# Patient Record
Sex: FEMALE | Race: BLACK OR AFRICAN AMERICAN | Employment: OTHER | RURAL
[De-identification: names, ages, dates, MRNs, and addresses within clinical notes are randomized per-mention and may not be internally consistent; named-entity substitution may affect disease eponyms.]

---

## 2017-02-02 RX ORDER — DILTIAZEM HYDROCHLORIDE 180 MG/1
CAPSULE, COATED, EXTENDED RELEASE ORAL
Qty: 30 CAP | Refills: 4 | Status: SHIPPED | OUTPATIENT
Start: 2017-02-02 | End: 2017-07-03 | Stop reason: SDUPTHER

## 2017-02-02 RX ORDER — FUROSEMIDE 20 MG/1
TABLET ORAL
Qty: 30 TAB | Refills: 4 | Status: SHIPPED | OUTPATIENT
Start: 2017-02-02 | End: 2017-07-03 | Stop reason: SDUPTHER

## 2017-03-07 ENCOUNTER — OFFICE VISIT (OUTPATIENT)
Dept: FAMILY MEDICINE CLINIC | Age: 82
End: 2017-03-07

## 2017-03-07 VITALS
BODY MASS INDEX: 35.19 KG/M2 | OXYGEN SATURATION: 96 % | HEART RATE: 60 BPM | RESPIRATION RATE: 18 BRPM | DIASTOLIC BLOOD PRESSURE: 69 MMHG | TEMPERATURE: 97.2 F | WEIGHT: 186.38 LBS | HEIGHT: 61 IN | SYSTOLIC BLOOD PRESSURE: 174 MMHG

## 2017-03-07 DIAGNOSIS — H65.01 RIGHT ACUTE SEROUS OTITIS MEDIA, RECURRENCE NOT SPECIFIED: ICD-10-CM

## 2017-03-07 DIAGNOSIS — E11.9 WELL CONTROLLED TYPE 2 DIABETES MELLITUS (HCC): Primary | ICD-10-CM

## 2017-03-07 DIAGNOSIS — I10 ESSENTIAL HYPERTENSION: ICD-10-CM

## 2017-03-07 RX ORDER — AMOXICILLIN 875 MG/1
875 TABLET, FILM COATED ORAL 2 TIMES DAILY
Qty: 20 TAB | Refills: 0 | Status: SHIPPED | OUTPATIENT
Start: 2017-03-07 | End: 2017-03-17

## 2017-03-07 RX ORDER — GLIMEPIRIDE 1 MG/1
1 TABLET ORAL
Qty: 90 TAB | Refills: 0 | Status: SHIPPED | OUTPATIENT
Start: 2017-03-07 | End: 2017-06-02 | Stop reason: SDUPTHER

## 2017-03-07 NOTE — MR AVS SNAPSHOT
Visit Information Date & Time Provider Department Dept. Phone Encounter #  
 3/7/2017  1:40 PM Jonathan Ordaz MD [place] [facility] [phone] [id number] Your Appointments 6/6/2017  1:40 PM  
Medicare Physical with MD [facility]) Appt Note: 3 mo f/u  
 [address] [phone]  
  
   
 [address] [phone] Upcoming Health Maintenance Date Due  
 FOOT EXAM Q1 10/5/2016 HEMOGLOBIN A1C Q6M 6/6/2017 Pneumococcal 65+ Low/Medium Risk (2 of 2 - PCV13) 6/7/2017 MICROALBUMIN Q1 6/7/2017 MEDICARE YEARLY EXAM 6/8/2017 EYE EXAM RETINAL OR DILATED Q1 7/14/2017 LIPID PANEL Q1 9/7/2017 GLAUCOMA SCREENING Q2Y 7/14/2018 DTaP/Tdap/Td series (2 - Td) 6/7/2026 Allergies as of 3/7/2017  Review Complete On: 3/7/2017 By: Jonathan Ordaz MD  
  
 Severity Noted Reaction Type Reactions Metformin  01/27/2014    Other (comments) Kidneys effected Current Immunizations  Reviewed on 11/7/2014 Name Date Influenza Vaccine 11/7/2014, 10/25/2013, 10/22/2012 Influenza Vaccine Kenneth Nicolasa) 12/6/2016, 10/2/2015 Pneumococcal Polysaccharide (PPSV-23) 6/7/2016 Not reviewed this visit You Were Diagnosed With   
  
 Codes Comments Well controlled type 2 [facility]    -  Primary ICD-10-CM: E11.9 ICD-9-CM: 250.00 Essential hypertension     ICD-10-CM: I10 
ICD-9-CM: 401.9 Right acute serous otitis media, recurrence not specified     ICD-10-CM: H65.01 
ICD-9-CM: 381.01 Vitals BP Pulse Temp Resp Height(growth percentile) Weight(growth percentile) 174/69 60 97.2 °F (36.2 °C) (Oral) 18 5' 1\" (1.549 m) 186 lb 6 oz (84.5 kg) SpO2 BMI OB Status Smoking Status 96% 35.22 kg/m2 Hysterectomy Never Smoker Vitals History BMI and BSA Data Body Mass Index Body Surface Area 35.22 kg/m 2 1.91 m 2 Preferred Pharmacy Pharmacy Name Phone [id number] [place], [place] - [address] Your Updated Medication List  
  
   
This list is accurate as of: 3/7/17  1:59 PM.  Always use your most recent med list.  
  
  
  
  
 acetaminophen 500 mg tablet Commonly known as:  [address] Take 1 Tab by mouth every six (6) hours as needed for Pain.  
  
 amoxicillin 875 mg tablet Commonly known as:  AMOXIL Take 1 Tab by mouth two (2) times a day for 10 days. aspirin delayed-release 81 mg tablet Take 1 Tab by mouth daily. dilTIAZem  mg ER capsule Commonly known as:  CARTIA XT  
ONE ORALLY DAILY  
  
 fluticasone 50 mcg/actuation nasal spray Commonly known as:  Shelvia Birks SPRAY 1 PUFF IN EACH NOSTRIL ONCE DAILY  
  
 furosemide 20 mg tablet Commonly known as:  LASIX TAKE ONE TABLET BY MOUTH ONCE DAILY IN THE MORNING  
  
 glimepiride 1 mg tablet Commonly known as:  AMARYL Take 1 Tab by mouth Daily (before breakfast). lisinopril-hydroCHLOROthiazide 20-25 mg per tablet Commonly known as:  Lopez Kemps Take 1 Tab by mouth daily. lovastatin 40 mg tablet Commonly known as:  MEVACOR  
TAKE 1 TAB BY MOUTH NIGHTLY FOR CHOLESTEROL LOWERING  
  
 MULTIVITAMIN PO Take 3,000 Units by mouth daily. omeprazole 40 mg capsule Commonly known as:  PRILOSEC  
TAKE ONE CAPSULE BY MOUTH EVERY MORNING TO CONTROL STOMACH ACID AND REPLACE PANTOPRAZOLE  
  
 spironolactone 25 mg tablet Commonly known as:  ALDACTONE  
TAKE 1/2 TABLET ONCE DAILY  
  
 triamcinolone acetonide 0.1 % topical cream  
Commonly known as:  KENALOG Apply  to affected area two (2) times a day. use thin layer  
  
 valsartan 320 mg tablet Commonly known as:  DIOVAN  
TAKE ONE TABLET ONCE DAILY FOR BLOOD PRESSURE CONTROL Prescriptions Sent to Pharmacy  Refills  
 glimepiride (AMARYL) 1 mg tablet 0  
 Sig: Take 1 Tab by mouth Daily (before breakfast). Class: Normal  
 Pharmacy: [facility] [address] Ph #: [phone] Route: Oral  
 amoxicillin (AMOXIL) 875 mg tablet 0 Sig: Take 1 Tab by mouth two (2) times a day for 10 days. Class: Normal  
 Pharmacy: [facility] Ph #: [phone] Route: Oral  
  
We Performed the Following COLLECTION VENOUS BLOOD,VENIPUNCTURE A2098649 CPT(R)] HEMOGLOBIN A1C WITH EAG [11424 CPT(R)]  DIABETES FOOT EXAM [HM7 Custom] METABOLIC PANEL, COMPREHENSIVE [10191 CPT(R)] Introducing [facility]! Zheng Covington introduces [employer] patient portal. Now you can access parts of your medical record, email your doctor's office, and request medication refills online. 1. In your internet browser, go to [email or web address] 2. Click on the First Time User? Click Here link in the Sign In box. You will see the New Member Sign Up page. 3. Enter your Song[employer] Access Code exactly as it appears below. You will not need to use this code after youve completed the sign-up process. If you do not sign up before the expiration date, you must request a new code. · Song[employer] Access Code: [email or web address] Expires: 6/5/2017  1:59 PM 
 
4. Enter the last four digits of your Social Security Number (xxxx) and Date of Birth (mm/dd/yyyy) as indicated and click Submit. You will be taken to the next sign-up page. 5. Create a [employer] ID. This will be your Songza login ID and cannot be changed, so think of one that is secure and easy to remember. 6. Create a [employer] password. You can change your password at any time. 7. Enter your Password Reset Question and Answer. This can be used at a later time if you forget your password. 8. Enter your e-mail address. You will receive e-mail notification when new information is available in [address] 9. Click Sign Up. You can now view and download portions of your medical record. 10. Click the Download Summary menu link to download a portable copy of your medical information. If you have questions, please visit the Frequently Asked Questions section of the DriftToIt website. Remember, DriftToIt is NOT to be used for urgent needs. For medical emergencies, dial 911. Now available from your iPhone and Android! Please provide this summary of care documentation to your next provider. Your primary care clinician is listed as Cassi Myrick. If you have any questions after today's visit, please call [phone].

## 2017-03-07 NOTE — PROGRESS NOTES
HISTORY OF PRESENT ILLNESS  Mariluz Blackmon is a 80 y.o. female. Ear Pain   Pertinent negatives include no chest pain, no abdominal pain, no headaches and no shortness of breath. Diabetes  Mariluz Blackmon is a 80 y.o. female who is here for a routine diabetic check. Current blood sugars at home have been 90-120s fasting. Lab Results   Component Value Date/Time    Hemoglobin A1c 8.0 12/06/2016 01:54 PM   There have been no episodes of hypoglycemia. Now she is back on Januvia as her A1c was up last visit. This has been very expensive for her and she would like to have something else. Last eye exam was this past summer. Checking feet daily. Hypertension   Has been following a low sodium diet   Taking meds daily  . Denies chest pain or shortness of breath. Hyperlipidemia  On medication for high cholesterol. No myalgias noted. Taking meds daily    Lab Results   Component Value Date/Time    Cholesterol, total 137 09/07/2016 03:24 PM    HDL Cholesterol 40 09/07/2016 03:24 PM    LDL, calculated 75 09/07/2016 03:24 PM    VLDL, calculated 22 09/07/2016 03:24 PM    Triglyceride 109 09/07/2016 03:24 PM     She is c/o right ear pain. Started a few days ago. Has recurrent OM. Using Flonase. Review of Systems   Constitutional: Negative for chills, fever and malaise/fatigue. HENT: Positive for congestion and ear pain. Negative for sore throat. Respiratory: Negative for cough and shortness of breath. Cardiovascular: Negative for chest pain, palpitations and leg swelling. Gastrointestinal: Negative for abdominal pain. Musculoskeletal: Positive for back pain and joint pain. Negative for falls and neck pain. Skin: Negative for rash. Neurological: Negative for dizziness and headaches. Psychiatric/Behavioral: Negative for depression.      Past Medical History:   Diagnosis Date    Anxiety     Back pain     CAD (coronary artery disease)     CKD (chronic kidney disease) stage 3, GFR 30-59 ml/min     Constipation     Diabetes ([facility]     Diverticulosis 2007    DM (diabetes mellitus) ([facility]     AODM    Fibroid 1972    GERD (gastroesophageal reflux disease)     Goiter, non-toxic     Hernia, hiatal     Hyperlipemia     Hypertension     Hypokalemia     Osteoarthritis 2006    bursitis R shoulder    Osteoarthritis of right knee     PVC (premature ventricular contraction)     H/O PVC's    Raynaud disease     Venous stasis     Wears hearing aid     both ears     Past Surgical History:   Procedure Laterality Date    HX BREAST BIOPSY      x5    HX CATARACT REMOVAL  2004    bilateral    HX COLONOSCOPY  2002, 08/2007    HX DILATION AND CURETTAGE  1960's    HX HYSTERECTOMY  70's    BSO    HX OTHER SURGICAL  2001    sigmoidoscopy     Allergies   Allergen Reactions    Metformin Other (comments)     Kidneys effected     Blood pressure 174/69, pulse 60, temperature 97.2 °F (36.2 °C), temperature source Oral, resp. rate 18, height 5' 1\" (1.549 m), weight 186 lb 6 oz (84.5 kg), SpO2 96 %. Physical Exam   Constitutional: She is oriented to person, place, and time. She appears well-developed and well-nourished. No distress. HENT:   Head: Normocephalic and atraumatic. Left Ear: External ear normal.   Mouth/Throat: Oropharynx is clear and moist.   Right TM red with poor LR   Eyes: Conjunctivae are normal.   Neck: Neck supple. Cardiovascular: Normal rate and regular rhythm. Murmur heard. Usual III/VI murmur   Pulmonary/Chest: Effort normal and breath sounds normal. No respiratory distress. Abdominal: Soft. Lymphadenopathy:     She has no cervical adenopathy. Neurological: She is alert and oriented to person, place, and time. Skin: Skin is warm. Psychiatric: She has a normal mood and affect. Nursing note and vitals reviewed.     Diabetic foot exam:     Left:    Sharp/dull discrimination normal    Filament test normal sensation with micro filament   Pulse DP: 2+ (normal)   Pulse PT: 2+ (normal)   Deformities: None  Right:    Sharp/dull discrimination normal   Filament test normal sensation with micro filament   Pulse DP: 2+ (normal)   Pulse PT: 2+ (normal)   Deformities: None    ASSESSMENT and PLAN  DM   Check A1c and CMP   Regular eye and foot exams   Discussed diet and exercise   Stop Januvia   Start Amaryl 1mg daily   Warned of s/sx hypoglycemia- if this occurs she is to stop Amaryl  HTN   Low sodium diet   Continue present meds  Hyperlipidemia   Low fat diet   Continue meds  OM   Amoxil 875mg BID x 10 days   RTO if no improvement  RTO 3 months

## 2017-03-08 LAB
ALBUMIN SERPL-MCNC: 3.7 G/DL (ref 3.5–4.7)
ALBUMIN/GLOB SERPL: 1.1 {RATIO} (ref 1.1–2.5)
ALP SERPL-CCNC: 57 IU/L (ref 39–117)
ALT SERPL-CCNC: 5 IU/L (ref 0–32)
AST SERPL-CCNC: 15 IU/L (ref 0–40)
BILIRUB SERPL-MCNC: 0.3 MG/DL (ref 0–1.2)
BUN SERPL-MCNC: 35 MG/DL (ref 8–27)
BUN/CREAT SERPL: 21 (ref 11–26)
CALCIUM SERPL-MCNC: 10.6 MG/DL (ref 8.7–10.3)
CHLORIDE SERPL-SCNC: 102 MMOL/L (ref 96–106)
CO2 SERPL-SCNC: 26 MMOL/L (ref 18–29)
CREAT SERPL-MCNC: 1.65 MG/DL (ref 0.57–1)
EST. AVERAGE GLUCOSE BLD GHB EST-MCNC: 148 MG/DL
GLOBULIN SER CALC-MCNC: 3.4 G/DL (ref 1.5–4.5)
GLUCOSE SERPL-MCNC: 124 MG/DL (ref 65–99)
HBA1C MFR BLD: 6.8 % (ref 4.8–5.6)
INTERPRETATION: NORMAL
POTASSIUM SERPL-SCNC: 3.9 MMOL/L (ref 3.5–5.2)
PROT SERPL-MCNC: 7.1 G/DL (ref 6–8.5)
SODIUM SERPL-SCNC: 141 MMOL/L (ref 134–144)

## 2017-03-08 NOTE — PROGRESS NOTES
Labs are stable. Her A1c is stable as well as her kidney function. Take the low dose Amaryl. Monitor sugars at home.  RTO 3 months

## 2017-06-06 ENCOUNTER — OFFICE VISIT (OUTPATIENT)
Dept: FAMILY MEDICINE CLINIC | Age: 82
End: 2017-06-06

## 2017-06-06 VITALS
BODY MASS INDEX: 35.21 KG/M2 | RESPIRATION RATE: 18 BRPM | OXYGEN SATURATION: 99 % | WEIGHT: 186.5 LBS | SYSTOLIC BLOOD PRESSURE: 144 MMHG | DIASTOLIC BLOOD PRESSURE: 80 MMHG | HEIGHT: 61 IN | HEART RATE: 50 BPM

## 2017-06-06 DIAGNOSIS — E78.5 HYPERLIPIDEMIA, UNSPECIFIED HYPERLIPIDEMIA TYPE: ICD-10-CM

## 2017-06-06 DIAGNOSIS — E11.9 WELL CONTROLLED TYPE 2 DIABETES MELLITUS (HCC): Primary | ICD-10-CM

## 2017-06-06 DIAGNOSIS — Z71.89 ADVANCE CARE PLANNING: ICD-10-CM

## 2017-06-06 DIAGNOSIS — Z00.00 ROUTINE GENERAL MEDICAL EXAMINATION AT A HEALTH CARE FACILITY: ICD-10-CM

## 2017-06-06 DIAGNOSIS — I10 ESSENTIAL HYPERTENSION: ICD-10-CM

## 2017-06-06 DIAGNOSIS — Z13.39 SCREENING FOR ALCOHOLISM: ICD-10-CM

## 2017-06-06 NOTE — PROGRESS NOTES
This is a Subsequent Medicare Annual Wellness Visit providing Personalized Prevention Plan Services (PPPS) (Performed 12 months after initial AWV and PPPS )    I have reviewed the patient's medical history in detail and updated the computerized patient record. History     Diabetes  Baldo Naima is a 80 y.o. female who is here for a routine diabetic check. Current blood sugars at home have been  fasting . Lab Results   Component Value Date/Time    Hemoglobin A1c 6.8 03/07/2017 01:55 PM   There have been no episodes of hypoglycemia. Taking meds daily   Last eye exam was last summer and she has an appointment in July. Checking feet daily      Hypertension   Has been following a low sodium diet  Exercises a few times per week. Taking meds daily  . Denies chest pain or shortness of breath. Hyperlipidemia  On medication for high cholesterol. No myalgias noted.   Taking meds daily    Lab Results   Component Value Date/Time    Cholesterol, total 137 09/07/2016 03:24 PM    HDL Cholesterol 40 09/07/2016 03:24 PM    LDL, calculated 75 09/07/2016 03:24 PM    VLDL, calculated 22 09/07/2016 03:24 PM    Triglyceride 109 09/07/2016 03:24 PM     CAD  No CP or SOB    Past Medical History:   Diagnosis Date    Anxiety     Back pain     CAD (coronary artery disease)     CKD (chronic kidney disease) stage 3, GFR 30-59 ml/min     Constipation     Diabetes ([address]     Diverticulosis 2007    DM (diabetes mellitus) ([address]     AODM    Fibroid 1972    GERD (gastroesophageal reflux disease)     Goiter, non-toxic     Hernia, hiatal     Hyperlipemia     Hypertension     Hypokalemia     Osteoarthritis 2006    bursitis R shoulder    Osteoarthritis of right knee     PVC (premature ventricular contraction)     H/O PVC's    Raynaud disease     Venous stasis     Wears hearing aid     both ears      Past Surgical History:   Procedure Laterality Date    HX BREAST BIOPSY      x5    HX CATARACT REMOVAL  2004 bilateral    HX COLONOSCOPY  2002, 08/2007    HX DILATION AND CURETTAGE  1960's    HX HYSTERECTOMY  70's    BSO    HX OTHER SURGICAL  2001    sigmoidoscopy     Current Outpatient Prescriptions   Medication Sig Dispense Refill    valsartan (DIOVAN) 320 mg tablet TAKE ONE TABLET ONCE DAILY FOR BLOOD PRESSURE CONTROL 90 Tab 0    glimepiride (AMARYL) 1 mg tablet TAKE 1 TAB BY MOUTH DAILY (BEFORE BREAKFAST). 90 Tab 0    lovastatin (MEVACOR) 40 mg tablet TAKE 1 TAB BY MOUTH NIGHTLY FOR CHOLESTEROL LOWERING 30 Tab 0    omeprazole (PRILOSEC) 40 mg capsule TAKE ONE CAPSULE BY MOUTH EVERY MORNING TO CONTROL STOMACH ACID 90 Cap 0    dilTIAZem CD (CARTIA XT) 180 mg ER capsule ONE ORALLY DAILY 30 Cap 4    furosemide (LASIX) 20 mg tablet TAKE ONE TABLET BY MOUTH ONCE DAILY IN THE MORNING 30 Tab 4    triamcinolone acetonide (KENALOG) 0.1 % topical cream Apply  to affected area two (2) times a day. use thin layer      fluticasone (FLONASE) 50 mcg/actuation nasal spray SPRAY 1 PUFF IN EACH NOSTRIL ONCE DAILY 16 g 3    spironolactone (ALDACTONE) 25 mg tablet TAKE 1/2 TABLET ONCE DAILY 15 Tab 5    aspirin delayed-release 81 mg tablet Take 1 Tab by mouth daily. 30 Tab 0    acetaminophen (TYLENOL EXTRA STRENGTH) 500 mg tablet Take 1 Tab by mouth every six (6) hours as needed for Pain. 30 Tab 1    MULTIVITAMIN PO Take 3,000 Units by mouth daily.        Allergies   Allergen Reactions    Metformin Other (comments)     Kidneys effected     Family History   Problem Relation Age of Onset    Diabetes Brother     Hypertension Mother     Diabetes Brother      Social History   Substance Use Topics    Smoking status: Never Smoker    Smokeless tobacco: Never Used    Alcohol use No     Patient Active Problem List   Diagnosis Code    Hyperlipemia E78.5    Encounter for long-term (current) use of other medications Z79.899    Well controlled type 2 diabetes mellitus ([facility] E11.9    Essential hypertension I10       Depression Risk Factor Screening:     PHQ over the last two weeks 6/6/2017   PHQ Not Done Patient Decline   Little interest or pleasure in doing things -   Feeling down, depressed or hopeless -   Total Score PHQ 2 -     Alcohol Risk Factor Screening: On any occasion during the past 3 months, have you had more than 3 drinks containing alcohol? No    Do you average more than 7 drinks per week? No      Functional Ability and Level of Safety:     Functional Ability:   Does the patient exhibit a steady gait?  nono   How long did it take the patient to get up and walk from a sitting position? 7 sec   Is the patient self reliant?  (ie can do own laundry, meals, household chores)  yes     Does the patient handle his/her own medications? yes     Does the patient handle his/her own money? yes     Is the patients home safe (ie good lighting, handrails on stairs and bath, etc.)? yes     Did you notice or did patient express any hearing difficulties? yes     Did you notice or did patient express any vision difficulties?   no     Were distance and reading eye charts used? no       Advance Care Planning:   Patient was offered the opportunity to discuss advance care planning:  yes     Does patient have an Advance Directive:  no   If no, did you provide information on Caring Connections?   yes     ADL Assessment 6/6/2017   Feeding yourself No Help Needed   Getting from bed to chair No Help Needed   Getting dressed No Help Needed   Bathing or showering No Help Needed   Walk across the room (includes cane/walker) No Help Needed   Using the telphone No Help Needed   Taking your medications No Help Needed   Preparing meals No Help Needed   Managing money (expenses/bills) No Help Needed   Moderately strenuous housework (laundry) No Help Needed   Shopping for personal items (toiletries/medicines) No Help Needed   Shopping for groceries No Help Needed   Driving No Help Needed   Climbing a flight of stairs No Help Needed   Getting to places beyond walking distances No Help Needed         Hearing Loss   normal-to-mild    Activities of Daily Living   Self-care. Requires assistance with: no ADLs    Fall Risk     Fall Risk Assessment, last 12 mths 6/6/2017   Able to walk? Yes   Fall in past 12 months? No     Abuse Screen   Patient is not abused    Review of Systems   A comprehensive review of systems was negative except for that written in the HPI. Physical Examination     Evaluation of Cognitive Function:  Mood/affect:  neutral  Appearance: age appropriate  Family member/caregiver input: n/a  Clock test done and passed    Physical Exam   Constitutional: She is oriented to person, place, and time. She appears well-developed and well-nourished. No distress. HENT:   Head: Normocephalic and atraumatic. Left Ear: External ear normal.   Mouth/Throat: Oropharynx is clear and moist.   Eyes: Conjunctivae are normal.   Neck: Neck supple. Cardiovascular: Normal rate and regular rhythm. Murmur heard. Usual III/VI murmur   Pulmonary/Chest: Effort normal and breath sounds normal. No respiratory distress. Abdominal: Soft. Lymphadenopathy:   She has no cervical adenopathy. Neurological: She is alert and oriented to person, place, and time. Skin: Skin is warm. Psychiatric: She has a normal mood and affect. Nursing note and vitals reviewed. Patient Care Team:  Orlando Alfonso MD as PCP - [facility])    Advice/Referrals/Counseling   Education and counseling provided:  End-of-Life planning (with patient's consent)  Screening for glaucoma      Assessment/Plan       ICD-10-CM ICD-9-CM    1. Well controlled type 2 diabetes mellitus ([facility] E11.9 250.00 COLLECTION VENOUS BLOOD,VENIPUNCTURE      METABOLIC PANEL, COMPREHENSIVE      MICROALBUMIN, UR, RAND W/ MICROALBUMIN/CREA RATIO      HEMOGLOBIN A1C W/O EAG   2. Routine general medical examination at a health care facility Z00.00 V70.0    3.  Screening for alcoholism Z13.89 V79.1    4. Essential hypertension I10 401.9 COLLECTION VENOUS BLOOD,VENIPUNCTURE      CBC WITH AUTOMATED DIFF      METABOLIC PANEL, COMPREHENSIVE   5. Hyperlipidemia, unspecified hyperlipidemia type E78.5 272.4 COLLECTION VENOUS BLOOD,VENIPUNCTURE      LIPID PANEL      CBC WITH AUTOMATED DIFF      METABOLIC PANEL, COMPREHENSIVE     current treatment plan is effective, no change in therapy  lab results and schedule of future lab studies reviewed with patient  reviewed diet, exercise and weight control  reviewed medications and side effects in detail. Pt has upcoming apt with Dr Joe Ferguson will get them to fax note in July     Advance Care Planning (ACP) Provider Note - Comprehensive     Date of ACP Conversation: 06/06/17  Persons included in Conversation:  patient and family  Length of ACP Conversation in minutes:  <16 minutes (Non-Billable)    Authorized Decision Maker (if patient is incapable of making informed decisions): This person is:   Other Legally Authorized Decision Maker (e.g. Next of Kin)          General ACP for ALL Patients with Decision Making Capacity:   Importance of advance care planning, including choosing a healthcare agent to communicate patient's healthcare decisions if patient lost the ability to make decisions, such as after a sudden illness or accident  Understanding of the healthcare agent role was assessed and information provided    Interventions Provided:  Recommended completion of Advance Directive form after review of ACP materials and conversation with prospective healthcare agent   Recommended communicating the plan and making copies for the healthcare agent, personal physician, and others as appropriate (e.g., health system)

## 2017-06-06 NOTE — MR AVS SNAPSHOT
Visit Information Date & Time Provider Department Dept. Phone Encounter #  
 6/6/2017  1:40 PM Karli Maharaj MD [facility] [phone] [id number] Upcoming Health Maintenance Date Due MICROALBUMIN Q1 6/7/2017 Pneumococcal 65+ Low/Medium Risk (2 of 2 - PCV13) 6/7/2017 EYE EXAM RETINAL OR DILATED Q1 7/14/2017 INFLUENZA AGE 9 TO ADULT 8/1/2017 HEMOGLOBIN A1C Q6M 9/7/2017 LIPID PANEL Q1 9/7/2017 FOOT EXAM Q1 3/7/2018 MEDICARE YEARLY EXAM 6/7/2018 GLAUCOMA SCREENING Q2Y 7/14/2018 DTaP/Tdap/Td series (2 - Td) 6/7/2026 Allergies as of 6/6/2017  Review Complete On: 6/6/2017 By: Karli Maharaj MD  
  
 Severity Noted Reaction Type Reactions Metformin  01/27/2014    Other (comments) Kidneys effected Current Immunizations  Reviewed on 11/7/2014 Name Date Influenza Vaccine 11/7/2014, 10/25/2013, 10/22/2012 Influenza Vaccine Jean-Pierre Lafourche) 12/6/2016, 10/2/2015 Pneumococcal Polysaccharide (PPSV-23) 6/7/2016 Not reviewed this visit You Were Diagnosed With   
  
 Codes Comments Well controlled type 2 diabetes mellitus ([facility]    -  Primary ICD-10-CM: E11.9 ICD-9-CM: 250.00 Routine general medical examination at a health care facility     ICD-10-CM: Z00.00 ICD-9-CM: V70.0 Screening for alcoholism     ICD-10-CM: Z13.89 ICD-9-CM: V79.1 Essential hypertension     ICD-10-CM: I10 
ICD-9-CM: 401.9 Hyperlipidemia, unspecified hyperlipidemia type     ICD-10-CM: E78.5 ICD-9-CM: 272.4 Vitals BP Pulse Resp Height(growth percentile) Weight(growth percentile) SpO2  
 173/52 (!) 50 18 5' 1\" (1.549 m) 186 lb 8 oz (84.6 kg) 99% BMI OB Status Smoking Status 35.24 kg/m2 Hysterectomy Never Smoker Vitals History BMI and BSA Data Body Mass Index Body Surface Area  
 35.24 kg/m 2 1.91 m 2 Preferred Pharmacy Pharmacy Name Phone [address] [place] [address] - [address] Your Updated Medication List  
  
   
This list is accurate as of: 6/6/17  2:05 PM.  Always use your most recent med list.  
  
  
  
  
 acetaminophen 500 mg tablet Commonly known as:  [address] Sus[address] Mcclain Take 1 Tab by mouth every six (6) hours as needed for Pain. aspirin delayed-release 81 mg tablet Take 1 Tab by mouth daily. dilTIAZem  mg ER capsule Commonly known as:  CARTIA XT  
ONE ORALLY DAILY  
  
 fluticasone 50 mcg/actuation nasal spray Commonly known as:  Skipper Or SPRAY 1 PUFF IN EACH NOSTRIL ONCE DAILY  
  
 furosemide 20 mg tablet Commonly known as:  LASIX TAKE ONE TABLET BY MOUTH ONCE DAILY IN THE MORNING  
  
 glimepiride 1 mg tablet Commonly known as:  AMARYL  
TAKE 1 TAB BY MOUTH DAILY (BEFORE BREAKFAST). lovastatin 40 mg tablet Commonly known as:  MEVACOR  
TAKE 1 TAB BY MOUTH NIGHTLY FOR CHOLESTEROL LOWERING  
  
 MULTIVITAMIN PO Take 3,000 Units by mouth daily. omeprazole 40 mg capsule Commonly known as:  PRILOSEC  
TAKE ONE CAPSULE BY MOUTH EVERY MORNING TO CONTROL STOMACH ACID  
  
 spironolactone 25 mg tablet Commonly known as:  ALDACTONE  
TAKE 1/2 TABLET ONCE DAILY  
  
 triamcinolone acetonide 0.1 % topical cream  
Commonly known as:  KENALOG Apply  to affected area two (2) times a day. use thin layer  
  
 valsartan 320 mg tablet Commonly known as:  DIOVAN  
TAKE ONE TABLET ONCE DAILY FOR BLOOD PRESSURE CONTROL We Performed the Following CBC WITH AUTOMATED DIFF [43320 CPT(R)] COLLECTION VENOUS BLOOD,VENIPUNCTURE [id number] CPT(R)] HEMOGLOBIN A1C W/O EAG [31247 CPT(R)] LIPID PANEL [63115 CPT(R)] METABOLIC PANEL, COMPREHENSIVE [20932 CPT(R)] MICROALBUMIN, UR, RAND W/ MICROALBUMIN/CREA RATIO [id number] CPT(R)] Introducing [facility] & HEALTH SERVICES!    
 Olivier Menezes introduces [employer] patient portal. Now you can access parts of your medical record, email your doctor's office, and request medication refills online. 1. In your internet browser, go to [email or web address] 2. Click on the First Time User? Click Here link in the Sign In box. You will see the New Member Sign Up page. 3. Enter your [employer] Access Code exactly as it appears below. You will not need to use this code after youve completed the sign-up process. If you do not sign up before the expiration date, you must request a new code. · [employer] Access Code: [address] [place] [address] Expires: 9/4/2017  2:05 PM 
 
4. Enter the last four digits of your Social Security Number (xxxx) and Date of Birth (mm/dd/yyyy) as indicated and click Submit. You will be taken to the next sign-up page. 5. Create a abcdexperts ID. This will be your abcdexperts login ID and cannot be changed, so think of one that is secure and easy to remember. 6. Create a abcdexperts password. You can change your password at any time. 7. Enter your Password Reset Question and Answer. This can be used at a later time if you forget your password. 8. Enter your e-mail address. You will receive e-mail notification when new information is available in [address] 9. Click Sign Up. You can now view and download portions of your medical record. 10. Click the Download Summary menu link to download a portable copy of your medical information. If you have questions, please visit the Frequently Asked Questions section of the abcdexperts website. Remember, abcdexperts is NOT to be used for urgent needs. For medical emergencies, dial 911. Now available from your iPhone and Android! Please provide this summary of care documentation to your next provider. Your primary care clinician is listed as Pawel Clemens. If you have any questions after today's visit, please call [phone].

## 2017-06-07 LAB
ALBUMIN SERPL-MCNC: 3.8 G/DL (ref 3.5–4.7)
ALBUMIN/CREAT UR: 7.4 MG/G CREAT (ref 0–30)
ALBUMIN/GLOB SERPL: 1.1 {RATIO} (ref 1.2–2.2)
ALP SERPL-CCNC: 62 IU/L (ref 39–117)
ALT SERPL-CCNC: 7 IU/L (ref 0–32)
AST SERPL-CCNC: 17 IU/L (ref 0–40)
BASOPHILS # BLD AUTO: 0 X10E3/UL (ref 0–0.2)
BASOPHILS NFR BLD AUTO: 0 %
BILIRUB SERPL-MCNC: 0.2 MG/DL (ref 0–1.2)
BUN SERPL-MCNC: 32 MG/DL (ref 8–27)
BUN/CREAT SERPL: 17 (ref 12–28)
CALCIUM SERPL-MCNC: 10.4 MG/DL (ref 8.7–10.3)
CHLORIDE SERPL-SCNC: 100 MMOL/L (ref 96–106)
CHOLEST SERPL-MCNC: 126 MG/DL (ref 100–199)
CO2 SERPL-SCNC: 23 MMOL/L (ref 18–29)
CREAT SERPL-MCNC: 1.89 MG/DL (ref 0.57–1)
CREAT UR-MCNC: 49.9 MG/DL
EOSINOPHIL # BLD AUTO: 0.1 X10E3/UL (ref 0–0.4)
EOSINOPHIL NFR BLD AUTO: 1 %
ERYTHROCYTE [DISTWIDTH] IN BLOOD BY AUTOMATED COUNT: 13.2 % (ref 12.3–15.4)
GLOBULIN SER CALC-MCNC: 3.4 G/DL (ref 1.5–4.5)
GLUCOSE SERPL-MCNC: 79 MG/DL (ref 65–99)
HBA1C MFR BLD: 5.8 % (ref 4.8–5.6)
HCT VFR BLD AUTO: 32.2 % (ref 34–46.6)
HDLC SERPL-MCNC: 40 MG/DL
HGB BLD-MCNC: 10.6 G/DL (ref 11.1–15.9)
IMM GRANULOCYTES # BLD: 0 X10E3/UL (ref 0–0.1)
IMM GRANULOCYTES NFR BLD: 0 %
INTERPRETATION, 910389: NORMAL
INTERPRETATION: NORMAL
LDLC SERPL CALC-MCNC: 64 MG/DL (ref 0–99)
LYMPHOCYTES # BLD AUTO: 1.7 X10E3/UL (ref 0.7–3.1)
LYMPHOCYTES NFR BLD AUTO: 35 %
MCH RBC QN AUTO: 31.5 PG (ref 26.6–33)
MCHC RBC AUTO-ENTMCNC: 32.9 G/DL (ref 31.5–35.7)
MCV RBC AUTO: 96 FL (ref 79–97)
MICROALBUMIN UR-MCNC: 3.7 UG/ML
MONOCYTES # BLD AUTO: 0.4 X10E3/UL (ref 0.1–0.9)
MONOCYTES NFR BLD AUTO: 8 %
NEUTROPHILS # BLD AUTO: 2.7 X10E3/UL (ref 1.4–7)
NEUTROPHILS NFR BLD AUTO: 56 %
PDF IMAGE, 910387: NORMAL
PLATELET # BLD AUTO: 177 X10E3/UL (ref 150–379)
POTASSIUM SERPL-SCNC: 3.9 MMOL/L (ref 3.5–5.2)
PROT SERPL-MCNC: 7.2 G/DL (ref 6–8.5)
RBC # BLD AUTO: 3.36 X10E6/UL (ref 3.77–5.28)
SODIUM SERPL-SCNC: 140 MMOL/L (ref 134–144)
TRIGL SERPL-MCNC: 108 MG/DL (ref 0–149)
VLDLC SERPL CALC-MCNC: 22 MG/DL (ref 5–40)
WBC # BLD AUTO: 4.9 X10E3/UL (ref 3.4–10.8)

## 2017-06-12 NOTE — PROGRESS NOTES
Overall labs look ok. Kidney function is a little worse. Blood sugars look to be under very good control with the medications.  RTO 3 months

## 2017-07-03 RX ORDER — DILTIAZEM HYDROCHLORIDE 180 MG/1
CAPSULE, EXTENDED RELEASE ORAL
Qty: 30 CAP | Refills: 1 | Status: SHIPPED | OUTPATIENT
Start: 2017-07-03 | End: 2017-08-30 | Stop reason: SDUPTHER

## 2017-07-03 RX ORDER — LISINOPRIL AND HYDROCHLOROTHIAZIDE 20; 25 MG/1; MG/1
TABLET ORAL
Qty: 30 TAB | Refills: 1 | Status: SHIPPED | OUTPATIENT
Start: 2017-07-03 | End: 2017-08-30 | Stop reason: SDUPTHER

## 2017-07-03 RX ORDER — OMEPRAZOLE 40 MG/1
CAPSULE, DELAYED RELEASE ORAL
Qty: 90 CAP | Refills: 1 | Status: SHIPPED | OUTPATIENT
Start: 2017-07-03 | End: 2017-12-01 | Stop reason: SDUPTHER

## 2017-07-03 RX ORDER — LOVASTATIN 40 MG/1
TABLET ORAL
Qty: 30 TAB | Refills: 1 | Status: SHIPPED | OUTPATIENT
Start: 2017-07-03 | End: 2017-08-30 | Stop reason: SDUPTHER

## 2017-07-03 RX ORDER — FUROSEMIDE 20 MG/1
TABLET ORAL
Qty: 30 TAB | Refills: 1 | Status: SHIPPED | OUTPATIENT
Start: 2017-07-03 | End: 2017-08-30 | Stop reason: SDUPTHER

## 2017-08-30 RX ORDER — GLIMEPIRIDE 1 MG/1
TABLET ORAL
Qty: 90 TAB | Refills: 1 | Status: SHIPPED | OUTPATIENT
Start: 2017-08-30 | End: 2018-01-29 | Stop reason: SDUPTHER

## 2017-08-30 RX ORDER — FUROSEMIDE 20 MG/1
TABLET ORAL
Qty: 30 TAB | Refills: 3 | Status: SHIPPED | OUTPATIENT
Start: 2017-08-30 | End: 2018-01-02 | Stop reason: SDUPTHER

## 2017-08-30 RX ORDER — VALSARTAN 320 MG/1
TABLET ORAL
Qty: 90 TAB | Refills: 1 | Status: SHIPPED | OUTPATIENT
Start: 2017-08-30 | End: 2018-03-01 | Stop reason: SDUPTHER

## 2017-08-30 RX ORDER — LISINOPRIL AND HYDROCHLOROTHIAZIDE 20; 25 MG/1; MG/1
TABLET ORAL
Qty: 30 TAB | Refills: 3 | Status: SHIPPED | OUTPATIENT
Start: 2017-08-30 | End: 2018-01-02 | Stop reason: SDUPTHER

## 2017-08-30 RX ORDER — DILTIAZEM HYDROCHLORIDE 180 MG/1
CAPSULE, EXTENDED RELEASE ORAL
Qty: 30 CAP | Refills: 3 | Status: SHIPPED | OUTPATIENT
Start: 2017-08-30 | End: 2017-09-19 | Stop reason: SDUPTHER

## 2017-08-30 RX ORDER — LOVASTATIN 40 MG/1
TABLET ORAL
Qty: 30 TAB | Refills: 3 | Status: SHIPPED | OUTPATIENT
Start: 2017-08-30 | End: 2018-01-02 | Stop reason: SDUPTHER

## 2017-09-19 ENCOUNTER — OFFICE VISIT (OUTPATIENT)
Dept: FAMILY MEDICINE CLINIC | Age: 82
End: 2017-09-19

## 2017-09-19 VITALS
HEART RATE: 78 BPM | DIASTOLIC BLOOD PRESSURE: 59 MMHG | RESPIRATION RATE: 18 BRPM | SYSTOLIC BLOOD PRESSURE: 178 MMHG | HEIGHT: 61 IN | TEMPERATURE: 98 F | BODY MASS INDEX: 35.34 KG/M2 | OXYGEN SATURATION: 99 % | WEIGHT: 187.2 LBS

## 2017-09-19 DIAGNOSIS — D63.8 ANEMIA OF INFECTION AND CHRONIC DISEASE: ICD-10-CM

## 2017-09-19 DIAGNOSIS — E11.9 WELL CONTROLLED TYPE 2 DIABETES MELLITUS (HCC): Primary | ICD-10-CM

## 2017-09-19 DIAGNOSIS — I10 ESSENTIAL HYPERTENSION: ICD-10-CM

## 2017-09-19 DIAGNOSIS — Z23 ENCOUNTER FOR IMMUNIZATION: ICD-10-CM

## 2017-09-19 DIAGNOSIS — E78.00 PURE HYPERCHOLESTEROLEMIA: ICD-10-CM

## 2017-09-19 DIAGNOSIS — H65.01 RIGHT ACUTE SEROUS OTITIS MEDIA, RECURRENCE NOT SPECIFIED: ICD-10-CM

## 2017-09-19 DIAGNOSIS — B99.9 ANEMIA OF INFECTION AND CHRONIC DISEASE: ICD-10-CM

## 2017-09-19 DIAGNOSIS — H61.21 IMPACTED CERUMEN OF RIGHT EAR: ICD-10-CM

## 2017-09-19 RX ORDER — AMOXICILLIN 875 MG/1
875 TABLET, FILM COATED ORAL 2 TIMES DAILY
Qty: 20 TAB | Refills: 0 | Status: SHIPPED | OUTPATIENT
Start: 2017-09-19 | End: 2017-09-29

## 2017-09-19 RX ORDER — DILTIAZEM HYDROCHLORIDE 240 MG/1
240 CAPSULE, COATED, EXTENDED RELEASE ORAL DAILY
Qty: 90 CAP | Refills: 1 | Status: SHIPPED | OUTPATIENT
Start: 2017-09-19 | End: 2018-01-29 | Stop reason: SDUPTHER

## 2017-09-19 NOTE — MR AVS SNAPSHOT
Visit Information Date & Time Provider Department Dept. Phone Encounter #  
 9/19/2017  1:20 PM Gilbert Reeves MD [facility] Primary Care [phone] [id number] Follow-up Instructions Return in about 6 weeks (around 10/31/2017). Your Appointments 10/31/2017  2:00 PM  
Follow Up with Gilbert Reeves MD  
[facility] Primary Care [address] [facility]) Appt Note: 6 week f/u  
 [address] [facility] [phone]  
  
   
 [address] [place] [address] [phone] Upcoming Health Maintenance Date Due Pneumococcal 65+ Low/Medium Risk (2 of 2 - PCV13) 6/7/2017 INFLUENZA AGE 9 TO ADULT 8/1/2017 HEMOGLOBIN A1C Q6M 12/6/2017 FOOT EXAM Q1 3/7/2018 MICROALBUMIN Q1 6/6/2018 LIPID PANEL Q1 6/6/2018 MEDICARE YEARLY EXAM 6/7/2018 EYE EXAM RETINAL OR DILATED Q1 7/13/2018 GLAUCOMA SCREENING Q2Y 7/13/2019 DTaP/Tdap/Td series (2 - Td) 6/7/2026 Allergies as of 9/19/2017  Review Complete On: 9/19/2017 By: Gilbert Reeves MD  
  
 Severity Noted Reaction Type Reactions Metformin  01/27/2014    Other (comments) Kidneys effected Current Immunizations  Reviewed on 11/7/2014 Name Date Influenza Vaccine 11/7/2014, 10/25/2013, 10/22/2012 Influenza Vaccine Juanita Hane) 12/6/2016, 10/2/2015 Pneumococcal Conjugate (PCV-13)  Incomplete Pneumococcal Polysaccharide (PPSV-23) 6/7/2016 Not reviewed this visit You Were Diagnosed With   
  
 Codes Comments Well controlled type 2 diabetes mellitus ([facility]    -  Primary ICD-10-CM: E11.9 ICD-9-CM: 250.00 Pure hypercholesterolemia     ICD-10-CM: E78.00 ICD-9-CM: 272.0 Essential hypertension     ICD-10-CM: I10 
ICD-9-CM: 401.9 Impacted cerumen of right ear     ICD-10-CM: H61.21 ICD-9-CM: 380.4 Anemia of infection and chronic disease     ICD-10-CM: B99.9, D63.8 ICD-9-CM: 285.29, 285.9 Right acute serous otitis media, recurrence not specified     ICD-10-CM: H65.01 
ICD-9-CM: 381.01 Encounter for immunization     ICD-10-CM: W15 ICD-9-CM: V03.89 Vitals BP Pulse Temp Resp Height(growth percentile) Weight(growth percentile) 175/63 (BP 1 Location: Left arm, BP Patient Position: Sitting) 78 98 °F (36.7 °C) (Oral) 18 5' 1\" (1.549 m) 187 lb 3.2 oz (84.9 kg) SpO2 BMI OB Status Smoking Status 99% 35.37 kg/m2 Hysterectomy Never Smoker Vitals History BMI and BSA Data Body Mass Index Body Surface Area  
 35.37 kg/m 2 1.91 m 2 Preferred Pharmacy Pharmacy Name Phone [phone] [place] [address] - [address] Your Updated Medication List  
  
   
This list is accurate as of: 9/19/17  2:19 PM.  Always use your most recent med list.  
  
  
  
  
 acetaminophen 500 mg tablet Commonly known as:  [address] Take 1 Tab by mouth every six (6) hours as needed for Pain.  
  
 amoxicillin 875 mg tablet Commonly known as:  AMOXIL Take 1 Tab by mouth two (2) times a day for 10 days. aspirin delayed-release 81 mg tablet Take 1 Tab by mouth daily. dilTIAZem  mg ER capsule Commonly known as:  CARTIA XT Take 1 Cap by mouth daily. fluticasone 50 mcg/actuation nasal spray Commonly known as:  Mariela Coffee SPRAY 1 PUFF IN EACH NOSTRIL ONCE DAILY  
  
 furosemide 20 mg tablet Commonly known as:  LASIX TAKE ONE TABLET BY MOUTH ONCE DAILY IN THE MORNING  
  
 glimepiride 1 mg tablet Commonly known as:  AMARYL  
TAKE 1 TAB BY MOUTH DAILY (BEFORE BREAKFAST). lisinopril-hydroCHLOROthiazide 20-25 mg per tablet Commonly known as:  PRINZIDE, ZESTORETIC  
TAKE 1 TAB BY MOUTH DAILY. lovastatin 40 mg tablet Commonly known as:  MEVACOR  
TAKE 1 TAB BY MOUTH NIGHTLY FOR CHOLESTEROL LOWERING  
  
 MULTIVITAMIN PO Take 3,000 Units by mouth daily. omeprazole 40 mg capsule Commonly known as:  PRILOSEC  
TAKE ONE CAPSULE BY MOUTH EVERY MORNING TO CONTROL STOMACH ACID  
  
 spironolactone 25 mg tablet Commonly known as:  ALDACTONE  
TAKE 1/2 TABLET ONCE DAILY  
  
 triamcinolone acetonide 0.1 % topical cream  
Commonly known as:  KENALOG Apply  to affected area two (2) times a day. use thin layer  
  
 valsartan 320 mg tablet Commonly known as:  DIOVAN  
TAKE ONE TABLET ONCE DAILY FOR BLOOD PRESSURE CONTROL Prescriptions Sent to Pharmacy Refills  
 amoxicillin (AMOXIL) 875 mg tablet 0 Sig: Take 1 Tab by mouth two (2) times a day for 10 days. Class: Normal  
 [facility] Ph #: [phone] Route: Oral  
 dilTIAZem CD (CARDIZEM CD) 240 mg ER capsule 1 Sig: Take 1 Cap by mouth daily. Class: Normal  
 Pharmacy: [facility] [address] Ph #: [phone] Route: Oral  
  
We Performed the Following ADMIN PNEUMOCOCCAL VACCINE [ [facility] [place]] CBC WITH AUTOMATED DIFF [41210 CPT(R)] HEMOGLOBIN A1C WITH EAG [29793 CPT(R)] METABOLIC PANEL, COMPREHENSIVE [60281 CPT(R)] PNEUMOCOCCAL CONJ VACCINE 13 VALENT IM [id number] CPT(R)] REMOVE IMPACTED EAR WAX [35123 CPT(R)] Follow-up Instructions Return in about 6 weeks (around 10/31/2017). Introducing [facility] & HEALTH SERVICES! Gretchen Gregg introduces [employer] patient portal. Now you can access parts of your medical record, email your doctor's office, and request medication refills online. 1. In your internet browser, go to [email or web address] 2. Click on the First Time User? Click Here link in the Sign In box. You will see the New Member Sign Up page. 3. Enter your [employer] Access Code exactly as it appears below. You will not need to use this code after youve completed the sign-up process. If you do not sign up before the expiration date, you must request a new code. · [employer] Access Code: [email or web address] Expires: 12/18/2017  2:17 PM 
 
4. Enter the last four digits of your Social Security Number (xxxx) and Date of Birth (mm/dd/yyyy) as indicated and click Submit. You will be taken to the next sign-up page. 5. Create a C[employer]Flip ID. This will be your [employer] login ID and cannot be changed, so think of one that is secure and easy to remember. 6. Create a [employer] password. You can change your password at any time. 7. Enter your Password Reset Question and Answer. This can be used at a later time if you forget your password. 8. Enter your e-mail address. You will receive e-mail notification when new information is available in [address] 9. Click Sign Up. You can now view and download portions of your medical record. 10. Click the Download Summary menu link to download a portable copy of your medical information. If you have questions, please visit the Frequently Asked Questions section of the [employer] website. Remember, [employer] is NOT to be used for urgent needs. For medical emergencies, dial 911. Now available from your iPhone and Android! Please provide this summary of care documentation to your next provider. Your primary care clinician is listed as Natalie Bethea. If you have any questions after today's visit, please call [phone].

## 2017-09-19 NOTE — PROGRESS NOTES
HISTORY OF PRESENT ILLNESS  Korina Ulloa is a 80 y.o. female. HPI  Diabetes  Korina Ulloa is a 80 y.o. female who is here for a routine diabetic check. Current blood sugars at home have been  fasting . Lab Results   Component Value Date/Time    Hemoglobin A1c 5.8 06/06/2017 01:47 PM   . There have been no episodes of hypoglycemia. Taking meds daily   Last eye exam was July. Checking feet daily      Hypertension  . Taking meds daily  . Denies chest pain or shortness of breath. Hyperlipidemia  On medication for high cholesterol. No myalgias noted. Taking meds daily    Lab Results   Component Value Date/Time    Cholesterol, total 126 06/06/2017 01:47 PM    HDL Cholesterol 40 06/06/2017 01:47 PM    LDL, calculated 64 06/06/2017 01:47 PM    VLDL, calculated 22 06/06/2017 01:47 PM    Triglyceride 108 06/06/2017 01:47 PM     She is having pain in her right ear. Some dizziness. Saw the person at [facility] and was told she had wax in the ear. Review of Systems   Constitutional: Positive for malaise/fatigue. Negative for fever. HENT: Positive for ear pain and hearing loss. Negative for congestion and sore throat. Respiratory: Negative for cough. Cardiovascular: Negative for chest pain and palpitations. Gastrointestinal: Negative for abdominal pain. Neurological: Positive for dizziness. Psychiatric/Behavioral: Negative for depression.      Past Medical History:   Diagnosis Date    Anxiety     Back pain     CAD (coronary artery disease)     CKD (chronic kidney disease) stage 3, GFR 30-59 ml/min     Constipation     [facility]     Diverticulosis 2007    DM (diabetes mellitus) ([facility]     AODM    Fibroid 1972    GERD (gastroesophageal reflux disease)     Goiter, non-toxic     Hernia, hiatal     Hyperlipemia     Hypertension     Hypokalemia     Osteoarthritis 2006    bursitis R shoulder    Osteoarthritis of right knee     PVC (premature ventricular contraction)     H/O PVC's  Raynaud disease     Venous stasis     Wears hearing aid     both ears     Past Surgical History:   Procedure Laterality Date    HX BREAST BIOPSY      x5    HX CATARACT REMOVAL  2004    bilateral    HX COLONOSCOPY  2002, 08/2007    HX DILATION AND CURETTAGE  1960's    HX HYSTERECTOMY  70's    BSO    HX OTHER SURGICAL  2001    sigmoidoscopy     Allergies   Allergen Reactions    Metformin Other (comments)     Kidneys effected     Blood pressure 175/63, pulse 78, temperature 98 °F (36.7 °C), temperature source Oral, resp. rate 18, height 5' 1\" (1.549 m), weight 187 lb 3.2 oz (84.9 kg), SpO2 99 %. Physical Exam   Constitutional: She is oriented to person, place, and time. She appears well-developed and well-nourished. No distress. HENT:   Head: Normocephalic and atraumatic. Left Ear: External ear normal.   Mouth/Throat: Oropharynx is clear and moist.   Right TM occluded with cerumen. After lavage with instrumentation TM with poor LR and LM   Eyes: Conjunctivae are normal.   Neck: Neck supple. Cardiovascular: Normal rate and regular rhythm. Murmur heard. Usual III/VI murmur   Pulmonary/Chest: Effort normal and breath sounds normal. No respiratory distress. Abdominal: Soft. Lymphadenopathy:     She has no cervical adenopathy. Neurological: She is alert and oriented to person, place, and time. Skin: Skin is warm. Psychiatric: She has a normal mood and affect. Nursing note and vitals reviewed.       ASSESSMENT and PLAN  DM   Monitor blood sugars at home   Regular eye and foot exams   Continue present meds   Check A1c  HTN   Low sodium diet   Increase Cartia to 240mg daily   RTO 6 weeks for BP check  Hyperlipidemia   Low fat diet  Impacted cerumen/OM   Ear lavage   Amoxil 875mg BID x 10 days  Immun Due   Prevnar today

## 2017-09-20 LAB
ALBUMIN SERPL-MCNC: 4.1 G/DL (ref 3.5–4.7)
ALBUMIN/GLOB SERPL: 1.4 {RATIO} (ref 1.2–2.2)
ALP SERPL-CCNC: 63 IU/L (ref 39–117)
ALT SERPL-CCNC: 7 IU/L (ref 0–32)
AST SERPL-CCNC: 16 IU/L (ref 0–40)
BASOPHILS # BLD AUTO: 0 X10E3/UL (ref 0–0.2)
BASOPHILS NFR BLD AUTO: 0 %
BILIRUB SERPL-MCNC: 0.3 MG/DL (ref 0–1.2)
BUN SERPL-MCNC: 31 MG/DL (ref 8–27)
BUN/CREAT SERPL: 18 (ref 12–28)
CALCIUM SERPL-MCNC: 10.7 MG/DL (ref 8.7–10.3)
CHLORIDE SERPL-SCNC: 100 MMOL/L (ref 96–106)
CO2 SERPL-SCNC: 26 MMOL/L (ref 18–29)
CREAT SERPL-MCNC: 1.73 MG/DL (ref 0.57–1)
EOSINOPHIL # BLD AUTO: 0.1 X10E3/UL (ref 0–0.4)
EOSINOPHIL NFR BLD AUTO: 1 %
ERYTHROCYTE [DISTWIDTH] IN BLOOD BY AUTOMATED COUNT: 13.6 % (ref 12.3–15.4)
EST. AVERAGE GLUCOSE BLD GHB EST-MCNC: 111 MG/DL
GLOBULIN SER CALC-MCNC: 2.9 G/DL (ref 1.5–4.5)
GLUCOSE SERPL-MCNC: 94 MG/DL (ref 65–99)
HBA1C MFR BLD: 5.5 % (ref 4.8–5.6)
HCT VFR BLD AUTO: 32.8 % (ref 34–46.6)
HGB BLD-MCNC: 10.6 G/DL (ref 11.1–15.9)
IMM GRANULOCYTES # BLD: 0 X10E3/UL (ref 0–0.1)
IMM GRANULOCYTES NFR BLD: 0 %
INTERPRETATION: NORMAL
LYMPHOCYTES # BLD AUTO: 1.6 X10E3/UL (ref 0.7–3.1)
LYMPHOCYTES NFR BLD AUTO: 35 %
MCH RBC QN AUTO: 31.9 PG (ref 26.6–33)
MCHC RBC AUTO-ENTMCNC: 32.3 G/DL (ref 31.5–35.7)
MCV RBC AUTO: 99 FL (ref 79–97)
MONOCYTES # BLD AUTO: 0.3 X10E3/UL (ref 0.1–0.9)
MONOCYTES NFR BLD AUTO: 6 %
NEUTROPHILS # BLD AUTO: 2.6 X10E3/UL (ref 1.4–7)
NEUTROPHILS NFR BLD AUTO: 58 %
PLATELET # BLD AUTO: 193 X10E3/UL (ref 150–379)
POTASSIUM SERPL-SCNC: 4 MMOL/L (ref 3.5–5.2)
PROT SERPL-MCNC: 7 G/DL (ref 6–8.5)
RBC # BLD AUTO: 3.32 X10E6/UL (ref 3.77–5.28)
SODIUM SERPL-SCNC: 140 MMOL/L (ref 134–144)
WBC # BLD AUTO: 4.6 X10E3/UL (ref 3.4–10.8)

## 2017-09-22 NOTE — PROGRESS NOTES
Labs are ok except for the calcium which is a little high. Need to recheck in 3 months. Other labs are stable.

## 2017-10-31 ENCOUNTER — OFFICE VISIT (OUTPATIENT)
Dept: FAMILY MEDICINE CLINIC | Age: 82
End: 2017-10-31

## 2017-10-31 VITALS
HEART RATE: 57 BPM | TEMPERATURE: 97.5 F | RESPIRATION RATE: 18 BRPM | OXYGEN SATURATION: 98 % | WEIGHT: 186.44 LBS | HEIGHT: 61 IN | BODY MASS INDEX: 35.2 KG/M2 | DIASTOLIC BLOOD PRESSURE: 84 MMHG | SYSTOLIC BLOOD PRESSURE: 158 MMHG

## 2017-10-31 DIAGNOSIS — Z23 ENCOUNTER FOR IMMUNIZATION: ICD-10-CM

## 2017-10-31 DIAGNOSIS — I10 ESSENTIAL HYPERTENSION: Primary | ICD-10-CM

## 2017-10-31 NOTE — PROGRESS NOTES
HISTORY OF PRESENT ILLNESS  Albaro Tobin is a 80 y.o. female. Hypertension    Associated symptoms include malaise/fatigue. Pertinent negatives include no chest pain, no palpitations and no dizziness. Immunization/Injection   Pertinent negatives include no chest pain and no abdominal pain. Hypertension  Taking meds daily  . Denies chest pain or shortness of breath. Floyde Alar was recently increased to 240mg daily    Review of Systems   Constitutional: Positive for malaise/fatigue. Negative for fever. HENT: Negative for congestion, ear pain and sore throat. Respiratory: Negative for cough. Cardiovascular: Negative for chest pain and palpitations. Gastrointestinal: Negative for abdominal pain. Neurological: Negative for dizziness. Psychiatric/Behavioral: Negative for depression. Past Medical History:   Diagnosis Date    Anxiety     Back pain     CAD (coronary artery disease)     CKD (chronic kidney disease) stage 3, GFR 30-59 ml/min     Constipation     [facility]     Diverticulosis 2007    DM (diabetes mellitus) ([facility]     AODM    Fibroid 1972    GERD (gastroesophageal reflux disease)     Goiter, non-toxic     Hernia, hiatal     Hyperlipemia     Hypertension     Hypokalemia     Osteoarthritis 2006    bursitis R shoulder    Osteoarthritis of right knee     PVC (premature ventricular contraction)     H/O PVC's    Raynaud disease     Venous stasis     Wears hearing aid     both ears     Past Surgical History:   Procedure Laterality Date    HX BREAST BIOPSY      x5    HX CATARACT REMOVAL  2004    bilateral    HX COLONOSCOPY  2002, 08/2007    HX DILATION AND CURETTAGE  1960's    HX HYSTERECTOMY  70's    BSO    HX OTHER SURGICAL  2001    sigmoidoscopy     Allergies   Allergen Reactions    Metformin Other (comments)     Kidneys effected     Blood pressure 182/63, pulse (!) 57, temperature 97.5 °F (36.4 °C), temperature source Oral, resp.  rate 18, height 5' 1\" (1.549 m), weight 186 lb 7 oz (84.6 kg), SpO2 98 %. Physical Exam   Constitutional: She appears well-developed and well-nourished. No distress. HENT:   Head: Normocephalic and atraumatic. Left Ear: External ear normal.   Mouth/Throat: Oropharynx is clear and moist.   Neck: Neck supple. Cardiovascular: Normal rate and regular rhythm. Murmur heard. Usual III/VI murmur   Pulmonary/Chest: Effort normal and breath sounds normal. No respiratory distress. Lymphadenopathy:     She has no cervical adenopathy. Neurological: She is alert. Skin: Skin is warm. Nursing note and vitals reviewed.       ASSESSMENT and PLAN  HTN   Low sodium diet   Continue Cartia  240mg daily   RTO 3 months for BP check  Immun Due   Flu shot today

## 2017-10-31 NOTE — MR AVS SNAPSHOT
Visit Information Date & Time Provider Department Dept. Phone Encounter #  
 10/31/2017  2:00 PM Che Wilks MD [facility] [phone] [id number] Upcoming Health Maintenance Date Due INFLUENZA AGE 9 TO ADULT 8/1/2017 FOOT EXAM Q1 3/7/2018 HEMOGLOBIN A1C Q6M 3/19/2018 MICROALBUMIN Q1 6/6/2018 LIPID PANEL Q1 6/6/2018 MEDICARE YEARLY EXAM 6/7/2018 EYE EXAM RETINAL OR DILATED Q1 7/13/2018 GLAUCOMA SCREENING Q2Y 7/13/2019 DTaP/Tdap/Td series (2 - Td) 6/7/2026 Allergies as of 10/31/2017  Review Complete On: 10/31/2017 By: Che Wilks MD  
  
 Severity Noted Reaction Type Reactions Metformin  01/27/2014    Other (comments) Kidneys effected Current Immunizations  Reviewed on 10/31/2017 Name Date Influenza High Dose Vaccine PF 10/31/2017  2:08 PM  
 Influenza Vaccine 11/7/2014, 10/25/2013, 10/22/2012 Influenza Vaccine Alcario Ravens) 12/6/2016, 10/2/2015 Pneumococcal Conjugate (PCV-13) 9/19/2017  2:22 PM  
 Pneumococcal Polysaccharide (PPSV-23) 6/7/2016 Reviewed by Jaky Jordan RN on 10/31/2017 at  2:09 PM  
You Were Diagnosed With   
  
 Codes Comments Essential hypertension    -  Primary ICD-10-CM: I10 
ICD-9-CM: 401.9 Encounter for immunization     ICD-10-CM: C80 ICD-9-CM: V03.89 Vitals BP Pulse Temp Resp Height(growth percentile) Weight(growth percentile) 158/84 (BP 1 Location: Left arm, BP Patient Position: Sitting) (!) 57 97.5 °F (36.4 °C) (Oral) 18 5' 1\" (1.549 m) 186 lb 7 oz (84.6 kg) SpO2 BMI OB Status Smoking Status 98% 35.23 kg/m2 Hysterectomy Never Smoker Vitals History BMI and BSA Data Body Mass Index Body Surface Area  
 35.23 kg/m 2 1.91 m 2 Preferred Pharmacy Pharmacy Name Phone [phone] [place], [place] - [address] Your Updated Medication List  
  
   
 This list is accurate as of: 10/31/17  2:22 PM.  Always use your most recent med list.  
  
  
  
  
 acetaminophen 500 mg tablet Commonly known as:  [address] Take 1 Tab by mouth every six (6) hours as needed for Pain. aspirin delayed-release 81 mg tablet Take 1 Tab by mouth daily. dilTIAZem  mg ER capsule Commonly known as:  CARTIA XT Take 1 Cap by mouth daily. fluticasone 50 mcg/actuation nasal spray Commonly known as:  Delisa Jumper SPRAY 1 PUFF IN EACH NOSTRIL ONCE DAILY  
  
 furosemide 20 mg tablet Commonly known as:  LASIX TAKE ONE TABLET BY MOUTH ONCE DAILY IN THE MORNING  
  
 glimepiride 1 mg tablet Commonly known as:  AMARYL  
TAKE 1 TAB BY MOUTH DAILY (BEFORE BREAKFAST). lisinopril-hydroCHLOROthiazide 20-25 mg per tablet Commonly known as:  PRINZIDE, ZESTORETIC  
TAKE 1 TAB BY MOUTH DAILY. lovastatin 40 mg tablet Commonly known as:  MEVACOR  
TAKE 1 TAB BY MOUTH NIGHTLY FOR CHOLESTEROL LOWERING  
  
 MULTIVITAMIN PO Take 3,000 Units by mouth daily. omeprazole 40 mg capsule Commonly known as:  PRILOSEC  
TAKE ONE CAPSULE BY MOUTH EVERY MORNING TO CONTROL STOMACH ACID  
  
 spironolactone 25 mg tablet Commonly known as:  ALDACTONE  
TAKE 1/2 TABLET ONCE DAILY  
  
 triamcinolone acetonide 0.1 % topical cream  
Commonly known as:  KENALOG Apply  to affected area two (2) times a day. use thin layer  
  
 valsartan 320 mg tablet Commonly known as:  DIOVAN  
TAKE ONE TABLET ONCE DAILY FOR BLOOD PRESSURE CONTROL We Performed the Following ADMIN INFLUENZA VIRUS VAC [ [facility]] INFLUENZA VIRUS VACCINE, HIGH DOSE SEASONAL, PRESERVATIVE FREE [68918 CPT(R)] Introducing [facility]! [facility] introduces [employer] patient portal. Now you can access parts of your medical record, email your doctor's office, and request medication refills online.    
 
1. In your internet browser, go to [email or web address]Bardolino Grillehart 2. Click on the First Time User? Click Here link in the Sign In box. You will see the New Member Sign Up page. 3. Enter your [employer] Access Code exactly as it appears below. You will not need to use this code after youve completed the sign-up process. If you do not sign up before the expiration date, you must request a new code. · [employer] Access Code: [email or web address] Expires: 12/18/2017  2:17 PM 
 
4. Enter the last four digits of your Social Security Number (xxxx) and Date of Birth (mm/dd/yyyy) as indicated and click Submit. You will be taken to the next sign-up page. 5. Create a [employer] ID. This will be your [employer] login ID and cannot be changed, so think of one that is secure and easy to remember. 6. Create a [employer] password. You can change your password at any time. 7. Enter your Password Reset Question and Answer. This can be used at a later time if you forget your password. 8. Enter your e-mail address. You will receive e-mail notification when new information is available in [address] 9. Click Sign Up. You can now view and download portions of your medical record. 10. Click the Download Summary menu link to download a portable copy of your medical information. If you have questions, please visit the Frequently Asked Questions section of the [employer] website. Remember, [employer] is NOT to be used for urgent needs. For medical emergencies, dial 911. Now available from your iPhone and Android! Please provide this summary of care documentation to your next provider. Your primary care clinician is listed as Darnell Singh. If you have any questions after today's visit, please call [phone].

## 2017-11-14 RX ORDER — FLUTICASONE PROPIONATE 50 MCG
SPRAY, SUSPENSION (ML) NASAL
Qty: 16 G | Refills: 3 | Status: SHIPPED | OUTPATIENT
Start: 2017-11-14 | End: 2019-02-08 | Stop reason: ALTCHOICE

## 2017-12-01 RX ORDER — OMEPRAZOLE 40 MG/1
CAPSULE, DELAYED RELEASE ORAL
Qty: 90 CAP | Refills: 1 | Status: SHIPPED | OUTPATIENT
Start: 2017-12-01 | End: 2018-12-28 | Stop reason: SDUPTHER

## 2017-12-19 ENCOUNTER — OFFICE VISIT (OUTPATIENT)
Dept: FAMILY MEDICINE CLINIC | Age: 82
End: 2017-12-19

## 2017-12-19 VITALS
SYSTOLIC BLOOD PRESSURE: 142 MMHG | DIASTOLIC BLOOD PRESSURE: 76 MMHG | HEART RATE: 54 BPM | WEIGHT: 189 LBS | HEIGHT: 61 IN | RESPIRATION RATE: 18 BRPM | BODY MASS INDEX: 35.68 KG/M2 | OXYGEN SATURATION: 97 %

## 2017-12-19 DIAGNOSIS — E11.21 TYPE 2 DIABETES MELLITUS WITH NEPHROPATHY (HCC): Primary | ICD-10-CM

## 2017-12-19 DIAGNOSIS — E78.00 PURE HYPERCHOLESTEROLEMIA: ICD-10-CM

## 2017-12-19 DIAGNOSIS — I10 ESSENTIAL HYPERTENSION: ICD-10-CM

## 2017-12-19 DIAGNOSIS — E11.9 WELL CONTROLLED TYPE 2 DIABETES MELLITUS (HCC): ICD-10-CM

## 2017-12-19 NOTE — PROGRESS NOTES
HISTORY OF PRESENT ILLNESS  Albaro Tobin is a 80 y.o. female. Hypertension    Pertinent negatives include no chest pain, no palpitations, no malaise/fatigue, no headaches and no dizziness. Diabetes   Pertinent negatives include no chest pain, no abdominal pain and no headaches. Diabetes  Albaro Tobin is a 80 y.o. female who is here for a routine diabetic check. Current blood sugars at home have been 80-90 fasting . Rarely over . Lab Results   Component Value Date/Time    Hemoglobin A1c 5.5 09/19/2017 01:36 PM   There have been no episodes of hypoglycemia. Taking meds daily   Last eye exam was July. Checking feet daily      Hypertension  Taking meds daily  . Denies chest pain or shortness of breath. Her Cartia was increased last visit. Hyperlipidemia  On medication for high cholesterol. No myalgias noted. Taking meds daily    Lab Results   Component Value Date/Time    Cholesterol, total 126 06/06/2017 01:47 PM    HDL Cholesterol 40 06/06/2017 01:47 PM    LDL, calculated 64 06/06/2017 01:47 PM    VLDL, calculated 22 06/06/2017 01:47 PM    Triglyceride 108 06/06/2017 01:47 PM         Review of Systems   Constitutional: Negative for fever and malaise/fatigue. HENT: Negative for congestion, ear pain, hearing loss and sore throat. Respiratory: Negative for cough and sputum production. Cardiovascular: Negative for chest pain and palpitations. Gastrointestinal: Negative for abdominal pain and heartburn. Musculoskeletal: Negative for joint pain. Neurological: Negative for dizziness and headaches. Psychiatric/Behavioral: Negative for depression.      Past Medical History:   Diagnosis Date    Anxiety     Back pain     CAD (coronary artery disease)     CKD (chronic kidney disease) stage 3, GFR 30-59 ml/min     Constipation     Diabetes ([address]     Diverticulosis 2007    DM (diabetes mellitus) [address] [address]     AODM    Fibroid 1972    GERD (gastroesophageal reflux disease)     Goiter, non-toxic     Hernia, hiatal     Hyperlipemia     Hypertension     Hypokalemia     Osteoarthritis 2006    bursitis R shoulder    Osteoarthritis of right knee     PVC (premature ventricular contraction)     H/O PVC's    Raynaud disease     Venous stasis     Wears hearing aid     both ears     Past Surgical History:   Procedure Laterality Date    HX BREAST BIOPSY      x5    HX CATARACT REMOVAL  2004    bilateral    HX COLONOSCOPY  2002, 08/2007    HX DILATION AND CURETTAGE  1960's    HX HYSTERECTOMY  70's    BSO    HX OTHER SURGICAL  2001    sigmoidoscopy     Allergies   Allergen Reactions    Metformin Other (comments)     Kidneys effected     Blood pressure 142/76, pulse (!) 54, resp. rate 18, height 5' 1\" (1.549 m), weight 189 lb (85.7 kg), SpO2 97 %. Physical Exam   Constitutional: She is oriented to person, place, and time. She appears well-developed and well-nourished. No distress. HENT:   Head: Normocephalic and atraumatic. Mouth/Throat: Oropharynx is clear and moist.   Eyes: Conjunctivae are normal.   Neck: Neck supple. Cardiovascular: Normal rate and regular rhythm. Murmur heard. Usual III/VI murmur   Pulmonary/Chest: Effort normal and breath sounds normal. No respiratory distress. Lymphadenopathy:     She has no cervical adenopathy. Neurological: She is alert and oriented to person, place, and time. Skin: Skin is warm. Psychiatric: She has a normal mood and affect. Nursing note and vitals reviewed.       ASSESSMENT and PLAN  DM   Monitor blood sugars at home   Regular eye and foot exams   Continue present meds- if A1c is still less than 6 will stop the Amaryl   Check A1c and TSH  HTN   Low sodium diet   Continue Cartia to 240mg daily and other meds   Check CMP  Hyperlipidemia   Low fat diet   Continue statin  BMI 35   Discussed diet and weight loss   Monitor weight

## 2017-12-19 NOTE — MR AVS SNAPSHOT
Visit Information Date & Time Provider Department Dept. Phone Encounter #  
 12/19/2017  3:20 PM Zita Marrufo MD [facility] Primary [facility] [phone] [id number] Follow-up Instructions Return in about 3 months (around 3/19/2018). Follow-up and Disposition History Your Appointments 12/19/2017  3:20 PM  
Follow Up with Zita Marrufo MD  
[facility] Primary Care [facility] Appt Note: 2 mo f/u  
 [address] [facility] [address] [phone]  
  
   
 [address] [facility] [phone] Upcoming Health Maintenance Date Due  
 FOOT EXAM Q1 3/7/2018 HEMOGLOBIN A1C Q6M 3/19/2018 MICROALBUMIN Q1 6/6/2018 LIPID PANEL Q1 6/6/2018 MEDICARE YEARLY EXAM 6/7/2018 EYE EXAM RETINAL OR DILATED Q1 7/13/2018 GLAUCOMA SCREENING Q2Y 7/13/2019 DTaP/Tdap/Td series (2 - Td) 6/7/2026 Allergies as of 12/19/2017  Review Complete On: 12/19/2017 By: Zita Marrufo MD  
  
 Severity Noted Reaction Type Reactions Metformin  01/27/2014    Other (comments) Kidneys effected Current Immunizations  Reviewed on 10/31/2017 Name Date Influenza High Dose Vaccine PF 10/31/2017  2:08 PM  
 Influenza Vaccine 11/7/2014, 10/25/2013, 10/22/2012 Influenza Vaccine Melford Going) 12/6/2016, 10/2/2015 Pneumococcal Conjugate (PCV-13) 9/19/2017  2:22 PM  
 Pneumococcal Polysaccharide (PPSV-23) 6/7/2016 Not reviewed this visit You Were Diagnosed With   
  
 Codes Comments Type 2 diabetes mellitus with nephropathy ([facility] [address]    -  Primary ICD-10-CM: E11.21 
ICD-9-CM: 250.40, 583.81 Well controlled type 2 diabetes mellitus ([facility] [address]     ICD-10-CM: E11.9 ICD-9-CM: 250.00 Pure hypercholesterolemia     ICD-10-CM: E78.00 ICD-9-CM: 272.0 Essential hypertension     ICD-10-CM: I10 
ICD-9-CM: 401.9 BMI 35.0-35.9,adult     ICD-10-CM: V03.88 ICD-9-CM: V85.35 Vitals BP Pulse Resp Height(growth percentile) Weight(growth percentile) SpO2  
 142/76 (!) 54 18 5' 1\" (1.549 m) 189 lb (85.7 kg) 97% BMI OB Status Smoking Status 35.71 kg/m2 Hysterectomy Never Smoker Vitals History BMI and BSA Data Body Mass Index Body Surface Area 35.71 kg/m 2 1.92 m 2 Preferred Pharmacy Pharmacy Name Phone [phone] [place], [place] - [address] Your Updated Medication List  
  
   
This list is accurate as of: 12/19/17  2:45 PM.  Always use your most recent med list.  
  
  
  
  
 acetaminophen 500 mg tablet Commonly known as:  [address] Take 1 Tab by mouth every six (6) hours as needed for Pain. aspirin delayed-release 81 mg tablet Take 1 Tab by mouth daily. dilTIAZem  mg ER capsule Commonly known as:  CARTIA XT Take 1 Cap by mouth daily. fluticasone 50 mcg/actuation nasal spray Commonly known as:  Healy Blizzard SPRAY 1 PUFF IN EACH NOSTRIL ONCE DAILY  
  
 furosemide 20 mg tablet Commonly known as:  LASIX TAKE ONE TABLET BY MOUTH ONCE DAILY IN THE MORNING  
  
 glimepiride 1 mg tablet Commonly known as:  AMARYL  
TAKE 1 TAB BY MOUTH DAILY (BEFORE BREAKFAST). lisinopril-hydroCHLOROthiazide 20-25 mg per tablet Commonly known as:  PRINZIDE, ZESTORETIC  
TAKE 1 TAB BY MOUTH DAILY. lovastatin 40 mg tablet Commonly known as:  MEVACOR  
TAKE 1 TAB BY MOUTH NIGHTLY FOR CHOLESTEROL LOWERING  
  
 MULTIVITAMIN PO Take 3,000 Units by mouth daily. omeprazole 40 mg capsule Commonly known as:  PRILOSEC  
TAKE ONE CAPSULE BY MOUTH EVERY MORNING TO CONTROL STOMACH ACID  
  
 spironolactone 25 mg tablet Commonly known as:  ALDACTONE  
TAKE 1/2 TABLET ONCE DAILY  
  
 triamcinolone acetonide 0.1 % topical cream  
Commonly known as:  KENALOG Apply  to affected area two (2) times a day. use thin layer  
  
 valsartan 320 mg tablet Commonly known as:  DIOVAN  
TAKE ONE TABLET ONCE DAILY FOR BLOOD PRESSURE CONTROL We Performed the Following CBC WITH AUTOMATED DIFF [90122 CPT(R)] COLLECTION VENOUS BLOOD,VENIPUNCTURE [id number] CPT(R)] HEMOGLOBIN A1C WITH EAG [13798 CPT(R)] METABOLIC PANEL, COMPREHENSIVE [53672 CPT(R)] TSH 3RD GENERATION [20273 CPT(R)] Follow-up Instructions Return in about 3 months (around 3/19/2018). To-Do List   
 12/28/2017 10:30 AM  
  Appointment with [address] [place] [address] at [facility] ([phone]) EXAM INSTRUCTIONS Do not wear deodorant, lotion, or powders to your appointment. GENERAL INSTRUCTIONS - Bring a list of all medications you are currently taking, including over the counter medications. - Only patients will be allowed in the exam room. This includes children. - Children under the age of 15 may not be left unattended. - [address] [place] [address] patients must have an armband and be accompanied by a caregiver or family member. - If you have a hand-written prescription for this exam, you must bring it with you on the day of your exam. - Bring all relevant prior images from any facility outside of [facility] with you on the day of your exam.  Failure to provide images may delay reading by Radiologist.  If you have questions or need to reschedule or cancel your appointment, call [phone]. Introducing [address]! [facility] introduces [employer] patient portal. Now you can access parts of your medical record, email your doctor's office, and request medication refills online. 1. In your internet browser, go to [email or web address] 2. Click on the First Time User? Click Here link in the Sign In box. You will see the New Member Sign Up page. 3. Enter your [employer] Access Code exactly as it appears below. You will not need to use this code after youve completed the sign-up process.  If you do not sign up before the expiration date, you must request a new code. · [employer] Access Code: [email or web address] Expires: 3/19/2018  2:45 PM 
 
4. Enter the last four digits of your Social Security Number (xxxx) and Date of Birth (mm/dd/yyyy) as indicated and click Submit. You will be taken to the next sign-up page. 5. Create a [employer] ID. This will be your [employer] login ID and cannot be changed, so think of one that is secure and easy to remember. 6. Create a [employer] password. You can change your password at any time. 7. Enter your Password Reset Question and Answer. This can be used at a later time if you forget your password. 8. Enter your e-mail address. You will receive e-mail notification when new information is available in [address] 9. Click Sign Up. You can now view and download portions of your medical record. 10. Click the Download Summary menu link to download a portable copy of your medical information. If you have questions, please visit the Frequently Asked Questions section of the [employer] website. Remember, [employer] is NOT to be used for urgent needs. For medical emergencies, dial 911. Now available from your iPhone and Android! Please provide this summary of care documentation to your next provider. Your primary care clinician is listed as Eunice Seals. If you have any questions after today's visit, please call [phone].

## 2017-12-20 LAB
ALBUMIN SERPL-MCNC: 4 G/DL (ref 3.5–4.7)
ALBUMIN/GLOB SERPL: 1.3 {RATIO} (ref 1.2–2.2)
ALP SERPL-CCNC: 64 IU/L (ref 39–117)
ALT SERPL-CCNC: 8 IU/L (ref 0–32)
AST SERPL-CCNC: 16 IU/L (ref 0–40)
BASOPHILS # BLD AUTO: 0 X10E3/UL (ref 0–0.2)
BASOPHILS NFR BLD AUTO: 0 %
BILIRUB SERPL-MCNC: <0.2 MG/DL (ref 0–1.2)
BUN SERPL-MCNC: 29 MG/DL (ref 8–27)
BUN/CREAT SERPL: 16 (ref 12–28)
CALCIUM SERPL-MCNC: 10.8 MG/DL (ref 8.7–10.3)
CHLORIDE SERPL-SCNC: 102 MMOL/L (ref 96–106)
CO2 SERPL-SCNC: 24 MMOL/L (ref 18–29)
CREAT SERPL-MCNC: 1.79 MG/DL (ref 0.57–1)
EOSINOPHIL # BLD AUTO: 0.1 X10E3/UL (ref 0–0.4)
EOSINOPHIL NFR BLD AUTO: 1 %
ERYTHROCYTE [DISTWIDTH] IN BLOOD BY AUTOMATED COUNT: 13.4 % (ref 12.3–15.4)
EST. AVERAGE GLUCOSE BLD GHB EST-MCNC: 128 MG/DL
GLOBULIN SER CALC-MCNC: 3 G/DL (ref 1.5–4.5)
GLUCOSE SERPL-MCNC: 81 MG/DL (ref 65–99)
HBA1C MFR BLD: 6.1 % (ref 4.8–5.6)
HCT VFR BLD AUTO: 33.8 % (ref 34–46.6)
HGB BLD-MCNC: 11.1 G/DL (ref 11.1–15.9)
IMM GRANULOCYTES # BLD: 0 X10E3/UL (ref 0–0.1)
IMM GRANULOCYTES NFR BLD: 0 %
INTERPRETATION: NORMAL
LYMPHOCYTES # BLD AUTO: 1.7 X10E3/UL (ref 0.7–3.1)
LYMPHOCYTES NFR BLD AUTO: 34 %
MCH RBC QN AUTO: 31.9 PG (ref 26.6–33)
MCHC RBC AUTO-ENTMCNC: 32.8 G/DL (ref 31.5–35.7)
MCV RBC AUTO: 97 FL (ref 79–97)
MONOCYTES # BLD AUTO: 0.3 X10E3/UL (ref 0.1–0.9)
MONOCYTES NFR BLD AUTO: 6 %
NEUTROPHILS # BLD AUTO: 3 X10E3/UL (ref 1.4–7)
NEUTROPHILS NFR BLD AUTO: 59 %
PLATELET # BLD AUTO: 228 X10E3/UL (ref 150–379)
POTASSIUM SERPL-SCNC: 3.7 MMOL/L (ref 3.5–5.2)
PROT SERPL-MCNC: 7 G/DL (ref 6–8.5)
RBC # BLD AUTO: 3.48 X10E6/UL (ref 3.77–5.28)
SODIUM SERPL-SCNC: 141 MMOL/L (ref 134–144)
TSH SERPL DL<=0.005 MIU/L-ACNC: 1.03 UIU/ML (ref 0.45–4.5)
WBC # BLD AUTO: 5.1 X10E3/UL (ref 3.4–10.8)

## 2017-12-20 NOTE — PROGRESS NOTES
A1c has gone up a bit so I want her to continue her diabetic meds. Kidney function is stable. Calcium is a little high so will recheck at her next visit. CBC stable.

## 2018-01-02 RX ORDER — FUROSEMIDE 20 MG/1
TABLET ORAL
Qty: 30 TAB | Refills: 1 | Status: SHIPPED | OUTPATIENT
Start: 2018-01-02 | End: 2018-03-01 | Stop reason: SDUPTHER

## 2018-01-02 RX ORDER — LISINOPRIL AND HYDROCHLOROTHIAZIDE 20; 25 MG/1; MG/1
TABLET ORAL
Qty: 30 TAB | Refills: 1 | Status: SHIPPED | OUTPATIENT
Start: 2018-01-02 | End: 2018-03-01 | Stop reason: SDUPTHER

## 2018-01-02 RX ORDER — LOVASTATIN 40 MG/1
TABLET ORAL
Qty: 30 TAB | Refills: 1 | Status: SHIPPED | OUTPATIENT
Start: 2018-01-02 | End: 2018-03-01 | Stop reason: SDUPTHER

## 2018-01-29 RX ORDER — DILTIAZEM HYDROCHLORIDE 240 MG/1
CAPSULE, EXTENDED RELEASE ORAL
Qty: 90 CAP | Refills: 0 | Status: SHIPPED | OUTPATIENT
Start: 2018-01-29 | End: 2018-05-01 | Stop reason: SDUPTHER

## 2018-01-29 RX ORDER — GLIMEPIRIDE 1 MG/1
TABLET ORAL
Qty: 90 TAB | Refills: 0 | Status: SHIPPED | OUTPATIENT
Start: 2018-01-29 | End: 2018-03-20

## 2018-03-02 RX ORDER — LISINOPRIL AND HYDROCHLOROTHIAZIDE 20; 25 MG/1; MG/1
TABLET ORAL
Qty: 30 TAB | Refills: 2 | Status: SHIPPED | OUTPATIENT
Start: 2018-03-02 | End: 2018-05-29 | Stop reason: SDUPTHER

## 2018-03-02 RX ORDER — VALSARTAN 320 MG/1
TABLET ORAL
Qty: 90 TAB | Refills: 2 | Status: SHIPPED | OUTPATIENT
Start: 2018-03-02 | End: 2018-07-17

## 2018-03-02 RX ORDER — FUROSEMIDE 20 MG/1
TABLET ORAL
Qty: 30 TAB | Refills: 2 | Status: SHIPPED | OUTPATIENT
Start: 2018-03-02 | End: 2018-05-29 | Stop reason: SDUPTHER

## 2018-03-02 RX ORDER — LOVASTATIN 40 MG/1
TABLET ORAL
Qty: 30 TAB | Refills: 2 | Status: SHIPPED | OUTPATIENT
Start: 2018-03-02 | End: 2018-05-29 | Stop reason: SDUPTHER

## 2018-03-19 ENCOUNTER — OFFICE VISIT (OUTPATIENT)
Dept: FAMILY MEDICINE CLINIC | Age: 83
End: 2018-03-19

## 2018-03-19 VITALS
HEIGHT: 61 IN | WEIGHT: 186.13 LBS | RESPIRATION RATE: 18 BRPM | SYSTOLIC BLOOD PRESSURE: 156 MMHG | HEART RATE: 55 BPM | BODY MASS INDEX: 35.14 KG/M2 | TEMPERATURE: 97.6 F | DIASTOLIC BLOOD PRESSURE: 73 MMHG | OXYGEN SATURATION: 95 %

## 2018-03-19 DIAGNOSIS — E83.52 SERUM CALCIUM ELEVATED: ICD-10-CM

## 2018-03-19 DIAGNOSIS — E11.9 WELL CONTROLLED TYPE 2 DIABETES MELLITUS (HCC): ICD-10-CM

## 2018-03-19 DIAGNOSIS — E78.00 PURE HYPERCHOLESTEROLEMIA: ICD-10-CM

## 2018-03-19 DIAGNOSIS — K64.4 EXTERNAL HEMORRHOID: ICD-10-CM

## 2018-03-19 DIAGNOSIS — E11.21 TYPE 2 DIABETES MELLITUS WITH NEPHROPATHY (HCC): Primary | ICD-10-CM

## 2018-03-19 DIAGNOSIS — I10 ESSENTIAL HYPERTENSION: ICD-10-CM

## 2018-03-19 PROBLEM — E66.01 SEVERE OBESITY (BMI 35.0-39.9) WITH COMORBIDITY (HCC): Status: ACTIVE | Noted: 2018-03-19

## 2018-03-19 RX ORDER — HYDROCORTISONE 25 MG/G
CREAM TOPICAL
Qty: 30 G | Refills: 0 | Status: SHIPPED | OUTPATIENT
Start: 2018-03-19 | End: 2018-08-01 | Stop reason: ALTCHOICE

## 2018-03-19 NOTE — PROGRESS NOTES
HISTORY OF PRESENT ILLNESS  Rose Newton is a 80 y.o. female. Cholesterol Problem   Pertinent negatives include no chest pain, no abdominal pain and no headaches. GERD   Pertinent negatives include no chest pain, no abdominal pain and no headaches. Hypertension    Pertinent negatives include no chest pain, no palpitations, no malaise/fatigue, no headaches and no dizziness. Diabetes   Pertinent negatives include no chest pain, no abdominal pain and no headaches. Diabetes  Rose Newton is a 80 y.o. female who is here for a routine diabetic check. Current blood sugars at home have been 80-90 fasting . Rarely over . 91 this am.   Lab Results   Component Value Date/Time    Hemoglobin A1c 6.1 (H) 12/19/2017 02:35 PM   There have been no episodes of hypoglycemia. Taking meds daily   Last eye exam was July. Checking feet daily      Hypertension  Taking meds daily  . Denies chest pain or shortness of breath. Her Cartia was increased last visit. Hyperlipidemia  On medication for high cholesterol. No myalgias noted. Taking meds daily    Lab Results   Component Value Date/Time    Cholesterol, total 126 06/06/2017 01:47 PM    HDL Cholesterol 40 06/06/2017 01:47 PM    LDL, calculated 64 06/06/2017 01:47 PM    VLDL, calculated 22 06/06/2017 01:47 PM    Triglyceride 108 06/06/2017 01:47 PM       Calcium was a little high at her visit in Dec. Needs a recheck. She is having some pink tinge on toilet paper when wiping her rectal area. Some burning noted when she wipes. No constipation. No pain with BM. Review of Systems   Constitutional: Negative for fever and malaise/fatigue. HENT: Negative for congestion, ear pain, hearing loss and sore throat. Respiratory: Negative for cough, sputum production and wheezing. Cardiovascular: Negative for chest pain and palpitations. Gastrointestinal: Negative for abdominal pain, constipation, diarrhea and heartburn.    Musculoskeletal: Negative for joint pain and myalgias. Neurological: Negative for dizziness and headaches. Psychiatric/Behavioral: Negative for depression. Past Medical History:   Diagnosis Date    Anxiety     Back pain     CAD (coronary artery disease)     CKD (chronic kidney disease) stage 3, GFR 30-59 ml/min     Constipation     Diabetes ([facility] [address]     Diverticulosis 2007    DM (diabetes mellitus) ([facility] [address]     AODM    Fibroid 1972    GERD (gastroesophageal reflux disease)     Goiter, non-toxic     Hernia, hiatal     Hyperlipemia     Hypertension     Hypokalemia     Menopause     Osteoarthritis 2006    bursitis R shoulder    Osteoarthritis of right knee     PVC (premature ventricular contraction)     H/O PVC's    Raynaud disease     Venous stasis     Wears hearing aid     both ears     Past Surgical History:   Procedure Laterality Date    HX BREAST BIOPSY Bilateral     x5    HX CATARACT REMOVAL  2004    bilateral    HX COLONOSCOPY  2002, 08/2007    HX DILATION AND CURETTAGE  1960's    HX HYSTERECTOMY  70's    BSO    HX OTHER SURGICAL  2001    sigmoidoscopy     Allergies   Allergen Reactions    Metformin Other (comments)     Kidneys effected     Blood pressure 156/73, pulse (!) 55, temperature 97.6 °F (36.4 °C), temperature source Oral, resp. rate 18, height 5' 1\" (1.549 m), weight 186 lb 2 oz (84.4 kg), SpO2 95 %. Physical Exam   Constitutional: She is oriented to person, place, and time. She appears well-developed and well-nourished. No distress. HENT:   Head: Normocephalic and atraumatic. Nose: Nose normal.   Mouth/Throat: Oropharynx is clear and moist.   Neck: Neck supple. Cardiovascular: Normal rate and regular rhythm. Murmur heard. Usual III/VI murmur   Pulmonary/Chest: Effort normal and breath sounds normal. No respiratory distress. Genitourinary:   Genitourinary Comments: Rectum- small hemorrhoid noted on exam. No bleeding at this time. Lymphadenopathy:     She has no cervical adenopathy. Neurological: She is alert and oriented to person, place, and time. Skin: Skin is warm. Psychiatric: She has a normal mood and affect. Nursing note and vitals reviewed.     Diabetic foot exam:     Left:    Sharp/dull discrimination normal    Filament test normal sensation with micro filament   Pulse DP: 2+ (normal)   Pulse PT: 2+ (normal)   Deformities: None  Right:    Sharp/dull discrimination normal   Filament test normal sensation with micro filament   Pulse DP: 2+ (normal)   Pulse PT: 2+ (normal)   Deformities: None  ASSESSMENT and PLAN  DM   Monitor blood sugars at home   Regular eye and foot exams   Continue present meds- if A1c is still less  6 will stop the Amaryl   Check A1c and TSH  HTN   Low sodium diet   Continue Cartia to 240mg daily and other meds   Check CMP  Hyperlipidemia   Low fat diet   Continue statin  External Hemorrhoid   Anusol HC TID as needed   Avoid straining with BM  Elevated calcium   Recheck labs

## 2018-03-19 NOTE — MR AVS SNAPSHOT
[address] [phone] Patient: Cristine Estimable MRN: [id number] :10/5/1931 Visit Information Date & Time Provider Department Dept. Phone Encounter #  
 3/19/2018  2:00 PM Lisa Merida  [address] [facility] [phone] [id number] Follow-up Instructions Return in about 3 months (around 2018). Upcoming Health Maintenance Date Due  
 FOOT EXAM Q1 3/7/2018 MICROALBUMIN Q1 2018 LIPID PANEL Q1 2018 MEDICARE YEARLY EXAM 2018 HEMOGLOBIN A1C Q6M 2018 EYE EXAM RETINAL OR DILATED Q1 2018 GLAUCOMA SCREENING Q2Y 2019 DTaP/Tdap/Td series (2 - Td) 2026 Allergies as of 3/19/2018  Review Complete On: 3/19/2018 By: Lisa Merida MD  
  
 Severity Noted Reaction Type Reactions Metformin  2014    Other (comments) Kidneys effected Current Immunizations  Reviewed on 10/31/2017 Name Date Influenza High Dose Vaccine PF 10/31/2017  2:08 PM  
 Influenza Vaccine 2014, 10/25/2013, 10/22/2012 Influenza Vaccine Velna Oats) 2016, 10/2/2015 Pneumococcal Conjugate (PCV-13) 2017  2:22 PM  
 Pneumococcal Polysaccharide (PPSV-23) 2016 Not reviewed this visit You Were Diagnosed With   
  
 Codes Comments Type 2 diabetes mellitus with nephropathy ([facility] [address]    -  Primary ICD-10-CM: E11.21 
ICD-9-CM: 250.40, 583.81 Well controlled type 2 diabetes mellitus ([facility] [address]     ICD-10-CM: E11.9 ICD-9-CM: 250.00 Pure hypercholesterolemia     ICD-10-CM: E78.00 ICD-9-CM: 272.0 Essential hypertension     ICD-10-CM: I10 
ICD-9-CM: 401.9 Serum calcium elevated     ICD-10-CM: E83.52 
ICD-9-CM: 275.42 External hemorrhoid     ICD-10-CM: K64.4 ICD-9-CM: 253. 3 Vitals BP Pulse Temp Resp Height(growth percentile) Weight(growth percentile) 156/73 (BP 1 Location: Left arm, BP Patient Position: Sitting) (!) 55 97.6 °F (36.4 °C) (Oral) 18 5' 1\" (1.549 m) 186 lb 2 oz (84.4 kg) SpO2 BMI OB Status Smoking Status 95% 35.17 kg/m2 Hysterectomy Never Smoker Vitals History BMI and BSA Data Body Mass Index Body Surface Area  
 35.17 kg/m 2 1.91 m 2 Preferred Pharmacy Pharmacy Name Phone [phone] [place] [address] - [address] Your Updated Medication List  
  
   
This list is accurate as of 3/19/18  2:50 PM.  Always use your most recent med list.  
  
  
  
  
 acetaminophen 500 mg tablet Commonly known as:  [address] Take 1 Tab by mouth every six (6) hours as needed for Pain. aspirin delayed-release 81 mg tablet Take 1 Tab by mouth daily. CARTIA  mg ER capsule Generic drug:  dilTIAZem CD  
TAKE 1 CAP BY MOUTH DAILY. fluticasone 50 mcg/actuation nasal spray Commonly known as:  Myna Heller SPRAY 1 PUFF IN EACH NOSTRIL ONCE DAILY  
  
 furosemide 20 mg tablet Commonly known as:  LASIX TAKE ONE TABLET BY MOUTH ONCE DAILY IN THE MORNING  
  
 glimepiride 1 mg tablet Commonly known as:  AMARYL  
TAKE 1 TAB BY MOUTH DAILY (BEFORE BREAKFAST). hydrocortisone 2.5 % rectal cream  
Commonly known as:  ANUSOL-HC Apply to rectal area BID  
  
 lisinopril-hydroCHLOROthiazide 20-25 mg per tablet Commonly known as:  PRINZIDE, ZESTORETIC  
TAKE 1 TAB BY MOUTH DAILY. lovastatin 40 mg tablet Commonly known as:  MEVACOR  
TAKE 1 TAB BY MOUTH NIGHTLY FOR CHOLESTEROL LOWERING  
  
 MULTIVITAMIN PO Take 3,000 Units by mouth daily. omeprazole 40 mg capsule Commonly known as:  PRILOSEC  
TAKE ONE CAPSULE BY MOUTH EVERY MORNING TO CONTROL STOMACH ACID  
  
 spironolactone 25 mg tablet Commonly known as:  ALDACTONE  
TAKE 1/2 TABLET ONCE DAILY  
  
 triamcinolone acetonide 0.1 % topical cream  
Commonly known as:  KENALOG Apply  to affected area two (2) times a day. use thin layer  
  
 valsartan 320 mg tablet Commonly known as:  DIOVAN  
TAKE ONE TABLET ONCE DAILY FOR BLOOD PRESSURE CONTROL Prescriptions Sent to Pharmacy Refills  
 hydrocortisone (ANUSOL-HC) 2.5 % rectal cream 0 Sig: Apply to rectal area BID Class: Normal  
 Pharmacy: [facility] [address]  #: [phone] We Performed the Following CBC WITH AUTOMATED DIFF [38900 CPT(R)] COLLECTION VENOUS BLOOD,VENIPUNCTURE U1087850 CPT(R)] HEMOGLOBIN A1C WITH EAG [70428 CPT(R)]  DIABETES FOOT EXAM [HM7 Custom] METABOLIC PANEL, COMPREHENSIVE [85658 CPT(R)] Follow-up Instructions Return in about 3 months (around 6/19/2018). Introducing [facility]! [facility] introduces [employer] patient portal. Now you can access parts of your medical record, email your doctor's office, and request medication refills online. 1. In your internet browser, go to [email or web address] 2. Click on the First Time User? Click Here link in the Sign In box. You will see the New Member Sign Up page. 3. Enter your [employer] Access Code exactly as it appears below. You will not need to use this code after youve completed the sign-up process. If you do not sign up before the expiration date, you must request a new code. · [employer] Access Code: [email or web address] Expires: 3/19/2018  3:45 PM 
 
4. Enter the last four digits of your Social Security Number (xxxx) and Date of Birth (mm/dd/yyyy) as indicated and click Submit. You will be taken to the next sign-up page. 5. Create a [employer] ID. This will be your [employer] login ID and cannot be changed, so think of one that is secure and easy to remember. 6. Create a [employer] password. You can change your password at any time. 7. Enter your Password Reset Question and Answer.  This can be used at a later time if you forget your password. 8. Enter your e-mail address. You will receive e-mail notification when new information is available in [address] 9. Click Sign Up. You can now view and download portions of your medical record. 10. Click the Download Summary menu link to download a portable copy of your medical information. If you have questions, please visit the Frequently Asked Questions section of the [employer] website. Remember, [employer] is NOT to be used for urgent needs. For medical emergencies, dial 911. Now available from your iPhone and Android! Please provide this summary of care documentation to your next provider. Your primary care clinician is listed as Baker Coleen. If you have any questions after today's visit, please call [phone].

## 2018-03-20 LAB
ALBUMIN SERPL-MCNC: 3.9 G/DL (ref 3.5–4.7)
ALBUMIN/GLOB SERPL: 1.3 {RATIO} (ref 1.2–2.2)
ALP SERPL-CCNC: 57 IU/L (ref 39–117)
ALT SERPL-CCNC: 9 IU/L (ref 0–32)
AST SERPL-CCNC: 16 IU/L (ref 0–40)
BASOPHILS # BLD AUTO: 0 X10E3/UL (ref 0–0.2)
BASOPHILS NFR BLD AUTO: 0 %
BILIRUB SERPL-MCNC: <0.2 MG/DL (ref 0–1.2)
BUN SERPL-MCNC: 31 MG/DL (ref 8–27)
BUN/CREAT SERPL: 17 (ref 12–28)
CALCIUM SERPL-MCNC: 10.3 MG/DL (ref 8.7–10.3)
CHLORIDE SERPL-SCNC: 103 MMOL/L (ref 96–106)
CO2 SERPL-SCNC: 23 MMOL/L (ref 18–29)
CREAT SERPL-MCNC: 1.82 MG/DL (ref 0.57–1)
EOSINOPHIL # BLD AUTO: 0.1 X10E3/UL (ref 0–0.4)
EOSINOPHIL NFR BLD AUTO: 2 %
ERYTHROCYTE [DISTWIDTH] IN BLOOD BY AUTOMATED COUNT: 13.2 % (ref 12.3–15.4)
EST. AVERAGE GLUCOSE BLD GHB EST-MCNC: 120 MG/DL
GFR SERPLBLD CREATININE-BSD FMLA CKD-EPI: 25 ML/MIN/1.73
GFR SERPLBLD CREATININE-BSD FMLA CKD-EPI: 29 ML/MIN/1.73
GLOBULIN SER CALC-MCNC: 2.9 G/DL (ref 1.5–4.5)
GLUCOSE SERPL-MCNC: 73 MG/DL (ref 65–99)
HBA1C MFR BLD: 5.8 % (ref 4.8–5.6)
HCT VFR BLD AUTO: 33.1 % (ref 34–46.6)
HGB BLD-MCNC: 10.6 G/DL (ref 11.1–15.9)
IMM GRANULOCYTES # BLD: 0 X10E3/UL (ref 0–0.1)
IMM GRANULOCYTES NFR BLD: 0 %
INTERPRETATION: NORMAL
LYMPHOCYTES # BLD AUTO: 2 X10E3/UL (ref 0.7–3.1)
LYMPHOCYTES NFR BLD AUTO: 40 %
MCH RBC QN AUTO: 31.4 PG (ref 26.6–33)
MCHC RBC AUTO-ENTMCNC: 32 G/DL (ref 31.5–35.7)
MCV RBC AUTO: 98 FL (ref 79–97)
MONOCYTES # BLD AUTO: 0.4 X10E3/UL (ref 0.1–0.9)
MONOCYTES NFR BLD AUTO: 7 %
NEUTROPHILS # BLD AUTO: 2.5 X10E3/UL (ref 1.4–7)
NEUTROPHILS NFR BLD AUTO: 51 %
PLATELET # BLD AUTO: 182 X10E3/UL (ref 150–379)
POTASSIUM SERPL-SCNC: 4 MMOL/L (ref 3.5–5.2)
PROT SERPL-MCNC: 6.8 G/DL (ref 6–8.5)
RBC # BLD AUTO: 3.38 X10E6/UL (ref 3.77–5.28)
SODIUM SERPL-SCNC: 142 MMOL/L (ref 134–144)
WBC # BLD AUTO: 5 X10E3/UL (ref 3.4–10.8)

## 2018-05-01 RX ORDER — DILTIAZEM HYDROCHLORIDE 240 MG/1
CAPSULE, EXTENDED RELEASE ORAL
Qty: 90 CAP | Refills: 0 | Status: SHIPPED | OUTPATIENT
Start: 2018-05-01 | End: 2018-07-17 | Stop reason: SDUPTHER

## 2018-05-15 ENCOUNTER — OFFICE VISIT (OUTPATIENT)
Dept: FAMILY MEDICINE CLINIC | Age: 83
End: 2018-05-15

## 2018-05-15 VITALS
BODY MASS INDEX: 34.98 KG/M2 | WEIGHT: 185.25 LBS | DIASTOLIC BLOOD PRESSURE: 88 MMHG | SYSTOLIC BLOOD PRESSURE: 150 MMHG | OXYGEN SATURATION: 98 % | HEART RATE: 52 BPM | RESPIRATION RATE: 18 BRPM | HEIGHT: 61 IN

## 2018-05-15 DIAGNOSIS — E11.42 DIABETIC POLYNEUROPATHY ASSOCIATED WITH TYPE 2 DIABETES MELLITUS (HCC): ICD-10-CM

## 2018-05-15 DIAGNOSIS — E11.21 TYPE 2 DIABETES MELLITUS WITH NEPHROPATHY (HCC): Primary | ICD-10-CM

## 2018-05-15 DIAGNOSIS — E11.9 WELL CONTROLLED TYPE 2 DIABETES MELLITUS (HCC): ICD-10-CM

## 2018-05-15 NOTE — PROGRESS NOTES
HISTORY OF PRESENT ILLNESS  Christiano Rai is a 80 y.o. female. HPI  She is here to have referral and forms completed for diabetic shoes. She sees Podiatry every 9 weeks. She has some neuropathy in both feet. She is taking meds regularly and monitors her blood sugars. Review of Systems   Constitutional: Negative for fever and malaise/fatigue. HENT: Negative for congestion, ear pain, hearing loss and sore throat. Respiratory: Negative for cough, sputum production and wheezing. Cardiovascular: Negative for chest pain and palpitations. Gastrointestinal: Negative for abdominal pain, constipation, diarrhea and heartburn. Musculoskeletal: Negative for joint pain and myalgias. Neurological: Positive for sensory change. Negative for dizziness and headaches. Psychiatric/Behavioral: Negative for depression. Past Medical History:   Diagnosis Date    Anxiety     Back pain     CAD (coronary artery disease)     CKD (chronic kidney disease) stage 3, GFR 30-59 ml/min     Constipation     Diabetes ([facility] [address]     Diverticulosis 2007    DM (diabetes mellitus) ([facility] [address]     AODM    Fibroid 1972    GERD (gastroesophageal reflux disease)     Goiter, non-toxic     Hernia, hiatal     Hyperlipemia     Hypertension     Hypokalemia     Menopause     Osteoarthritis 2006    bursitis R shoulder    Osteoarthritis of right knee     PVC (premature ventricular contraction)     H/O PVC's    Raynaud disease     Venous stasis     Wears hearing aid     both ears     Past Surgical History:   Procedure Laterality Date    HX BREAST BIOPSY Bilateral     x5    HX CATARACT REMOVAL  2004    bilateral    HX COLONOSCOPY  2002, 08/2007    HX DILATION AND CURETTAGE  1960's    HX HYSTERECTOMY  70's    BSO    HX OTHER SURGICAL  2001    sigmoidoscopy     Allergies   Allergen Reactions    Metformin Other (comments)     Kidneys effected     Blood pressure 150/88, pulse (!) 52, resp.  rate 18, height 5' 1\" (1.549 m), weight 185 lb 4 oz (84 kg), SpO2 98 %. Physical Exam   Constitutional: She appears well-developed and well-nourished. No distress. Cardiovascular: Normal rate, regular rhythm and normal heart sounds. Pulmonary/Chest: Effort normal and breath sounds normal. No respiratory distress. Diabetic foot exam:     Left:    Sharp/dull discrimination normal    Filament test normal sensation with micro filament   Pulse DP: 2+ (normal)   Pulse PT: 2+ (normal)   Deformities: None  Right:    Sharp/dull discrimination normal   Filament test reduced sensation with micro filament   Pulse DP: 2+ (normal)   Pulse PT: 2+ (normal)   Deformities: None  ASSESSMENT and PLAN  Diagnoses and all orders for this visit:    1. Type 2 diabetes mellitus with nephropathy (HCC)  -     MICROALB/CREAT RATIO, TIMED UR    2. Well controlled type 2 diabetes mellitus ([address]  -     MICROALB/CREAT RATIO, TIMED UR    3. Diabetic polyneuropathy associated with type 2 diabetes mellitus (HCC)  -     MICROALB/CREAT RATIO, TIMED UR    -Form completed for diabetic shoes. I am following her every 3-4 months for her diabetes.

## 2018-05-17 LAB
ALBUMIN 24H UR-MRATE: NORMAL MG/DAY
ALBUMIN ?TM UR-MRATE: NORMAL UG/MIN (ref 0–20)
ALBUMIN/CREAT UR: 3 UG/MG CREAT (ref 0–30)
CREAT UR-MCNC: 109.3 MG/DL
MICROALBUMIN UR-MCNC: 3.3 UG/ML

## 2018-06-19 ENCOUNTER — OFFICE VISIT (OUTPATIENT)
Dept: FAMILY MEDICINE CLINIC | Age: 83
End: 2018-06-19

## 2018-06-19 VITALS
OXYGEN SATURATION: 98 % | RESPIRATION RATE: 18 BRPM | TEMPERATURE: 98.6 F | DIASTOLIC BLOOD PRESSURE: 70 MMHG | SYSTOLIC BLOOD PRESSURE: 181 MMHG | HEART RATE: 56 BPM | HEIGHT: 61 IN | WEIGHT: 180.5 LBS | BODY MASS INDEX: 34.08 KG/M2

## 2018-06-19 DIAGNOSIS — E66.01 SEVERE OBESITY (BMI 35.0-39.9) WITH COMORBIDITY (HCC): ICD-10-CM

## 2018-06-19 DIAGNOSIS — Z00.00 MEDICARE ANNUAL WELLNESS VISIT, SUBSEQUENT: ICD-10-CM

## 2018-06-19 DIAGNOSIS — I10 ESSENTIAL HYPERTENSION: ICD-10-CM

## 2018-06-19 DIAGNOSIS — R00.1 BRADYCARDIA: ICD-10-CM

## 2018-06-19 DIAGNOSIS — E78.00 PURE HYPERCHOLESTEROLEMIA: ICD-10-CM

## 2018-06-19 DIAGNOSIS — D63.8 ANEMIA OF CHRONIC DISEASE: ICD-10-CM

## 2018-06-19 DIAGNOSIS — E11.9 WELL CONTROLLED TYPE 2 DIABETES MELLITUS (HCC): ICD-10-CM

## 2018-06-19 DIAGNOSIS — E11.21 TYPE 2 DIABETES MELLITUS WITH NEPHROPATHY (HCC): Primary | ICD-10-CM

## 2018-06-19 NOTE — PROGRESS NOTES
1. Have you been to the ER, urgent care clinic since your last visit? Hospitalized since your last visit? No    2. Have you seen or consulted any other health care providers outside of the [facility] since your last visit? Include any pap smears or colon screening. No      This is the Subsequent Medicare Annual Wellness Exam, performed 12 months or more after the Initial AWV or the last Subsequent AWV    I have reviewed the patient's medical history in detail and updated the computerized patient record. History     Past Medical History:   Diagnosis Date    Anemia of chronic disease 6/19/2018    Anxiety     Back pain     CAD (coronary artery disease)     CKD (chronic kidney disease) stage 3, GFR 30-59 ml/min     Constipation     Diabetes ([facility] [address]     Diverticulosis 2007    DM (diabetes mellitus) ([facility] [address]     AODM    Fibroid 1972    GERD (gastroesophageal reflux disease)     Goiter, non-toxic     Hernia, hiatal     Hyperlipemia     Hypertension     Hypokalemia     Menopause     Osteoarthritis 2006    bursitis R shoulder    Osteoarthritis of right knee     PVC (premature ventricular contraction)     H/O PVC's    Raynaud disease     Venous stasis     Wears hearing aid     both ears      Past Surgical History:   Procedure Laterality Date    HX BREAST BIOPSY Bilateral     x5    HX CATARACT REMOVAL  2004    bilateral    HX COLONOSCOPY  2002, 08/2007    HX DILATION AND CURETTAGE  1960's    HX HYSTERECTOMY  70's    BSO    HX OTHER SURGICAL  2001    sigmoidoscopy     Current Outpatient Prescriptions   Medication Sig Dispense Refill    furosemide (LASIX) 20 mg tablet TAKE ONE TABLET BY MOUTH ONCE DAILY IN THE MORNING 30 Tab 3    lovastatin (MEVACOR) 40 mg tablet TAKE 1 TAB BY MOUTH NIGHTLY FOR CHOLESTEROL LOWERING 30 Tab 3    lisinopril-hydroCHLOROthiazide (PRINZIDE, ZESTORETIC) 20-25 mg per tablet TAKE 1 TAB BY MOUTH DAILY.  30 Tab 3    CARTIA  mg ER capsule TAKE ONE CAPSULE BY MOUTH ONCE DAILY 90 Cap 0    hydrocortisone (ANUSOL-HC) 2.5 % rectal cream Apply to rectal area BID 30 g 0    valsartan (DIOVAN) 320 mg tablet TAKE ONE TABLET ONCE DAILY FOR BLOOD PRESSURE CONTROL 90 Tab 2    omeprazole (PRILOSEC) 40 mg capsule TAKE ONE CAPSULE BY MOUTH EVERY MORNING TO CONTROL STOMACH ACID 90 Cap 1    fluticasone (FLONASE) 50 mcg/actuation nasal spray SPRAY 1 PUFF IN EACH NOSTRIL ONCE DAILY 16 g 3    triamcinolone acetonide (KENALOG) 0.1 % topical cream Apply  to affected area two (2) times a day. use thin layer      spironolactone (ALDACTONE) 25 mg tablet TAKE 1/2 TABLET ONCE DAILY 15 Tab 5    aspirin delayed-release 81 mg tablet Take 1 Tab by mouth daily. 30 Tab 0    acetaminophen (TYLENOL EXTRA STRENGTH) 500 mg tablet Take 1 Tab by mouth every six (6) hours as needed for Pain. 30 Tab 1    MULTIVITAMIN PO Take 3,000 Units by mouth daily. Allergies   Allergen Reactions    Metformin Other (comments)     Kidneys effected     Family History   Problem Relation Age of Onset    Diabetes Brother     Hypertension Mother     Diabetes Brother      Social History   Substance Use Topics    Smoking status: Never Smoker    Smokeless tobacco: Never Used    Alcohol use No     Patient Active Problem List   Diagnosis Code    Encounter for long-term (current) use of other medications Z79.899    Well controlled type 2 diabetes mellitus ([facility] E11.9    Essential hypertension I10    Type 2 diabetes mellitus with nephropathy ([facility] [address] E11.21    BMI 35.0-35.9,adult Z68.35    Severe obesity (BMI 35.0-39. 9) [facility] comorbidity [facility] E66.01    Pure hypercholesterolemia E78.00    Anemia of chronic disease D63.8       Depression Risk Factor Screening:     PHQ over the last two weeks 6/19/2018   PHQ Not Done Patient Decline   Little interest or pleasure in doing things -   Feeling down, depressed or hopeless -   Total Score PHQ 2 -     Alcohol Risk Factor Screening:    You do not drink alcohol or very rarely. Functional Ability and Level of Safety:   Hearing Loss  Hearing is good. Activities of Daily Living  The home contains: no safety equipment. Patient does total self care  Functional Ability:   Does the patient exhibit a steady gait?  no   How long did it take the patient to get up and walk from a sitting position? 6sec   Is the patient self reliant?  (ie can do own laundry, meals, household chores)  yes     Does the patient handle his/her own medications? yes     Does the patient handle his/her own money? yes     Is the patients home safe (ie good lighting, handrails on stairs and bath, etc.)? yes     Did you notice or did patient express any hearing difficulties? no     Did you notice or did patient express any vision difficulties?   no     Were distance and reading eye charts used? no       Advance Care Planning:   Patient was offered the opportunity to discuss advance care planning:  yes     Does patient have an Advance Directive:  no   If no, did you provide information on Caring Connections? yes     ADL Assessment 6/6/2017   Feeding yourself No Help Needed   Getting from bed to chair No Help Needed   Getting dressed No Help Needed   Bathing or showering No Help Needed   Walk across the room (includes cane/walker) No Help Needed   Using the telphone No Help Needed   Taking your medications No Help Needed   Preparing meals No Help Needed   Managing money (expenses/bills) No Help Needed   Moderately strenuous housework (laundry) No Help Needed   Shopping for personal items (toiletries/medicines) No Help Needed   Shopping for groceries No Help Needed   Driving No Help Needed   Climbing a flight of stairs No Help Needed   Getting to places beyond walking distances No Help Needed         Fall Risk  Fall Risk Assessment, last 12 mths 6/19/2018   Able to walk? Yes   Fall in past 12 months? Yes   Fall with injury?  Yes   Number of falls in past 12 months 1   Fall Risk Score 2       Abuse Screen  Patient is not abused    Cognitive Screening   Evaluation of Cognitive Function:  Has your family/caregiver stated any concerns about your memory: no  Normal, Clock Drawing test    Patient Care Team   Patient Care Team:  Joshua Caraballo MD as PCP - General (Family Practice)    Assessment/Plan   Education and counseling provided:  Are appropriate based on today's review and evaluation  End-of-Life planning (with patient's consent)    Diagnoses and all orders for this visit:    1. Type 2 diabetes mellitus with nephropathy ([facility] [address]    2. Well controlled type 2 diabetes mellitus ([address]    3. Severe obesity (BMI 35.0-39. 9) with comorbidity ([address]    4. Pure hypercholesterolemia    5. Essential hypertension    6. Anemia of chronic disease    7.  Medicare annual wellness visit, subsequent    Other orders  -     METABOLIC PANEL, COMPREHENSIVE  -     HEMOGLOBIN A1C WITH EAG  -     CBC WITH AUTOMATED DIFF        Health Maintenance Due   Topic Date Due    LIPID PANEL Q1  06/06/2018    EYE EXAM RETINAL OR DILATED Q1  07/13/2018

## 2018-06-19 NOTE — PATIENT INSTRUCTIONS
Medicare Wellness Visit, Female    The best way to live healthy is to have a lifestyle where you eat a well-balanced diet, exercise regularly, limit alcohol use, and quit all forms of tobacco/nicotine, if applicable. Regular preventive services are another way to keep healthy. Preventive services (vaccines, screening tests, monitoring & exams) can help personalize your care plan, which helps you manage your own care. Screening tests can find health problems at the earliest stages, when they are easiest to treat. [facility] follows the current, evidence-based guidelines published by the [facility] ([facility]) when recommending preventive services for our patients. Because we follow these guidelines, sometimes recommendations change over time as research supports it. (For example, mammograms used to be recommended annually. Even though Medicare will still pay for an annual mammogram, the newer guidelines recommend a mammogram every two years for women of average risk.)    Of course, you and your provider may decide to screen more often for some diseases, based on your risk and co-morbidities (chronic disease you are already diagnosed with). Preventive services for you include:    - Medicare offers their members a free annual wellness visit, which is time for you and your primary care provider to discuss and plan for your preventive service needs. Take advantage of this benefit every year!    -All people over age 72 should receive the recommended pneumonia vaccines. Current USPSTF guidelines recommend a series of two vaccines for the best pneumonia protection.     -All adults should have a yearly flu vaccine and a tetanus vaccine every 10 years. All adults age 61 years should receive a shingles vaccine once in their lifetime.      -A bone mass density test is recommended when a woman turns 65 to screen for osteoporosis.  This test is only recommended once as a screening. Some providers will use this same test as a disease monitoring tool if you already have osteoporosis. -All adults age 38-68 years who are overweight should have a diabetes screening test once every three years.     -Other screening tests & preventive services for persons with diabetes include: an eye exam to screen for diabetic retinopathy, a kidney function test, a foot exam, and stricter control over your cholesterol.     -Cardiovascular screening for adults with routine risk involves an electrocardiogram (ECG) at intervals determined by the provider.     -Colorectal cancer screenings should be done for adults age 54-65 years with normal risk. There are a number of acceptable methods of screening for this type of cancer. Each test has its own benefits and drawbacks. Discuss with your provider what is most appropriate for you during your annual wellness visit. The different tests include: colonoscopy (considered the best screening method), a fecal occult blood test, a fecal DNA test, and sigmoidoscopy. -Breast cancer screenings are recommended every other year for women of normal risk age 54-69 years.     -Cervical cancer screenings for women over age 72 are only recommended with certain risk factors.     -All adults born between [facility] should be screened once for Hepatitis C.      Here is a list of your current Health Maintenance items (your personalized list of preventive services) with a due date:  Health Maintenance Due   Topic Date Due    Cholesterol Test   06/06/2018    Eye Exam  07/13/2018

## 2018-06-19 NOTE — MR AVS SNAPSHOT
[address] [phone] Patient: Arnaldo Shell MRN: [id number] :10/5/1931 Visit Information Date & Time Provider Department Dept. Phone Encounter #  
 2018  2:00 PM Kye Mendez  [address] [place] [address] [phone] [id number] Your Appointments 2018  2:00 PM  
Follow Up with Kye Mendez MD  
[address] [place] [address] ([facility]) Appt Note: 3 mo f/u  
 [facility]  
[phone] [facility] Upcoming Health Maintenance Date Due  
 LIPID PANEL Q1 2018 EYE EXAM RETINAL OR DILATED Q1 2018 Influenza Age 5 to Adult 2018 HEMOGLOBIN A1C Q6M 2018 FOOT EXAM Q1 3/19/2019 MICROALBUMIN Q1 5/15/2019 MEDICARE YEARLY EXAM 2019 GLAUCOMA SCREENING Q2Y 2019 DTaP/Tdap/Td series (2 - Td) 2026 Allergies as of 2018  Review Complete On: 2018 By: Kye Mendez MD  
  
 Severity Noted Reaction Type Reactions Metformin  2014    Other (comments) Kidneys effected Current Immunizations  Reviewed on 10/31/2017 Name Date Influenza High Dose Vaccine PF 10/31/2017  2:08 PM  
 Influenza Vaccine 2014, 10/25/2013, 10/22/2012 Influenza Vaccine [place]) 2016, 10/2/2015 Pneumococcal Conjugate (PCV-13) 2017  2:22 PM  
 Pneumococcal Polysaccharide (PPSV-23) 2016 Not reviewed this visit You Were Diagnosed With   
  
 Codes Comments Type 2 diabetes mellitus with nephropathy ([facility] [address]    -  Primary ICD-10-CM: E11.21 
ICD-9-CM: 250.40, 583.81 Well controlled type 2 diabetes mellitus ([facility] [address]     ICD-10-CM: E11.9 ICD-9-CM: 250.00 Severe obesity (BMI 35.0-39. 9) with comorbidity ([facility] [address]     ICD-10-CM: E66.01 
ICD-9-CM: 278.01   
 Pure hypercholesterolemia     ICD-10-CM: E78.00 ICD-9-CM: 272.0 Essential hypertension     ICD-10-CM: I10 
ICD-9-CM: 401.9 Anemia of chronic disease     ICD-10-CM: D63.8 ICD-9-CM: 285.29 Medicare annual wellness visit, subsequent     ICD-10-CM: Z00.00 ICD-9-CM: V70.0 Bradycardia     ICD-10-CM: R00.1 ICD-9-CM: 427.89 Vitals BP Pulse Temp Resp Height(growth percentile) Weight(growth percentile) 181/70 (BP 1 Location: Left arm) (!) 56 98.6 °F (37 °C) (Oral) 18 5' 1\" (1.549 m) 180 lb 8 oz (81.9 kg) SpO2 BMI OB Status Smoking Status 98% 34.11 kg/m2 Hysterectomy Never Smoker Vitals History BMI and BSA Data Body Mass Index Body Surface Area  
 34.11 kg/m 2 1.88 m 2 Preferred Pharmacy Pharmacy Name Phone [id number] [address] Your Updated Medication List  
  
   
This list is accurate as of 6/19/18  3:35 PM.  Always use your most recent med list.  
  
  
  
  
 acetaminophen 500 mg tablet Commonly known as:  80 Bacilio Manzo Jr [facility] Se Take 1 Tab by mouth every six (6) hours as needed for Pain. aspirin delayed-release 81 mg tablet Take 1 Tab by mouth daily. CARTIA  mg ER capsule Generic drug:  dilTIAZem CD  
TAKE ONE CAPSULE BY MOUTH ONCE DAILY  
  
 fluticasone 50 mcg/actuation nasal spray Commonly known as:  Francella Lacks SPRAY 1 PUFF IN EACH NOSTRIL ONCE DAILY  
  
 furosemide 20 mg tablet Commonly known as:  LASIX TAKE ONE TABLET BY MOUTH ONCE DAILY IN THE MORNING  
  
 hydrocortisone 2.5 % rectal cream  
Commonly known as:  ANUSOL-HC Apply to rectal area BID  
  
 lisinopril-hydroCHLOROthiazide 20-25 mg per tablet Commonly known as:  PRINZIDE, ZESTORETIC  
TAKE 1 TAB BY MOUTH DAILY. lovastatin 40 mg tablet Commonly known as:  MEVACOR  
TAKE 1 TAB BY MOUTH NIGHTLY FOR CHOLESTEROL LOWERING  
  
 MULTIVITAMIN PO Take 3,000 Units by mouth daily. omeprazole 40 mg capsule Commonly known as:  PRILOSEC  
TAKE ONE CAPSULE BY MOUTH EVERY MORNING TO CONTROL STOMACH ACID  
  
 spironolactone 25 mg tablet Commonly known as:  ALDACTONE  
TAKE 1/2 TABLET ONCE DAILY  
  
 triamcinolone acetonide 0.1 % topical cream  
Commonly known as:  KENALOG Apply  to affected area two (2) times a day. use thin layer  
  
 valsartan 320 mg tablet Commonly known as:  DIOVAN  
TAKE ONE TABLET ONCE DAILY FOR BLOOD PRESSURE CONTROL We Performed the Following AMB POC EKG ROUTINE W/ 12 LEADS, INTER & REP [28081 CPT(R)] CBC WITH AUTOMATED DIFF [61256 CPT(R)] COLLECTION VENOUS BLOOD,VENIPUNCTURE [id number] CPT(R)] HEMOGLOBIN A1C WITH EAG [06548 CPT(R)] METABOLIC PANEL, COMPREHENSIVE [90565 CPT(R)] To-Do List   
 06/19/2018 ECHO:  2D ECHO COMPLETE ADULT (TTE) W OR WO CONTR   
  
 06/19/2018 ECG:  CARDIAC HOLTER MONITOR, 24 HOURS Patient Instructions Medicare Wellness Visit, Female The best way to live healthy is to have a lifestyle where you eat a well-balanced diet, exercise regularly, limit alcohol use, and quit all forms of tobacco/nicotine, if applicable. Regular preventive services are another way to keep healthy. Preventive services (vaccines, screening tests, monitoring & exams) can help personalize your care plan, which helps you manage your own care. Screening tests can find health problems at the earliest stages, when they are easiest to treat. Cassy Bartlett follows the current, evidence-based guidelines published by the [facility] States Mendez Grace ([facility]) when recommending preventive services for our patients. Because we follow these guidelines, sometimes recommendations change over time as research supports it. (For example, mammograms used to be recommended annually.  Even though Medicare will still pay for an annual mammogram, the newer guidelines recommend a mammogram every two years for women of average risk.) Of course, you and your provider may decide to screen more often for some diseases, based on your risk and co-morbidities (chronic disease you are already diagnosed with). Preventive services for you include: - Medicare offers their members a free annual wellness visit, which is time for you and your primary care provider to discuss and plan for your preventive service needs. Take advantage of this benefit every year! 
 
-All people over age 72 should receive the recommended pneumonia vaccines. Current USPSTF guidelines recommend a series of two vaccines for the best pneumonia protection.  
 
-All adults should have a yearly flu vaccine and a tetanus vaccine every 10 years. All adults age 61 years should receive a shingles vaccine once in their lifetime.   
 
-A bone mass density test is recommended when a woman turns 65 to screen for osteoporosis. This test is only recommended once as a screening. Some providers will use this same test as a disease monitoring tool if you already have osteoporosis. -All adults age 38-68 years who are overweight should have a diabetes screening test once every three years.  
 
-Other screening tests & preventive services for persons with diabetes include: an eye exam to screen for diabetic retinopathy, a kidney function test, a foot exam, and stricter control over your cholesterol.  
 
-Cardiovascular screening for adults with routine risk involves an electrocardiogram (ECG) at intervals determined by the provider.  
 
-Colorectal cancer screenings should be done for adults age 54-65 years with normal risk. There are a number of acceptable methods of screening for this type of cancer. Each test has its own benefits and drawbacks. Discuss with your provider what is most appropriate for you during your annual wellness visit.  The different tests include: colonoscopy (considered the best screening method), a fecal occult blood test, a fecal DNA test, and sigmoidoscopy. -Breast cancer screenings are recommended every other year for women of normal risk age 54-69 years.  
 
-Cervical cancer screenings for women over age 72 are only recommended with certain risk factors.  
 
-All adults born between [facility] should be screened once for Hepatitis C. Here is a list of your current Health Maintenance items (your personalized list of preventive services) with a due date: 
Health Maintenance Due Topic Date Due  Cholesterol Test   06/06/2018 [facility] Exam  07/13/2018 Introducing [facility]! Carin Barr introduces [employer] patient portal. Now you can access parts of your medical record, email your doctor's office, and request medication refills online. 1. In your internet browser, go to [email or web address] 2. Click on the First Time User? Click Here link in the Sign In box. You will see the New Member Sign Up page. 3. Enter your [employer] Access Code exactly as it appears below. You will not need to use this code after youve completed the sign-up process. If you do not sign up before the expiration date, you must request a new code. · [employer] Access Code: [email or web address] Expires: 9/17/2018  3:35 PM 
 
4. Enter the last four digits of your Social Security Number (xxxx) and Date of Birth (mm/dd/yyyy) as indicated and click Submit. You will be taken to the next sign-up page. 5. Create a [employer] ID. This will be your [employer] login ID and cannot be changed, so think of one that is secure and easy to remember. 6. Create a [employer] password. You can change your password at any time. 7. Enter your Password Reset Question and Answer. This can be used at a later time if you forget your password. 8. Enter your e-mail address. You will receive e-mail notification when new information is available in [address] 9. Click Sign Up. You can now view and download portions of your medical record. 10. Click the Download Summary menu link to download a portable copy of your medical information. If you have questions, please visit the Frequently Asked Questions section of the [employer] website. Remember, [employer] is NOT to be used for urgent needs. For medical emergencies, dial 911. Now available from your iPhone and Android! Please provide this summary of care documentation to your next provider. Your primary care clinician is listed as Loraine Rosales. If you have any questions after today's visit, please call [phone].

## 2018-06-19 NOTE — PROGRESS NOTES
HISTORY OF PRESENT ILLNESS  Zafar Tran is a 80 y.o. female. Cholesterol Problem   Associated symptoms include chest pain. Pertinent negatives include no abdominal pain, no headaches and no shortness of breath. GERD   Associated symptoms include chest pain. Pertinent negatives include no abdominal pain, no headaches and no shortness of breath. Hypertension    Associated symptoms include chest pain and neck pain. Pertinent negatives include no palpitations, no malaise/fatigue, no headaches, no dizziness and no shortness of breath. Diabetes   Associated symptoms include chest pain. Pertinent negatives include no abdominal pain, no headaches and no shortness of breath. Diabetes  Zafar Tran is a 80 y.o. female who is here for a routine diabetic check. Current blood sugars at home have been  fasting . Lab Results   Component Value Date/Time    Hemoglobin A1c 5.8 (H) 03/19/2018 02:33 PM   There have been no episodes of hypoglycemia. Is now off Amaryl and not on any meds. Watching her diet. Checking feet daily      Hypertension  Taking meds daily  . Denies chest pain or shortness of breath. Her Cartia was increased last visit. Hyperlipidemia  On medication for high cholesterol. No myalgias noted. Taking meds daily    Lab Results   Component Value Date/Time    Cholesterol, total 126 06/06/2017 01:47 PM    HDL Cholesterol 40 06/06/2017 01:47 PM    LDL, calculated 64 06/06/2017 01:47 PM    VLDL, calculated 22 06/06/2017 01:47 PM    Triglyceride 108 06/06/2017 01:47 PM     She had a fall at [employer] [facility] days ago. She tripped coming down a wooden ramp and fell forward . Contusion on her right breast. No SOB. Some CP with inspiration. Having a harder time hearing. Review of Systems   Constitutional: Negative for fever and malaise/fatigue. HENT: Negative for congestion, ear pain, hearing loss and sore throat.     Respiratory: Negative for cough, sputum production, shortness of breath and wheezing. Cardiovascular: Positive for chest pain and leg swelling. Negative for palpitations and claudication. Gastrointestinal: Negative for abdominal pain, constipation, diarrhea and heartburn. Musculoskeletal: Positive for myalgias and neck pain. Negative for joint pain. Neurological: Negative for dizziness and headaches. Endo/Heme/Allergies: Bruises/bleeds easily. Psychiatric/Behavioral: Negative for depression. Past Medical History:   Diagnosis Date    Anxiety     Back pain     CAD (coronary artery disease)     CKD (chronic kidney disease) stage 3, GFR 30-59 ml/min     Constipation     Diabetes ([facility] [address]     Diverticulosis 2007    DM (diabetes mellitus) ([facility] [address]     AODM    Fibroid 1972    GERD (gastroesophageal reflux disease)     Goiter, non-toxic     Hernia, hiatal     Hyperlipemia     Hypertension     Hypokalemia     Menopause     Osteoarthritis 2006    bursitis R shoulder    Osteoarthritis of right knee     PVC (premature ventricular contraction)     H/O PVC's    Raynaud disease     Venous stasis     Wears hearing aid     both ears     Past Surgical History:   Procedure Laterality Date    HX BREAST BIOPSY Bilateral     x5    HX CATARACT REMOVAL  2004    bilateral    HX COLONOSCOPY  2002, 08/2007    HX DILATION AND CURETTAGE  1960's    HX HYSTERECTOMY  70's    BSO    HX OTHER SURGICAL  2001    sigmoidoscopy     Allergies   Allergen Reactions    Metformin Other (comments)     Kidneys effected     Blood pressure 181/70, pulse (!) 56, temperature 98.6 °F (37 °C), temperature source Oral, resp. rate 18, height 5' 1\" (1.549 m), weight 180 lb 8 oz (81.9 kg), SpO2 98 %. Physical Exam   Constitutional: She is oriented to person, place, and time. She appears well-developed and well-nourished. No distress. HENT:   Head: Normocephalic and atraumatic.    Left Ear: External ear normal.   Nose: Nose normal.   Mouth/Throat: Oropharynx is clear and moist.   Right TM partially occluded with cerumen. Eyes: Conjunctivae are normal.   Neck: Neck supple. TTP trapezius   Cardiovascular: Regular rhythm. Murmur heard. Usual III/VI murmur  Bradycardic rate but regular   Pulmonary/Chest: Effort normal and breath sounds normal. No respiratory distress. Right breast with contusion present   Abdominal: Soft. Genitourinary:   Genitourinary Comments: Rectum- small hemorrhoid noted on exam. No bleeding at this time. Lymphadenopathy:     She has no cervical adenopathy. Neurological: She is alert and oriented to person, place, and time. Skin: Skin is warm. Psychiatric: She has a normal mood and affect. Nursing note and vitals reviewed.     EKG: Marked sinus tiffanie, LVH    ASSESSMENT and PLAN  DM   Monitor blood sugars at home   Regular eye and foot exams   Check A1c   HTN   Low sodium diet   Continue present meds   Check CMP  Hyperlipidemia   Low fat diet   Continue statin  Fall with contusion right breast   Heat for comfort and tylenol  Bradycardia- asymptomatic   Check EKG, holter, and ECHO   RTO 3 weeks for recheck  Anemia   Check CBC  BMI 34   Discussed diet and exercise

## 2018-06-19 NOTE — ACP (ADVANCE CARE PLANNING)
Advance Care Planning (ACP) Provider Conversation Snapshot    Date of ACP Conversation: 06/19/18  Persons included in Conversation:  patient  Length of ACP Conversation in minutes:  <16 minutes (Non-Billable)    Authorized Decision Maker (if patient is incapable of making informed decisions): This person is:    Other Legally Authorized Decision Maker (e.g. Next of Kin)    Conversation Outcomes / Follow-Up Plan:   Recommended completion of Advance Directive form after review of ACP materials and conversation with prospective healthcare agent   Recommended communicating the plan and making copies for the healthcare agent, personal physician, and others as appropriate (e.g., health system)  Recommended review of completed ACP document annually or upon change in health status

## 2018-06-20 LAB
ALBUMIN SERPL-MCNC: 4.3 G/DL (ref 3.5–4.7)
ALBUMIN/GLOB SERPL: 1.3 {RATIO} (ref 1.2–2.2)
ALP SERPL-CCNC: 68 IU/L (ref 39–117)
ALT SERPL-CCNC: 9 IU/L (ref 0–32)
AST SERPL-CCNC: 25 IU/L (ref 0–40)
BASOPHILS # BLD AUTO: 0 X10E3/UL (ref 0–0.2)
BASOPHILS NFR BLD AUTO: 0 %
BILIRUB SERPL-MCNC: 0.3 MG/DL (ref 0–1.2)
BUN SERPL-MCNC: 29 MG/DL (ref 8–27)
BUN/CREAT SERPL: 15 (ref 12–28)
CALCIUM SERPL-MCNC: 11.5 MG/DL (ref 8.7–10.3)
CHLORIDE SERPL-SCNC: 101 MMOL/L (ref 96–106)
CO2 SERPL-SCNC: 25 MMOL/L (ref 20–29)
CREAT SERPL-MCNC: 1.96 MG/DL (ref 0.57–1)
EOSINOPHIL # BLD AUTO: 0 X10E3/UL (ref 0–0.4)
EOSINOPHIL NFR BLD AUTO: 1 %
ERYTHROCYTE [DISTWIDTH] IN BLOOD BY AUTOMATED COUNT: 12.6 % (ref 12.3–15.4)
EST. AVERAGE GLUCOSE BLD GHB EST-MCNC: 169 MG/DL
GFR SERPLBLD CREATININE-BSD FMLA CKD-EPI: 23 ML/MIN/1.73
GFR SERPLBLD CREATININE-BSD FMLA CKD-EPI: 26 ML/MIN/1.73
GLOBULIN SER CALC-MCNC: 3.2 G/DL (ref 1.5–4.5)
GLUCOSE SERPL-MCNC: 191 MG/DL (ref 65–99)
HBA1C MFR BLD: 7.5 % (ref 4.8–5.6)
HCT VFR BLD AUTO: 33.9 % (ref 34–46.6)
HGB BLD-MCNC: 11 G/DL (ref 11.1–15.9)
IMM GRANULOCYTES # BLD: 0 X10E3/UL (ref 0–0.1)
IMM GRANULOCYTES NFR BLD: 0 %
INTERPRETATION: NORMAL
LYMPHOCYTES # BLD AUTO: 1.7 X10E3/UL (ref 0.7–3.1)
LYMPHOCYTES NFR BLD AUTO: 36 %
MCH RBC QN AUTO: 31.6 PG (ref 26.6–33)
MCHC RBC AUTO-ENTMCNC: 32.4 G/DL (ref 31.5–35.7)
MCV RBC AUTO: 97 FL (ref 79–97)
MONOCYTES # BLD AUTO: 0.3 X10E3/UL (ref 0.1–0.9)
MONOCYTES NFR BLD AUTO: 6 %
NEUTROPHILS # BLD AUTO: 2.7 X10E3/UL (ref 1.4–7)
NEUTROPHILS NFR BLD AUTO: 57 %
PLATELET # BLD AUTO: 177 X10E3/UL (ref 150–379)
POTASSIUM SERPL-SCNC: 3.9 MMOL/L (ref 3.5–5.2)
PROT SERPL-MCNC: 7.5 G/DL (ref 6–8.5)
RBC # BLD AUTO: 3.48 X10E6/UL (ref 3.77–5.28)
SODIUM SERPL-SCNC: 140 MMOL/L (ref 134–144)
WBC # BLD AUTO: 4.8 X10E3/UL (ref 3.4–10.8)

## 2018-06-25 NOTE — PROGRESS NOTES
Blood sugar is higher but still less than 8% A1c. Calcium is high. Have her RTO to discuss and to have more labs.

## 2018-06-28 ENCOUNTER — CLINICAL SUPPORT (OUTPATIENT)
Dept: CARDIOLOGY CLINIC | Age: 83
End: 2018-06-28

## 2018-06-28 DIAGNOSIS — R00.1 BRADYCARDIA: ICD-10-CM

## 2018-06-28 NOTE — PROGRESS NOTES
24 hour Holter monitor only. Verified patient with two patient identifiers. Pt verbalized understanding of its use. Ordering CRISTIANO Bethea  Reason: bradycardia  Start time:  10:26am  Return date: 6/29/18        No LOS.

## 2018-06-29 ENCOUNTER — DOCUMENTATION ONLY (OUTPATIENT)
Dept: CARDIOLOGY CLINIC | Age: 83
End: 2018-06-29

## 2018-07-03 ENCOUNTER — OFFICE VISIT (OUTPATIENT)
Dept: FAMILY MEDICINE CLINIC | Age: 83
End: 2018-07-03

## 2018-07-03 VITALS
OXYGEN SATURATION: 99 % | DIASTOLIC BLOOD PRESSURE: 71 MMHG | TEMPERATURE: 98.6 F | WEIGHT: 181.25 LBS | HEIGHT: 61 IN | RESPIRATION RATE: 18 BRPM | HEART RATE: 56 BPM | BODY MASS INDEX: 34.22 KG/M2 | SYSTOLIC BLOOD PRESSURE: 167 MMHG

## 2018-07-03 DIAGNOSIS — E83.52 HYPERCALCEMIA: Primary | ICD-10-CM

## 2018-07-03 NOTE — PROGRESS NOTES
Alessandro Dempsey is a 80 y.o. female who presents to the office today with the following:  Chief Complaint   Patient presents with    Abnormal Lab Results       HPI:  She was here a week or so ago for her regular follow up and was found to have an elevated calcium on her labs. No previous history of hypercalcemia. On Calcium and Vitamin D supp. Her creatinine has also increased a bit. A1c was 7.5 but she has been taken off her Metformin. Watching her diet. Patient Active Problem List   Diagnosis Code    Encounter for long-term (current) use of other medications Z79.899    Well controlled type 2 diabetes mellitus ([facility] E11.9    Essential hypertension I10    Type 2 diabetes mellitus with nephropathy ([facility] E11.21    Severe obesity (BMI 35.0-39. 9) with comorbidity (HCC) E66.01    Pure hypercholesterolemia E78.00    Anemia of chronic disease D63.8    BMI 34.0-34.9,adult Z68.34       Allergies   Allergen Reactions    Metformin Other (comments)     Kidneys effected       Current Outpatient Prescriptions   Medication Sig    furosemide (LASIX) 20 mg tablet TAKE ONE TABLET BY MOUTH ONCE DAILY IN THE MORNING    lovastatin (MEVACOR) 40 mg tablet TAKE 1 TAB BY MOUTH NIGHTLY FOR CHOLESTEROL LOWERING    lisinopril-hydroCHLOROthiazide (PRINZIDE, ZESTORETIC) 20-25 mg per tablet TAKE 1 TAB BY MOUTH DAILY.  CARTIA  mg ER capsule TAKE ONE CAPSULE BY MOUTH ONCE DAILY    hydrocortisone (ANUSOL-HC) 2.5 % rectal cream Apply to rectal area BID    valsartan (DIOVAN) 320 mg tablet TAKE ONE TABLET ONCE DAILY FOR BLOOD PRESSURE CONTROL    omeprazole (PRILOSEC) 40 mg capsule TAKE ONE CAPSULE BY MOUTH EVERY MORNING TO CONTROL STOMACH ACID    fluticasone (FLONASE) 50 mcg/actuation nasal spray SPRAY 1 PUFF IN EACH NOSTRIL ONCE DAILY    triamcinolone acetonide (KENALOG) 0.1 % topical cream Apply  to affected area two (2) times a day.  use thin layer    spironolactone (ALDACTONE) 25 mg tablet TAKE 1/2 TABLET ONCE DAILY    aspirin delayed-release 81 mg tablet Take 1 Tab by mouth daily.  acetaminophen (TYLENOL EXTRA STRENGTH) 500 mg tablet Take 1 Tab by mouth every six (6) hours as needed for Pain.  MULTIVITAMIN PO Take 3,000 Units by mouth daily. No current facility-administered medications for this visit. Past Medical History:   Diagnosis Date    Anemia of chronic disease 6/19/2018    Anxiety     Back pain     CAD (coronary artery disease)     CKD (chronic kidney disease) stage 3, GFR 30-59 ml/min     Constipation     Diabetes ([facility]     Diverticulosis 2007    DM (diabetes mellitus) ([facility]     AODM    Fibroid 1972    GERD (gastroesophageal reflux disease)     Goiter, non-toxic     Hernia, hiatal     Hyperlipemia     Hypertension     Hypokalemia     Menopause     Osteoarthritis 2006    bursitis R shoulder    Osteoarthritis of right knee     PVC (premature ventricular contraction)     H/O PVC's    Raynaud disease     Venous stasis     Wears hearing aid     both ears       Past Surgical History:   Procedure Laterality Date    HX BREAST BIOPSY Bilateral     x5    HX CATARACT REMOVAL  2004    bilateral    HX COLONOSCOPY  2002, 08/2007    HX DILATION AND CURETTAGE  1960's    HX HYSTERECTOMY  66's    BSO    HX OTHER SURGICAL  2001    sigmoidoscopy       Social History     Social History    Marital status:      Spouse name: N/A    Number of children: N/A    Years of education: N/A     Social History Main Topics    Smoking status: Never Smoker    Smokeless tobacco: Never Used    Alcohol use No    Drug use: No    Sexual activity: No     Other Topics Concern    None     Social History Narrative    Happily . No concern for abuse. Exercise: none    Diet: Careful.     Wear Seatbelts               History   Smoking Status    Never Smoker   Smokeless Tobacco    Never Used       Family History   Problem Relation Age of Onset    Diabetes Brother     Hypertension Mother  Diabetes Brother        Vitals:    07/03/18 1338   BP: 167/71   BP 1 Location: Left arm   BP Patient Position: Sitting   Pulse: (!) 56   Resp: 18   Temp: 98.6 °F (37 °C)   TempSrc: Oral   SpO2: 99%   Weight: 181 lb 4 oz (82.2 kg)   Height: 5' 1\" (1.549 m)       PHQ over the last two weeks 6/19/2018   PHQ Not Done Patient Decline   Little interest or pleasure in doing things -   Feeling down, depressed or hopeless -   Total Score PHQ 2 -       ROS:  Review of Systems   Constitutional: Negative for fever and malaise/fatigue. HENT: Negative for congestion, ear pain, hearing loss and sore throat. Respiratory: Negative for cough, sputum production, shortness of breath and wheezing. Cardiovascular: Positive for leg swelling. Negative for chest pain, palpitations and claudication. Gastrointestinal: Negative for abdominal pain, constipation, diarrhea and heartburn. Musculoskeletal: Positive for myalgias and neck pain. Negative for joint pain. Neurological: Negative for dizziness and headaches. Endo/Heme/Allergies: Bruises/bleeds easily. Psychiatric/Behavioral: Negative for depression. Physical Exam:  Visit Vitals    /71 (BP 1 Location: Left arm, BP Patient Position: Sitting)    Pulse (!) 56    Temp 98.6 °F (37 °C) (Oral)    Resp 18    Ht 5' 1\" (1.549 m)    Wt 181 lb 4 oz (82.2 kg)    SpO2 99%    BMI 34.25 kg/m2     Physical Exam   Constitutional: She is well-developed, well-nourished, and in no distress. HENT:   Head: Normocephalic. Neck: Neck supple. Cardiovascular:   Murmur heard. Bradycardia but regular   Pulmonary/Chest: Effort normal and breath sounds normal. No respiratory distress. SUMMARY:  Left ventricle: Systolic function was normal. Ejection fraction was  estimated in the range of 65 % to 70 %. There were no regional wall motion  abnormalities. Wall thickness was mildly to moderately increased.  Doppler  parameters were consistent with abnormal left ventricular relaxation  (grade 1 diastolic dysfunction). Left atrium: The atrium was moderately dilated. Aortic valve: Normal valve structure. Leaflets exhibited marked  calcification. Transaortic velocity and gradient were increased due to  stenosis as well as concomitant increased transaortic flow. There was mild  to moderate stenosis. Valve mean gradient was 24.42 mmHg. Tricuspid valve: There was mild to moderate pulmonary hypertension. Assessment/Plan:  Results for orders placed or performed in visit on 32/14/77   METABOLIC PANEL, COMPREHENSIVE   Result Value Ref Range    Glucose 191 (H) 65 - 99 mg/dL    BUN 29 (H) 8 - 27 mg/dL    Creatinine 1.96 (H) 0.57 - 1.00 mg/dL    GFR est non-AA 23 (L) >59 mL/min/1.73    GFR est AA 26 (L) >59 mL/min/1.73    BUN/Creatinine ratio 15 12 - 28    Sodium 140 134 - 144 mmol/L    Potassium 3.9 3.5 - 5.2 mmol/L    Chloride 101 96 - 106 mmol/L    CO2 25 20 - 29 mmol/L    Calcium 11.5 (H) 8.7 - 10.3 mg/dL    Protein, total 7.5 6.0 - 8.5 g/dL    Albumin 4.3 3.5 - 4.7 g/dL    GLOBULIN, TOTAL 3.2 1.5 - 4.5 g/dL    A-G Ratio 1.3 1.2 - 2.2    Bilirubin, total 0.3 0.0 - 1.2 mg/dL    Alk. phosphatase 68 39 - 117 IU/L    AST (SGOT) 25 0 - 40 IU/L    ALT (SGPT) 9 0 - 32 IU/L   HEMOGLOBIN A1C WITH EAG   Result Value Ref Range    Hemoglobin A1c 7.5 (H) 4.8 - 5.6 %    Estimated average glucose 169 mg/dL   CBC WITH AUTOMATED DIFF   Result Value Ref Range    WBC 4.8 3.4 - 10.8 x10E3/uL    RBC 3.48 (L) 3.77 - 5.28 x10E6/uL    HGB 11.0 (L) 11.1 - 15.9 g/dL    HCT 33.9 (L) 34.0 - 46.6 %    MCV 97 79 - 97 fL    MCH 31.6 26.6 - 33.0 pg    MCHC 32.4 31.5 - 35.7 g/dL    RDW 12.6 12.3 - 15.4 %    PLATELET 244 299 - 270 x10E3/uL    NEUTROPHILS 57 Not Estab. %    Lymphocytes 36 Not Estab. %    MONOCYTES 6 Not Estab. %    EOSINOPHILS 1 Not Estab. %    BASOPHILS 0 Not Estab. %    ABS. NEUTROPHILS 2.7 1.4 - 7.0 x10E3/uL    Abs Lymphocytes 1.7 0.7 - 3.1 x10E3/uL    ABS.  MONOCYTES 0.3 0.1 - 0.9 x10E3/uL    ABS. EOSINOPHILS 0.0 0.0 - 0.4 x10E3/uL    ABS. BASOPHILS 0.0 0.0 - 0.2 x10E3/uL    IMMATURE GRANULOCYTES 0 Not Estab. %    ABS. IMM. GRANS. 0.0 0.0 - 0.1 x10E3/uL   CKD REPORT   Result Value Ref Range    Interpretation Note        1. Hypercalcemia  - Stop Vitamin D supp and calcium supplements  - METABOLIC PANEL, COMPREHENSIVE  - PTH INTACT  - PHOSPHORUS  - PROTEIN ELECTROPHORESIS  - TSH 3RD GENERATION       Orders Placed This Encounter    METABOLIC PANEL, COMPREHENSIVE    PTH INTACT    PHOSPHORUS    PROTEIN ELECTROPHORESIS    TSH 3RD GENERATION     2. DM  - Watch diet    Follow-up Disposition: Not on File    Patient has been advised to contact practice or seek care if condition persists or worsens.      Eunice Seals MD

## 2018-07-03 NOTE — MR AVS SNAPSHOT
[address] [phone] Patient: Luis Armando Kelly MRN: [id number] :10/5/1931 Visit Information Date & Time Provider Department Dept. Phone Encounter #  
 7/3/2018  1:40 PM J Carlos Bertrand  N [address] [place] [address] [id number] Your Appointments 2018  2:00 PM  
Follow Up with J Carlos Bertrand MD  
[address] [place] [address] ([address]) Appt Note: 3 mo f/u  
 [address] [facility]  
[phone] [address] [facility] Upcoming Health Maintenance Date Due  
 LIPID PANEL Q1 2018 EYE EXAM RETINAL OR DILATED Q1 2018 Influenza Age 5 to Adult 2018 HEMOGLOBIN A1C Q6M 2018 FOOT EXAM Q1 3/19/2019 MICROALBUMIN Q1 5/15/2019 MEDICARE YEARLY EXAM 2019 GLAUCOMA SCREENING Q2Y 2019 DTaP/Tdap/Td series (2 - Td) 2026 Allergies as of 7/3/2018  Review Complete On: 7/3/2018 By: J Carlos Bertrand MD  
  
 Severity Noted Reaction Type Reactions Metformin  2014    Other (comments) Kidneys effected Current Immunizations  Reviewed on 10/31/2017 Name Date Influenza High Dose Vaccine PF 10/31/2017  2:08 PM  
 Influenza Vaccine 2014, 10/25/2013, 10/22/2012 Influenza Vaccine Veronia Delia) 2016, 10/2/2015 Pneumococcal Conjugate (PCV-13) 2017  2:22 PM  
 Pneumococcal Polysaccharide (PPSV-23) 2016 Not reviewed this visit You Were Diagnosed With   
  
 Codes Comments Hypercalcemia    -  Primary ICD-10-CM: M43.55 
ICD-9-CM: 275.42 Vitals BP Pulse Temp Resp Height(growth percentile) Weight(growth percentile) 167/71 (BP 1 Location: Left arm, BP Patient Position: Sitting) (!) 56 98.6 °F (37 °C) (Oral) 18 5' 1\" (1.549 m) 181 lb 4 oz (82.2 kg) SpO2 BMI OB Status Smoking Status 99% 34.25 kg/m2 Hysterectomy Never Smoker Vitals History BMI and BSA Data Body Mass Index Body Surface Area  
 34.25 kg/m 2 1.88 m 2 Preferred Pharmacy Pharmacy Name Phone [address] [place], [place] - [address] Your Updated Medication List  
  
   
This list is accurate as of 7/3/18  2:36 PM.  Always use your most recent med list.  
  
  
  
  
 acetaminophen 500 mg tablet Commonly known as:  [address] Take 1 Tab by mouth every six (6) hours as needed for Pain. aspirin delayed-release 81 mg tablet Take 1 Tab by mouth daily. CARTIA  mg ER capsule Generic drug:  dilTIAZem CD  
TAKE ONE CAPSULE BY MOUTH ONCE DAILY  
  
 fluticasone 50 mcg/actuation nasal spray Commonly known as:  Lella Solum SPRAY 1 PUFF IN EACH NOSTRIL ONCE DAILY  
  
 furosemide 20 mg tablet Commonly known as:  LASIX TAKE ONE TABLET BY MOUTH ONCE DAILY IN THE MORNING  
  
 hydrocortisone 2.5 % rectal cream  
Commonly known as:  ANUSOL-HC Apply to rectal area BID  
  
 lisinopril-hydroCHLOROthiazide 20-25 mg per tablet Commonly known as:  PRINZIDE, ZESTORETIC  
TAKE 1 TAB BY MOUTH DAILY. lovastatin 40 mg tablet Commonly known as:  MEVACOR  
TAKE 1 TAB BY MOUTH NIGHTLY FOR CHOLESTEROL LOWERING  
  
 MULTIVITAMIN PO Take 3,000 Units by mouth daily. omeprazole 40 mg capsule Commonly known as:  PRILOSEC  
TAKE ONE CAPSULE BY MOUTH EVERY MORNING TO CONTROL STOMACH ACID  
  
 spironolactone 25 mg tablet Commonly known as:  ALDACTONE  
TAKE 1/2 TABLET ONCE DAILY  
  
 triamcinolone acetonide 0.1 % topical cream  
Commonly known as:  KENALOG Apply  to affected area two (2) times a day. use thin layer  
  
 valsartan 320 mg tablet Commonly known as:  DIOVAN  
TAKE ONE TABLET ONCE DAILY FOR BLOOD PRESSURE CONTROL We Performed the Following METABOLIC PANEL, COMPREHENSIVE [79076 CPT(R)] PHOSPHORUS [01081 CPT(R)] PROTEIN ELECTROPHORESIS [22000 CPT(R)] PTH INTACT [36029 CPT(R)] TSH 3RD GENERATION [22029 CPT(R)] Introducing [facility] SERVICES! Praveena Fung introduces [employer] patient portal. Now you can access parts of your medical record, email your doctor's office, and request medication refills online. 1. In your internet browser, go to [email or web address] 2. Click on the First Time User? Click Here link in the Sign In box. You will see the New Member Sign Up page. 3. Enter your [employer] Access Code exactly as it appears below. You will not need to use this code after youve completed the sign-up process. If you do not sign up before the expiration date, you must request a new code. · [employer] Access Code: [email or web address] Expires: 9/17/2018  3:35 PM 
 
4. Enter the last four digits of your Social Security Number (xxxx) and Date of Birth (mm/dd/yyyy) as indicated and click Submit. You will be taken to the next sign-up page. 5. Create a [employer] ID. This will be your [employer] login ID and cannot be changed, so think of one that is secure and easy to remember. 6. Create a [employer] password. You can change your password at any time. 7. Enter your Password Reset Question and Answer. This can be used at a later time if you forget your password. 8. Enter your e-mail address. You will receive e-mail notification when new information is available in [address] 9. Click Sign Up. You can now view and download portions of your medical record. 10. Click the Download Summary menu link to download a portable copy of your medical information. If you have questions, please visit the Frequently Asked Questions section of the [employer] website. Remember, [employer] is NOT to be used for urgent needs. For medical emergencies, dial 911. Now available from your iPhone and Android! Please provide this summary of care documentation to your next provider. Your primary care clinician is listed as Anne Hernandez. If you have any questions after today's visit, please call [phone].

## 2018-07-03 NOTE — PROGRESS NOTES
1. Have you been to the ER, urgent care clinic since your last visit? Hospitalized since your last visit? No    2. Have you seen or consulted any other health care providers outside of the [address] [place] [address] since your last visit? Include any pap smears or colon screening.  No

## 2018-07-06 LAB
ALBUMIN SERPL ELPH-MCNC: 3.4 G/DL (ref 2.9–4.4)
ALBUMIN SERPL-MCNC: 3.6 G/DL (ref 3.5–4.7)
ALBUMIN/GLOB SERPL: 1 {RATIO} (ref 0.7–1.7)
ALBUMIN/GLOB SERPL: 1.2 {RATIO} (ref 1.2–2.2)
ALP SERPL-CCNC: 68 IU/L (ref 39–117)
ALPHA1 GLOB SERPL ELPH-MCNC: 0.2 G/DL (ref 0–0.4)
ALPHA2 GLOB SERPL ELPH-MCNC: 0.8 G/DL (ref 0.4–1)
ALT SERPL-CCNC: 9 IU/L (ref 0–32)
AST SERPL-CCNC: 14 IU/L (ref 0–40)
B-GLOBULIN SERPL ELPH-MCNC: 0.9 G/DL (ref 0.7–1.3)
BILIRUB SERPL-MCNC: 0.3 MG/DL (ref 0–1.2)
BUN SERPL-MCNC: 31 MG/DL (ref 8–27)
BUN/CREAT SERPL: 17 (ref 12–28)
CALCIUM SERPL-MCNC: 10.9 MG/DL (ref 8.7–10.3)
CHLORIDE SERPL-SCNC: 102 MMOL/L (ref 96–106)
CO2 SERPL-SCNC: 24 MMOL/L (ref 20–29)
CREAT SERPL-MCNC: 1.82 MG/DL (ref 0.57–1)
GAMMA GLOB SERPL ELPH-MCNC: 1.3 G/DL (ref 0.4–1.8)
GLOBULIN SER CALC-MCNC: 3.1 G/DL (ref 1.5–4.5)
GLOBULIN SER CALC-MCNC: 3.3 G/DL (ref 2.2–3.9)
GLUCOSE SERPL-MCNC: 233 MG/DL (ref 65–99)
INTERPRETATION: NORMAL
M PROTEIN SERPL ELPH-MCNC: NORMAL G/DL
PHOSPHATE SERPL-MCNC: 1.6 MG/DL (ref 2.5–4.5)
PLEASE NOTE, 011150: NORMAL
POTASSIUM SERPL-SCNC: 4 MMOL/L (ref 3.5–5.2)
PROT SERPL-MCNC: 6.7 G/DL (ref 6–8.5)
PTH-INTACT SERPL-MCNC: NORMAL PG/ML
SODIUM SERPL-SCNC: 140 MMOL/L (ref 134–144)
TSH SERPL DL<=0.005 MIU/L-ACNC: 0.8 UIU/ML (ref 0.45–4.5)

## 2018-07-12 ENCOUNTER — LAB ONLY (OUTPATIENT)
Dept: FAMILY MEDICINE CLINIC | Age: 83
End: 2018-07-12

## 2018-07-12 DIAGNOSIS — E83.52 HYPERCALCEMIA: Primary | ICD-10-CM

## 2018-07-13 LAB — PTH-INTACT SERPL-MCNC: 106 PG/ML (ref 15–65)

## 2018-07-14 NOTE — PROGRESS NOTES
Hyperparathyroid. RTO to discuss.  Needs US of her thyroid/parathyroid and Endocrine referral. Need to change BP meds

## 2018-07-17 ENCOUNTER — OFFICE VISIT (OUTPATIENT)
Dept: FAMILY MEDICINE CLINIC | Age: 83
End: 2018-07-17

## 2018-07-17 ENCOUNTER — TELEPHONE (OUTPATIENT)
Dept: ENDOCRINOLOGY | Age: 83
End: 2018-07-17

## 2018-07-17 VITALS
WEIGHT: 180.13 LBS | HEART RATE: 52 BPM | DIASTOLIC BLOOD PRESSURE: 81 MMHG | HEIGHT: 61 IN | SYSTOLIC BLOOD PRESSURE: 158 MMHG | OXYGEN SATURATION: 99 % | BODY MASS INDEX: 34.01 KG/M2 | TEMPERATURE: 98.4 F | RESPIRATION RATE: 18 BRPM

## 2018-07-17 DIAGNOSIS — I10 ESSENTIAL HYPERTENSION: ICD-10-CM

## 2018-07-17 DIAGNOSIS — R00.1 BRADYCARDIA: ICD-10-CM

## 2018-07-17 DIAGNOSIS — E21.3 HYPERPARATHYROIDISM (HCC): Primary | ICD-10-CM

## 2018-07-17 PROBLEM — E66.01 SEVERE OBESITY (BMI 35.0-39.9) WITH COMORBIDITY (HCC): Status: RESOLVED | Noted: 2018-03-19 | Resolved: 2018-07-17

## 2018-07-17 RX ORDER — DILTIAZEM HYDROCHLORIDE 120 MG/1
120 CAPSULE, COATED, EXTENDED RELEASE ORAL DAILY
Qty: 90 CAP | Refills: 1 | Status: SHIPPED | OUTPATIENT
Start: 2018-07-17 | End: 2018-08-01 | Stop reason: ALTCHOICE

## 2018-07-17 RX ORDER — IRBESARTAN 300 MG/1
300 TABLET ORAL
Qty: 90 TAB | Refills: 0 | Status: SHIPPED | OUTPATIENT
Start: 2018-07-17 | End: 2018-10-22 | Stop reason: SDUPTHER

## 2018-07-17 NOTE — PATIENT INSTRUCTIONS
Stop Valsartan  Start Avapro 300 mg daily in place of Valsartan  Stop lisinopril with HCTZ   Decrease Cartia XT to 120 mg daily

## 2018-07-17 NOTE — PROGRESS NOTES
HISTORY OF PRESENT ILLNESS  Dayana Rankin is a 80 y.o. female.   HPI    ROS    Physical Exam    ASSESSMENT and PLAN  {ASSESSMENT/PLAN:[id number]}

## 2018-07-17 NOTE — TELEPHONE ENCOUNTER
----- Message from Judith Ahumada sent at 7/17/2018  2:19 PM EDT -----  Regarding: Physician Call  Dr. Mahi Tejeda, from [facility], would like to talk to you about this patient's thyroid when you get a chance. She can be reached directly at:  [phone].

## 2018-07-17 NOTE — TELEPHONE ENCOUNTER
Petar Dumont, thanks for the message. This is a patient who needs to be seen sooner for \"hypercalcemia\". Can we touch base with the patient to have her scheduled for Friday July 27th at 8:30 AM.     Thanks ! Note to self:   Patient with hx of low bone density and elevated calcium / PTH. I spoke with Dr. Neelam Shah who will obtain an US and NM parathyroid scan for evaluation, and also start 500mg calcium carbonate twice daily. When I see patient I will consider a 24 hour urine calcium, and determine if based on imaging results and labs we can proceed to have her evaluated by the surgery team.     Vinay Kelly.  [facility]  [address] [place] [address]

## 2018-07-17 NOTE — MR AVS SNAPSHOT
[address] [facility] 
 
 
 [address] [phone] Patient: Tang Blunt MRN: [id number] :10/5/1931 Visit Information Date & Time Provider Department Dept. Phone Encounter #  
 2018  2:40 PM Manuelito Leblanc  N [address] [place] [address] [phone] [id number] Follow-up Instructions Return in about 3 weeks (around 2018). Your Appointments 2018  2:20 PM  
New Patient with MD [facility]) Appt Note: ref f-w - fu holter  $0 cp  
 [address] [facility] [address] [address] [phone]  
  
   
 [address] [place] [address] [address] [address] 2018  2:00 PM  
Follow Up with Manuelito Leblanc MD  
[address] [place] [address] ([address]) Appt Note: 3 mo f/u  
 [facility]  
[phone] [address] [address]  
  
    
 2018  9:50 AM  
New Patient with Nasreen Corley MD  
[facility]) Appt Note: New patient referred by Dr. Katia Lea with [address] [address]( P) [phone] for hypthyroidism [address]  
[address] [place] [address] Upcoming Health Maintenance Date Due  
 LIPID PANEL Q1 2018 EYE EXAM RETINAL OR DILATED Q1 2018 Influenza Age 5 to Adult 2018 HEMOGLOBIN A1C Q6M 2018 FOOT EXAM Q1 3/19/2019 MICROALBUMIN Q1 5/15/2019 MEDICARE YEARLY EXAM 2019 GLAUCOMA SCREENING Q2Y 2019 DTaP/Tdap/Td series (2 - Td) 2026 Allergies as of 2018  Review Complete On: 2018 By: Manuelito Leblanc MD  
  
 Severity Noted Reaction Type Reactions Metformin  2014    Other (comments) Kidneys effected Current Immunizations  Reviewed on 10/31/2017 Name Date Influenza High Dose Vaccine PF 10/31/2017  2:08 PM  
 Influenza Vaccine 11/7/2014, 10/25/2013, 10/22/2012 Influenza Vaccine Kennethzhao Martinezn) 12/6/2016, 10/2/2015 Pneumococcal Conjugate (PCV-13) 9/19/2017  2:22 PM  
 Pneumococcal Polysaccharide (PPSV-23) 6/7/2016 Not reviewed this visit You Were Diagnosed With   
  
 Codes Comments Hyperparathyroidism ([facility]    -  Primary ICD-10-CM: E21.3 ICD-9-CM: 252.00 Vitals BP Pulse Temp Resp Height(growth percentile) Weight(growth percentile) 158/81 (BP 1 Location: Left arm) (!) 52 98.4 °F (36.9 °C) (Oral) 18 5' 1\" (1.549 m) 180 lb 2 oz (81.7 kg) SpO2 BMI OB Status Smoking Status 99% 34.03 kg/m2 Hysterectomy Never Smoker Vitals History BMI and BSA Data Body Mass Index Body Surface Area 34.03 kg/m 2 1.88 m 2 Preferred Pharmacy Pharmacy Name Phone [phone] [place] [address] - [address] Your Updated Medication List  
  
   
This list is accurate as of 7/17/18  3:08 PM.  Always use your most recent med list.  
  
  
  
  
 acetaminophen 500 mg tablet Commonly known as:  [address] Take 1 Tab by mouth every six (6) hours as needed for Pain. aspirin delayed-release 81 mg tablet Take 1 Tab by mouth daily. dilTIAZem  mg ER capsule Commonly known as:  CARTIA XT Take 1 Cap by mouth daily. fluticasone 50 mcg/actuation nasal spray Commonly known as:  Alexia Milam SPRAY 1 PUFF IN EACH NOSTRIL ONCE DAILY  
  
 furosemide 20 mg tablet Commonly known as:  LASIX TAKE ONE TABLET BY MOUTH ONCE DAILY IN THE MORNING  
  
 hydrocortisone 2.5 % rectal cream  
Commonly known as:  ANUSOL-HC Apply to rectal area BID  
  
 irbesartan 300 mg tablet Commonly known as:  AVAPRO Take 1 Tab by mouth nightly. lovastatin 40 mg tablet Commonly known as:  MEVACOR  
 TAKE 1 TAB BY MOUTH NIGHTLY FOR CHOLESTEROL LOWERING  
  
 MULTIVITAMIN PO Take 3,000 Units by mouth daily. omeprazole 40 mg capsule Commonly known as:  PRILOSEC  
TAKE ONE CAPSULE BY MOUTH EVERY MORNING TO CONTROL STOMACH ACID  
  
 spironolactone 25 mg tablet Commonly known as:  ALDACTONE  
TAKE 1/2 TABLET ONCE DAILY  
  
 triamcinolone acetonide 0.1 % topical cream  
Commonly known as:  KENALOG Apply  to affected area two (2) times a day. use thin layer Prescriptions Sent to Pharmacy Refills  
 dilTIAZem CD (CARDIZEM CD) 120 mg ER capsule 1 Sig: Take 1 Cap by mouth daily. Class: Normal  
 [facility] [address] Ph #: [phone] Route: Oral  
 irbesartan (AVAPRO) 300 mg tablet 0 Sig: Take 1 Tab by mouth nightly. Class: Normal  
 Pharmacy: [facility] [address] Ph #: [phone] Route: Oral  
  
We Performed the Following AMB POC EKG ROUTINE W/ 12 LEADS, INTER & REP [59052 CPT(R)] CALCIUM, IONIZED [id number] CPT(R)] COLLECTION VENOUS BLOOD,VENIPUNCTURE A2007327 CPT(R)] PHOSPHORUS [00302 CPT(R)] REFERRAL TO ENDOCRINOLOGY [OCL80 Custom] Comments:  
 hyperparathyroidism VITAMIN D, 25 HYDROXY [id number] CPT(R)] Follow-up Instructions Return in about 3 weeks (around 8/7/2018). To-Do List   
 07/17/2018 Imaging:  DEXA BONE DENSITY STUDY AXIAL   
  
 07/17/2018 Imaging:  US THYROID/PARATHYROID/SOFT TISS Referral Information Referral ID Referred By Referred To  
  
 [id number] Kang VALDOVINOS MD   
   [address] [place] [address] [address] [place], [address] [place] [address] Phone: [phone] Fax: [phone] Visits Status Start Date End Date 1 Authorized 7/17/18 7/17/19  If your referral has a status of pending review or denied, additional information will be sent to support the outcome of this decision. Patient Instructions Stop Valsartan Start Avapro 300 mg daily in place of Valsartan Stop lisinopril with HCTZ Decrease Cartia XT to 120 mg daily Introducing [facility] SERVICES! Torin Murphy introduces [employer] patient portal. Now you can access parts of your medical record, email your doctor's office, and request medication refills online. 1. In your internet browser, go to [email or web address] 2. Click on the First Time User? Click Here link in the Sign In box. You will see the New Member Sign Up page. 3. Enter your [employer] Access Code exactly as it appears below. You will not need to use this code after youve completed the sign-up process. If you do not sign up before the expiration date, you must request a new code. · [employer] Access Code: [email or web address] Expires: 9/17/2018  3:35 PM 
 
4. Enter the last four digits of your Social Security Number (xxxx) and Date of Birth (mm/dd/yyyy) as indicated and click Submit. You will be taken to the next sign-up page. 5. Create a [employer] ID. This will be your [employer] login ID and cannot be changed, so think of one that is secure and easy to remember. 6. Create a [employer] password. You can change your password at any time. 7. Enter your Password Reset Question and Answer. This can be used at a later time if you forget your password. 8. Enter your e-mail address. You will receive e-mail notification when new information is available in [address] 9. Click Sign Up. You can now view and download portions of your medical record. 10. Click the Download Summary menu link to download a portable copy of your medical information. If you have questions, please visit the Frequently Asked Questions section of the [employer] website. Remember, [employer] is NOT to be used for urgent needs. For medical emergencies, dial 911. Now available from your iPhone and Android! Please provide this summary of care documentation to your next provider. Your primary care clinician is listed as Zita Marrufo. If you have any questions after today's visit, please call [phone].

## 2018-07-17 NOTE — PROGRESS NOTES
1. Have you been to the ER, urgent care clinic since your last visit? Hospitalized since your last visit? No    2. Have you seen or consulted any other health care providers outside of the [address] [place] [address] since your last visit? Include any pap smears or colon screening.  No

## 2018-07-17 NOTE — PROGRESS NOTES
Elisa Toussaint is a 80 y.o. female who presents to the office today with the following:  Chief Complaint   Patient presents with    Abnormal Lab Results    Thyroid Problem       HPI:  She is here to discuss her labs. Her PTH is elevated along with her calcium. Had a DEXA in 2016 which showed osteopenia. HR is still low. Holter with sinus tiffanie. No dizziness or lightheadedness. Patient Active Problem List   Diagnosis Code    Encounter for long-term (current) use of other medications Z79.899    Well controlled type 2 diabetes mellitus ([facility] E11.9    Essential hypertension I10    Type 2 diabetes mellitus with nephropathy ([facility] E11.21    Pure hypercholesterolemia E78.00    Anemia of chronic disease D63.8    BMI 34.0-34.9,adult Z68.34       Allergies   Allergen Reactions    Metformin Other (comments)     Kidneys effected       Current Outpatient Prescriptions   Medication Sig    dilTIAZem CD (CARDIZEM CD) 120 mg ER capsule Take 1 Cap by mouth daily.  irbesartan (AVAPRO) 300 mg tablet Take 1 Tab by mouth nightly.  furosemide (LASIX) 20 mg tablet TAKE ONE TABLET BY MOUTH ONCE DAILY IN THE MORNING    lovastatin (MEVACOR) 40 mg tablet TAKE 1 TAB BY MOUTH NIGHTLY FOR CHOLESTEROL LOWERING    hydrocortisone (ANUSOL-HC) 2.5 % rectal cream Apply to rectal area BID    omeprazole (PRILOSEC) 40 mg capsule TAKE ONE CAPSULE BY MOUTH EVERY MORNING TO CONTROL STOMACH ACID    fluticasone (FLONASE) 50 mcg/actuation nasal spray SPRAY 1 PUFF IN EACH NOSTRIL ONCE DAILY    triamcinolone acetonide (KENALOG) 0.1 % topical cream Apply  to affected area two (2) times a day. use thin layer    spironolactone (ALDACTONE) 25 mg tablet TAKE 1/2 TABLET ONCE DAILY    aspirin delayed-release 81 mg tablet Take 1 Tab by mouth daily.  acetaminophen (TYLENOL EXTRA STRENGTH) 500 mg tablet Take 1 Tab by mouth every six (6) hours as needed for Pain.  MULTIVITAMIN PO Take 3,000 Units by mouth daily.      No current facility-administered medications for this visit. Past Medical History:   Diagnosis Date    Anemia of chronic disease 6/19/2018    Anxiety     Back pain     CAD (coronary artery disease)     CKD (chronic kidney disease) stage 3, GFR 30-59 ml/min     Constipation     Diabetes ([facility]     Diverticulosis 2007    DM (diabetes mellitus) ([facility]     AODM    Fibroid 1972    GERD (gastroesophageal reflux disease)     Goiter, non-toxic     Hernia, hiatal     Hyperlipemia     Hypertension     Hypokalemia     Menopause     Osteoarthritis 2006    bursitis R shoulder    Osteoarthritis of right knee     PVC (premature ventricular contraction)     H/O PVC's    Raynaud disease     Venous stasis     Wears hearing aid     both ears       Past Surgical History:   Procedure Laterality Date    HX BREAST BIOPSY Bilateral     x5    HX CATARACT REMOVAL  2004    bilateral    HX COLONOSCOPY  2002, 08/2007    HX DILATION AND CURETTAGE  1960's    HX HYSTERECTOMY  66's    BSO    HX OTHER SURGICAL  2001    sigmoidoscopy       Social History     Social History    Marital status:      Spouse name: N/A    Number of children: N/A    Years of education: N/A     Social History Main Topics    Smoking status: Never Smoker    Smokeless tobacco: Never Used    Alcohol use No    Drug use: No    Sexual activity: No     Other Topics Concern    None     Social History Narrative    Happily . No concern for abuse. Exercise: none    Diet: Careful.     Wear Seatbelts               History   Smoking Status    Never Smoker   Smokeless Tobacco    Never Used       Family History   Problem Relation Age of Onset    Diabetes Brother     Hypertension Mother     Diabetes Brother        Vitals:    07/17/18 1357   BP: 158/81   BP 1 Location: Left arm   Pulse: (!) 52   Resp: 18   Temp: 98.4 °F (36.9 °C)   TempSrc: Oral   SpO2: 99%   Weight: 180 lb 2 oz (81.7 kg)   Height: 5' 1\" (1.549 m)       PHQ over the last two weeks 7/17/2018   PHQ Not Done Patient Decline   Little interest or pleasure in doing things -   Feeling down, depressed or hopeless -   Total Score PHQ 2 -       ROS:  Review of Systems   Constitutional: Negative for chills, fever and malaise/fatigue. HENT: Negative for congestion, ear pain, hearing loss and sore throat. Respiratory: Negative for cough, sputum production, shortness of breath and wheezing. Cardiovascular: Positive for leg swelling. Negative for chest pain, palpitations and claudication. Gastrointestinal: Negative for abdominal pain, constipation, diarrhea and heartburn. Musculoskeletal: Positive for myalgias and neck pain. Negative for joint pain. Neurological: Negative for dizziness and headaches. Endo/Heme/Allergies: Bruises/bleeds easily. Psychiatric/Behavioral: Negative for depression. Physical Exam:  Visit Vitals    /81 (BP 1 Location: Left arm)    Pulse (!) 52    Temp 98.4 °F (36.9 °C) (Oral)    Resp 18    Ht 5' 1\" (1.549 m)    Wt 180 lb 2 oz (81.7 kg)    SpO2 99%    BMI 34.03 kg/m2     Physical Exam   Constitutional: She is well-developed, well-nourished, and in no distress. HENT:   Head: Normocephalic. Nose: Nose normal.   Mouth/Throat: Oropharynx is clear and moist.   Neck: Neck supple. Cardiovascular:   Murmur heard. Bradycardia but regular   Pulmonary/Chest: Effort normal and breath sounds normal. No respiratory distress. Neurological: She is alert. Skin: Skin is warm. Psychiatric: Affect normal.       Assessment/Plan:  Results for orders placed or performed in visit on 07/12/18   PTH INTACT   Result Value Ref Range    PTH, Intact 106 (H) 15 - 65 pg/mL       1. Hyperparathyroidism ([facility]    - DEXA BONE DENSITY STUDY AXIAL;  Future  - Stop HCTZ  - PHOSPHORUS  - CALCIUM, IONIZED  - VITAMIN D, 25 HYDROXY  - AMB POC EKG ROUTINE W/ 12 LEADS, INTER & REP  - US THYROID/PARATHYROID/SOFT TISS; Future  - COLLECTION VENOUS BLOOD,VENIPUNCTURE  - NM PARATHYROID SCAN; Future  - Discussed with Dr Winsome Reyes who advises to restart her calcium at 500mg BID and he will arrange for her to be seen in the office- will defer Fosomax at this time until he has evaluated     2. Bradycardia  - Check EKG- sinus tiffanie  - Decrease Cartia to 120 mg daily    3. HTN  - Valsartan has been recalled so will change to Avapro 300 mg daily  - Stop Lisinopril and HCTZ  - RTO 3-4 weeks for BP check    Orders Placed This Encounter    COLLECTION VENOUS BLOOD,VENIPUNCTURE    DEXA BONE DENSITY STUDY AXIAL     Standing Status:   Future     Standing Expiration Date:   8/17/2019     Order Specific Question:   Reason for Exam     Answer:   hyperparathyroidism    US THYROID/PARATHYROID/SOFT TISS     Standing Status:   Future     Standing Expiration Date:   8/17/2019    NM PARATHYROID SCAN     Standing Status:   Future     Standing Expiration Date:   8/17/2019     Order Specific Question:   Reason for Exam     Answer:   hyperparathyroidism    PHOSPHORUS    CALCIUM, IONIZED    VITAMIN D, 25 HYDROXY    REFERRAL TO ENDOCRINOLOGY     Referral Priority:   Routine     Referral Type:   Consultation     Referral Reason:   Specialty Services Required     Referred to Provider:   Tri Corrigan MD     Requested Specialty:   Endocrinology    AMB POC EKG ROUTINE W/ 12 LEADS, INTER & REP     Order Specific Question:   Reason for Exam:     Answer:   hyperparathyroid    dilTIAZem CD (CARDIZEM CD) 120 mg ER capsule     Sig: Take 1 Cap by mouth daily. Dispense:  90 Cap     Refill:  1    irbesartan (AVAPRO) 300 mg tablet     Sig: Take 1 Tab by mouth nightly. Dispense:  90 Tab     Refill:  0       Follow-up Disposition:  Return in about 3 weeks (around 8/7/2018). Patient has been advised to contact practice or seek care if condition persists or worsens.      Talya Maria MD

## 2018-07-27 ENCOUNTER — TELEPHONE (OUTPATIENT)
Dept: FAMILY MEDICINE CLINIC | Age: 83
End: 2018-07-27

## 2018-07-27 ENCOUNTER — HOSPITAL ENCOUNTER (OUTPATIENT)
Dept: MAMMOGRAPHY | Age: 83
Discharge: HOME | End: 2018-07-27
Attending: FAMILY MEDICINE

## 2018-07-27 ENCOUNTER — OFFICE VISIT (OUTPATIENT)
Dept: ENDOCRINOLOGY | Age: 83
End: 2018-07-27

## 2018-07-27 VITALS
SYSTOLIC BLOOD PRESSURE: 192 MMHG | WEIGHT: 180.4 LBS | HEIGHT: 61 IN | HEART RATE: 58 BPM | BODY MASS INDEX: 34.06 KG/M2 | DIASTOLIC BLOOD PRESSURE: 69 MMHG

## 2018-07-27 DIAGNOSIS — E21.3 HYPERPARATHYROIDISM (HCC): ICD-10-CM

## 2018-07-27 DIAGNOSIS — E21.0 PRIMARY HYPERPARATHYROIDISM (HCC): Primary | ICD-10-CM

## 2018-07-27 DIAGNOSIS — E55.9 VITAMIN D DEFICIENCY: ICD-10-CM

## 2018-07-27 DIAGNOSIS — M85.852 OSTEOPENIA OF NECK OF LEFT FEMUR: ICD-10-CM

## 2018-07-27 DIAGNOSIS — E83.52 HYPERCALCEMIA: ICD-10-CM

## 2018-07-27 DIAGNOSIS — E04.2 MULTINODULAR GOITER: ICD-10-CM

## 2018-07-27 LAB
25(OH)D3+25(OH)D2 SERPL-MCNC: 57.9 NG/ML (ref 30–100)
CA-I SERPL ISE-MCNC: 5.9 MG/DL (ref 4.5–5.6)
PHOSPHATE SERPL-MCNC: 2.4 MG/DL (ref 2.5–4.5)

## 2018-07-27 RX ORDER — VALSARTAN 320 MG/1
TABLET ORAL
COMMUNITY
Start: 2018-07-03 | End: 2018-07-30

## 2018-07-27 RX ORDER — CALCIUM CITRATE/VITAMIN D3 200MG-6.25
TABLET ORAL
COMMUNITY
Start: 2018-04-23

## 2018-07-27 RX ORDER — LISINOPRIL AND HYDROCHLOROTHIAZIDE 20; 25 MG/1; MG/1
TABLET ORAL
COMMUNITY
Start: 2018-07-03 | End: 2018-07-30 | Stop reason: DRUGHIGH

## 2018-07-27 RX ORDER — DILTIAZEM HYDROCHLORIDE 240 MG/1
CAPSULE, EXTENDED RELEASE ORAL
COMMUNITY
Start: 2018-07-03 | End: 2018-08-01

## 2018-07-27 NOTE — PROGRESS NOTES
Unable to perform bone density test scheduled for 7/27/18 at [facility]. The DEXA was cancelled because nuclear medicine contrast was administered two days earlier on 7/25/18. DEXA scanning protocol requires patients to be scheduled 5 days post nuclear medicine injection. Patient was provided with phone number to [facility] to re-schedule at her convenience.

## 2018-07-27 NOTE — MR AVS SNAPSHOT
[address] [facility] [address] [id number] [phone] Patient: Maverick Guadalupe MRN: [id number] :10/5/1931 Visit Information Date & Time Provider Department Dept. Phone Encounter #  
 2018  8:30 AM Zak Rodriguez, [address] [place] [address] Diabetes and Endocrinology [phone] Your Appointments 2018  2:20 PM  
New Patient with Sang Sutton MD  
[facility] [address] [facility] [address]) Appt Note: ref f-w - fu holter  $0 cp  
 [address] [facility] [address] [address] [phone]  
  
   
 [address] [place] [address]  
  
    
 2018  2:20 PM  
Follow Up with Darnell Singh MD  
[address] [facility] ([address]) Appt Note: 3 week f/u  
 [address]  
[phone] [address] [place] [id number]  
  
    
 2018  2:00 PM  
Follow Up with Darnell Singh MD  
[address] [place] [address] ([address]) Appt Note: 3 mo f/u  
 [address]  
[phone] Upcoming Health Maintenance Date Due  
 LIPID PANEL Q1 2018 Influenza Age 5 to Adult 2018 HEMOGLOBIN A1C Q6M 2018 FOOT EXAM Q1 3/19/2019 MICROALBUMIN Q1 5/15/2019 MEDICARE YEARLY EXAM 2019 EYE EXAM RETINAL OR DILATED Q1 2019 GLAUCOMA SCREENING Q2Y 2020 DTaP/Tdap/Td series (2 - Td) 2026 Allergies as of 2018  Review Complete On: 2018 By: Zak Rodriguez MD  
  
 Severity Noted Reaction Type Reactions Metformin  2014    Other (comments) Kidneys effected Current Immunizations  Reviewed on 10/31/2017 Name Date Influenza High Dose Vaccine PF 10/31/2017  2:08 PM  
 Influenza Vaccine 2014, 10/25/2013, 10/22/2012 Influenza Vaccine Brooke Thrasher) 12/6/2016, 10/2/2015 Pneumococcal Conjugate (PCV-13) 9/19/2017  2:22 PM  
 Pneumococcal Polysaccharide (PPSV-23) 6/7/2016 Not reviewed this visit You Were Diagnosed With   
  
 Codes Comments Primary hyperparathyroidism ([facility]    -  Primary ICD-10-CM: E21.0 ICD-9-CM: 252.01 Hypercalcemia     ICD-10-CM: H81.34 
ICD-9-CM: 275.42 Vitamin D deficiency     ICD-10-CM: E55.9 ICD-9-CM: 268.9 Vitals BP Pulse Height(growth percentile) Weight(growth percentile) BMI OB Status 192/69 (BP 1 Location: Right arm, BP Patient Position: Sitting) (!) 58 5' 1\" (1.549 m) 180 lb 6.4 oz (81.8 kg) 34.09 kg/m2 Hysterectomy Smoking Status Never Smoker Vitals History BMI and BSA Data Body Mass Index Body Surface Area 34.09 kg/m 2 1.88 m 2 Preferred Pharmacy Pharmacy Name Phone [phone] [place] [address] - [address] Your Updated Medication List  
  
   
This list is accurate as of 7/27/18  9:22 AM.  Always use your most recent med list.  
  
  
  
  
 acetaminophen 500 mg tablet Commonly known as:  Jr Susie Alonso Se Take 1 Tab by mouth every six (6) hours as needed for Pain. aspirin delayed-release 81 mg tablet Take 1 Tab by mouth daily. * CARTIA  mg ER capsule Generic drug:  dilTIAZem CD  
  
 * dilTIAZem  mg ER capsule Commonly known as:  CARTIA XT Take 1 Cap by mouth daily. fluticasone 50 mcg/actuation nasal spray Commonly known as:  Jb Terese SPRAY 1 PUFF IN EACH NOSTRIL ONCE DAILY  
  
 furosemide 20 mg tablet Commonly known as:  LASIX TAKE ONE TABLET BY MOUTH ONCE DAILY IN THE MORNING  
  
 hydrocortisone 2.5 % rectal cream  
Commonly known as:  ANUSOL-HC Apply to rectal area BID  
  
 irbesartan 300 mg tablet Commonly known as:  AVAPRO Take 1 Tab by mouth nightly. lisinopril-hydroCHLOROthiazide 20-25 mg per tablet Commonly known as:  PRINZIDE, ZESTORETIC  
  
 lovastatin 40 mg tablet Commonly known as:  MEVACOR  
TAKE 1 TAB BY MOUTH NIGHTLY FOR CHOLESTEROL LOWERING  
  
 MULTIVITAMIN PO Take 3,000 Units by mouth daily. omeprazole 40 mg capsule Commonly known as:  PRILOSEC  
TAKE ONE CAPSULE BY MOUTH EVERY MORNING TO CONTROL STOMACH ACID  
  
 spironolactone 25 mg tablet Commonly known as:  ALDACTONE  
TAKE 1/2 TABLET ONCE DAILY  
  
 triamcinolone acetonide 0.1 % topical cream  
Commonly known as:  KENALOG Apply  to affected area two (2) times a day. use thin layer TRUE METRIX GLUCOSE TEST STRIP strip Generic drug:  glucose blood VI test strips  
  
 valsartan 320 mg tablet Commonly known as:  DIOVAN  
  
 * Notice: This list has 2 medication(s) that are the same as other medications prescribed for you. Read the directions carefully, and ask your doctor or other care provider to review them with you. We Performed the Following CALCIUM, UR, 24 HR [76315 CPT(R)] CREATININE, UR, 24 HR [91497 CPT(R)] Comments:  
 Collect in same container as CALCIUM  
 METABOLIC PANEL, COMPREHENSIVE [90751 CPT(R)] To-Do List   
 07/27/2018  2:30 PM  
  Appointment with [facility] DEXA 1 at [facility] [address] ([phone]) Please, no calcium supplements or antacids that coat the stomach (ex: Tums, Mylanta) 24 hours prior to procedure. Maintain normal diet and medications. Dairy products are allowed. Wear an outfit with an elastic waistband (no zipper or metal snaps). Check in at registration 15min before your appointment time unless you were instructed to do otherwise. Patient Instructions 1. Plan on collecting urine for 24 hours using the jug provided, and submit to the lab the following day. 24 HOUR URINE COLLECTION As part of your evaluation, we would like for you to collect a 24 hour sample of urine. This means that you will collect all of your urine into a collection jug for 24 hours. The first step is to visit the laboratory to receive a collection jug. Then select a day to start the urine collection. Keep in mind when selecting a day that you will need to return to the laboratory the following day to submit your urine sample. On the morning of the day you start collecting urine, the very first urine void should go into the toilet however everything else collected afterward should be collected into the jug, including any urine overnight, AND the first urine the following morning. After this you can stop collecting and bring the sample to the laboratory. We will discuss the results with you along with any other labs you have completed. If you have other blood work to perform, you can complete that when you submit the urine sample to the lab. Just in case your lab tests may require it, return to the laboratory in the morning fasting. 2. Plan to do the blood test the same morning you drop off the jug.  
 
3. Consider whether or not surgery would be something you would consider, including surgery for the thyroid at the same time. 4. I will let you know about results when available. 5. Plan to return to clinic here in about 2 months,  
 
6. After you submit your urine collection, I will let you know if its ok to go ahead and restart your calcium and vitamin D. Dexter Porter. [address] [facility]! Liyah Pérez introduces [employer] patient portal. Now you can access parts of your medical record, email your doctor's office, and request medication refills online. 1. In your internet browser, go to [email or web address] 2. Click on the First Time User? Click Here link in the Sign In box. You will see the New Member Sign Up page. 3. Enter your [employer] Access Code exactly as it appears below. You will not need to use this code after youve completed the sign-up process. If you do not sign up before the expiration date, you must request a new code. · [employer] Access Code: [email or web address] Expires: 9/17/2018  3:35 PM 
 
4. Enter the last four digits of your Social Security Number (xxxx) and Date of Birth (mm/dd/yyyy) as indicated and click Submit. You will be taken to the next sign-up page. 5. Create a [employer] ID. This will be your [employer] login ID and cannot be changed, so think of one that is secure and easy to remember. 6. Create a [employer] password. You can change your password at any time. 7. Enter your Password Reset Question and Answer. This can be used at a later time if you forget your password. 8. Enter your e-mail address. You will receive e-mail notification when new information is available in [address] 9. Click Sign Up. You can now view and download portions of your medical record. 10. Click the Download Summary menu link to download a portable copy of your medical information. If you have questions, please visit the Frequently Asked Questions section of the [employer] website. Remember, [employer] is NOT to be used for urgent needs. For medical emergencies, dial 911. Now available from your iPhone and Android! Please provide this summary of care documentation to your next provider. Your primary care clinician is listed as Brady Nguyen. If you have any questions after today's visit, please call [phone].

## 2018-07-27 NOTE — PROGRESS NOTES
CONSULTATION REQUESTED BY: Oralia Brooks MD     REASON FOR CONSULT: Evaluation of hypercalcemia, primary hyperparathyroidism    CHIEF COMPLAINT: Hypercalcemia    HISTORY OF PRESENT ILLNESS:   Jessica Reyes is a 80 y.o. female with a PMHx as noted below who was referred to our endocrinology clinic for evaluation of hypercalcemia. Patient was first noted for elevated calcium as far back as January 2014 per records. Lab review suggests serum calcium as high as 11.5 recently. Family history is not significant for hypercalcemia. Patient denies any history of kidney stones. Vitamin D status: Normal at 57.9, prev on 10,000 units D3, stopped one week ago  Calcium supplementation: previously on calcium, 600mg BID, off one week ago  Parathyroid imaging:    - 7/25/18: NM Parathyroid Scan: Normal Study   - 7/25/18: US: MNG noted. No parathyroid adenoma described, [facility]    Regarding bone health, patient has a history of advanced osteopenia  Bone Density Scan 2016: T= - 2.4 L femoral neck,  Repeat DXA is ordered/scheduled. Review of most recent bone-related metabolic lab panel:  Lab Results   Component Value Date    PTHILT 106 (H) 07/12/2018    PTHILT CANCELED 07/03/2018    PTHILT 57 02/02/2016    CA 10.9 (H) 07/03/2018    CA 11.5 (H) 06/19/2018    CA 10.3 03/19/2018    VITD3 57.9 07/17/2018    TSH 0.801 07/03/2018    PHOS 2.4 (L) 07/17/2018     [id number]: PTHrP level  [id number]: Serum NTX level  PTHILT: Intact PTH level    Patient is otherwise feeling well today.     PAST MEDICAL/SURGICAL HISTORY:   Past Medical History:   Diagnosis Date    Anemia of chronic disease 6/19/2018    Anxiety     Back pain     CAD (coronary artery disease)     CKD (chronic kidney disease) stage 3, GFR 30-59 ml/min     Constipation     Diabetes ([address]     Diverticulosis 2007    DM (di[facility] mellitus) [facility]     AODM    Fibroid 1972    GERD (gastroesophageal reflux disease)     Goiter, non-toxic  Hernia, hiatal     Hyperlipemia     Hypertension     Hypokalemia     Menopause     Osteoarthritis 2006    bursitis R shoulder    Osteoarthritis of right knee     PVC (premature ventricular contraction)     H/O PVC's    Raynaud disease     Venous stasis     Wears hearing aid     both ears     Past Surgical History:   Procedure Laterality Date    HX BREAST BIOPSY Bilateral     x5    HX CATARACT REMOVAL  2004    bilateral    HX COLONOSCOPY  2002, 08/2007    HX DILATION AND CURETTAGE  1960's    HX HYSTERECTOMY  70's    BSO    HX OTHER SURGICAL  2001    sigmoidoscopy       ALLERGIES:   Allergies   Allergen Reactions    Metformin Other (comments)     Kidneys effected       MEDICATIONS ON ADMISSION:     Current Outpatient Prescriptions:     lovastatin (MEVACOR) 40 mg tablet, TAKE 1 TAB BY MOUTH NIGHTLY FOR CHOLESTEROL LOWERING, Disp: 30 Tab, Rfl: 3    omeprazole (PRILOSEC) 40 mg capsule, TAKE ONE CAPSULE BY MOUTH EVERY MORNING TO CONTROL STOMACH ACID, Disp: 90 Cap, Rfl: 1    fluticasone (FLONASE) 50 mcg/actuation nasal spray, SPRAY 1 PUFF IN EACH NOSTRIL ONCE DAILY, Disp: 16 g, Rfl: 3    aspirin delayed-release 81 mg tablet, Take 1 Tab by mouth daily. , Disp: 30 Tab, Rfl: 0    acetaminophen (TYLENOL EXTRA STRENGTH) 500 mg tablet, Take 1 Tab by mouth every six (6) hours as needed for Pain., Disp: 30 Tab, Rfl: 1    TRUE METRIX GLUCOSE TEST STRIP strip, , Disp: , Rfl:     CARTIA  mg ER capsule, , Disp: , Rfl:     lisinopril-hydroCHLOROthiazide (PRINZIDE, ZESTORETIC) 20-25 mg per tablet, , Disp: , Rfl:     valsartan (DIOVAN) 320 mg tablet, , Disp: , Rfl:     dilTIAZem CD (CARDIZEM CD) 120 mg ER capsule, Take 1 Cap by mouth daily. , Disp: 90 Cap, Rfl: 1    irbesartan (AVAPRO) 300 mg tablet, Take 1 Tab by mouth nightly., Disp: 90 Tab, Rfl: 0    furosemide (LASIX) 20 mg tablet, TAKE ONE TABLET BY MOUTH ONCE DAILY IN THE MORNING, Disp: 30 Tab, Rfl: 3    hydrocortisone (ANUSOL-HC) 2.5 % rectal cream, Apply to rectal area BID, Disp: 30 g, Rfl: 0    triamcinolone acetonide (KENALOG) 0.1 % topical cream, Apply  to affected area two (2) times a day. use thin layer, Disp: , Rfl:     spironolactone (ALDACTONE) 25 mg tablet, TAKE 1/2 TABLET ONCE DAILY, Disp: 15 Tab, Rfl: 5    MULTIVITAMIN PO, Take 3,000 Units by mouth daily. , Disp: , Rfl:     SOCIAL HISTORY:   Social History     Social History    Marital status:      Spouse name: N/A    Number of children: N/A    Years of education: N/A     Occupational History    Not on file. Social History Main Topics    Smoking status: Never Smoker    Smokeless tobacco: Never Used    Alcohol use No    Drug use: No    Sexual activity: No     Other Topics Concern    Not on file     Social History Narrative    Happily . No concern for abuse. Exercise: none    Diet: Careful. Wear Seatbelts               FAMILY HISTORY:  Family History   Problem Relation Age of Onset    Diabetes Brother     Hypertension Mother     Diabetes Brother        REVIEW OF SYSTEMS: Complete ROS assessed and noted for that which is described above, all else are negative.   Eyes: normal  ENT: normal  CVS: normal  Resp: normal  GI: normal  : normal  GYN: normal  Endocrine: normal  Integument: normal  Musculoskeletal: normal  Neuro: normal  Psych: normal      PHYSICAL EXAMINATION:    VITAL SIGNS:  Visit Vitals    /69 (BP 1 Location: Right arm, BP Patient Position: Sitting)    Pulse (!) 58    Ht 5' 1\" (1.549 m)    Wt 180 lb 6.4 oz (81.8 kg)    BMI 34.09 kg/m2       GENERAL: NCAT, Sitting comfortably in wheel chair, NAD  EYES: EOMI, non-icteric, no proptosis  Ear/Nose/Throat: NCAT, no inflammation, no masses  LYMPH NODES: No LAD  CARDIOVASCULAR: S1 S2, RRR, No murmur, 2+ radial pulses  RESPIRATORY: CTA b/l, no wheeze/rales  GASTROINTESTINAL: NT, ND,  MUSCULOSKELETAL: Normal ROM, no atrophy  SKIN: warm, no edema/rash/ or other skin changes  NEUROLOGIC: 5/5 power all extremities, no tremors, AAOx3  PSYCHIATRIC: Normal affect, Normal insight and judgement      REVIEW OF LABORATORY AND RADIOLOGY DATA:   Labs and documentation have been reviewed as described above. ASSESSMENT AND PLAN:   Heber Walsh is a 80 y.o. female with a PMHx as noted above who was referred to our endocrinology clinic for evaluation of hypercalcemia. Hypercalcemia  History of vitamin D deficiency  Primary Hyperparathyroidism  Multinodular Goiter  Osteopenia    Today we spent time discussing the physiologic effects of parathyroid hormone on bone in order to maintain normal calcium balance in the body. We also discussed how this mechanism can become dysfunctional and produce erratic calcium levels which do not respond to normal feed back processes, and how this can contribute to low bone density and increasing fracture risk. We noted the contribution of chronic calcium and vitamin D deficiency on the parathyroid glands and how they can contribute to their condition. And finally we discussed the possibility that their can be non-PTH factors that contribute to an elevated calcium level. I have advised them of the proper work-up to determine the most likely cause, which may include further lab investigation, along with 24 hour urine collection, that may guide our decision making. * Patient is not 021% certain about whether she would want surgery or not. I advised that we can check a 24 hour urine calcium level and give her time to think about it. Her decision will guide how we proceed with treating. Calcium has been as high as 11.5 in June, and so we would need to consider how aggressive her condition may become.      Hyperparathyroidism / Hypercalcemia:  Vitamin D supplementation: Hold, will restart her 10,000 units later if appropriate  Calcium supplementation: Holding calcium 600mg BID for now till urine calcium checked  Lab evaluation: CMP,  24 hour urine calcium/creatinine    Provided container and instruction on urine collection,   Imaging: US/ NM scan reports reviewed, advised they have images of US sent to me for review,     NM scan normal, MNG on US, need to determine if any of them likely parathyroid adenomas    Osteopenia: Will review repeat DXA, will need treatment and will determine best timing. MNG: Has some dysphagia, if has parathyroid surgery, would need to consider simultaneous thyroidectomy for MNG. RTC: 2 months for re-evaluation, to call with any questions or concerns  >60 minutes spent together with patient today of which >50% of this time was spent in counseling and coordination of care. Jessica Dexter.  [address] [facility]

## 2018-07-27 NOTE — PATIENT INSTRUCTIONS
1. Plan on collecting urine for 24 hours using the jug provided, and submit to the lab the following day. 24 HOUR URINE COLLECTION  As part of your evaluation, we would like for you to collect a 24 hour sample of urine. This means that you will collect all of your urine into a collection jug for 24 hours. The first step is to visit the laboratory to receive a collection jug. Then select a day to start the urine collection. Keep in mind when selecting a day that you will need to return to the laboratory the following day to submit your urine sample. On the morning of the day you start collecting urine, the very first urine void should go into the toilet however everything else collected afterward should be collected into the jug, including any urine overnight, AND the first urine the following morning. After this you can stop collecting and bring the sample to the laboratory. We will discuss the results with you along with any other labs you have completed. If you have other blood work to perform, you can complete that when you submit the urine sample to the lab. Just in case your lab tests may require it, return to the laboratory in the morning fasting. 2. Plan to do the blood test the same morning you drop off the jug.     3. Consider whether or not surgery would be something you would consider, including surgery for the thyroid at the same time. 4. I will let you know about results when available. 5. Plan to return to clinic here in about 2 months,     6. After you submit your urine collection, I will let you know if its ok to go ahead and restart your calcium and vitamin D. Андрей Biswas.  [address] [facility]  [address] [place] [address]

## 2018-07-27 NOTE — TELEPHONE ENCOUNTER
Patients daughter called and stated that her moms BP is still really high since she was taken off of her BP meds due to her low pulse rate. Daughter would like her mother back on a BP medication. Also she would like to let you know that she couldn't have her dexa scan done today because she has to wait five days after the NM scans for her thyroid. Daughter was very upset that they waited around the whole day in [place] and couldn't have it done.  Gave her the number to call and reschedule the appointment to have it done at [facility]

## 2018-07-30 ENCOUNTER — TELEPHONE (OUTPATIENT)
Dept: FAMILY MEDICINE CLINIC | Age: 83
End: 2018-07-30

## 2018-07-30 RX ORDER — LISINOPRIL AND HYDROCHLOROTHIAZIDE 12.5; 2 MG/1; MG/1
2 TABLET ORAL DAILY
Qty: 180 TAB | Refills: 1 | Status: SHIPPED | OUTPATIENT
Start: 2018-07-30 | End: 2018-08-06

## 2018-07-30 NOTE — TELEPHONE ENCOUNTER
Have her change the Lisinopril  20/25 to Lisinopril 20/12.5 and take 2 pills daily in the am. Continue the other BP meds.

## 2018-08-01 ENCOUNTER — OFFICE VISIT (OUTPATIENT)
Dept: CARDIOLOGY CLINIC | Age: 83
End: 2018-08-01

## 2018-08-01 VITALS
SYSTOLIC BLOOD PRESSURE: 178 MMHG | HEART RATE: 50 BPM | BODY MASS INDEX: 33.79 KG/M2 | WEIGHT: 179 LBS | DIASTOLIC BLOOD PRESSURE: 64 MMHG | OXYGEN SATURATION: 97 % | HEIGHT: 61 IN | RESPIRATION RATE: 12 BRPM

## 2018-08-01 DIAGNOSIS — D63.8 ANEMIA OF CHRONIC DISEASE: ICD-10-CM

## 2018-08-01 DIAGNOSIS — R00.1 BRADYCARDIA: Primary | ICD-10-CM

## 2018-08-01 DIAGNOSIS — I35.0 AORTIC STENOSIS, MODERATE: ICD-10-CM

## 2018-08-01 DIAGNOSIS — E78.00 PURE HYPERCHOLESTEROLEMIA: ICD-10-CM

## 2018-08-01 DIAGNOSIS — E11.9 WELL CONTROLLED TYPE 2 DIABETES MELLITUS (HCC): ICD-10-CM

## 2018-08-01 DIAGNOSIS — I10 ESSENTIAL HYPERTENSION: ICD-10-CM

## 2018-08-01 RX ORDER — HYDRALAZINE HYDROCHLORIDE 25 MG/1
25 TABLET, FILM COATED ORAL 3 TIMES DAILY
Qty: 90 TAB | Refills: 5 | Status: SHIPPED | OUTPATIENT
Start: 2018-08-01 | End: 2019-02-04

## 2018-08-01 RX ORDER — DILTIAZEM HYDROCHLORIDE 120 MG/1
CAPSULE, COATED, EXTENDED RELEASE ORAL DAILY
COMMUNITY
End: 2018-08-01 | Stop reason: SINTOL

## 2018-08-01 RX ORDER — TRIAMCINOLONE ACETONIDE 1 MG/G
OINTMENT TOPICAL 2 TIMES DAILY
COMMUNITY
End: 2019-02-08 | Stop reason: ALTCHOICE

## 2018-08-01 NOTE — PROGRESS NOTES
PATIENT ID VERIFIED WITH TWO PATIENT IDENTIFIERS. PATIENT MEDICATIONS REVIEWED AND APPROVED BY DR. Chioma Austin. MEDICATIONS THAT WERE REMOVED FROM THIS VISIT HAVE BEEN APPROVED BY DR. Chioma Austin. Chief Complaint Patient presents with  Slow Heart Rate New patient evaluation referred by Dr. Karthikeyan Arana  Hypertension 1. Have you been to the ER, urgent care clinic since your last visit? Hospitalized since your last visit? New patient evaluation 2. Have you seen or consulted any other health care providers outside of the [address] [place] [address] since your last visit? Include any pap smears or colon screening. New patient evaluation

## 2018-08-01 NOTE — PATIENT INSTRUCTIONS
STOP TAKING DILTIAZEM COMPLETELY. START taking Hydralazine 25mg three times a day. New prescription sent to [address] [place] [address]. See Dr. Null File on August 6th for follow up. See Dr. Jackie Caldwell in 6 months.

## 2018-08-01 NOTE — PROGRESS NOTES
Elida Schwab is a 80 y.o. female is here for cardiac evaluation. Hx DM II, hypertension/hypertensive CVD, dyslipidemia, anemia of chronic disease. GERD, DJD, Raynaud's, followed by Dr. Mariana Lenz,  Also with hx multinodular goiter/parathryoid issues. Recent issues with bradycardia, HR high 40's. EKG with sinus tiffanie 48, LVH, PRWP/old AMI, NSST. Had been on Cardizem --reduced to 120 by PCP. Holter with sinus tiffanie, HR 42-79, avg 52, occasional PAC's. Echo 6/27/18 with mod LVEF, LVEF 65-70, LAE, mod aortic stenosis (mean 24), mild to mod pulm hypertension. Continues on Irbesartan 300, lisinopril HCT, lasix. Had been on spironolactone, no longer taking. Occ dizziness. The patient denies chest pain/ shortness of breath, orthopnea, PND, LE edema, syncope. Patient Active Problem List  
 Diagnosis Date Noted  Bradycardia 08/01/2018  Aortic stenosis, moderate  Pure hypercholesterolemia 06/19/2018  Anemia of chronic disease 06/19/2018  BMI 34.0-34.9,adult 06/19/2018  Type 2 diabetes mellitus with nephropathy ([address] 12/19/2017  Well controlled type 2 diabetes mellitus ([address] 06/07/2016  Essential hypertension 06/07/2016  Encounter for long-term (current) use of other medications 08/10/2015 Anne Hernandez MD 
Past Medical History:  
Diagnosis Date  Anemia of chronic disease 6/19/2018  Anxiety  Aortic stenosis, moderate  Back pain  CAD (coronary artery disease)  CKD (chronic kidney disease) stage 3, GFR 30-59 ml/min  Constipation  Diabetes ([address]  Diverticulosis 2007  DM (diabetes mellitus) ([address] AODM [address]  GERD (gastroesophageal reflux disease)  Goiter, non-toxic  Hernia, hiatal   
 Hyperlipemia  Hypertension  Hypokalemia  Menopause  Osteoarthritis 2006  
 bursitis R shoulder  Osteoarthritis of right knee  PVC (premature ventricular contraction) H/O PVC's  Raynaud disease  Venous stasis  Wears hearing aid   
 both ears Past Surgical History:  
Procedure Laterality Date  HX BREAST BIOPSY Bilateral   
 x5  
 HX CATARACT REMOVAL  2004  
 bilateral  
 HX COLONOSCOPY  2002, 08/2007  HX DILATION AND CURETTAGE  1960's  HX HYSTERECTOMY  70's BSO  HX OTHER SURGICAL  2001  
 sigmoidoscopy Allergies Allergen Reactions  Metformin Other (comments) Kidneys effected Family History Problem Relation Age of Onset  Diabetes Brother  Hypertension Mother  Diabetes Brother Social History Social History  Marital status:  Spouse name: N/A  
 Number of children: N/A  
 Years of education: N/A Occupational History  Not on file. Social History Main Topics  Smoking status: Never Smoker  Smokeless tobacco: Never Used  Alcohol use No  
 Drug use: No  
 Sexual activity: No  
 
Other Topics Concern  Not on file Social History Narrative Happily . No concern for abuse. Exercise: none Diet: Careful. Wear Seatbelts Current Outpatient Prescriptions Medication Sig  
 lisinopril-hydroCHLOROthiazide (PRINZIDE, ZESTORETIC) 20-12.5 mg per tablet Take 2 Tabs by mouth daily.  TRUE METRIX GLUCOSE TEST STRIP strip  CARTIA  mg ER capsule  dilTIAZem CD (CARDIZEM CD) 120 mg ER capsule Take 1 Cap by mouth daily.  irbesartan (AVAPRO) 300 mg tablet Take 1 Tab by mouth nightly.  furosemide (LASIX) 20 mg tablet TAKE ONE TABLET BY MOUTH ONCE DAILY IN THE MORNING  
 lovastatin (MEVACOR) 40 mg tablet TAKE 1 TAB BY MOUTH NIGHTLY FOR CHOLESTEROL LOWERING  hydrocortisone (ANUSOL-HC) 2.5 % rectal cream Apply to rectal area BID  omeprazole (PRILOSEC) 40 mg capsule TAKE ONE CAPSULE BY MOUTH EVERY MORNING TO CONTROL STOMACH ACID  fluticasone (FLONASE) 50 mcg/actuation nasal spray SPRAY 1 PUFF IN EACH NOSTRIL ONCE DAILY  triamcinolone acetonide (KENALOG) 0.1 % topical cream Apply  to affected area two (2) times a day. use thin layer  spironolactone (ALDACTONE) 25 mg tablet TAKE 1/2 TABLET ONCE DAILY  aspirin delayed-release 81 mg tablet Take 1 Tab by mouth daily.  acetaminophen (TYLENOL EXTRA STRENGTH) 500 mg tablet Take 1 Tab by mouth every six (6) hours as needed for Pain.  MULTIVITAMIN PO Take 3,000 Units by mouth daily. No current facility-administered medications for this visit. Review of Symptoms: 
 
CONST  No weight change. No fever, chills, sweats ENT No visual changes, URI sx, sore throat CV  See HPI  
RESP  No cough, or sputum, wheezing. Also see HPI  
GI  No abdominal pain or change in bowel habits. No heartburn or dysphagia. No melena or rectal bleeding.   No dysuria, urgency, frequency, hematuria MSKEL  No joint pain, swelling. No muscle pain. SKIN  No rash or lesions. NEURO  No headache, syncope, or seizure. No weakness, loss of sensation, or paresthesias. PSYCH  No low mood or depression No anxiety. HE/LYMPH  No easy bruising, abnormal bleeding, or enlarged glands. Physical ExamPhysical Exam:   
There were no vitals taken for this visit. Gen: NAD HEENT:  PERRL, throat clear Neck: no adenopathy, no thyromegaly, no JVD Heart:  Regular,Nl S1S2,  II/VI murmur, no gallop or rub.  
Lungs:  clear Abdomen:   Soft, non-tender, bowel sounds are active.  
Extremities:  No edema Pulse: symmetric Neuro: A&O times 3, No focal neuro deficits Cardiographics ECG: from 7/11/18--sinus tiffanie, LVH, PRWP/old AMI, NSST Labs:  
Lab Results Component Value Date/Time  Sodium 140 07/03/2018 02:29 PM  
 Sodium 140 06/19/2018 02:36 PM  
 Sodium 142 03/19/2018 02:33 PM  
 Sodium 141 12/19/2017 02:35 PM  
 Sodium 140 09/19/2017 01:36 PM  
 Potassium 4.0 07/03/2018 02:29 PM  
 Potassium 3.9 06/19/2018 02:36 PM  
 Potassium 4.0 03/19/2018 02:33 PM  
 Potassium 3.7 12/19/2017 02:35 PM  
 Potassium 4.0 09/19/2017 01:36 PM  
 Chloride 102 07/03/2018 02:29 PM  
 Chloride 101 06/19/2018 02:36 PM  
 Chloride 103 03/19/2018 02:33 PM  
 Chloride 102 12/19/2017 02:35 PM  
 Chloride 100 09/19/2017 01:36 PM  
 CO2 24 07/03/2018 02:29 PM  
 CO2 25 06/19/2018 02:36 PM  
 CO2 23 03/19/2018 02:33 PM  
 CO2 24 12/19/2017 02:35 PM  
 CO2 26 09/19/2017 01:36 PM  
 Glucose 233 (H) 07/03/2018 02:29 PM  
 Glucose 191 (H) 06/19/2018 02:36 PM  
 Glucose 73 03/19/2018 02:33 PM  
 Glucose 81 12/19/2017 02:35 PM  
 Glucose 94 09/19/2017 01:36 PM  
 BUN 31 (H) 07/03/2018 02:29 PM  
 BUN 29 (H) 06/19/2018 02:36 PM  
 BUN 31 (H) 03/19/2018 02:33 PM  
 BUN 29 (H) 12/19/2017 02:35 PM  
 BUN 31 (H) 09/19/2017 01:36 PM  
 Creatinine 1.82 (H) 07/03/2018 02:29 PM  
 Creatinine 1.96 (H) 06/19/2018 02:36 PM  
 Creatinine 1.82 (H) 03/19/2018 02:33 PM  
 Creatinine 1.79 (H) 12/19/2017 02:35 PM  
 Creatinine 1.73 (H) 09/19/2017 01:36 PM  
 BUN/Creatinine ratio 17 07/03/2018 02:29 PM  
 BUN/Creatinine ratio 15 06/19/2018 02:36 PM  
 BUN/Creatinine ratio 17 03/19/2018 02:33 PM  
 BUN/Creatinine ratio 16 12/19/2017 02:35 PM  
 BUN/Creatinine ratio 18 09/19/2017 01:36 PM  
 GFR est AA 29 (L) 07/03/2018 02:29 PM  
 GFR est AA 26 (L) 06/19/2018 02:36 PM  
 GFR est AA 29 (L) 03/19/2018 02:33 PM  
 GFR est AA 29 (L) 12/19/2017 02:35 PM  
 GFR est AA 31 (L) 09/19/2017 01:36 PM  
 GFR est non-AA 25 (L) 07/03/2018 02:29 PM  
 GFR est non-AA 23 (L) 06/19/2018 02:36 PM  
 GFR est non-AA 25 (L) 03/19/2018 02:33 PM  
 GFR est non-AA 25 (L) 12/19/2017 02:35 PM  
 GFR est non-AA 27 (L) 09/19/2017 01:36 PM  
 Calcium 10.9 (H) 07/03/2018 02:29 PM  
 Calcium 11.5 (H) 06/19/2018 02:36 PM  
 Calcium 10.3 03/19/2018 02:33 PM  
 Calcium 10.8 (H) 12/19/2017 02:35 PM  
 Calcium 10.7 (H) 09/19/2017 01:36 PM  
 Bilirubin, total 0.3 07/03/2018 02:29 PM  
 Bilirubin, total 0.3 06/19/2018 02:36 PM  
 Bilirubin, total <0.2 03/19/2018 02:33 PM  
 Bilirubin, total <0.2 12/19/2017 02:35 PM  
 Bilirubin, total 0.3 09/19/2017 01:36 PM  
 AST (SGOT) 14 07/03/2018 02:29 PM  
 AST (SGOT) 25 06/19/2018 02:36 PM  
 AST (SGOT) 16 03/19/2018 02:33 PM  
 AST (SGOT) 16 12/19/2017 02:35 PM  
 AST (SGOT) 16 09/19/2017 01:36 PM  
 Alk. phosphatase 68 07/03/2018 02:29 PM  
 Alk. phosphatase 68 06/19/2018 02:36 PM  
 Alk. phosphatase 57 03/19/2018 02:33 PM  
 Alk. phosphatase 64 12/19/2017 02:35 PM  
 Alk. phosphatase 63 09/19/2017 01:36 PM  
 Protein, total 6.7 07/03/2018 02:29 PM  
 Protein, total 7.5 06/19/2018 02:36 PM  
 Protein, total 6.8 03/19/2018 02:33 PM  
 Protein, total 7.0 12/19/2017 02:35 PM  
 Protein, total 7.0 09/19/2017 01:36 PM  
 Albumin 3.6 07/03/2018 02:29 PM  
 Albumin 4.3 06/19/2018 02:36 PM  
 Albumin 3.9 03/19/2018 02:33 PM  
 Albumin 4.0 12/19/2017 02:35 PM  
 Albumin 4.1 09/19/2017 01:36 PM  
 A-G Ratio 1.2 07/03/2018 02:29 PM  
 A-G Ratio 1.3 06/19/2018 02:36 PM  
 A-G Ratio 1.3 03/19/2018 02:33 PM  
 A-G Ratio 1.3 12/19/2017 02:35 PM  
 A-G Ratio 1.4 09/19/2017 01:36 PM  
 ALT (SGPT) 9 07/03/2018 02:29 PM  
 ALT (SGPT) 9 06/19/2018 02:36 PM  
 ALT (SGPT) 9 03/19/2018 02:33 PM  
 ALT (SGPT) 8 12/19/2017 02:35 PM  
 ALT (SGPT) 7 09/19/2017 01:36 PM  
 
No results found for: CPK, CPKX, CPX Lab Results Component Value Date/Time  Cholesterol, total 126 06/06/2017 01:47 PM  
 Cholesterol, total 137 09/07/2016 03:24 PM  
 Cholesterol, total 115 06/07/2016 04:34 PM  
 Cholesterol, total 119 01/04/2016 02:24 PM  
 Cholesterol, total 112 08/10/2015 08:48 AM  
 HDL Cholesterol 40 06/06/2017 01:47 PM  
 HDL Cholesterol 40 09/07/2016 03:24 PM  
 HDL Cholesterol 37 (L) 06/07/2016 04:34 PM  
 HDL Cholesterol 35 (L) 01/04/2016 02:24 PM  
 HDL Cholesterol 35 (L) 08/10/2015 08:48 AM  
 LDL, calculated 64 06/06/2017 01:47 PM  
 LDL, calculated 75 09/07/2016 03:24 PM  
 LDL, calculated 60 06/07/2016 04:34 PM  
 LDL, calculated 68 01/04/2016 02:24 PM  
 LDL, calculated 61 08/10/2015 08:48 AM  
 Triglyceride 108 06/06/2017 01:47 PM  
 Triglyceride 109 09/07/2016 03:24 PM  
 Triglyceride 90 06/07/2016 04:34 PM  
 Triglyceride 80 01/04/2016 02:24 PM  
 Triglyceride 78 08/10/2015 08:48 AM  
 
No results found for this or any previous visit. Assessment:  
  
  
Patient Active Problem List  
 Diagnosis Date Noted  Bradycardia 08/01/2018  Aortic stenosis, moderate  Pure hypercholesterolemia 06/19/2018  Anemia of chronic disease 06/19/2018  BMI 34.0-34.9,adult 06/19/2018  Type 2 diabetes mellitus with nephropathy ([facility] 12/19/2017  Well controlled type 2 diabetes mellitus ([facility] 06/07/2016  Essential hypertension 06/07/2016  Encounter for long-term (current) use of other medications 08/10/2015 Hx DM II, hypertension/hypertensive CVD, dyslipidemia, anemia of chronic disease. GERD, DJD, Raynaud's, followed by Dr. Ki Garsia,  Also with hx multinodular goiter/parathryoid issues. Recent issues with bradycardia, HR high 40's. EKG with sinus tiffanie 48, LVH, PRWP/old AMI, NSST. Had been on Cardizem --reduced to 120 by PCP. Holter with sinus tiffanie, HR 42-79, avg 52, occasional PAC's. Echo 6/27/18 with mod LVEF, LVEF 65-70, LAE, mod aortic stenosis (mean 24), mild to mod pulm hypertension. Continues on Irbesartan 300, lisinopril HCT, lasix. Had been on spironolactone, no longer taking. Occ dizziness. No syncope. Plan:  
 
Echo and holter reviewed with pt and spouse Remains bradycardic on reduced dose cardizem now 120--will stop completely Start Hydralazine 25mg tid Continue irbesartan, lisinopril HCT, lasix F/u next week with PCP as planned May eventually need PPM  
Will follow aortic valve--mod AS 
RTC 6 mos, sooner prumang Naidu MD

## 2018-08-01 NOTE — MR AVS SNAPSHOT
Rogerio Ego 
 
 
 [address] [facility] [address] [address] [phone] Patient: Kaykay Ruiz MRN: [id number] :10/5/1931 Visit Information Date & Time Provider Department Dept. Phone Encounter #  
 2018  2:20 PM Maxi Hillr, [address] [facility] [place] [phone] Follow-up Instructions Return in about 6 months (around 2019). Follow-up and Disposition History Your Appointments 2018  2:20 PM  
Follow Up with Eunice Seals MD  
[address] ([address]) Appt Note: 3 week f/u  
 [address] [employer]  
[phone] 164[address] [facility] [address]  
  
    
 2018  2:00 PM  
Follow Up with Eunice Seals MD  
[address] [facility] ([address]) Appt Note: 3 mo f/u  
 [address] [employer]  
[phone]  
  
    
 2018  3:30 PM  
Follow Up with Sanjeev Dobbins MD  
[facility] [address] [facility]) Appt Note: f/u thyroid ok per Dr. Butt [facility] [address] [phone] [address] [facility] Upcoming Health Maintenance Date Due  
 LIPID PANEL Q1 2018 Influenza Age 5 to Adult 2018 HEMOGLOBIN A1C Q6M 2018 FOOT EXAM Q1 3/19/2019 MICROALBUMIN Q1 5/15/2019 MEDICARE YEARLY EXAM 2019 EYE EXAM RETINAL OR DILATED Q1 2019 GLAUCOMA SCREENING Q2Y 2020 DTaP/Tdap/Td series (2 - Td) 2026 Allergies as of 2018  Review Complete On: 2018 By: Summer Liu Severity Noted Reaction Type Reactions Metformin  2014    Other (comments) Kidneys effected Current Immunizations  Reviewed on 10/31/2017 Name Date Influenza High Dose Vaccine PF 10/31/2017  2:08 PM  
 Influenza Vaccine 11/7/2014, 10/25/2013, 10/22/2012 Influenza Vaccine Verobeth Lin) 12/6/2016, 10/2/2015 Pneumococcal Conjugate (PCV-13) 9/19/2017  2:22 PM  
 Pneumococcal Polysaccharide (PPSV-23) 6/7/2016 Not reviewed this visit You Were Diagnosed With   
  
 Codes Comments Bradycardia    -  Primary ICD-10-CM: R00.1 ICD-9-CM: 427.89 Essential hypertension     ICD-10-CM: I10 
ICD-9-CM: 401.9 Aortic stenosis, moderate     ICD-10-CM: I35.0 ICD-9-CM: 424.1 Well controlled type 2 diabetes mellitus ([facility] [address]     ICD-10-CM: E11.9 ICD-9-CM: 250.00 Pure hypercholesterolemia     ICD-10-CM: E78.00 ICD-9-CM: 272.0 Anemia of chronic disease     ICD-10-CM: D63.8 ICD-9-CM: 285.29 Vitals BP Pulse Resp Height(growth percentile) Weight(growth percentile) SpO2  
 178/64 (BP 1 Location: Left arm, BP Patient Position: Sitting) (!) 50 12 5' 1\" (1.549 m) 179 lb (81.2 kg) 97% BMI OB Status Smoking Status 33.82 kg/m2 Hysterectomy Never Smoker Vitals History BMI and BSA Data Body Mass Index Body Surface Area  
 33.82 kg/m 2 1.87 m 2 Preferred Pharmacy Pharmacy Name Phone [address] [place], [place] - [address] Your Updated Medication List  
  
   
This list is accurate as of 8/1/18  3:23 PM.  Always use your most recent med list.  
  
  
  
  
 acetaminophen 500 mg tablet Commonly known as:  [address] Take 1 Tab by mouth every six (6) hours as needed for Pain. aspirin delayed-release 81 mg tablet Take 1 Tab by mouth daily. fluticasone 50 mcg/actuation nasal spray Commonly known as:  Michaelyn Drought SPRAY 1 PUFF IN EACH NOSTRIL ONCE DAILY  
  
 furosemide 20 mg tablet Commonly known as:  LASIX TAKE ONE TABLET BY MOUTH ONCE DAILY IN THE MORNING  
  
 hydrALAZINE 25 mg tablet Commonly known as:  APRESOLINE  
 Take 1 Tab by mouth three (3) times daily. irbesartan 300 mg tablet Commonly known as:  AVAPRO Take 1 Tab by mouth nightly. lisinopril-hydroCHLOROthiazide 20-12.5 mg per tablet Commonly known as:  Ej Posey Take 2 Tabs by mouth daily. lovastatin 40 mg tablet Commonly known as:  MEVACOR  
TAKE 1 TAB BY MOUTH NIGHTLY FOR CHOLESTEROL LOWERING  
  
 omeprazole 40 mg capsule Commonly known as:  PRILOSEC  
TAKE ONE CAPSULE BY MOUTH EVERY MORNING TO CONTROL STOMACH ACID  
  
 triamcinolone acetonide 0.1 % ointment Commonly known as:  KENALOG Apply  to affected area two (2) times a day. use thin layer TRUE METRIX GLUCOSE TEST STRIP strip Generic drug:  glucose blood VI test strips Prescriptions Sent to Pharmacy Refills  
 hydrALAZINE (APRESOLINE) 25 mg tablet 5 Sig: Take 1 Tab by mouth three (3) times daily. Class: Normal  
 Pharmacy: [address] Ph #: [phone] Route: Oral  
  
Follow-up Instructions Return in about 6 months (around 2/1/2019). To-Do List   
 08/09/2018 12:30 PM  
  Appointment with Adrian Jiang7 1 at [address] ([phone]) DIET RESTRICTIONS - DO NOT TAKE CALCIUM or MULTIVITAMIN SUPPLEMENTS 24 hours prior to your exam.  [address] will be required to change into a gown if you have metal in your clothing. - Only patients will be allowed in the exam room. This includes children. - Children under the age of 15 may not be left unattended. - [facility] patients must have an armband and be accompanied by a caregiver or family member.  - If you have a hand-written prescription for this exam, you must bring it with you on the day of your exam. - Bring all relevant prior images from any facility outside of [facility] with you on the day of your exam.  Failure to provide images may delay reading by Radiologist. If you have questions or need to reschedule or cancel your appointment, call [phone]. Patient Instructions STOP TAKING DILTIAZEM COMPLETELY. START taking Hydralazine 25mg three times a day. New prescription sent to [address] [place] [address]. See Dr. Kendall Martinez on August 6th for follow up. See Dr. Jonnie Ann in 6 months. Patient Instructions History Introducing [facility] & HEALTH SERVICES! Dave Mena introduces [employer] patient portal. Now you can access parts of your medical record, email your doctor's office, and request medication refills online. 1. In your internet browser, go to [email or web address] 2. Click on the First Time User? Click Here link in the Sign In box. You will see the New Member Sign Up page. 3. Enter your [employer] Access Code exactly as it appears below. You will not need to use this code after youve completed the sign-up process. If you do not sign up before the expiration date, you must request a new code. · [employer] Access Code: [email or web address] Expires: 9/17/2018  3:35 PM 
 
4. Enter the last four digits of your Social Security Number (xxxx) and Date of Birth (mm/dd/yyyy) as indicated and click Submit. You will be taken to the next sign-up page. 5. Create a [employer] ID. This will be your [employer] login ID and cannot be changed, so think of one that is secure and easy to remember. 6. Create a [employer] password. You can change your password at any time. 7. Enter your Password Reset Question and Answer. This can be used at a later time if you forget your password. 8. Enter your e-mail address. You will receive e-mail notification when new information is available in [address] 9. Click Sign Up. You can now view and download portions of your medical record. 10. Click the Download Summary menu link to download a portable copy of your medical information.  
 
If you have questions, please visit the Frequently Asked Questions section of the My eStore App. Remember, [employer] is NOT to be used for urgent needs. For medical emergencies, dial 911. Now available from your iPhone and Android! Please provide this summary of care documentation to your next provider. Your primary care clinician is listed as Zari Sahni. If you have any questions after today's visit, please call [phone].

## 2018-08-02 ENCOUNTER — TELEPHONE (OUTPATIENT)
Dept: CARDIOLOGY CLINIC | Age: 83
End: 2018-08-02

## 2018-08-02 NOTE — TELEPHONE ENCOUNTER
Spoke with Katia Velasco. Verified patient with two patient identifiers. Informed her of the plan from yesterday per Dr. Jackie Caldwell:    STOP TAKING DILTIAZEM COMPLETELY.     START taking Hydralazine 25mg three times a day. New prescription sent to [address] [place] [address]. Katia Velasco verbalized understanding.

## 2018-08-02 NOTE — TELEPHONE ENCOUNTER
Junie Smoker (daughter on HIPPA) was not able to come w/pt to visit yesterday. She has been trying to review yesterdays paperwork and discuss w/pt. She has medication questions. Requesting c/b.

## 2018-08-06 ENCOUNTER — OFFICE VISIT (OUTPATIENT)
Dept: FAMILY MEDICINE CLINIC | Age: 83
End: 2018-08-06

## 2018-08-06 VITALS
WEIGHT: 179.5 LBS | BODY MASS INDEX: 33.89 KG/M2 | HEIGHT: 61 IN | TEMPERATURE: 98.1 F | OXYGEN SATURATION: 99 % | HEART RATE: 54 BPM | DIASTOLIC BLOOD PRESSURE: 84 MMHG | SYSTOLIC BLOOD PRESSURE: 152 MMHG

## 2018-08-06 DIAGNOSIS — I10 ESSENTIAL HYPERTENSION: ICD-10-CM

## 2018-08-06 DIAGNOSIS — E21.3 HYPERPARATHYROIDISM (HCC): Primary | ICD-10-CM

## 2018-08-06 DIAGNOSIS — R00.1 BRADYCARDIA: ICD-10-CM

## 2018-08-06 RX ORDER — DICLOFENAC SODIUM 10 MG/G
GEL TOPICAL 4 TIMES DAILY
Qty: 100 G | Refills: 3 | Status: SHIPPED | OUTPATIENT
Start: 2018-08-06 | End: 2019-02-08 | Stop reason: ALTCHOICE

## 2018-08-06 NOTE — MR AVS SNAPSHOT
[address] [facility] 
 
 
 [address] 67 [phone] Patient: Sheldon Robins MRN: [id number] :10/5/1931 Visit Information Date & Time Provider Department Dept. Phone Encounter #  
 2018  2:20 PM Gavino Claros  [address] [place] [address] [address] [facility] [id number] Your Appointments 2018  2:00 PM  
Follow Up with Gavino Claros MD  
[address] [place] [address] ([facility] Appt Note: 3 mo f/u  
 [address] [facility]  
[phone] [address] [address]  
  
    
 2018  3:30 PM  
Follow Up with Jessie Diggs MD  
[facility] Appt Note: f/u thyroid ok per Dr. Mark Bazzi [facility] [address] [phone] [address] [place] [address] Road  
  
    
 2019  2:00 PM  
ESTABLISHED PATIENT with Shaquille Orantes MD  
[address] [facility] [place] [facility] Appt Note: 6 mo f/u  
 [facility] [facility] [address] [phone]  
  
   
 [address] [place] [address] Upcoming Health Maintenance Date Due  
 LIPID PANEL Q1 2018 Influenza Age 5 to Adult 2018 HEMOGLOBIN A1C Q6M 2018 FOOT EXAM Q1 3/19/2019 MICROALBUMIN Q1 5/15/2019 MEDICARE YEARLY EXAM 2019 EYE EXAM RETINAL OR DILATED Q1 2019 GLAUCOMA SCREENING Q2Y 2020 DTaP/Tdap/Td series (2 - Td) 2026 Allergies as of 2018  Review Complete On: 2018 By: Gavino Claros MD  
  
 Severity Noted Reaction Type Reactions Metformin  2014    Other (comments) Kidneys effected Current Immunizations  Reviewed on 10/31/2017 Name Date  Influenza High Dose Vaccine PF 10/31/2017  2:08 PM  
 Influenza Vaccine 11/7/2014, 10/25/2013, 10/22/2012 Influenza Vaccine Jennifer Holman) 12/6/2016, 10/2/2015 Pneumococcal Conjugate (PCV-13) 9/19/2017  2:22 PM  
 Pneumococcal Polysaccharide (PPSV-23) 6/7/2016 Not reviewed this visit You Were Diagnosed With   
  
 Codes Comments Hyperparathyroidism ([facility] [address]    -  Primary ICD-10-CM: E21.3 ICD-9-CM: 252.00 Essential hypertension     ICD-10-CM: I10 
ICD-9-CM: 401.9 Bradycardia     ICD-10-CM: R00.1 ICD-9-CM: 427.89 BMI 33.0-33.9,adult     ICD-10-CM: F12.22 
ICD-9-CM: V85.33 Vitals BP Pulse Temp Height(growth percentile) Weight(growth percentile) SpO2  
 152/84 (!) 54 98.1 °F (36.7 °C) (Oral) 5' 1\" (1.549 m) 179 lb 8 oz (81.4 kg) 99% BMI OB Status Smoking Status 33.92 kg/m2 Hysterectomy Never Smoker Vitals History BMI and BSA Data Body Mass Index Body Surface Area  
 33.92 kg/m 2 1.87 m 2 Preferred Pharmacy Pharmacy Name Phone [facility] [address] Your Updated Medication List  
  
   
This list is accurate as of 8/6/18  3:26 PM.  Always use your most recent med list.  
  
  
  
  
 acetaminophen 500 mg tablet Commonly known as:  [address] [facility] Take 1 Tab by mouth every six (6) hours as needed for Pain. aspirin delayed-release 81 mg tablet Take 1 Tab by mouth daily. diclofenac 1 % Gel Commonly known as:  VOLTAREN Apply  to affected area four (4) times daily. fluticasone 50 mcg/actuation nasal spray Commonly known as:  Lexii Fryer SPRAY 1 PUFF IN EACH NOSTRIL ONCE DAILY  
  
 furosemide 20 mg tablet Commonly known as:  LASIX TAKE ONE TABLET BY MOUTH ONCE DAILY IN THE MORNING  
  
 hydrALAZINE 25 mg tablet Commonly known as:  APRESOLINE Take 1 Tab by mouth three (3) times daily. irbesartan 300 mg tablet Commonly known as:  AVAPRO Take 1 Tab by mouth nightly. lovastatin 40 mg tablet Commonly known as:  MEVACOR  
TAKE 1 TAB BY MOUTH NIGHTLY FOR CHOLESTEROL LOWERING  
  
 omeprazole 40 mg capsule Commonly known as:  PRILOSEC  
TAKE ONE CAPSULE BY MOUTH EVERY MORNING TO CONTROL STOMACH ACID  
  
 triamcinolone acetonide 0.1 % ointment Commonly known as:  KENALOG Apply  to affected area two (2) times a day. use thin layer TRUE METRIX GLUCOSE TEST STRIP strip Generic drug:  glucose blood VI test strips Prescriptions Sent to Pharmacy Refills  
 diclofenac (VOLTAREN) 1 % gel 3 Sig: Apply  to affected area four (4) times daily. Class: Normal  
 Pharmacy: [address] Ph #: [phone] Route: Topical  
  
To-Do List   
 08/09/2018 12:30 PM  
  Appointment with Adrian Taveras 1 at [address] ([phone]) DIET RESTRICTIONS - DO NOT TAKE CALCIUM or MULTIVITAMIN SUPPLEMENTS 24 hours prior to your exam.  [address] will be required to change into a gown if you have metal in your clothing. - Only patients will be allowed in the exam room. This includes children. - Children under the age of 15 may not be left unattended. - [facility] patients must have an armband and be accompanied by a caregiver or family member. - If you have a hand-written prescription for this exam, you must bring it with you on the day of your exam. - Bring all relevant prior images from any facility outside of [facility] with you on the day of your exam.  Failure to provide images may delay reading by Radiologist.  If you have questions or need to reschedule or cancel your appointment, call [phone]. Introducing hospitals & HEALTH SERVICES! Leandro Mcgrath introduces [employer] patient portal. Now you can access parts of your medical record, email your doctor's office, and request medication refills online. 1. In your internet browser, go to [email or web address] 2. Click on the First Time User? Click Here link in the Sign In box. You will see the New Member Sign Up page. 3. Enter your [employer] Access Code exactly as it appears below. You will not need to use this code after youve completed the sign-up process. If you do not sign up before the expiration date, you must request a new code. · [employer] Access Code: [email or web address] Expires: 9/17/2018  3:35 PM 
 
4. Enter the last four digits of your Social Security Number (xxxx) and Date of Birth (mm/dd/yyyy) as indicated and click Submit. You will be taken to the next sign-up page. 5. Create a [employer] ID. This will be your [employer] login ID and cannot be changed, so think of one that is secure and easy to remember. 6. Create a [employer] password. You can change your password at any time. 7. Enter your Password Reset Question and Answer. This can be used at a later time if you forget your password. 8. Enter your e-mail address. You will receive e-mail notification when new information is available in [address] 9. Click Sign Up. You can now view and download portions of your medical record. 10. Click the Download Summary menu link to download a portable copy of your medical information. If you have questions, please visit the Frequently Asked Questions section of the [employer] website. Remember, [employer] is NOT to be used for urgent needs. For medical emergencies, dial 911. Now available from your iPhone and Android! Please provide this summary of care documentation to your next provider. Your primary care clinician is listed as Windy Setting. If you have any questions after today's visit, please call [phone].

## 2018-08-06 NOTE — PROGRESS NOTES
1. Have you been to the ER, urgent care clinic since your last visit? Hospitalized since your last visit? No    2. Have you seen or consulted any other health care providers outside of the [facility] since your last visit? Include any pap smears or colon screening.  No

## 2018-08-06 NOTE — PROGRESS NOTES
HISTORY OF PRESENT ILLNESS  Sheldon Robins is a 80 y.o. female. Shortness of Breath   Associated symptoms include neck pain and leg swelling. Pertinent negatives include no fever, no headaches, no sore throat, no ear pain, no cough, no sputum production, no wheezing, no chest pain, no abdominal pain and no claudication. Hypertension    Associated symptoms include neck pain and shortness of breath. Pertinent negatives include no chest pain, no palpitations, no malaise/fatigue, no headaches and no dizziness. Hip Pain    Associated symptoms include neck pain. Shoulder Pain        She was recently diagnosed with hyperparathyroidism. Was seen by Dr Mark Bazzi. Has a DEXA scheduled for later this week and will be obtaining a 24 h urine. HR is still low. Holter with sinus tiffanie. No dizziness or lightheadedness. Her Dilt has been discontinued and she was placed on Hydralazine by Dr Jyothi Cohn last week. She is having occasional SOB. She is aching today from her OA. Pain in her neck and shoulders. Knees and ankles are also more painful. Will take tylenol which helps. Not able to take NSAIDs due to her CKD. Review of Systems   Constitutional: Negative for chills, fever and malaise/fatigue. HENT: Negative for congestion, ear pain, hearing loss and sore throat. Respiratory: Positive for shortness of breath. Negative for cough, sputum production and wheezing. Cardiovascular: Positive for leg swelling. Negative for chest pain, palpitations and claudication. Gastrointestinal: Negative for abdominal pain, constipation, diarrhea and heartburn. Musculoskeletal: Positive for joint pain, myalgias and neck pain. Neurological: Negative for dizziness and headaches. Endo/Heme/Allergies: Bruises/bleeds easily. Psychiatric/Behavioral: Negative for depression.      Past Medical History:   Diagnosis Date    Anemia of chronic disease 6/19/2018    Anxiety     Aortic stenosis, moderate     Back pain     CAD (coronary artery disease)     CKD (chronic kidney disease) stage 3, GFR 30-59 ml/min     Constipation     Diabetes ([facility] [address]     Diverticulosis 2007    DM (diabetes mellitus) ([facility] [address]     AODM    Fibroid 1972    GERD (gastroesophageal reflux disease)     Goiter, non-toxic     Hernia, hiatal     Hyperlipemia     Hypertension     Hypokalemia     Menopause     Osteoarthritis 2006    bursitis R shoulder    Osteoarthritis of right knee     PVC (premature ventricular contraction)     H/O PVC's    Raynaud disease     Venous stasis     Wears hearing aid     both ears     Past Surgical History:   Procedure Laterality Date    HX BREAST BIOPSY Bilateral     x5    HX CATARACT REMOVAL  2004    bilateral    HX COLONOSCOPY  2002, 08/2007    HX DILATION AND CURETTAGE  1960's    HX HYSTERECTOMY  70's    BSO    HX OTHER SURGICAL  2001    sigmoidoscopy     Allergies   Allergen Reactions    Metformin Other (comments)     Kidneys effected       Blood pressure 152/84, pulse (!) 54, temperature 98.1 °F (36.7 °C), temperature source Oral, height 5' 1\" (1.549 m), weight 179 lb 8 oz (81.4 kg), SpO2 99 %. Physical Exam   Constitutional: She is oriented to person, place, and time. She appears well-developed and well-nourished. No distress. HENT:   Head: Normocephalic and atraumatic. Left Ear: External ear normal.   Nose: Nose normal.   Mouth/Throat: Oropharynx is clear and moist.   Eyes: Conjunctivae are normal.   Neck: Neck supple. Cardiovascular: Regular rhythm. Murmur heard. Usual III/VI murmur  Bradycardic rate but regular   Pulmonary/Chest: Effort normal and breath sounds normal. No respiratory distress. Genitourinary:   Genitourinary Comments: Rectum- small hemorrhoid noted on exam. No bleeding at this time. Lymphadenopathy:     She has no cervical adenopathy. Neurological: She is alert and oriented to person, place, and time. Skin: Skin is warm. Psychiatric: She has a normal mood and affect. Nursing note and vitals reviewed. ASSESSMENT and PLAN  Diagnoses and all orders for this visit:    1. Hyperparathyroidism ([address]    2. Essential hypertension    3. Bradycardia    4. BMI 33.0-33.9,adult    Other orders  -     diclofenac (VOLTAREN) 1 % gel; Apply  to affected area four (4) times daily. She has a follow up scheduled with Dr Vashti Angulo. Continue present BP meds. Avoid sodium  RTO 4 weeks for BP check  Discussed diet and exercise. Monitor weight.    Tylenol on a regular schedule for OA

## 2018-08-09 ENCOUNTER — TELEPHONE (OUTPATIENT)
Dept: ENDOCRINOLOGY | Age: 83
End: 2018-08-09

## 2018-08-09 NOTE — TELEPHONE ENCOUNTER
Patient's daughter Diamond Dodd is calling in regards to her mothers fasting lab order. Please retuen her call to [phone].

## 2018-08-21 ENCOUNTER — TELEPHONE (OUTPATIENT)
Dept: ENDOCRINOLOGY | Age: 83
End: 2018-08-21

## 2018-08-21 NOTE — TELEPHONE ENCOUNTER
[email or web address] [place] to inquire about this. I was told \" It looks like it didn't get sent out. I will have to look into why and get back to you. \". I told them I would pass this on to Dr. Filomena Cueto.   Margarita Pollock

## 2018-08-21 NOTE — TELEPHONE ENCOUNTER
----- Message from Evelia Walker MD sent at 8/20/2018  9:36 AM EDT -----  Patients serum calcium is back normal.   Her 24 hour urine however was not processed or sent to a different lab ? Joseph Landon could we find out what happened to that urine collection. Seems like this happened before. Thank you ! Rosalee Armendariz.  39 [facility]  [address] [place] [address]

## 2018-08-30 NOTE — TELEPHONE ENCOUNTER
Jhoan Nathan,     Did you already let Mr. Peterson know about this patients urine sample which we are looking for ? If not can you touch base with him about this. Seems like we still have not heard anything yet. Thanks,    Tiffany Rees.  [address]

## 2018-09-17 NOTE — TELEPHONE ENCOUNTER
I called Chandler Marcia Modi at [phone] and spoke with him about this. He was in a hurry because he had a meeting to go to but said he would call me back.   Parish Bell

## 2018-09-26 ENCOUNTER — OFFICE VISIT (OUTPATIENT)
Dept: ENDOCRINOLOGY | Age: 83
End: 2018-09-26

## 2018-09-26 VITALS
WEIGHT: 173 LBS | HEART RATE: 58 BPM | DIASTOLIC BLOOD PRESSURE: 73 MMHG | BODY MASS INDEX: 32.66 KG/M2 | HEIGHT: 61 IN | SYSTOLIC BLOOD PRESSURE: 151 MMHG

## 2018-09-26 DIAGNOSIS — E83.52 HYPERCALCEMIA: ICD-10-CM

## 2018-09-26 DIAGNOSIS — M85.852 OSTEOPENIA OF NECK OF LEFT FEMUR: ICD-10-CM

## 2018-09-26 DIAGNOSIS — E04.2 MULTINODULAR GOITER: ICD-10-CM

## 2018-09-26 DIAGNOSIS — E21.0 PRIMARY HYPERPARATHYROIDISM (HCC): Primary | ICD-10-CM

## 2018-09-26 DIAGNOSIS — E55.9 VITAMIN D DEFICIENCY: ICD-10-CM

## 2018-09-26 NOTE — PATIENT INSTRUCTIONS
Start taking Vitamin D3 2000 units each morning with breakfast    Start taking calcium carbonate (generic tums) 500mg twice daily with your meals    Plan for a RECLAST infusion to help protect your bones, you will receive a call to schedule this    Your thyroid ultrasound suggests table nodules, there is no urgency with this at the present time    I will discuss your results with you and let you know if surgery is warranted,       See you back in 3 months,       Tal Enriquez.  39 [facility]  [facility]

## 2018-09-26 NOTE — PROGRESS NOTES
CHIEF COMPLAINT: Hyperparathyroidism, MNG    HISTORY OF PRESENT ILLNESS:   Maribell Gomes is a 80 y.o. female with a PMHx as noted below who presents for f/u eval of hypercalcemia and MNG    Elevated calcium as far back as January 2014, as high as 11.5, recently 10.9  Vitamin D status: had stabilized off her D3  Calcium supplementation: off her calcium, 24 hr urine calcium was never processed ? Parathyroid imaging:    - 7/25/18: NM Parathyroid Scan: Normal Study   - 7/25/18: US: MNG noted.  No parathyroid adenoma described, images not available ([facility])    Rt upper 1.3 x 0.8 x 1.2 cm nodule,     Rt lateral 1.1 x 1 x 0.9 cm nodule,    Rt lower 1.5 x 1 cm nodule,    Lt  Inferior 1.8 x 1.6 x 1.8 cm nodule    Lt  Superior 1.2 x 0.9 x 0.8 cm nodule    Regarding bone health, patient has a history of advanced osteopenia  Bone Density Scan 2016: T= - 2.4 L femoral neck,  Repeat DXA 8/19/18: T= -1.9 R FN, -1.8 L femoral neck  Performed at different centers, however generally seems less severe than prior    Review of most recent bone-related metabolic lab panel:  Lab Results   Component Value Date    PTHILT CANCELED 09/18/2018    PTHILT 106 (H) 07/12/2018    PTHILT CANCELED 07/03/2018    CA 10.9 (H) 09/18/2018    CA 11.0 (H) 09/07/2018    CA 10.0 08/09/2018    VITD3 57.9 07/17/2018    TSH 0.801 07/03/2018    PHOS 2.4 (L) 07/17/2018     [id number]: PTHrP level  [id number]: Serum NTX level  PTHILT: Intact PTH level      PAST MEDICAL/SURGICAL HISTORY:   Past Medical History:   Diagnosis Date    Anemia of chronic disease 6/19/2018    Anxiety     Aortic stenosis, moderate     Back pain     CAD (coronary artery disease)     CKD (chronic kidney disease) stage 3, GFR 30-59 ml/min     Constipation     Diabetes ([facility]     Diverticulosis 2007    DM (diabetes mellitus) ([facility]     AODM    Fibroid 1972    GERD (gastroesophageal reflux disease)     Goiter, non-toxic     Hernia, hiatal     Hyperlipemia     Hypertension     Hypokalemia     Menopause     Osteoarthritis 2006    bursitis R shoulder    Osteoarthritis of right knee     PVC (premature ventricular contraction)     H/O PVC's    Raynaud disease     Venous stasis     Wears hearing aid     both ears     Past Surgical History:   Procedure Laterality Date    HX BREAST BIOPSY Bilateral     x5    HX CATARACT REMOVAL  2004    bilateral    HX COLONOSCOPY  2002, 08/2007    HX DILATION AND CURETTAGE  1960's    HX HYSTERECTOMY  70's    BSO    HX OTHER SURGICAL  2001    sigmoidoscopy       ALLERGIES:   Allergies   Allergen Reactions    Metformin Other (comments)     Kidneys effected       MEDICATIONS ON ADMISSION:     Current Outpatient Prescriptions:     triamcinolone acetonide (KENALOG) 0.1 % ointment, Apply  to affected area two (2) times a day. use thin layer, Disp: , Rfl:     hydrALAZINE (APRESOLINE) 25 mg tablet, Take 1 Tab by mouth three (3) times daily. , Disp: 90 Tab, Rfl: 5    TRUE METRIX GLUCOSE TEST STRIP strip, , Disp: , Rfl:     irbesartan (AVAPRO) 300 mg tablet, Take 1 Tab by mouth nightly., Disp: 90 Tab, Rfl: 0    furosemide (LASIX) 20 mg tablet, TAKE ONE TABLET BY MOUTH ONCE DAILY IN THE MORNING, Disp: 30 Tab, Rfl: 3    lovastatin (MEVACOR) 40 mg tablet, TAKE 1 TAB BY MOUTH NIGHTLY FOR CHOLESTEROL LOWERING, Disp: 30 Tab, Rfl: 3    omeprazole (PRILOSEC) 40 mg capsule, TAKE ONE CAPSULE BY MOUTH EVERY MORNING TO CONTROL STOMACH ACID, Disp: 90 Cap, Rfl: 1    fluticasone (FLONASE) 50 mcg/actuation nasal spray, SPRAY 1 PUFF IN EACH NOSTRIL ONCE DAILY, Disp: 16 g, Rfl: 3    aspirin delayed-release 81 mg tablet, Take 1 Tab by mouth daily. , Disp: 30 Tab, Rfl: 0    acetaminophen (TYLENOL EXTRA STRENGTH) 500 mg tablet, Take 1 Tab by mouth every six (6) hours as needed for Pain., Disp: 30 Tab, Rfl: 1    diclofenac (VOLTAREN) 1 % gel, Apply  to affected area four (4) times daily. , Disp: 100 g, Rfl: 3    SOCIAL HISTORY:   Social History     Social History    Marital status:      Spouse name: N/A    Number of children: N/A    Years of education: N/A     Occupational History    Not on file. Social History Main Topics    Smoking status: Never Smoker    Smokeless tobacco: Never Used    Alcohol use No    Drug use: No    Sexual activity: No     Other Topics Concern    Not on file     Social History Narrative    Happily . No concern for abuse. Exercise: none    Diet: Careful. Wear Seatbelts               FAMILY HISTORY:  Family History   Problem Relation Age of Onset    Diabetes Brother     Hypertension Mother     Diabetes Brother        REVIEW OF SYSTEMS: Complete ROS assessed and noted for that which is described above, all else are negative. Eyes: normal  ENT: normal  CVS: normal  Resp: normal  GI: normal  : normal  GYN: normal  Endocrine: normal  Integument: normal  Musculoskeletal: normal  Neuro: normal  Psych: normal      PHYSICAL EXAMINATION:    VITAL SIGNS:  Visit Vitals    /73 (BP 1 Location: Right arm, BP Patient Position: Sitting)    Pulse (!) 58    Ht 5' 1\" (1.549 m)    Wt 173 lb (78.5 kg)    BMI 32.69 kg/m2       GENERAL: NCAT, Sitting comfortably in wheel chair, NAD  EYES: EOMI, non-icteric, no proptosis  Ear/Nose/Throat: NCAT, no inflammation, no masses  LYMPH NODES: No LAD  CARDIOVASCULAR: S1 S2, RRR, No murmur, 2+ radial pulses  RESPIRATORY: CTA b/l, no wheeze/rales  GASTROINTESTINAL: NT, ND,  MUSCULOSKELETAL: Normal ROM, no atrophy  SKIN: warm, no edema/rash/ or other skin changes  NEUROLOGIC: 5/5 power all extremities, no tremors, AAOx3  PSYCHIATRIC: Normal affect, Normal insight and judgement      REVIEW OF LABORATORY AND RADIOLOGY DATA:   Labs and documentation have been reviewed as described above. ASSESSMENT AND PLAN:   Luisito Ledesma is a 80 y.o. female with a PMHx as noted above who was referred to our endocrinology clinic for evaluation of hypercalcemia.     Hypercalcemia  History of vitamin D deficiency  Primary Hyperparathyroidism  Multinodular Goiter  Osteopenia    Hyperparathyroidism / Hypercalcemia:  At this time, I spent time discussing the possibility of meeting with a surgeon to discuss surgical exploration for a parathyroid adenoma. Currently her NM parathyroid scan and her thyroid US are not revealing of a parathyroid adenoma. Today I have reviewed the images of the ultrasound which the patient brought with her today on the disk and did not see a candidate nodule that would be suspicious for a parathyroid adenoma. I believe however that a dose of reclast to protect her bone and reduce calcium loss would be warranted. We discussed this along with the risks / benefits. She would like the treatment. She will be having some blood work drawn soon ordered by Dr. Guillermo Gauthier, they do not have the lab slip, I will reorder the lab and provide them with a the slip and forward to Dr. Aleks Dewey when reviewed/received. Ultimately if her calcium is <11 and her PTH is not rising, we may see how she does with reclast and calcium/d replacement, conservatively however if progressing, will refer to surgery. Vitamin D supplementation: Start 2000 units of D3 once daily  Calcium supplementation: Start 500mg calcium carbonate twice daily  Lab evaluation: Ordered  Imaging: US/ NM scan w/o evidence of parathyroid adenoma,       Osteopenia on DXA, reviewed, stable    Osteopenia: RECLAST dose #1 ordered, patient to schedule, Calcium and D as above    MNG: I have reviewed the images of her thyroid US personally. Her thyroid nodules mostly appear as pseudonodules, meaning that there is chronic heterogenous appearance giving the resemblance of thyroid nodules whereas most of them are not actually true nodules. The nodules present did not appear suspicious. Ultrasound can be repeated in 2 years as long as stable clinically.      RTC: 3 months for re-evaluation, to call with any questions or concerns  >40 minutes spent together with patient today of which >50% of this time was spent in counseling and coordination of care. Rafael Nguyen.  [address] [facility]

## 2018-09-26 NOTE — MR AVS SNAPSHOT
[address] [place] [address] [address] [address] [phone] Patient: Jamie Dunlap MRN: [id number] :10/5/1931 Visit Information Date & Time Provider Department Dept. Phone Encounter #  
 2018  3:30 PM Millie Singh, [address] [facility] [phone] [id number] Follow-up Instructions Return in about 3 months (around 2018). Your Appointments 2018  2:00 PM  
Follow Up with Jonathan Ordaz MD  
Formerly named [facility] PRIMARY CARE AT [address] [facility] ([place]) Appt Note: 3 month f/u  
 [address] [id number] [phone] [address] [facility] [phone]  2:00 PM  
ESTABLISHED PATIENT with Nahun Henderson MD  
[facility] [address]) Appt Note: 6 mo f/u  
 [address] [facility] [address] [address] [phone]  
  
   
 [address] [place] [id number] Upcoming Health Maintenance Date Due Shingrix Vaccine Age 50> (1 of 2) 10/5/1981 LIPID PANEL Q1 2019* Influenza Age 5 to Adult 3/7/2019* HEMOGLOBIN A1C Q6M 2018 FOOT EXAM Q1 3/19/2019 MICROALBUMIN Q1 5/15/2019 MEDICARE YEARLY EXAM 2019 EYE EXAM RETINAL OR DILATED Q1 2019 GLAUCOMA SCREENING Q2Y 2020 DTaP/Tdap/Td series (2 - Td) 2026 *Topic was postponed. The date shown is not the original due date. Allergies as of 2018  Review Complete On: 2018 By: Millie Singh MD  
  
 Severity Noted Reaction Type Reactions Metformin  2014    Other (comments) Kidneys effected Current Immunizations  Reviewed on 10/31/2017 Name Date Influenza High Dose Vaccine PF 10/31/2017  2:08 PM  
 Influenza Vaccine 2014, 10/25/2013, 10/22/2012 Influenza Vaccine Kenneth Nicolasa) 2016, 10/2/2015 Pneumococcal Conjugate (PCV-13) 9/19/2017  2:22 PM  
 Pneumococcal Polysaccharide (PPSV-23) 6/7/2016 Not reviewed this visit You Were Diagnosed With   
  
 Codes Comments Primary hyperparathyroidism ([facility]    -  Primary ICD-10-CM: E21.0 ICD-9-CM: 252.01 Hypercalcemia     ICD-10-CM: D82.02 
ICD-9-CM: 275.42 Vitamin D deficiency     ICD-10-CM: E55.9 ICD-9-CM: 268.9 Multinodular goiter     ICD-10-CM: E04.2 ICD-9-CM: 241.1 Osteopenia of neck of left femur     ICD-10-CM: N31.760 ICD-9-CM: 733.90 Vitals BP Pulse Height(growth percentile) Weight(growth percentile) BMI OB Status 151/73 (BP 1 Location: Right arm, BP Patient Position: Sitting) (!) 58 5' 1\" (1.549 m) 173 lb (78.5 kg) 32.69 kg/m2 Hysterectomy Smoking Status Never Smoker Vitals History BMI and BSA Data Body Mass Index Body Surface Area  
 32.69 kg/m 2 1.84 m 2 Preferred Pharmacy Pharmacy Name Phone [phone] [place] [address] - [address] Your Updated Medication List  
  
   
This list is accurate as of 9/26/18  4:34 PM.  Always use your most recent med list.  
  
  
  
  
 acetaminophen 500 mg tablet Commonly known as:  [address] [facility] Take 1 Tab by mouth every six (6) hours as needed for Pain. aspirin delayed-release 81 mg tablet Take 1 Tab by mouth daily. diclofenac 1 % Gel Commonly known as:  VOLTAREN Apply  to affected area four (4) times daily. fluticasone 50 mcg/actuation nasal spray Commonly known as:  Adan Villa SPRAY 1 PUFF IN EACH NOSTRIL ONCE DAILY  
  
 furosemide 20 mg tablet Commonly known as:  LASIX TAKE ONE TABLET BY MOUTH ONCE DAILY IN THE MORNING  
  
 hydrALAZINE 25 mg tablet Commonly known as:  APRESOLINE Take 1 Tab by mouth three (3) times daily. irbesartan 300 mg tablet Commonly known as:  AVAPRO Take 1 Tab by mouth nightly. lovastatin 40 mg tablet Commonly known as:  MEVACOR  
TAKE 1 TAB BY MOUTH NIGHTLY FOR CHOLESTEROL LOWERING  
  
 omeprazole 40 mg capsule Commonly known as:  PRILOSEC  
TAKE ONE CAPSULE BY MOUTH EVERY MORNING TO CONTROL STOMACH ACID  
  
 triamcinolone acetonide 0.1 % ointment Commonly known as:  KENALOG Apply  to affected area two (2) times a day. use thin layer TRUE METRIX GLUCOSE TEST STRIP strip Generic drug:  glucose blood VI test strips We Performed the Following METABOLIC PANEL, COMPREHENSIVE [48629 CPT(R)] PTH INTACT [36950 CPT(R)] SED RATE (ESR) [id number] CPT(R)] VITAMIN D, 25 HYDROXY [id number] CPT(R)] Follow-up Instructions Return in about 3 months (around 12/26/2018). Patient Instructions Start taking Vitamin D3 2000 units each morning with breakfast 
 
Start taking calcium carbonate (generic tums) 500mg twice daily with your meals Plan for a RECLAST infusion to help protect your bones, you will receive a call to schedule this Your thyroid ultrasound suggests table nodules, there is no urgency with this at the present time I will discuss your results with you and let you know if surgery is warranted, See you back in 3 months,  
 
 
Santhosh AVENDANO. [address] [facility] [address] [place] [address] & HEALTH SERVICES! Katy Willis introduces [employer] patient portal. Now you can access parts of your medical record, email your doctor's office, and request medication refills online. 1. In your internet browser, go to [email or web address] 2. Click on the First Time User? Click Here link in the Sign In box. You will see the New Member Sign Up page. 3. Enter your [employer] Access Code exactly as it appears below. You will not need to use this code after youve completed the sign-up process. If you do not sign up before the expiration date, you must request a new code. · [employer] Access Code: [email or web address] Expires: 12/17/2018  3:51 PM 
 
4. Enter the last four digits of your Social Security Number (xxxx) and Date of Birth (mm/dd/yyyy) as indicated and click Submit. You will be taken to the next sign-up page. 5. Create a [employer] ID. This will be your [employer] login ID and cannot be changed, so think of one that is secure and easy to remember. 6. Create a [employer] password. You can change your password at any time. 7. Enter your Password Reset Question and Answer. This can be used at a later time if you forget your password. 8. Enter your e-mail address. You will receive e-mail notification when new information is available in [address] 9. Click Sign Up. You can now view and download portions of your medical record. 10. Click the Download Summary menu link to download a portable copy of your medical information. If you have questions, please visit the Frequently Asked Questions section of the [employer] website. Remember, [employer] is NOT to be used for urgent needs. For medical emergencies, dial 911. Now available from your iPhone and Android! Please provide this summary of care documentation to your next provider. Your primary care clinician is listed as Zita Marrufo. If you have any questions after today's visit, please call [phone].

## 2018-09-27 ENCOUNTER — TELEPHONE (OUTPATIENT)
Dept: ENDOCRINOLOGY | Age: 83
End: 2018-09-27

## 2018-09-27 NOTE — TELEPHONE ENCOUNTER
----- Message from Sanjeev Dobbins MD sent at 8/20/2018  9:36 AM EDT -----  Patients serum calcium is back normal.   Her 24 hour urine however was not processed or sent to a different lab ? Margarita Pollock could we find out what happened to that urine collection. Seems like this happened before. Thank you ! Kendy Co.  [address] [facility]  [address] [place] [address]

## 2018-09-29 LAB
25(OH)D3+25(OH)D2 SERPL-MCNC: 49.5 NG/ML (ref 30–100)
ALBUMIN SERPL-MCNC: 4.1 G/DL (ref 3.5–4.7)
ALBUMIN/GLOB SERPL: 1.4 {RATIO} (ref 1.2–2.2)
ALP SERPL-CCNC: 66 IU/L (ref 39–117)
ALT SERPL-CCNC: 8 IU/L (ref 0–32)
AST SERPL-CCNC: 19 IU/L (ref 0–40)
BILIRUB SERPL-MCNC: 0.5 MG/DL (ref 0–1.2)
BUN SERPL-MCNC: 26 MG/DL (ref 8–27)
BUN/CREAT SERPL: 16 (ref 12–28)
CALCIUM SERPL-MCNC: 10.9 MG/DL (ref 8.7–10.3)
CHLORIDE SERPL-SCNC: 103 MMOL/L (ref 96–106)
CO2 SERPL-SCNC: 24 MMOL/L (ref 20–29)
CREAT SERPL-MCNC: 1.63 MG/DL (ref 0.57–1)
ERYTHROCYTE [SEDIMENTATION RATE] IN BLOOD BY WESTERGREN METHOD: 26 MM/HR (ref 0–40)
GLOBULIN SER CALC-MCNC: 3 G/DL (ref 1.5–4.5)
GLUCOSE SERPL-MCNC: 156 MG/DL (ref 65–99)
INTERPRETATION: NORMAL
POTASSIUM SERPL-SCNC: 3.6 MMOL/L (ref 3.5–5.2)
PROT SERPL-MCNC: 7.1 G/DL (ref 6–8.5)
PTH-INTACT SERPL-MCNC: 74 PG/ML (ref 15–65)
SODIUM SERPL-SCNC: 144 MMOL/L (ref 134–144)

## 2018-10-01 RX ORDER — FUROSEMIDE 20 MG/1
TABLET ORAL
Qty: 30 TAB | Refills: 2 | Status: SHIPPED | OUTPATIENT
Start: 2018-10-01 | End: 2018-12-03 | Stop reason: SDUPTHER

## 2018-10-01 RX ORDER — LOVASTATIN 40 MG/1
TABLET ORAL
Qty: 30 TAB | Refills: 3 | Status: SHIPPED | OUTPATIENT
Start: 2018-10-01 | End: 2019-03-26 | Stop reason: SDUPTHER

## 2018-10-01 NOTE — PROGRESS NOTES
Rogers Cox, could you let patient know the following: Patients imaging studies did not reveal a suspicious parathyroid gland, blood calcium is stable and her parathyroid hormone level has come down. We will hold off on surgical evaluation, as we re-evaluate her after having treated with Reclast, and with calcium and vitamin D as discussed on recent visit. Thanks.      (I am forwarding to Dr. Glo Claude as just an [place])

## 2018-10-02 ENCOUNTER — TELEPHONE (OUTPATIENT)
Dept: ENDOCRINOLOGY | Age: 83
End: 2018-10-02

## 2018-10-02 NOTE — TELEPHONE ENCOUNTER
----- Message from Tori Cheung MD sent at 10/1/2018  1:42 PM EDT -----  Mejia Grewal, could you let patient know the following: Patients imaging studies did not reveal a suspicious parathyroid gland, blood calcium is stable and her parathyroid hormone level has come down. We will hold off on surgical evaluation, as we re-evaluate her after having treated with Reclast, and with calcium and vitamin D as discussed on recent visit. Thanks.      (I am forwarding to Dr. Ashley Vanegas as just an [place])

## 2018-10-02 NOTE — TELEPHONE ENCOUNTER
I called Ms. Denis Campbell and spoke with her son. I relayed the message from Dr. Beryl Smith. He understood the information and will pass this on to MsChandler Denis Campbell.    Edel Pillai

## 2018-10-03 ENCOUNTER — TELEPHONE (OUTPATIENT)
Dept: ENDOCRINOLOGY | Age: 83
End: 2018-10-03

## 2018-10-03 NOTE — TELEPHONE ENCOUNTER
----- Message from Gato Marcano sent at 10/3/2018  1:11 PM EDT -----  Regarding: Dr Darron Petit  Pt's daughter Zac Lewis (p) [phone], said during her mother last visit Dr Deepak Wray wanted to start her mother back on Calcium.     She would like to confirm does he want her on Tums plus a calcium supplement  or just plain Tums alone,  And how much in dosage , So she know what to purchase

## 2018-10-04 NOTE — TELEPHONE ENCOUNTER
I returned this call. I went over what Dr. Reyes Limon had said at the last visit. She understood this information.   Louis Leach

## 2018-10-15 ENCOUNTER — TELEPHONE (OUTPATIENT)
Dept: ENDOCRINOLOGY | Age: 83
End: 2018-10-15

## 2018-10-15 NOTE — TELEPHONE ENCOUNTER
I returned Ms. Cornell's call. She said that her mom had a infusion a little over a week ago and is feeling better on the right side of her body however the left side is no better. Because the dose had to be cut in half due to her high BP, Ms. Lina Astudillo is wondering if if Ms. Tim Matthews should do the rest of the treatment or wait and see if the left side gets better. I told her I would pass this on to Dr. Vivien Rodríguez and get back to her.   Aura Daily

## 2018-10-15 NOTE — TELEPHONE ENCOUNTER
I called Ms. Aneta and relayed the message from Dr. Alisia Martin. She understood the information.   Nav Alfaro

## 2018-10-15 NOTE — TELEPHONE ENCOUNTER
Reclast was adjusted for her kidney function and not because of blood pressure. The medicine she received is plenty for her for an entire year. She should not receive any more due to her kidney function . Thanks,    Matthew Terrell.  [address] [facility] Endocrin[facility]  [address] [place] [address]

## 2018-10-15 NOTE — TELEPHONE ENCOUNTER
----- Message from [facility] sent at 10/12/2018 11:19 AM EDT -----  Regarding: Dr. Ty Christie telephone  Matt Bryant, daughter, is requesting a callback because after the infusion the pt has normal feeling on one side of her body and not the other. She is wondering if that is normal or does the other half of the treatment need to be completed.     Best contact number is [phone]

## 2018-10-22 RX ORDER — IRBESARTAN 300 MG/1
300 TABLET ORAL
Qty: 90 TAB | Refills: 0 | Status: SHIPPED | OUTPATIENT
Start: 2018-10-22 | End: 2018-12-03 | Stop reason: SDUPTHER

## 2018-12-14 ENCOUNTER — OFFICE VISIT (OUTPATIENT)
Dept: ENDOCRINOLOGY | Age: 83
End: 2018-12-14

## 2018-12-14 VITALS
SYSTOLIC BLOOD PRESSURE: 139 MMHG | DIASTOLIC BLOOD PRESSURE: 71 MMHG | BODY MASS INDEX: 31.91 KG/M2 | WEIGHT: 169 LBS | HEIGHT: 61 IN | HEART RATE: 61 BPM

## 2018-12-14 DIAGNOSIS — E21.0 PRIMARY HYPERPARATHYROIDISM (HCC): Primary | ICD-10-CM

## 2018-12-14 DIAGNOSIS — E83.52 HYPERCALCEMIA: ICD-10-CM

## 2018-12-14 DIAGNOSIS — M85.852 OSTEOPENIA OF NECK OF LEFT FEMUR: ICD-10-CM

## 2018-12-14 DIAGNOSIS — E04.2 MULTINODULAR GOITER: ICD-10-CM

## 2018-12-14 DIAGNOSIS — E55.9 VITAMIN D DEFICIENCY: ICD-10-CM

## 2018-12-14 RX ORDER — LISINOPRIL AND HYDROCHLOROTHIAZIDE 12.5; 2 MG/1; MG/1
TABLET ORAL
COMMUNITY
Start: 2018-12-01 | End: 2019-01-09

## 2018-12-14 NOTE — PROGRESS NOTES
CHIEF COMPLAINT: Hyperparathyroidism, MNG    HISTORY OF PRESENT ILLNESS:   Dimitry Lo is a 80 y.o. female with a PMHx as noted below who presents for f/u eval of hypercalcemia and MNG    Elevated calcium as far back as January 2014, as high as 11.5, recently 10.9  Vitamin D status: stable levels, restarted on 2000 units D3 daily  Calcium supplementation: restarted her prev on 500mg BID with meals  Parathyroid imaging:    - 7/25/18: NM Parathyroid Scan: Normal Study   - 7/25/18: US: MNG noted.  No parathyroid adenoma described, images not available ([facility])    Rt upper 1.3 x 0.8 x 1.2 cm nodule,     Rt lateral 1.1 x 1 x 0.9 cm nodule,    Rt lower 1.5 x 1 cm nodule,    Lt  Inferior 1.8 x 1.6 x 1.8 cm nodule    Lt  Superior 1.2 x 0.9 x 0.8 cm nodule    Regarding bone health, patient has a history of advanced osteopenia  Bone Density Scan 2016: T= - 2.4 L femoral neck,  Repeat DXA 8/19/18: T= -1.9 R FN, -1.8 L femoral neck  Antiresorptive Therapy: 10/5/18: Received 3mg Reclast: Dose #1 (adjusted for GFR)    Review of most recent bone-related metabolic lab panel:  Lab Results   Component Value Date    PTHILT 74 (H) 09/28/2018    PTHILT CANCELED 09/18/2018    PTHILT 106 (H) 07/12/2018    CA 10.9 (H) 09/28/2018    CA 10.9 (H) 09/18/2018    CA 11.0 (H) 09/07/2018    VITD3 49.5 09/28/2018    VITD3 57.9 07/17/2018    TSH 0.801 07/03/2018    PHOS 2.4 (L) 07/17/2018     [id number]: PTHrP level  [id number]: Serum NTX level  PTHILT: Intact PTH level      PAST MEDICAL/SURGICAL HISTORY:   Past Medical History:   Diagnosis Date    Anemia of chronic disease 6/19/2018    Anxiety     Aortic stenosis, moderate     Back pain     CAD (coronary artery disease)     CKD (chronic kidney disease) stage 3, GFR 30-59 ml/min ([facility])     Constipation     Diabetes ([facility]     Diverticulosis 2007    [facility]     AODM    Fibroid 1972    GERD (gastroesophageal reflux disease)     Goiter, non-toxic     Hernia, hiatal  Hyperlipemia     Hypertension     Hypokalemia     Menopause     Osteoarthritis 2006    bursitis R shoulder    Osteoarthritis of right knee     PVC (premature ventricular contraction)     H/O PVC's    Raynaud disease     Venous stasis     Wears hearing aid     both ears     Past Surgical History:   Procedure Laterality Date    HX BREAST BIOPSY Bilateral     x5    HX CATARACT REMOVAL  2004    bilateral    HX COLONOSCOPY  2002, 08/2007    HX DILATION AND CURETTAGE  1960's    HX HYSTERECTOMY  70's    BSO    HX OTHER SURGICAL  2001    sigmoidoscopy       ALLERGIES:   Allergies   Allergen Reactions    Metformin Other (comments)     Kidneys effected       MEDICATIONS ON ADMISSION:     Current Outpatient Medications:     lisinopril-hydroCHLOROthiazide (PRINZIDE, ZESTORETIC) 20-12.5 mg per tablet, , Disp: , Rfl:     calcium phosphate trib/vit D3 (YOGURT+CALCIUM GUMMIES PO), Take  by mouth., Disp: , Rfl:     furosemide (LASIX) 20 mg tablet, TAKE 1 TABLET BY MOUTH ONCE DAILY IN THE MORNING, Disp: 30 Tab, Rfl: 0    irbesartan (AVAPRO) 300 mg tablet, Take 1 Tab by mouth nightly., Disp: 30 Tab, Rfl: 0    lovastatin (MEVACOR) 40 mg tablet, TAKE 1 TAB BY MOUTH NIGHTLY FOR CHOLESTEROL LOWERING, Disp: 30 Tab, Rfl: 3    diclofenac (VOLTAREN) 1 % gel, Apply  to affected area four (4) times daily. , Disp: 100 g, Rfl: 3    hydrALAZINE (APRESOLINE) 25 mg tablet, Take 1 Tab by mouth three (3) times daily. , Disp: 90 Tab, Rfl: 5    omeprazole (PRILOSEC) 40 mg capsule, TAKE ONE CAPSULE BY MOUTH EVERY MORNING TO CONTROL STOMACH ACID, Disp: 90 Cap, Rfl: 1    fluticasone (FLONASE) 50 mcg/actuation nasal spray, SPRAY 1 PUFF IN EACH NOSTRIL ONCE DAILY, Disp: 16 g, Rfl: 3    aspirin delayed-release 81 mg tablet, Take 1 Tab by mouth daily. , Disp: 30 Tab, Rfl: 0    triamcinolone acetonide (KENALOG) 0.1 % ointment, Apply  to affected area two (2) times a day.  use thin layer, Disp: , Rfl:     TRUE METRIX GLUCOSE TEST STRIP strip, , Disp: , Rfl:     acetaminophen (TYLENOL EXTRA STRENGTH) 500 mg tablet, Take 1 Tab by mouth every six (6) hours as needed for Pain., Disp: 30 Tab, Rfl: 1    SOCIAL HISTORY:   Social History     Socioeconomic History    Marital status:      Spouse name: Not on file    Number of children: Not on file    Years of education: Not on file    Highest education level: Not on file   Social Needs    Financial resource strain: Not on file    Food insecurity - worry: Not on file    Food insecurity - inability: Not on file   [employer] needs - medical: Not on file   [employer] needs - non-medical: Not on file   Occupational History    Not on file   Tobacco Use    Smoking status: Never Smoker    Smokeless tobacco: Never Used   Substance and Sexual Activity    Alcohol use: No     Alcohol/week: 0.0 oz    Drug use: No    Sexual activity: No   Other Topics Concern    Not on file   Social History Narrative    Happily . No concern for abuse. Exercise: none    Diet: Careful. Wear Seatbelts               FAMILY HISTORY:  Family History   Problem Relation Age of Onset    Diabetes Brother     Hypertension Mother     Diabetes Brother        REVIEW OF SYSTEMS: Complete ROS assessed and noted for that which is described above, all else are negative.   Eyes: normal  ENT: normal  CVS: normal  Resp: normal  GI: normal  : normal  GYN: normal  Endocrine: normal  Integument: normal  Musculoskeletal: normal  Neuro: normal  Psych: normal      PHYSICAL EXAMINATION:    VITAL SIGNS:  Visit Vitals  /71 (BP 1 Location: Left arm, BP Patient Position: Sitting)   Pulse 61   Ht 5' 1\" (1.549 m)   Wt 169 lb (76.7 kg)   BMI 31.93 kg/m²       GENERAL: NCAT, Sitting comfortably in wheel chair, NAD  EYES: EOMI, non-icteric, no proptosis  Ear/Nose/Throat: NCAT, no inflammation, no masses  LYMPH NODES: No LAD  CARDIOVASCULAR: S1 S2, RRR, No murmur, 2+ radial pulses  RESPIRATORY: CTA b/l, no wheeze/rales  GASTROINTESTINAL: NT, ND,  MUSCULOSKELETAL: Normal ROM, no atrophy  SKIN: warm, no edema/rash/ or other skin changes  NEUROLOGIC: 5/5 power all extremities, no tremors, AAOx3  PSYCHIATRIC: Normal affect, Normal insight and judgement      REVIEW OF LABORATORY AND RADIOLOGY DATA:   Labs and documentation have been reviewed as described above. ASSESSMENT AND PLAN:   Mayur Martinez is a 80 y.o. female with a PMHx as noted above who was referred to our endocrinology clinic for evaluation of hypercalcemia. Hypercalcemia  History of vitamin D deficiency  Primary Hyperparathyroidism  Multinodular Goiter  Osteopenia    Hyperparathyroidism / Hypercalcemia:  Vitamin D supplementation: 2000 units of D3 once daily  Calcium supplementation: 500mg calcium carbonate twice daily  Other Med: Received Reclast as noted  Imaging: US/ NM scan w/o evidence of parathyroid adenoma,       Osteopenia on DXA, reviewed, stable  Labs: BMP today,   CMP/PTH/D/TSH prelab before next visit ordered,    Osteopenia:   RECLAST s/p dose #1 ordered, Next dose Oct 2019   Calcium and D as above  DXA: Next imaging Aug 2020    MNG: I have reviewed the images of her thyroid US personally. Her thyroid nodules mostly appear as pseudonodules, meaning that there is chronic heterogenous appearance giving the resemblance of thyroid nodules whereas most of them are not actually true nodules. The nodules present did not appear suspicious. Ultrasound can be repeated in 2020 years as long as stable clinically. Hx of diabetes: managed by pcp, they had question about her sugars, reviewed her fasting sugars at [address], and advised this is very reasonable range for her age at 80. She is not on medication now for her diabetes per her daughter, and I would think there is no need to start any new meds for her sugars. She has shoulder arthritis and I cautioned about steroid injections on her sugars, but does not plan for any.  I cautioned about hypoglycemia in patients of her age range and the consequences. RTC: 3 months for re-evaluation, to call with any questions or concerns    Joen Homans.  [address] [facility]

## 2018-12-14 NOTE — PATIENT INSTRUCTIONS
Continue calcium 500mg twice daily,   Continue Vitamin D 2000 units per day,  Vitamin D ok to be combined with calcium,   Plan for next reclast infusion In October 2019, will order it then,     See you back in 4 months,   Complete blood work few days prior,

## 2018-12-15 LAB
BUN SERPL-MCNC: 55 MG/DL (ref 8–27)
BUN/CREAT SERPL: 28 (ref 12–28)
CALCIUM SERPL-MCNC: 11 MG/DL (ref 8.7–10.3)
CHLORIDE SERPL-SCNC: 105 MMOL/L (ref 96–106)
CO2 SERPL-SCNC: 23 MMOL/L (ref 20–29)
CREAT SERPL-MCNC: 1.94 MG/DL (ref 0.57–1)
GLUCOSE SERPL-MCNC: 175 MG/DL (ref 65–99)
INTERPRETATION: NORMAL
POTASSIUM SERPL-SCNC: 3.7 MMOL/L (ref 3.5–5.2)
SODIUM SERPL-SCNC: 143 MMOL/L (ref 134–144)

## 2018-12-17 ENCOUNTER — TELEPHONE (OUTPATIENT)
Dept: ENDOCRINOLOGY | Age: 83
End: 2018-12-17

## 2018-12-17 NOTE — TELEPHONE ENCOUNTER
I called [facility] about the 24 hr urine calcium result which we still did not receive from aug 2018. This is an important result because her calcium level fluctuates as well as her renal function and we are determining if surgery is warranted for her to avoid complication. At her age of 80, conservative monitoring is ideal if there are no progressive complications, and I would believe knowledge of her 24 hr urine calcium is a critical information since her renal function is in question. Jordin Bass.  [address] Endocrinology  [address] [place] [address]

## 2018-12-20 NOTE — PROGRESS NOTES
Calcium level up to 11, has historically fluctuated between  10.9 and 11 in the past 5 months. Renal function stable compared with 6 months ago. Will need to keep monitoring if we are to keep pursuing conservative measures,    Sakshi Costa.  [facility]  [address] [place] [address]

## 2019-02-04 ENCOUNTER — OFFICE VISIT (OUTPATIENT)
Dept: CARDIOLOGY CLINIC | Age: 84
End: 2019-02-04

## 2019-02-04 VITALS
SYSTOLIC BLOOD PRESSURE: 184 MMHG | HEART RATE: 49 BPM | WEIGHT: 173 LBS | BODY MASS INDEX: 32.66 KG/M2 | RESPIRATION RATE: 12 BRPM | DIASTOLIC BLOOD PRESSURE: 90 MMHG | OXYGEN SATURATION: 98 % | HEIGHT: 61 IN

## 2019-02-04 DIAGNOSIS — E11.9 WELL CONTROLLED TYPE 2 DIABETES MELLITUS (HCC): ICD-10-CM

## 2019-02-04 DIAGNOSIS — E21.3 HYPERPARATHYROIDISM (HCC): ICD-10-CM

## 2019-02-04 DIAGNOSIS — I10 ESSENTIAL HYPERTENSION: Primary | ICD-10-CM

## 2019-02-04 DIAGNOSIS — I35.0 AORTIC STENOSIS, MODERATE: ICD-10-CM

## 2019-02-04 DIAGNOSIS — D63.8 ANEMIA OF CHRONIC DISEASE: ICD-10-CM

## 2019-02-04 DIAGNOSIS — E78.00 PURE HYPERCHOLESTEROLEMIA: ICD-10-CM

## 2019-02-04 RX ORDER — HYDRALAZINE HYDROCHLORIDE 50 MG/1
50 TABLET, FILM COATED ORAL 3 TIMES DAILY
Qty: 90 TAB | Refills: 5 | Status: SHIPPED | OUTPATIENT
Start: 2019-02-04 | End: 2019-03-26 | Stop reason: SDUPTHER

## 2019-02-04 NOTE — PROGRESS NOTES
PATIENT ID VERIFIED WITH TWO PATIENT IDENTIFIERS. PATIENT MEDICATIONS REVIEWED AND APPROVED BY DR. Rena Hall. MEDICATIONS THAT WERE REMOVED FROM THIS VISIT HAVE BEEN APPROVED BY DR. Rena Hall. Chief Complaint   Patient presents with    Aortic Stenosis     6 month follow up    Hypertension       1. Have you been to the ER, urgent care clinic since your last visit? Hospitalized since your last visit? No    2. Have you seen or consulted any other health care providers outside of the [address] [place] [address] since your last visit? Include any pap smears or colon screening.  Yes endocrinology Dr. Victorino Ardon for thyroid Dec 2018

## 2019-02-04 NOTE — PROGRESS NOTES
Marla Murphy is a 80 y.o. female is here for routine f/u. Hx DM II, hypertension/hypertensive CVD, dyslipidemia, anemia of chronic disease. GERD, DJD, Raynaud's, followed by Dr. Chaz Doyle,  Also with hx multinodular goiter/parathryoid issues. Recent issues with bradycardia, HR high 40's. EKG with sinus tiffanie 48, LVH, PRWP/old AMI, NSST. Had been on Cardizem --reduced to 120 by PCP, now stopped. Holter with sinus tiffanie, HR 42-79, avg 52, occasional PAC's. Echo 6/27/18 with mod LVEF, LVEF 65-70, LAE, mod aortic stenosis (mean 24), mild to mod pulm hypertension. Continues on Irbesartan 300, hydralazine 25mg tid, lasix 20. BP running high. The patient denies chest pain/ shortness of breath, orthopnea, PND, LE edema, palpitations, syncope, presyncope or fatigue.        Patient Active Problem List    Diagnosis Date Noted    Hyperparathyroidism ([address] 08/06/2018    BMI 33.0-33.9,adult 08/06/2018    Bradycardia 08/01/2018    Aortic stenosis, moderate     Pure hypercholesterolemia 06/19/2018    Anemia of chronic disease 06/19/2018    Type 2 diabetes mellitus with nephropathy ([address] 12/19/2017    Well controlled type 2 diabetes mellitus ([address] 06/07/2016    Essential hypertension 06/07/2016    Encounter for long-term (current) use of other medications 08/10/2015      Weldon Romberg, MD  Past Medical History:   Diagnosis Date    Anemia of chronic disease 6/19/2018    Anxiety     Aortic stenosis, moderate     Back pain     CAD (coronary artery disease)     CKD (chronic kidney disease) stage 3, GFR 30-59 ml/min ([facility])     Constipation     Diabetes ([address]     Diverticulosis 2007    DM (diabetes mellitus) ([address]     AODM    Fibroid 1972    GERD (gastroesophageal reflux disease)     Goiter, non-toxic     Hernia, hiatal     Hyperlipemia     Hypertension     Hypokalemia     Menopause     Osteoarthritis 2006    bursitis R shoulder    Osteoarthritis of right knee     PVC (premature ventricular contraction)     H/O PVC's    Raynaud disease     Venous stasis     Wears hearing aid     both ears      Past Surgical History:   Procedure Laterality Date    HX BREAST BIOPSY Bilateral     x5    HX CATARACT REMOVAL  2004    bilateral    HX COLONOSCOPY  2002, 08/2007    HX DILATION AND CURETTAGE  1960's    HX HYSTERECTOMY  66's    BSO    HX OTHER SURGICAL  2001    sigmoidoscopy     Allergies   Allergen Reactions    Metformin Other (comments)     Kidneys effected      Family History   Problem Relation Age of Onset    Diabetes Brother     Hypertension Mother     Diabetes Brother       Social History     Socioeconomic History    Marital status:      Spouse name: Not on file    Number of children: Not on file    Years of education: Not on file    Highest education level: Not on file   Social Needs    Financial resource strain: Not on file    Food insecurity - worry: Not on file    Food insecurity - inability: Not on file   [employer] needs - medical: Not on file   [employer] needs - non-medical: Not on file   Occupational History    Not on file   Tobacco Use    Smoking status: Never Smoker    Smokeless tobacco: Never Used   Substance and Sexual Activity    Alcohol use: No     Alcohol/week: 0.0 oz    Drug use: No    Sexual activity: No   Other Topics Concern    Not on file   Social History Narrative    Happily . No concern for abuse. Exercise: none    Diet: Careful. Wear Seatbelts              Current Outpatient Medications   Medication Sig    traMADol (ULTRAM) 50 mg tablet Take 1 Tab by mouth every six (6) hours as needed for Pain. Max Daily Amount: 200 mg.    gabapentin (NEURONTIN) 600 mg tablet 1/2 to 1 tab qhs prn pain    irbesartan (AVAPRO) 300 mg tablet Take 1 Tab by mouth nightly.     omeprazole (PRILOSEC) 40 mg capsule TAKE ONE CAPSULE BY MOUTH EVERY MORNING TO CONTROL STOMACH ACID    furosemide (LASIX) 20 mg tablet TAKE 1 TABLET BY MOUTH ONCE DAILY IN THE MORNING    calcium phosphate trib/vit D3 (YOGURT+CALCIUM GUMMIES PO) Take  by mouth.  lovastatin (MEVACOR) 40 mg tablet TAKE 1 TAB BY MOUTH NIGHTLY FOR CHOLESTEROL LOWERING    diclofenac (VOLTAREN) 1 % gel Apply  to affected area four (4) times daily.  triamcinolone acetonide (KENALOG) 0.1 % ointment Apply  to affected area two (2) times a day. use thin layer    hydrALAZINE (APRESOLINE) 25 mg tablet Take 1 Tab by mouth three (3) times daily.  TRUE METRIX GLUCOSE TEST STRIP strip     fluticasone (FLONASE) 50 mcg/actuation nasal spray SPRAY 1 PUFF IN EACH NOSTRIL ONCE DAILY    aspirin delayed-release 81 mg tablet Take 1 Tab by mouth daily.  acetaminophen (TYLENOL EXTRA STRENGTH) 500 mg tablet Take 1 Tab by mouth every six (6) hours as needed for Pain. No current facility-administered medications for this visit. Review of Symptoms:    CONST  No weight change. No fever, chills, sweats    ENT No visual changes, URI sx, sore throat    CV  See HPI   RESP  No cough, or sputum, wheezing. Also see HPI   GI  No abdominal pain or change in bowel habits. No heartburn or dysphagia. No melena or rectal bleeding.   No dysuria, urgency, frequency, hematuria   MSKEL  No joint pain, swelling. No muscle pain. SKIN  No rash or lesions. NEURO  No headache, syncope, or seizure. No weakness, loss of sensation, or paresthesias. PSYCH  No low mood or depression  No anxiety. HE/LYMPH  No easy bruising, abnormal bleeding, or enlarged glands.         Physical ExamPhysical Exam:    Visit Vitals  BP (!) 230/80 (BP 1 Location: Left arm, BP Patient Position: Sitting)   Pulse (!) 49   Resp 12   Ht 5' 1\" (1.549 m)   Wt 173 lb (78.5 kg)   SpO2 98%   BMI 32.69 kg/m²     Gen: NAD  HEENT:  PERRL, throat clear  Neck: no adenopathy, no thyromegaly, no JVD   Heart:  Regular,Nl S1S2,  II/VI murmur, no gallop or rub.   Lungs:  clear  Abdomen:   Soft, non-tender, bowel sounds are active.   Extremities:  No edema  Pulse: symmetric  Neuro: A&O times 3, No focal neuro deficits    Cardiographics    Labs:   Lab Results   Component Value Date/Time    Sodium 138 01/08/2019 05:05 PM    Sodium 143 12/14/2018 12:30 PM    Sodium 144 09/28/2018 02:37 PM    Sodium 142 09/18/2018 03:40 PM    Sodium 143 09/07/2018 04:53 PM    Potassium 3.8 01/08/2019 05:05 PM    Potassium 3.7 12/14/2018 12:30 PM    Potassium 3.6 09/28/2018 02:37 PM    Potassium 3.7 09/18/2018 03:40 PM    Potassium 3.6 09/07/2018 04:53 PM    Chloride 102 01/08/2019 05:05 PM    Chloride 105 12/14/2018 12:30 PM    Chloride 103 09/28/2018 02:37 PM    Chloride 102 09/18/2018 03:40 PM    Chloride 105 09/07/2018 04:53 PM    CO2 23 01/08/2019 05:05 PM    CO2 23 12/14/2018 12:30 PM    CO2 24 09/28/2018 02:37 PM    CO2 24 09/18/2018 03:40 PM    CO2 24 09/07/2018 04:53 PM    Anion gap 9 08/09/2018 12:19 PM    Glucose 140 (H) 01/08/2019 05:05 PM    Glucose 175 (H) 12/14/2018 12:30 PM    Glucose 156 (H) 09/28/2018 02:37 PM    Glucose 164 (H) 09/18/2018 03:40 PM    Glucose 154 (H) 09/07/2018 04:53 PM    BUN 27 01/08/2019 05:05 PM    BUN 55 (H) 12/14/2018 12:30 PM    BUN 26 09/28/2018 02:37 PM    BUN 26 09/18/2018 03:40 PM    BUN 18 09/07/2018 04:53 PM    Creatinine 1.56 (H) 01/08/2019 05:05 PM    Creatinine 1.94 (H) 12/14/2018 12:30 PM    Creatinine 1.63 (H) 09/28/2018 02:37 PM    Creatinine 1.58 (H) 09/18/2018 03:40 PM    Creatinine 1.57 (H) 09/07/2018 04:53 PM    BUN/Creatinine ratio 17 01/08/2019 05:05 PM    BUN/Creatinine ratio 28 12/14/2018 12:30 PM    BUN/Creatinine ratio 16 09/28/2018 02:37 PM    BUN/Creatinine ratio 16 09/18/2018 03:40 PM    BUN/Creatinine ratio 11 (L) 09/07/2018 04:53 PM    GFR est AA 34 (L) 01/08/2019 05:05 PM    GFR est AA 26 (L) 12/14/2018 12:30 PM    GFR est AA 33 (L) 09/28/2018 02:37 PM    GFR est AA 34 (L) 09/18/2018 03:40 PM    GFR est AA 34 (L) 09/07/2018 04:53 PM    GFR est non-AA 30 (L) 01/08/2019 05:05 PM    GFR est non-AA 23 (L) 12/14/2018 12:30 PM    GFR est non-AA 28 (L) 09/28/2018 02:37 PM    GFR est non-AA 29 (L) 09/18/2018 03:40 PM    GFR est non-AA 30 (L) 09/07/2018 04:53 PM    Calcium 10.5 (H) 01/08/2019 05:05 PM    Calcium 11.0 (H) 12/14/2018 12:30 PM    Calcium 10.9 (H) 09/28/2018 02:37 PM    Calcium 10.9 (H) 09/18/2018 03:40 PM    Calcium 11.0 (H) 09/07/2018 04:53 PM    Bilirubin, total 0.3 01/08/2019 05:05 PM    Bilirubin, total 0.5 09/28/2018 02:37 PM    Bilirubin, total 0.4 09/18/2018 03:40 PM    Bilirubin, total 0.5 09/07/2018 04:53 PM    Bilirubin, total 0.4 08/09/2018 12:19 PM    AST (SGOT) 15 01/08/2019 05:05 PM    AST (SGOT) 19 09/28/2018 02:37 PM    AST (SGOT) 22 09/18/2018 03:40 PM    AST (SGOT) 20 09/07/2018 04:53 PM    AST (SGOT) 16 08/09/2018 12:19 PM    Alk. phosphatase 48 01/08/2019 05:05 PM    Alk. phosphatase 66 09/28/2018 02:37 PM    Alk. phosphatase 60 09/18/2018 03:40 PM    Alk. phosphatase 60 09/07/2018 04:53 PM    Alk.  phosphatase 67 08/09/2018 12:19 PM    Protein, total 6.9 01/08/2019 05:05 PM    Protein, total 7.1 09/28/2018 02:37 PM    Protein, total 7.2 09/18/2018 03:40 PM    Protein, total 7.3 09/07/2018 04:53 PM    Protein, total 6.8 08/09/2018 12:19 PM    Albumin 3.8 01/08/2019 05:05 PM    Albumin 4.1 09/28/2018 02:37 PM    Albumin 4.0 09/18/2018 03:40 PM    Albumin 4.1 09/07/2018 04:53 PM    Albumin 3.2 (L) 08/09/2018 12:19 PM    Globulin 3.6 08/09/2018 12:19 PM    A-G Ratio 1.2 01/08/2019 05:05 PM    A-G Ratio 1.4 09/28/2018 02:37 PM    A-G Ratio 1.3 09/18/2018 03:40 PM    A-G Ratio 1.3 09/07/2018 04:53 PM    A-G Ratio 0.9 (L) 08/09/2018 12:19 PM    ALT (SGPT) 8 01/08/2019 05:05 PM    ALT (SGPT) 8 09/28/2018 02:37 PM    ALT (SGPT) 11 09/18/2018 03:40 PM    ALT (SGPT) 9 09/07/2018 04:53 PM    ALT (SGPT) 11 (L) 08/09/2018 12:19 PM     No results found for: CPK, CPKX, CPX  Lab Results   Component Value Date/Time    Cholesterol, total 126 06/06/2017 01:47 PM    Cholesterol, total 137 09/07/2016 03:24 PM    Cholesterol, total 115 06/07/2016 04:34 PM    Cholesterol, total 119 01/04/2016 02:24 PM    Cholesterol, total 112 08/10/2015 08:48 AM    HDL Cholesterol 40 06/06/2017 01:47 PM    HDL Cholesterol 40 09/07/2016 03:24 PM    HDL Cholesterol 37 (L) 06/07/2016 04:34 PM    HDL Cholesterol 35 (L) 01/04/2016 02:24 PM    HDL Cholesterol 35 (L) 08/10/2015 08:48 AM    LDL, calculated 64 06/06/2017 01:47 PM    LDL, calculated 75 09/07/2016 03:24 PM    LDL, calculated 60 06/07/2016 04:34 PM    LDL, calculated 68 01/04/2016 02:24 PM    LDL, calculated 61 08/10/2015 08:48 AM    Triglyceride 108 06/06/2017 01:47 PM    Triglyceride 109 09/07/2016 03:24 PM    Triglyceride 90 06/07/2016 04:34 PM    Triglyceride 80 01/04/2016 02:24 PM    Triglyceride 78 08/10/2015 08:48 AM     No results found for this or any previous visit. Assessment:         Patient Active Problem List    Diagnosis Date Noted    Hyperparathyroidism ([facility] 08/06/2018    BMI 33.0-33.9,adult 08/06/2018    Bradycardia 08/01/2018    Aortic stenosis, moderate     Pure hypercholesterolemia 06/19/2018    Anemia of chronic disease 06/19/2018    Type 2 diabetes mellitus with nephropathy ([facility] 12/19/2017    Well controlled type 2 diabetes mellitus ([facility] 06/07/2016    Essential hypertension 06/07/2016    Encounter for long-term (current) use of other medications 08/10/2015     Hx DM II, hypertension/hypertensive CVD, dyslipidemia, anemia of chronic disease. GERD, DJD, Raynaud's, followed by Dr. Mark Alamo,  Also with hx multinodular goiter/parathryoid issues. Recent issues with bradycardia, HR high 40's. EKG with sinus tiffanie 48, LVH, PRWP/old AMI, NSST. Had been on Cardizem --reduced to 120 by PCP, now stopped. Holter with sinus tiffanie, HR 42-79, avg 52, occasional PAC's. Echo 6/27/18 with mod LVEF, LVEF 65-70, LAE, mod aortic stenosis (mean 24), mild to mod pulm hypertension. Continues on Irbesartan 300, hydralazine 25mg tid, lasix 20. BP running high. Plan: Will increase hydralazine to 50mg tid (now on 25)  Continue other meds  Avoiding HR slowing meds  F/u with PCP one month--BP etc   Continue current care and f/u in 6 months.     Viktor Stanford MD

## 2019-02-19 ENCOUNTER — TELEPHONE (OUTPATIENT)
Dept: CARDIOLOGY CLINIC | Age: 84
End: 2019-02-19

## 2019-02-19 NOTE — TELEPHONE ENCOUNTER
pts daughter wanting to know if pt could be seen with her  this week. Was seen last week for BP and still having issues with it.   Thanks, [employer]

## 2019-02-19 NOTE — TELEPHONE ENCOUNTER
Spoke w/Amanda (on HIPPA) and advised per Dr Candida Rausch last fc note \"Please see your primary care physician in 3-4 weeks regarding blood pressure. \"    Daughter acknowledged understanding and will f/u with pt regarding jeffery't w/PCP.

## 2019-03-13 RX ORDER — FLUTICASONE PROPIONATE 50 MCG
SPRAY, SUSPENSION (ML) NASAL
Qty: 1 BOTTLE | Refills: 5 | Status: SHIPPED | OUTPATIENT
Start: 2019-03-13 | End: 2021-06-15

## 2019-07-15 PROBLEM — E11.40 TYPE 2 DIABETES MELLITUS WITH DIABETIC NEUROPATHY (HCC): Status: ACTIVE | Noted: 2019-07-15

## 2019-07-15 PROBLEM — D63.8 ANEMIA, CHRONIC DISEASE: Status: ACTIVE | Noted: 2019-07-15

## 2019-07-15 PROBLEM — N18.30 CKD (CHRONIC KIDNEY DISEASE) STAGE 3, GFR 30-59 ML/MIN (HCC): Status: ACTIVE | Noted: 2019-07-15

## 2019-08-21 ENCOUNTER — OFFICE VISIT (OUTPATIENT)
Dept: CARDIOLOGY CLINIC | Age: 84
End: 2019-08-21

## 2019-08-21 VITALS
WEIGHT: 172 LBS | HEART RATE: 55 BPM | BODY MASS INDEX: 32.47 KG/M2 | HEIGHT: 61 IN | RESPIRATION RATE: 14 BRPM | OXYGEN SATURATION: 98 % | SYSTOLIC BLOOD PRESSURE: 136 MMHG | DIASTOLIC BLOOD PRESSURE: 64 MMHG

## 2019-08-21 DIAGNOSIS — I35.0 AORTIC STENOSIS, MODERATE: ICD-10-CM

## 2019-08-21 DIAGNOSIS — R07.2 PRECORDIAL PAIN: ICD-10-CM

## 2019-08-21 DIAGNOSIS — R00.1 BRADYCARDIA: ICD-10-CM

## 2019-08-21 DIAGNOSIS — I10 ESSENTIAL HYPERTENSION: Primary | ICD-10-CM

## 2019-08-21 DIAGNOSIS — E78.00 PURE HYPERCHOLESTEROLEMIA: ICD-10-CM

## 2019-08-21 DIAGNOSIS — E21.3 HYPERPARATHYROIDISM (HCC): ICD-10-CM

## 2019-08-21 DIAGNOSIS — I77.9 CAROTID ARTERY DISEASE, UNSPECIFIED LATERALITY, UNSPECIFIED TYPE (HCC): ICD-10-CM

## 2019-08-21 DIAGNOSIS — D63.8 ANEMIA OF CHRONIC DISEASE: ICD-10-CM

## 2019-08-21 DIAGNOSIS — E11.9 WELL CONTROLLED TYPE 2 DIABETES MELLITUS (HCC): ICD-10-CM

## 2019-08-21 NOTE — PROGRESS NOTES
Verified patient with two patient identifiers. Medications reviewed/approved by Dr. Lona Fischer. A verbal from Dr. Lona Fischer was given to remove any medications that were deleted during the visit. Medication(s) removed:  None    Chief Complaint   Patient presents with    Aortic Stenosis     6 month follow up    Slow Heart Rate    Hypertension     1. Have you been to the ER, urgent care clinic since your last visit? Hospitalized since your last visit?no  2. Have you seen or consulted any other health care providers outside of the [address] [place] [address] since your last visit? Include any pap smears or colon screening.  no

## 2019-08-21 NOTE — PROGRESS NOTES
Tashia Platt is a 80 y.o. female is here for routine f/u. Hx DM II, hypertension/hypertensive CVD, dyslipidemia, anemia of chronic disease. GERD, DJD, Raynaud's, followed by Dr. Alexandra Goddard with hx multinodular goiter/parathryoid issues.  Issues with bradycardia, HR high 40's. EKG with sinus tiffanie 48, LVH, PRWP/old AMI, NSST. Had been on Cardizem --reduced to 120 by PCP, now stopped. Holter with sinus tiffanie, HR 42-79, avg 52, occasional PAC's. Echo 6/27/18 with mod LVEF, LVEF 65-70, LAE, mod aortic stenosis (mean 24), mild to mod pulm hypertension. Hydralazine dose increased last OV here to 50mg tid. Remains on Irbesartan 300, lasix 20, spironolactone 25. Continues to see PCP. Intermittent chest pain to neck/arms, more with exertion--she thinks \"arthritis\". Mild BERMAN, occasional dizziness. The patient denies  orthopnea, PND, LE edema, palpitations, syncope, presyncope or fatigue.        Patient Active Problem List    Diagnosis Date Noted    Type 2 diabetes mellitus with diabetic neuropathy ([address] 07/15/2019    CKD (chronic kidney disease) stage 3, GFR 30-59 ml/min (HCC) 07/15/2019    Anemia, chronic disease 07/15/2019    Hyperparathyroidism ([address] 08/06/2018    BMI 33.0-33.9,adult 08/06/2018    Bradycardia 08/01/2018    Aortic stenosis, moderate     Pure hypercholesterolemia 06/19/2018    Anemia of chronic disease 06/19/2018    Type 2 diabetes mellitus with nephropathy ([address] 12/19/2017    Well controlled type 2 diabetes mellitus ([address] 06/07/2016    Essential hypertension 06/07/2016    Encounter for long-term (current) use of other medications 08/10/2015      Sienna Tong MD  Past Medical History:   Diagnosis Date    Anemia of chronic disease 6/19/2018    Anxiety     Aortic stenosis, moderate     Back pain     CAD (coronary artery disease)     CKD (chronic kidney disease) stage 3, GFR 30-59 ml/min (HCC)     Constipation     Diabetes ([address]     Diverticulosis 2007  DM (diabetes mellitus) ([facility]     AODM    Fibroid 1972    GERD (gastroesophageal reflux disease)     Goiter, non-toxic     Hernia, hiatal     Hyperlipemia     Hypertension     Hypokalemia     Menopause     Osteoarthritis 2006    bursitis R shoulder    Osteoarthritis of right knee     PVC (premature ventricular contraction)     H/O PVC's    Raynaud disease     Venous stasis     Wears hearing aid     both ears      Past Surgical History:   Procedure Laterality Date    HX BREAST BIOPSY Bilateral     x5    HX CATARACT REMOVAL  2004    bilateral    HX COLONOSCOPY  2002, 08/2007    HX DILATION AND CURETTAGE  1960's    HX HYSTERECTOMY  66's    BSO    HX OTHER SURGICAL  2001    sigmoidoscopy     Allergies   Allergen Reactions    Metformin Other (comments)     Kidneys effected      Family History   Problem Relation Age of Onset    Diabetes Brother     Hypertension Mother     Diabetes Brother       Social History     Socioeconomic History    Marital status:      Spouse name: Not on file    Number of children: Not on file    Years of education: Not on file    Highest education level: Not on file   Occupational History    Not on file   Social Needs    Financial resource strain: Not on file    Food insecurity:     Worry: Not on file     Inability: Not on file    Transportation needs:     Medical: Not on file     Non-medical: Not on file   Tobacco Use    Smoking status: Never Smoker    Smokeless tobacco: Never Used   Substance and Sexual Activity    Alcohol use: No     Alcohol/week: 0.0 standard drinks    Drug use: No    Sexual activity: Never   Lifestyle    Physical activity:     Days per week: Not on file     Minutes per session: Not on file    Stress: Not on file   Relationships    Social connections:     Talks on phone: Not on file     Gets together: Not on file     Attends Anabaptist service: Not on file     Active member of club or organization: Not on file     Attends meetings of clubs or organizations: Not on file     Relationship status: Not on file    Intimate partner violence:     Fear of current or ex partner: Not on file     Emotionally abused: Not on file     Physically abused: Not on file     Forced sexual activity: Not on file   Other Topics Concern    Not on file   Social History Narrative    Happily . No concern for abuse. Exercise: none    Diet: Careful. Wear Seatbelts              Current Outpatient Medications   Medication Sig    gabapentin (NEURONTIN) 100 mg capsule TAKE 1 CAP BY MOUTH THREE (3) TIMES DAILY.  omeprazole (PRILOSEC) 40 mg capsule TAKE ONE CAPSULE BY MOUTH EVERY MORNING TO CONTROL STOMACH ACID    varicella-zoster recombinant, PF, (SHINGRIX, PF,) 50 mcg/0.5 mL susr injection 0.5mL by IntraMUSCular route once now and then repeat in 2-6 months    potassium chloride (KLOR-CON) 10 mEq tablet Take 1 Tab by mouth daily.  spironolactone (ALDACTONE) 25 mg tablet Take 1 Tab by mouth daily.  lovastatin (MEVACOR) 40 mg tablet TAKE 1 TAB BY MOUTH NIGHTLY FOR CHOLESTEROL LOWERING    furosemide (LASIX) 20 mg tablet TAKE 1 TABLET BY MOUTH ONCE DAILY IN THE MORNING    hydrALAZINE (APRESOLINE) 50 mg tablet Take 1 Tab by mouth three (3) times daily.  irbesartan (AVAPRO) 300 mg tablet Take 1 Tab by mouth nightly.  fluticasone propionate (FLONASE) 50 mcg/actuation nasal spray SPRAY 1 PUFF IN EACH NOSTRIL ONCE DAILY    calcium phosphate trib/vit D3 (YOGURT+CALCIUM GUMMIES PO) Take  by mouth.  TRUE METRIX GLUCOSE TEST STRIP strip     aspirin delayed-release 81 mg tablet Take 1 Tab by mouth daily.  acetaminophen (TYLENOL EXTRA STRENGTH) 500 mg tablet Take 1 Tab by mouth every six (6) hours as needed for Pain. No current facility-administered medications for this visit. Review of Symptoms:    CONST  No weight change.  No fever, chills, sweats    ENT No visual changes, URI sx, sore throat    CV  See HPI   RESP  No cough, or sputum, wheezing. Also see HPI   GI  No abdominal pain or change in bowel habits. No heartburn or dysphagia. No melena or rectal bleeding.   No dysuria, urgency, frequency, hematuria   MSKEL  No joint pain, swelling. No muscle pain. SKIN  No rash or lesions. NEURO  No headache, syncope, or seizure. No weakness, loss of sensation, or paresthesias. PSYCH  No low mood or depression  No anxiety. HE/LYMPH  No easy bruising, abnormal bleeding, or enlarged glands. Physical ExamPhysical Exam:    Visit Vitals  /64 (BP 1 Location: Left arm, BP Patient Position: Sitting)   Pulse (!) 55   Resp 14   Ht 5' 1\" (1.549 m)   Wt 172 lb (78 kg)   SpO2 98% Comment: ra   BMI 32.50 kg/m²     Gen: NAD  HEENT:  PERRL, throat clear  Neck: no adenopathy, no thyromegaly, no JVD ? faint bruit on R  Heart:  Regular,Nl S1S2, III/VI murmur, no gallop or rub.   Lungs:  clear  Abdomen:   Soft, non-tender, bowel sounds are active.   Extremities:  No edema  Pulse: symmetric  Neuro: A&O times 3, No focal neuro deficits    Cardiographics    ECG: sinus tiffanie 52, LVH, NSST, no acute changes      Labs:   Lab Results   Component Value Date/Time    Sodium 140 07/15/2019 01:53 PM    Sodium 145 (H) 02/26/2019 03:04 PM    Sodium 138 01/08/2019 05:05 PM    Sodium 143 12/14/2018 12:30 PM    Sodium 144 09/28/2018 02:37 PM    Potassium 4.7 07/15/2019 01:53 PM    Potassium 3.3 (L) 02/26/2019 03:04 PM    Potassium 3.8 01/08/2019 05:05 PM    Potassium 3.7 12/14/2018 12:30 PM    Potassium 3.6 09/28/2018 02:37 PM    Chloride 106 07/15/2019 01:53 PM    Chloride 105 02/26/2019 03:04 PM    Chloride 102 01/08/2019 05:05 PM    Chloride 105 12/14/2018 12:30 PM    Chloride 103 09/28/2018 02:37 PM    CO2 21 07/15/2019 01:53 PM    CO2 24 02/26/2019 03:04 PM    CO2 23 01/08/2019 05:05 PM    CO2 23 12/14/2018 12:30 PM    CO2 24 09/28/2018 02:37 PM    Anion gap 9 08/09/2018 12:19 PM    Glucose 111 (H) 07/15/2019 01:53 PM    Glucose 125 (H) 02/26/2019 03:04 PM    Glucose 140 (H) 01/08/2019 05:05 PM    Glucose 175 (H) 12/14/2018 12:30 PM    Glucose 156 (H) 09/28/2018 02:37 PM    BUN 54 (H) 07/15/2019 01:53 PM    BUN 17 02/26/2019 03:04 PM    BUN 27 01/08/2019 05:05 PM    BUN 55 (H) 12/14/2018 12:30 PM    BUN 26 09/28/2018 02:37 PM    Creatinine 2.06 (H) 07/15/2019 01:53 PM    Creatinine 1.48 (H) 02/26/2019 03:04 PM    Creatinine 1.56 (H) 01/08/2019 05:05 PM    Creatinine 1.94 (H) 12/14/2018 12:30 PM    Creatinine 1.63 (H) 09/28/2018 02:37 PM    BUN/Creatinine ratio 26 07/15/2019 01:53 PM    BUN/Creatinine ratio 11 (L) 02/26/2019 03:04 PM    BUN/Creatinine ratio 17 01/08/2019 05:05 PM    BUN/Creatinine ratio 28 12/14/2018 12:30 PM    BUN/Creatinine ratio 16 09/28/2018 02:37 PM    GFR est AA 24 (L) 07/15/2019 01:53 PM    GFR est AA 36 (L) 02/26/2019 03:04 PM    GFR est AA 34 (L) 01/08/2019 05:05 PM    GFR est AA 26 (L) 12/14/2018 12:30 PM    GFR est AA 33 (L) 09/28/2018 02:37 PM    GFR est non-AA 21 (L) 07/15/2019 01:53 PM    GFR est non-AA 32 (L) 02/26/2019 03:04 PM    GFR est non-AA 30 (L) 01/08/2019 05:05 PM    GFR est non-AA 23 (L) 12/14/2018 12:30 PM    GFR est non-AA 28 (L) 09/28/2018 02:37 PM    Calcium 10.6 (H) 07/15/2019 01:53 PM    Calcium 10.3 02/26/2019 03:04 PM    Calcium 10.5 (H) 01/08/2019 05:05 PM    Calcium 11.0 (H) 12/14/2018 12:30 PM    Calcium 10.9 (H) 09/28/2018 02:37 PM    Bilirubin, total 0.2 07/15/2019 01:53 PM    Bilirubin, total 0.3 02/26/2019 03:04 PM    Bilirubin, total 0.3 01/08/2019 05:05 PM    Bilirubin, total 0.5 09/28/2018 02:37 PM    Bilirubin, total 0.4 09/18/2018 03:40 PM    AST (SGOT) 17 07/15/2019 01:53 PM    AST (SGOT) 15 02/26/2019 03:04 PM    AST (SGOT) 15 01/08/2019 05:05 PM    AST (SGOT) 19 09/28/2018 02:37 PM    AST (SGOT) 22 09/18/2018 03:40 PM    Alk. phosphatase 48 07/15/2019 01:53 PM    Alk. phosphatase 51 02/26/2019 03:04 PM    Alk. phosphatase 48 01/08/2019 05:05 PM    Alk.  phosphatase 66 09/28/2018 02:37 PM    Alk. phosphatase 60 09/18/2018 03:40 PM    Protein, total 7.1 07/15/2019 01:53 PM    Protein, total 7.0 02/26/2019 03:04 PM    Protein, total 6.9 01/08/2019 05:05 PM    Protein, total 7.1 09/28/2018 02:37 PM    Protein, total 7.2 09/18/2018 03:40 PM    Albumin 4.2 07/15/2019 01:53 PM    Albumin 3.8 02/26/2019 03:04 PM    Albumin 3.8 01/08/2019 05:05 PM    Albumin 4.1 09/28/2018 02:37 PM    Albumin 4.0 09/18/2018 03:40 PM    Globulin 3.6 08/09/2018 12:19 PM    A-G Ratio 1.4 07/15/2019 01:53 PM    A-G Ratio 1.2 02/26/2019 03:04 PM    A-G Ratio 1.2 01/08/2019 05:05 PM    A-G Ratio 1.4 09/28/2018 02:37 PM    A-G Ratio 1.3 09/18/2018 03:40 PM    ALT (SGPT) 9 07/15/2019 01:53 PM    ALT (SGPT) 7 02/26/2019 03:04 PM    ALT (SGPT) 8 01/08/2019 05:05 PM    ALT (SGPT) 8 09/28/2018 02:37 PM    ALT (SGPT) 11 09/18/2018 03:40 PM     No results found for: CPK, CPKX, CPX  Lab Results   Component Value Date/Time    Cholesterol, total 113 02/26/2019 03:04 PM    Cholesterol, total 126 06/06/2017 01:47 PM    Cholesterol, total 137 09/07/2016 03:24 PM    Cholesterol, total 115 06/07/2016 04:34 PM    Cholesterol, total 119 01/04/2016 02:24 PM    HDL Cholesterol 38 (L) 02/26/2019 03:04 PM    HDL Cholesterol 40 06/06/2017 01:47 PM    HDL Cholesterol 40 09/07/2016 03:24 PM    HDL Cholesterol 37 (L) 06/07/2016 04:34 PM    HDL Cholesterol 35 (L) 01/04/2016 02:24 PM    LDL, calculated 56 02/26/2019 03:04 PM    LDL, calculated 64 06/06/2017 01:47 PM    LDL, calculated 75 09/07/2016 03:24 PM    LDL, calculated 60 06/07/2016 04:34 PM    LDL, calculated 68 01/04/2016 02:24 PM    Triglyceride 94 02/26/2019 03:04 PM    Triglyceride 108 06/06/2017 01:47 PM    Triglyceride 109 09/07/2016 03:24 PM    Triglyceride 90 06/07/2016 04:34 PM    Triglyceride 80 01/04/2016 02:24 PM     No results found for this or any previous visit.     Assessment:         Patient Active Problem List    Diagnosis Date Noted    Type 2 diabetes mellitus with diabetic neuropathy ([facility] 07/15/2019    CKD (chronic kidney disease) stage 3, GFR 30-59 ml/min ([facility]) 07/15/2019    Anemia, chronic disease 07/15/2019    Hyperparathyroidism ([facility] 08/06/2018    BMI 33.0-33.9,adult 08/06/2018    Bradycardia 08/01/2018    Aortic stenosis, moderate     Pure hypercholesterolemia 06/19/2018    Anemia of chronic disease 06/19/2018    Type 2 diabetes mellitus with nephropathy ([facility] 12/19/2017    Well controlled type 2 diabetes mellitus ([facility] 06/07/2016    Essential hypertension 06/07/2016    Encounter for long-term (current) use of other medications 08/10/2015       Hx DM II, hypertension/hypertensive CVD, dyslipidemia, anemia of chronic disease. GERD, DJD, Raynaud's, followed by Dr. Shaka Ochoa with hx multinodular goiter/parathryoid issues.  Issues with bradycardia, HR high 40's. EKG with sinus tiffanie 48, LVH, PRWP/old AMI, NSST. Had been on Cardizem --reduced to 120 by PCP, now stopped. Holter with sinus tiffanie, HR 42-79, avg 52, occasional PAC's. Echo 6/27/18 with mod LVEF, LVEF 65-70, LAE, mod aortic stenosis (mean 24), mild to mod pulm hypertension. Hydralazine dose increased last OV here to 50mg tid. Remains on Irbesartan 300, lasix 20, spironolactone 25. Continues to see PCP. Intermittent chest pain to neck/arms, more with exertion--she thinks \"arthritis\". Mild BERMAN, occasional dizziness.       Plan:     Chest pain--r/o ischemia--Lexiscan MPI (tiffanie, unable to walk on treadmill, DM, abnormal EKG, multiple CV risks)  Echo/doppler--chest pain, dizziness, aortic stenosis  Carotid dopplers--?bruit, ASCVD, dizziness on head turning  Continue current meds  F/u with PCP as planned  RTC 6 mos, sooner prn or if testing warrants    Micheal Stevens MD

## 2019-09-27 ENCOUNTER — TELEPHONE (OUTPATIENT)
Dept: CARDIOLOGY CLINIC | Age: 84
End: 2019-09-27

## 2019-09-27 NOTE — TELEPHONE ENCOUNTER
----- Message from Rochell Gottron, MD sent at 9/27/2019  8:48 AM EDT -----  Regarding: tests  Advise stress nuclear scan normal--no blockages or ischemia. Echo shows normal LV pumping function, mild aortic valve stenosis--no concerns, can follow. Carotid dopplers show mild plaque on right, otherwise fine. RTC 6 mos, f/u with PCP as planned.  Thanks [facility]

## 2019-10-11 ENCOUNTER — OFFICE VISIT (OUTPATIENT)
Dept: ENDOCRINOLOGY | Age: 84
End: 2019-10-11

## 2019-10-11 VITALS
SYSTOLIC BLOOD PRESSURE: 139 MMHG | WEIGHT: 173 LBS | HEIGHT: 61 IN | BODY MASS INDEX: 32.66 KG/M2 | HEART RATE: 54 BPM | DIASTOLIC BLOOD PRESSURE: 56 MMHG

## 2019-10-11 DIAGNOSIS — E55.9 VITAMIN D DEFICIENCY: ICD-10-CM

## 2019-10-11 DIAGNOSIS — M85.852 OSTEOPENIA OF NECK OF LEFT FEMUR: ICD-10-CM

## 2019-10-11 DIAGNOSIS — E83.52 HYPERCALCEMIA: ICD-10-CM

## 2019-10-11 DIAGNOSIS — E04.2 MULTINODULAR GOITER: ICD-10-CM

## 2019-10-11 DIAGNOSIS — E21.0 PRIMARY HYPERPARATHYROIDISM (HCC): Primary | ICD-10-CM

## 2019-10-11 NOTE — PATIENT INSTRUCTIONS
Continue calcium 500mg twice daily,   Continue Vitamin D 2000 units per day,  Vitamin D ok to be combined with calcium,   Plan for next reclast infusion In October 2019, ordered today,   See you back in 6 months, labs ordered for few days prior,

## 2019-10-11 NOTE — PROGRESS NOTES
CHIEF COMPLAINT: Hyperparathyroidism, MNG    HISTORY OF PRESENT ILLNESS:   Joaquín Romero is a 80 y.o. female with a PMHx as noted below who presents for f/u eval of hypercalcemia and MNG    Elevated calcium as far back as January 2014, as high as 11.5, recently 10.9  Vitamin D status: stable levels, restarted on 2000 units D3 daily  Calcium supplementation: restarted her prev on 500mg BID with meals  Parathyroid imaging:    - 7/25/18: NM Parathyroid Scan: Normal Study   - 7/25/18: US: MNG noted. No parathyroid adenoma described, images not available ([facility])    Rt upper 1.3 x 0.8 x 1.2 cm nodule,     Rt lateral 1.1 x 1 x 0.9 cm nodule,    Rt lower 1.5 x 1 cm nodule,    Lt  Inferior 1.8 x 1.6 x 1.8 cm nodule    Lt  Superior 1.2 x 0.9 x 0.8 cm nodule    Regarding bone health, patient has a history of advanced osteopenia  Bone Density Scan 2016: T= - 2.4 L femoral neck,  Repeat DXA 8/19/18: T= -1.9 R FN, -1.8 L femoral neck  Antiresorptive Therapy: 10/5/18: Received 3mg Reclast: Dose #1 (adjusted for GFR)  Patient not see since Dec 2018. Recent serum calcium at 10.6  Notes today she is swallowing much better than before.      Review of most recent bone-related metabolic lab panel:  Lab Results   Component Value Date    PTHILT 74 (H) 09/28/2018    PTHILT CANCELED 09/18/2018    PTHILT 106 (H) 07/12/2018    CA 10.6 (H) 07/15/2019    CA 10.3 02/26/2019    CA 10.5 (H) 01/08/2019    VITD3 49.5 09/28/2018    VITD3 57.9 07/17/2018    TSH 1.130 02/26/2019    PHOS 2.4 (L) 07/17/2018     [id number]: PTHrP level  [id number]: Serum NTX level  PTHILT: Intact PTH level      PAST MEDICAL/SURGICAL HISTORY:   Past Medical History:   Diagnosis Date    Anemia of chronic disease 6/19/2018    Anxiety     Aortic stenosis, moderate     Back pain     CAD (coronary artery disease)     CKD (chronic kidney disease) stage 3, GFR 30-59 ml/min (HCC)     Constipation     Diabetes ([facility]     Diverticulosis 2007    DM (diabetes mellitus) ([facility]     AODM    Fibroid 1972    GERD (gastroesophageal reflux disease)     Goiter, non-toxic     Hernia, hiatal     Hyperlipemia     Hypertension     Hypokalemia     Menopause     Osteoarthritis 2006    bursitis R shoulder    Osteoarthritis of right knee     PVC (premature ventricular contraction)     H/O PVC's    Raynaud disease     Venous stasis     Wears hearing aid     both ears     Past Surgical History:   Procedure Laterality Date    HX BREAST BIOPSY Bilateral     x5    HX CATARACT REMOVAL  2004    bilateral    HX COLONOSCOPY  2002, 08/2007    HX DILATION AND CURETTAGE  1960's    HX HYSTERECTOMY  70's    BSO    HX OTHER SURGICAL  2001    sigmoidoscopy       ALLERGIES:   Allergies   Allergen Reactions    Metformin Other (comments)     Kidneys effected       MEDICATIONS ON ADMISSION:     Current Outpatient Medications:     spironolactone (ALDACTONE) 25 mg tablet, TAKE 1 TAB BY MOUTH DAILY. , Disp: 90 Tab, Rfl: 1    potassium chloride SR (KLOR-CON 10) 10 mEq tablet, TAKE 1 TAB BY MOUTH DAILY. , Disp: 90 Tab, Rfl: 0    lovastatin (MEVACOR) 40 mg tablet, TAKE 1 TAB BY MOUTH NIGHTLY FOR CHOLESTEROL LOWERING, Disp: 90 Tab, Rfl: 1    furosemide (LASIX) 20 mg tablet, TAKE 1 TABLET BY MOUTH ONCE DAILY IN THE MORNING, Disp: 90 Tab, Rfl: 1    hydrALAZINE (APRESOLINE) 50 mg tablet, TAKE 1 TAB BY MOUTH THREE (3) TIMES DAILY. , Disp: 270 Tab, Rfl: 1    gabapentin (NEURONTIN) 100 mg capsule, TAKE 1 CAP BY MOUTH THREE (3) TIMES DAILY. , Disp: 90 Cap, Rfl: 2    irbesartan (AVAPRO) 300 mg tablet, TAKE 1 TAB BY MOUTH NIGHTLY., Disp: 90 Tab, Rfl: 0    omeprazole (PRILOSEC) 40 mg capsule, TAKE ONE CAPSULE BY MOUTH EVERY MORNING TO CONTROL STOMACH ACID, Disp: 30 Cap, Rfl: 5    fluticasone propionate (FLONASE) 50 mcg/actuation nasal spray, SPRAY 1 PUFF IN EACH NOSTRIL ONCE DAILY, Disp: 1 Bottle, Rfl: 5    calcium phosphate trib/vit D3 (YOGURT+CALCIUM GUMMIES PO), Take  by mouth., Disp: , Rfl:     TRUE METRIX GLUCOSE TEST STRIP strip, , Disp: , Rfl:     aspirin delayed-release 81 mg tablet, Take 1 Tab by mouth daily. , Disp: 30 Tab, Rfl: 0    varicella-zoster recombinant, PF, (SHINGRIX, PF,) 50 mcg/0.5 mL susr injection, 0.5mL by IntraMUSCular route once now and then repeat in 2-6 months, Disp: 0.5 mL, Rfl: 1    acetaminophen (TYLENOL EXTRA STRENGTH) 500 mg tablet, Take 1 Tab by mouth every six (6) hours as needed for Pain., Disp: 30 Tab, Rfl: 1    SOCIAL HISTORY:   Social History     Socioeconomic History    Marital status:      Spouse name: Not on file    Number of children: Not on file    Years of education: Not on file    Highest education level: Not on file   Occupational History    Not on file   Social Needs    Financial resource strain: Not on file    Food insecurity:     Worry: Not on file     Inability: Not on file    Transportation needs:     Medical: Not on file     Non-medical: Not on file   Tobacco Use    Smoking status: Never Smoker    Smokeless tobacco: Never Used   Substance and Sexual Activity    Alcohol use: No     Alcohol/week: 0.0 standard drinks    Drug use: No    Sexual activity: Never   Lifestyle    Physical activity:     Days per week: Not on file     Minutes per session: Not on file    Stress: Not on file   Relationships    Social connections:     Talks on phone: Not on file     Gets together: Not on file     Attends [employer] service: Not on file     Active member of club or organization: Not on file     Attends meetings of clubs or organizations: Not on file     Relationship status: Not on file    Intimate partner violence:     Fear of current or ex partner: Not on file     Emotionally abused: Not on file     Physically abused: Not on file     Forced sexual activity: Not on file   Other Topics Concern    Not on file   Social History Narrative    Happily . No concern for abuse. Exercise: none    Diet: Careful.     Wear Seatbelts               FAMILY HISTORY:  Family History   Problem Relation Age of Onset    Diabetes Brother     Hypertension Mother     Diabetes Brother        REVIEW OF SYSTEMS: Complete ROS assessed and noted for that which is described above, all else are negative. Eyes: normal  ENT: normal  CVS: normal  Resp: normal  GI: normal  : normal  GYN: normal  Endocrine: normal  Integument: normal  Musculoskeletal: normal  Neuro: normal  Psych: normal      PHYSICAL EXAMINATION:    VITAL SIGNS:  Visit Vitals  /56 (BP 1 Location: Left arm, BP Patient Position: Sitting)   Pulse (!) 54   Ht 5' 1\" (1.549 m)   Wt 173 lb (78.5 kg)   BMI 32.69 kg/m²       GENERAL: NCAT, Sitting comfortably in wheel chair, NAD  EYES: EOMI, non-icteric, no proptosis  Ear/Nose/Throat: NCAT, no inflammation, no masses  LYMPH NODES: No LAD  CARDIOVASCULAR: S1 S2, RRR, No murmur, 2+ radial pulses  RESPIRATORY: CTA b/l, no wheeze/rales  GASTROINTESTINAL: NT, ND,  MUSCULOSKELETAL: Normal ROM, no atrophy  SKIN: warm, no edema/rash/ or other skin changes  NEUROLOGIC: 5/5 power all extremities, no tremors, AAOx3  PSYCHIATRIC: Normal affect, Normal insight and judgement      REVIEW OF LABORATORY AND RADIOLOGY DATA:   Labs and documentation have been reviewed as described above. ASSESSMENT AND PLAN:   Arabella Olivier is a 80 y.o. female with a PMHx as noted above who was referred to our endocrinology clinic for evaluation of hypercalcemia. Hypercalcemia  History of vitamin D deficiency  Primary Hyperparathyroidism  Multinodular Goiter  Osteopenia    Hyperparathyroidism / Hypercalcemia:  Stable mildly elevated serum calcium with preference to avoid surgery if possible per patient, noting also negative imaging. We will continue conservative management at this time.    Vitamin D supplementation: 2000 units of D3 once daily  Calcium supplementation: 500mg calcium carbonate twice daily  Other Med: on Reclast as noted  Imaging: US/ NM scan w/o evidence of parathyroid adenoma, Osteopenia on DXA, reviewed, stable  Labs: reviewed, stable mild hypercalcemia    Osteopenia:   RECLAST: Dose # 2 due Oct 2019, ordered  Calcium and D as above, taking, recommended to continue  DXA: Next imaging Oct 2020 (following 2 years of reclast)    MNG: I have reviewed the images of her thyroid US personally. Her thyroid nodules mostly appear as pseudonodules, meaning that there is chronic heterogenous appearance giving the resemblance of thyroid nodules whereas most of them are not actually true nodules. The nodules present did not appear suspicious. Ultrasound can be repeated in 2020 years as long as stable clinically. RTC: 6 months for re-evaluation, to call with any questions or concerns    Radha Agosto.  [address] [facility]

## 2019-10-14 ENCOUNTER — TELEPHONE (OUTPATIENT)
Dept: ENDOCRINOLOGY | Age: 84
End: 2019-10-14

## 2019-10-14 ENCOUNTER — TELEPHONE (OUTPATIENT)
Dept: CARDIOLOGY CLINIC | Age: 84
End: 2019-10-14

## 2019-10-14 NOTE — TELEPHONE ENCOUNTER
They got the letter about her mother's test results but she has a few questions.   Please call [phone]

## 2019-10-14 NOTE — TELEPHONE ENCOUNTER
----- Message from Varsha Mclaughlin sent at 10/14/2019  8:21 AM EDT -----  Regarding: Dr. Geovani Camilo  General Message/Vendor Calls    Caller's first and last name:      Reason for call: pt was in the office on Friday October 11,2019 and she was told that a new prescription would be called in but when she went to the pharmacy, nothing was their.  An Infusion was also ordered for the pt and pt daughter would like to know would this affect the pt heart condition in any kind of way      Callback required yes/no and why: yes      Best contact number(s): [phone]      Details to clarify the request:      Varsha Mclaughlin

## 2019-10-15 NOTE — TELEPHONE ENCOUNTER
Per Dr. Marcial Merrill:  Thomas Plasencia should be ok--only moderate AS (mean 23)--do not think this will have significant impact on her AS.  Okay to proceed. Thanks Delia Kothari with the patients daughter Elle Hernández. Verified patient with two patient identifiers. Dr. Marcial Merrill response given and questions answered. Patients daughter verbalized understanding.

## 2019-10-15 NOTE — TELEPHONE ENCOUNTER
Spoke with the pts daughter, Aubrey Jin. Verified patient with two patient identifiers. Aubrey Jin is concerned that the RECLAST infusion that Dr. Emily Ardon wants to administer will cause her mothers AS to worsen. RECLAST is to help treat the hypercalcemia. Last seen by Dr. Emily Ardon on 10/11/19. Will have Dr. Maria M Ratliff review.

## 2019-10-22 NOTE — TELEPHONE ENCOUNTER
I returned this call today as I was out of the office last week and spoke with Ms. Pabon's daughter. She said that at the last visit, her mom thought that Dr. Mark Liu had said she needed a new script. I read her the note saying to continue with the Vitamin D and Calcium. She understood this information.   Nehemias Herrera

## 2019-10-30 ENCOUNTER — TELEPHONE (OUTPATIENT)
Dept: ENDOCRINOLOGY | Age: 84
End: 2019-10-30

## 2019-10-30 NOTE — TELEPHONE ENCOUNTER
Chayo Cox from [address] [place] [address] called, she needs an updated Reclast order for pt, fax to [phone]. Pt has an appt on Friday.

## 2019-10-30 NOTE — TELEPHONE ENCOUNTER
Francis Bernabe with the infusion center stated that Ms. Amanda Mcmullen is scheduled to have an infusion this Friday 11/1/2019 at [address] [place] [address].  She  stated that Ms. Pabon creatinine from Oct 15 was elevated at 2.3. She would like to speak with the nurse to see if it is okay for the patient to have the infusion . Please advise.

## 2019-10-31 PROBLEM — E21.0 PRIMARY HYPERPARATHYROIDISM (HCC): Status: ACTIVE | Noted: 2019-10-31

## 2019-10-31 NOTE — TELEPHONE ENCOUNTER
I returned this call to the [facility] and let them know that the order had been changed to Prolia instead of the Reclast. I spoke with Nurse Freedom and relayed that information. She said she would wait until tomorrow to see if the pharmacy would give the medication based on Ms. Pabon's labs. I then called Ms. Mervin Martinez and spoke with her daughter and let her know that the order had been changed as well. She understood this information.   Jacquie Biswas

## 2019-10-31 NOTE — TELEPHONE ENCOUNTER
Agnieszka East, I reviewed those labs collected by Dr. Evelia Boxer, after we had seen her. Renal function is worse, we will switch her to Prolia for now. Please let the infusion center know that we will change the order, and notify patient that we will change to prolia which is a subcutaneous injection given a the infusion center every 6 months instead of reclast, until her kidney function improves a bit. I will complete a new order for the infusion center to be faxed,    Thanks,     Estrellita Bennett.  [address] [place] [address] Endocrinology  [address] [place] [address]

## 2020-01-21 PROBLEM — K21.9 GASTROESOPHAGEAL REFLUX DISEASE WITHOUT ESOPHAGITIS: Status: ACTIVE | Noted: 2020-01-21

## 2020-02-26 ENCOUNTER — OFFICE VISIT (OUTPATIENT)
Dept: CARDIOLOGY CLINIC | Age: 85
End: 2020-02-26

## 2020-02-26 VITALS
RESPIRATION RATE: 16 BRPM | HEIGHT: 61 IN | BODY MASS INDEX: 33.99 KG/M2 | DIASTOLIC BLOOD PRESSURE: 62 MMHG | HEART RATE: 55 BPM | OXYGEN SATURATION: 99 % | SYSTOLIC BLOOD PRESSURE: 162 MMHG | WEIGHT: 180 LBS

## 2020-02-26 DIAGNOSIS — E78.00 PURE HYPERCHOLESTEROLEMIA: ICD-10-CM

## 2020-02-26 DIAGNOSIS — R00.1 BRADYCARDIA: ICD-10-CM

## 2020-02-26 DIAGNOSIS — E11.40 TYPE 2 DIABETES MELLITUS WITH DIABETIC NEUROPATHY, WITHOUT LONG-TERM CURRENT USE OF INSULIN (HCC): ICD-10-CM

## 2020-02-26 DIAGNOSIS — N18.30 CKD (CHRONIC KIDNEY DISEASE) STAGE 3, GFR 30-59 ML/MIN (HCC): ICD-10-CM

## 2020-02-26 DIAGNOSIS — I35.0 AORTIC STENOSIS, MODERATE: Primary | ICD-10-CM

## 2020-02-26 DIAGNOSIS — E21.0 PRIMARY HYPERPARATHYROIDISM (HCC): ICD-10-CM

## 2020-02-26 DIAGNOSIS — I10 ESSENTIAL HYPERTENSION: ICD-10-CM

## 2020-02-26 RX ORDER — HYDRALAZINE HYDROCHLORIDE 100 MG/1
100 TABLET, FILM COATED ORAL 3 TIMES DAILY
Qty: 270 TAB | Refills: 1 | Status: SHIPPED | OUTPATIENT
Start: 2020-02-26 | End: 2020-08-12 | Stop reason: SDUPTHER

## 2020-02-26 NOTE — PROGRESS NOTES
Maverick Guadalupe is a 80 y.o. female is here for routine f/u. Hx DM II, hypertension/hypertensive CVD, dyslipidemia, anemia of chronic disease. GERD, DJD, Raynaud's, followed by Dr. Sagar Hernandez with hx multinodular goiter/parathryoid issues.  Issues with bradycardia, HR high 40's. EKG with sinus tiffanie 48, LVH, PRWP/old AMI, NSST. Had been on Cardizem --reduced to 120 by PCP, now stopped. Holter with sinus tiffanie, HR 42-79, avg 52, occasional PAC's. Echo 6/27/18 with mod LVEF, LVEF 65-70, LAE, mod aortic stenosis (mean 24), mild to mod pulm hypertension. Lexiscan 9/26/19 with no infarct/ischemia, LVEF >65%. Echo 9/19 with LVEF 60-65, mod AS (mean 23), MAC, mild MVP with trace MR, RVSP 44. Carotid doppler 9/19 with mild MAXWELL, otherwise ok. Currently on irbesartan 300, hydralazine 50 tid, spironolactone 25, lasix 20. The patient denies chest pain/ shortness of breath, orthopnea, PND, LE edema, palpitations, syncope, presyncope or fatigue.        Patient Active Problem List    Diagnosis Date Noted    Gastroesophageal reflux disease without esophagitis 01/21/2020    Primary hyperparathyroidism ([address] 10/31/2019    Type 2 diabetes mellitus with diabetic neuropathy ([address] 07/15/2019    CKD (chronic kidney disease) stage 3, GFR 30-59 ml/min (HCC) 07/15/2019    Anemia, chronic disease 07/15/2019    Hyperparathyroidism ([address] 08/06/2018    BMI 33.0-33.9,adult 08/06/2018    Bradycardia 08/01/2018    Aortic stenosis, moderate     Pure hypercholesterolemia 06/19/2018    Anemia of chronic disease 06/19/2018    Type 2 diabetes mellitus with nephropathy ([address] 12/19/2017    Well controlled type 2 diabetes mellitus ([address] 06/07/2016    Essential hypertension 06/07/2016    Encounter for long-term (current) use of other medications 08/10/2015      Valeriano Capps MD  Past Medical History:   Diagnosis Date    Anemia of chronic disease 6/19/2018    Anxiety     Aortic stenosis, moderate     Back pain     CAD (coronary artery disease)     CKD (chronic kidney disease) stage 3, GFR 30-59 ml/min ([facility])     Constipation     Diabetes [facility]     Diverticulosis 2007    DM (diabetes mellitus) ([facility]     AODM    Fibroid 1972    GERD (gastroesophageal reflux disease)     Goiter, non-toxic     Hernia, hiatal     Hyperlipemia     Hypertension     Hypokalemia     Menopause     Osteoarthritis 2006    bursitis R shoulder    Osteoarthritis of right knee     PVC (premature ventricular contraction)     H/O PVC's    Raynaud disease     Venous stasis     Wears hearing aid     both ears      Past Surgical History:   Procedure Laterality Date    HX BREAST BIOPSY Bilateral     x5    HX CATARACT REMOVAL  2004    bilateral    HX COLONOSCOPY  2002, 08/2007    HX DILATION AND CURETTAGE  1960's    HX HYSTERECTOMY  66's    BSO    HX OTHER SURGICAL  2001    sigmoidoscopy     Allergies   Allergen Reactions    Metformin Other (comments)     Kidneys effected      Family History   Problem Relation Age of Onset    Diabetes Brother     Hypertension Mother     Diabetes Brother       Social History     Socioeconomic History    Marital status:      Spouse name: Not on file    Number of children: Not on file    Years of education: Not on file    Highest education level: Not on file   Occupational History    Not on file   Social Needs    Financial resource strain: Not on file    Food insecurity:     Worry: Not on file     Inability: Not on file    Transportation needs:     Medical: Not on file     Non-medical: Not on file   Tobacco Use    Smoking status: Never Smoker    Smokeless tobacco: Never Used   Substance and Sexual Activity    Alcohol use: No     Alcohol/week: 0.0 standard drinks    Drug use: No    Sexual activity: Never   Lifestyle    Physical activity:     Days per week: Not on file     Minutes per session: Not on file    Stress: Not on file   Relationships    Social connections:     Talks on phone: Not on file     Gets together: Not on file     Attends [employer] service: Not on file     Active member of club or organization: Not on file     Attends meetings of clubs or organizations: Not on file     Relationship status: Not on file    Intimate partner violence:     Fear of current or ex partner: Not on file     Emotionally abused: Not on file     Physically abused: Not on file     Forced sexual activity: Not on file   Other Topics Concern    Not on file   Social History Narrative    Happily . No concern for abuse. Exercise: none    Diet: Careful. Wear Seatbelts              Current Outpatient Medications   Medication Sig    hydrALAZINE (APRESOLINE) 100 mg tablet Take 1 Tab by mouth three (3) times daily.  irbesartan (AVAPRO) 300 mg tablet TAKE 1 TAB BY MOUTH NIGHTLY.  omeprazole (PRILOSEC) 40 mg capsule Take 1 Cap by mouth daily.  gabapentin (NEURONTIN) 100 mg capsule Take 1 Cap by mouth three (3) times daily.  spironolactone (ALDACTONE) 25 mg tablet TAKE 1 TAB BY MOUTH DAILY.  lovastatin (MEVACOR) 40 mg tablet TAKE 1 TAB BY MOUTH NIGHTLY FOR CHOLESTEROL LOWERING    furosemide (LASIX) 20 mg tablet TAKE 1 TABLET BY MOUTH ONCE DAILY IN THE MORNING    varicella-zoster recombinant, PF, (SHINGRIX, PF,) 50 mcg/0.5 mL susr injection 0.5mL by IntraMUSCular route once now and then repeat in 2-6 months    fluticasone propionate (FLONASE) 50 mcg/actuation nasal spray SPRAY 1 PUFF IN EACH NOSTRIL ONCE DAILY    calcium phosphate trib/vit D3 (YOGURT+CALCIUM GUMMIES PO) Take  by mouth.  TRUE METRIX GLUCOSE TEST STRIP strip     aspirin delayed-release 81 mg tablet Take 1 Tab by mouth daily.  acetaminophen (TYLENOL EXTRA STRENGTH) 500 mg tablet Take 1 Tab by mouth every six (6) hours as needed for Pain. No current facility-administered medications for this visit. Review of Symptoms:    CONST  No weight change.  No fever, chills, sweats    ENT No visual changes, URI sx, sore throat    CV  See HPI   RESP  No cough, or sputum, wheezing. Also see HPI   GI  No abdominal pain or change in bowel habits. No heartburn or dysphagia. No melena or rectal bleeding.   No dysuria, urgency, frequency, hematuria   MSKEL  No joint pain, swelling. No muscle pain. SKIN  No rash or lesions. NEURO  No headache, syncope, or seizure. No weakness, loss of sensation, or paresthesias. PSYCH  No low mood or depression  No anxiety. HE/LYMPH  No easy bruising, abnormal bleeding, or enlarged glands.         Physical ExamPhysical Exam:    Visit Vitals  /62 (BP 1 Location: Right arm, BP Patient Position: Sitting)   Pulse (!) 55   Resp 16   Ht 5' 1\" (1.549 m)   Wt 180 lb (81.6 kg)   SpO2 99% Comment: ra   BMI 34.01 kg/m²     Gen: NAD  HEENT:  PERRL, throat clear  Neck: no adenopathy, no thyromegaly, no JVD   Heart:  Regular,Nl S1S2,  III/VI aortic murmur, no gallop or rub.   Lungs:  clear  Abdomen:   Soft, non-tender, bowel sounds are active.   Extremities:  No edema  Pulse: symmetric  Neuro: A&O times 3, No focal neuro deficits    Cardiographics    Labs:   Lab Results   Component Value Date/Time    Sodium 137 01/21/2020 01:38 PM    Sodium 139 11/14/2019 01:25 PM    Sodium 137 10/15/2019 01:39 PM    Sodium 140 07/15/2019 01:53 PM    Sodium 145 (H) 02/26/2019 03:04 PM    Potassium 5.3 (H) 01/21/2020 01:38 PM    Potassium 4.8 11/14/2019 01:25 PM    Potassium 5.0 10/15/2019 01:39 PM    Potassium 4.7 07/15/2019 01:53 PM    Potassium 3.3 (L) 02/26/2019 03:04 PM    Chloride 107 (H) 01/21/2020 01:38 PM    Chloride 107 11/14/2019 01:25 PM    Chloride 103 10/15/2019 01:39 PM    Chloride 106 07/15/2019 01:53 PM    Chloride 105 02/26/2019 03:04 PM    CO2 16 (L) 01/21/2020 01:38 PM    CO2 21 11/14/2019 01:25 PM    CO2 18 (L) 10/15/2019 01:39 PM    CO2 21 07/15/2019 01:53 PM    CO2 24 02/26/2019 03:04 PM    Anion gap 11 11/14/2019 01:25 PM    Anion gap 9 08/09/2018 12:19 PM    Glucose 108 (H) 01/21/2020 01:38 PM    Glucose 109 (H) 11/14/2019 01:25 PM    Glucose 118 (H) 10/15/2019 01:39 PM    Glucose 111 (H) 07/15/2019 01:53 PM    Glucose 125 (H) 02/26/2019 03:04 PM    BUN 45 (H) 01/21/2020 01:38 PM    BUN 64 (H) 11/14/2019 01:25 PM    BUN 51 (H) 10/15/2019 01:39 PM    BUN 54 (H) 07/15/2019 01:53 PM    BUN 17 02/26/2019 03:04 PM    Creatinine 2.10 (H) 01/21/2020 01:38 PM    Creatinine 2.63 (H) 11/14/2019 01:25 PM    Creatinine 2.31 (H) 10/15/2019 01:39 PM    Creatinine 2.06 (H) 07/15/2019 01:53 PM    Creatinine 1.48 (H) 02/26/2019 03:04 PM    BUN/Creatinine ratio 21 01/21/2020 01:38 PM    BUN/Creatinine ratio 24 (H) 11/14/2019 01:25 PM    BUN/Creatinine ratio 22 10/15/2019 01:39 PM    BUN/Creatinine ratio 26 07/15/2019 01:53 PM    BUN/Creatinine ratio 11 (L) 02/26/2019 03:04 PM    GFR est AA 24 (L) 01/21/2020 01:38 PM    GFR est AA 21 (L) 11/14/2019 01:25 PM    GFR est AA 21 (L) 10/15/2019 01:39 PM    GFR est AA 24 (L) 07/15/2019 01:53 PM    GFR est AA 36 (L) 02/26/2019 03:04 PM    GFR est non-AA 21 (L) 01/21/2020 01:38 PM    GFR est non-AA 17 (L) 11/14/2019 01:25 PM    GFR est non-AA 18 (L) 10/15/2019 01:39 PM    GFR est non-AA 21 (L) 07/15/2019 01:53 PM    GFR est non-AA 32 (L) 02/26/2019 03:04 PM    Calcium 10.1 01/21/2020 01:38 PM    Calcium 10.2 (H) 11/14/2019 01:25 PM    Calcium 10.7 (H) 10/15/2019 01:39 PM    Calcium 10.6 (H) 07/15/2019 01:53 PM    Calcium 10.3 02/26/2019 03:04 PM    Bilirubin, total 0.2 01/21/2020 01:38 PM    Bilirubin, total 0.2 10/15/2019 01:39 PM    Bilirubin, total 0.2 07/15/2019 01:53 PM    Bilirubin, total 0.3 02/26/2019 03:04 PM    Bilirubin, total 0.3 01/08/2019 05:05 PM    AST (SGOT) 18 01/21/2020 01:38 PM    AST (SGOT) 15 10/15/2019 01:39 PM    AST (SGOT) 17 07/15/2019 01:53 PM    AST (SGOT) 15 02/26/2019 03:04 PM    AST (SGOT) 15 01/08/2019 05:05 PM    Alk. phosphatase 51 01/21/2020 01:38 PM    Alk. phosphatase 57 10/15/2019 01:39 PM    Alk.  phosphatase 48 07/15/2019 01:53 PM    Alk. phosphatase 51 02/26/2019 03:04 PM    Alk. phosphatase 48 01/08/2019 05:05 PM    Protein, total 6.9 01/21/2020 01:38 PM    Protein, total 6.9 10/15/2019 01:39 PM    Protein, total 7.1 07/15/2019 01:53 PM    Protein, total 7.0 02/26/2019 03:04 PM    Protein, total 6.9 01/08/2019 05:05 PM    Albumin 3.8 01/21/2020 01:38 PM    Albumin 3.4 (L) 11/14/2019 01:25 PM    Albumin 3.8 10/15/2019 01:39 PM    Albumin 4.2 07/15/2019 01:53 PM    Albumin 3.8 02/26/2019 03:04 PM    Globulin 3.6 08/09/2018 12:19 PM    A-G Ratio 1.2 01/21/2020 01:38 PM    A-G Ratio 1.2 10/15/2019 01:39 PM    A-G Ratio 1.4 07/15/2019 01:53 PM    A-G Ratio 1.2 02/26/2019 03:04 PM    A-G Ratio 1.2 01/08/2019 05:05 PM    ALT (SGPT) 9 01/21/2020 01:38 PM    ALT (SGPT) 8 10/15/2019 01:39 PM    ALT (SGPT) 9 07/15/2019 01:53 PM    ALT (SGPT) 7 02/26/2019 03:04 PM    ALT (SGPT) 8 01/08/2019 05:05 PM     No results found for: CPK, CPKX, CPX  Lab Results   Component Value Date/Time    Cholesterol, total 113 02/26/2019 03:04 PM    Cholesterol, total 126 06/06/2017 01:47 PM    Cholesterol, total 137 09/07/2016 03:24 PM    Cholesterol, total 115 06/07/2016 04:34 PM    Cholesterol, total 119 01/04/2016 02:24 PM    HDL Cholesterol 38 (L) 02/26/2019 03:04 PM    HDL Cholesterol 40 06/06/2017 01:47 PM    HDL Cholesterol 40 09/07/2016 03:24 PM    HDL Cholesterol 37 (L) 06/07/2016 04:34 PM    HDL Cholesterol 35 (L) 01/04/2016 02:24 PM    LDL, calculated 56 02/26/2019 03:04 PM    LDL, calculated 64 06/06/2017 01:47 PM    LDL, calculated 75 09/07/2016 03:24 PM    LDL, calculated 60 06/07/2016 04:34 PM    LDL, calculated 68 01/04/2016 02:24 PM    Triglyceride 94 02/26/2019 03:04 PM    Triglyceride 108 06/06/2017 01:47 PM    Triglyceride 109 09/07/2016 03:24 PM    Triglyceride 90 06/07/2016 04:34 PM    Triglyceride 80 01/04/2016 02:24 PM     No results found for this or any previous visit.     Assessment:         Patient Active Problem List Diagnosis Date Noted    Gastroesophageal reflux disease without esophagitis 01/21/2020    Primary hyperparathyroidism ([facility] 10/31/2019    Type 2 diabetes mellitus with diabetic neuropathy ([facility] 07/15/2019    CKD (chronic kidney disease) stage 3, GFR 30-59 ml/min ([facility] 07/15/2019    Anemia, chronic disease 07/15/2019    Hyperparathyroidism ([facility] 08/06/2018    BMI 33.0-33.9,adult 08/06/2018    Bradycardia 08/01/2018    Aortic stenosis, moderate     Pure hypercholesterolemia 06/19/2018    Anemia of chronic disease 06/19/2018    Type 2 diabetes mellitus with nephropathy ([facility] 12/19/2017    Well controlled type 2 diabetes mellitus ([facility] 06/07/2016    Essential hypertension 06/07/2016    Encounter for long-term (current) use of other medications 08/10/2015     Hx DM II, hypertension/hypertensive CVD, dyslipidemia, anemia of chronic disease. GERD, DJD, Raynaud's, followed by Dr. Amelie Fong with hx multinodular goiter/parathryoid issues.  Issues with bradycardia, HR high 40's. EKG with sinus tiffanie 48, LVH, PRWP/old AMI, NSST. Had been on Cardizem --reduced to 120 by PCP, now stopped. Holter with sinus tiffanie, HR 42-79, avg 52, occasional PAC's. Echo 6/27/18 with mod LVEF, LVEF 65-70, LAE, mod aortic stenosis (mean 24), mild to mod pulm hypertension. Lexiscan 9/26/19 with no infarct/ischemia, LVEF >65%. Echo 9/19 with LVEF 60-65, mod AS (mean 23), MAC, mild MVP with trace MR, RVSP 44. Carotid doppler 9/19 with mild MAXWELL, otherwise ok. Currently on irbesartan 300, hydralazine 50 tid, spironolactone 25, lasix 20. Plan:     Doing well with no adverse cardiac symptoms. BP suboptimal--will increase hydralazine from 50 to 100mg tid  Stop KCL  Continue other meds  F/u with PCP as planned    Lipids and labs followed by PCP. Continue current care and f/u in 6 months.     Arpit Regan MD

## 2020-02-26 NOTE — PROGRESS NOTES
Verified patient with two patient identifiers. Medications reviewed/approved by Dr. Gian Walker. 1. Have you been to the ER, urgent care clinic since your last visit? Hospitalized since your last visit?no    2. Have you seen or consulted any other health care providers outside of the [address] [place] [address] since your last visit? Include any pap smears or colon screening.  no

## 2020-04-24 ENCOUNTER — VIRTUAL VISIT (OUTPATIENT)
Dept: ENDOCRINOLOGY | Age: 85
End: 2020-04-24

## 2020-04-24 ENCOUNTER — TELEPHONE (OUTPATIENT)
Dept: ENDOCRINOLOGY | Age: 85
End: 2020-04-24

## 2020-04-24 DIAGNOSIS — M85.852 OSTEOPENIA OF NECK OF LEFT FEMUR: ICD-10-CM

## 2020-04-24 DIAGNOSIS — E04.2 MULTINODULAR GOITER: ICD-10-CM

## 2020-04-24 DIAGNOSIS — E83.52 HYPERCALCEMIA: ICD-10-CM

## 2020-04-24 DIAGNOSIS — E55.9 VITAMIN D DEFICIENCY: ICD-10-CM

## 2020-04-24 DIAGNOSIS — E21.0 PRIMARY HYPERPARATHYROIDISM (HCC): Primary | ICD-10-CM

## 2020-04-24 RX ORDER — CETIRIZINE HCL 10 MG
10 TABLET ORAL
COMMUNITY

## 2020-04-24 NOTE — TELEPHONE ENCOUNTER
Diane Fernandez last October we requested 2 doses of prolia each 6 months apart for patient. She received first dose Nov 2019, but does not seem to have her second dose scheduled for May 2020. Can you check with infusion center regarding this, shouldn't need another order as each order is for 2 doses, thanks !    -----    Addendum, scratch that, patient has it scheduled at outside location,     Kennedy García.  [facility] Endocrinology  [address] [place] [address]

## 2020-04-24 NOTE — PROGRESS NOTES
**DUE TO PANDEMIC AND HEALTH CONCERNS IN THE COMMUNITY, THIS PATIENT WAS EITHER ILL OR FOUND TO BE HIGH RISK FOR IN-PERSON EVALUATION WITHIN THE CLINIC. THE FOLLOWING IS A VIRTUAL TELEMEDICINE VIDEO ENCOUNTER VIA [employer], TO WHICH THE PATIENT AGREED. THE PURPOSE IS TO LIMIT INTERRUPTIONS IN HEALTHCARE AND TO PROVIDE FOR ONGOING URGENT NEEDS UNDER THE CURRENT CONDITIONS. CHIEF COMPLAINT: Hyperparathyroidism, MNG    HISTORY OF PRESENT ILLNESS:   Marielena Cabral is a 80 y.o. female with a PMHx as noted below who presents for f/u eval of hypercalcemia and MNG    Elevated calcium as far back as January 2014, as high as 11.5, 10.1 normal in Jan 2020 ! Vitamin D status: stable levels, on 2000 units D3 daily  Calcium supplementation: restarted her prev on 500mg BID with meals,        Parathyroid imaging:    - 7/25/18: NM Parathyroid Scan: Normal Study   - 7/25/18: US: MNG noted.  No parathyroid adenoma described, images not available ([facility])    Rt upper 1.3 x 0.8 x 1.2 cm nodule,     Rt lateral 1.1 x 1 x 0.9 cm nodule,    Rt lower 1.5 x 1 cm nodule,    Lt  Inferior 1.8 x 1.6 x 1.8 cm nodule    Lt  Superior 1.2 x 0.9 x 0.8 cm nodule    Regarding bone health, patient has a history of advanced osteopenia  Bone Density Scan 2016: T= - 2.4 L femoral neck,  Repeat DXA 8/19/18: T= -1.9 R FN, -1.8 L femoral neck  Antiresorptive Therapy: 10/5/18: Received 3mg Reclast: Dose #1 (adjusted for GFR)        11/14/19: Prolia dose #1 (renal function had worsened so switched)         Next Prolia dose due in May 2020,    Review of most recent bone-related metabolic lab panel:  Lab Results   Component Value Date    PTHILT 74 (H) 09/28/2018    PTHILT CANCELED 09/18/2018    PTHILT 106 (H) 07/12/2018    CA 10.1 01/21/2020    CA 10.2 (H) 11/14/2019    CA 10.7 (H) 10/15/2019    VITD3 49.5 09/28/2018    VITD3 57.9 07/17/2018    TSH 1.130 02/26/2019    PHOS 3.2 11/14/2019    MG 1.2 (L) 11/14/2019     [id number]: PTHrP level  [id number]: Serum NTX level  PTHILT: Intact PTH level      PAST MEDICAL/SURGICAL HISTORY:   Past Medical History:   Diagnosis Date    Anemia of chronic disease 6/19/2018    Anxiety     Aortic stenosis, moderate     Back pain     CAD (coronary artery disease)     CKD (chronic kidney disease) stage 3, GFR 30-59 ml/min ([facility]     Constipation     Diabetes [facility]     Diverticulosis 2007    DM (diabetes mellitus) ([facility]     AODM    Fibroid 1972    GERD (gastroesophageal reflux disease)     Goiter, non-toxic     Hernia, hiatal     Hyperlipemia     Hypertension     Hypokalemia     Menopause     Osteoarthritis 2006    bursitis R shoulder    Osteoarthritis of right knee     PVC (premature ventricular contraction)     H/O PVC's    Raynaud disease     Venous stasis     Wears hearing aid     both ears     Past Surgical History:   Procedure Laterality Date    HX BREAST BIOPSY Bilateral     x5    HX CATARACT REMOVAL  2004    bilateral    HX COLONOSCOPY  2002, 08/2007    HX DILATION AND CURETTAGE  1960's    HX HYSTERECTOMY  70's    BSO    HX OTHER SURGICAL  2001    sigmoidoscopy       ALLERGIES:   Allergies   Allergen Reactions    Metformin Other (comments)     Kidneys effected       MEDICATIONS ON ADMISSION:     Current Outpatient Medications:     cetirizine (ZYRTEC) 10 mg tablet, Take 10 mg by mouth., Disp: , Rfl:     gabapentin (NEURONTIN) 100 mg capsule, TAKE 1 CAP BY MOUTH THREE (3) TIMES DAILY. , Disp: 90 Cap, Rfl: 0    spironolactone (ALDACTONE) 25 mg tablet, TAKE 1 TAB BY MOUTH DAILY. , Disp: 90 Tab, Rfl: 1    furosemide (LASIX) 20 mg tablet, TAKE 1 TABLET BY MOUTH ONCE DAILY IN THE MORNING, Disp: 90 Tab, Rfl: 1    lovastatin (MEVACOR) 40 mg tablet, TAKE 1 TAB BY MOUTH NIGHTLY FOR CHOLESTEROL LOWERING, Disp: 90 Tab, Rfl: 1    hydrALAZINE (APRESOLINE) 100 mg tablet, Take 1 Tab by mouth three (3) times daily. , Disp: 270 Tab, Rfl: 1    irbesartan (AVAPRO) 300 mg tablet, TAKE 1 TAB BY MOUTH NIGHTLY., Disp: 90 Tab, Rfl: 0    omeprazole (PRILOSEC) 40 mg capsule, Take 1 Cap by mouth daily. , Disp: 30 Cap, Rfl: 5    fluticasone propionate (FLONASE) 50 mcg/actuation nasal spray, SPRAY 1 PUFF IN EACH NOSTRIL ONCE DAILY, Disp: 1 Bottle, Rfl: 5    calcium phosphate trib/vit D3 (YOGURT+CALCIUM GUMMIES PO), Take  by mouth., Disp: , Rfl:     aspirin delayed-release 81 mg tablet, Take 1 Tab by mouth daily. , Disp: 30 Tab, Rfl: 0    acetaminophen (TYLENOL EXTRA STRENGTH) 500 mg tablet, Take 1 Tab by mouth every six (6) hours as needed for Pain., Disp: 30 Tab, Rfl: 1    varicella-zoster recombinant, PF, (SHINGRIX, PF,) 50 mcg/0.5 mL susr injection, 0.5mL by IntraMUSCular route once now and then repeat in 2-6 months, Disp: 0.5 mL, Rfl: 1    TRUE METRIX GLUCOSE TEST STRIP strip, , Disp: , Rfl:     SOCIAL HISTORY:   Social History     Socioeconomic History    Marital status:      Spouse name: Not on file    Number of children: Not on file    Years of education: Not on file    Highest education level: Not on file   Occupational History    Not on file   Social Needs    Financial resource strain: Not on file    Food insecurity     Worry: Not on file     Inability: Not on file    Transportation needs     Medical: Not on file     Non-medical: Not on file   Tobacco Use    Smoking status: Never Smoker    Smokeless tobacco: Never Used   Substance and Sexual Activity    Alcohol use: No     Alcohol/week: 0.0 standard drinks    Drug use: No    Sexual activity: Never   Lifestyle    Physical activity     Days per week: Not on file     Minutes per session: Not on file    Stress: Not on file   Relationships    Social connections     Talks on phone: Not on file     Gets together: Not on file     Attends Christianity service: Not on file     Active member of club or organization: Not on file     Attends meetings of clubs or organizations: Not on file     Relationship status: Not on file    Intimate partner violence     Fear of current or ex partner: Not on file     Emotionally abused: Not on file     Physically abused: Not on file     Forced sexual activity: Not on file   Other Topics Concern    Not on file   Social History Narrative    Happily . No concern for abuse. Exercise: none    Diet: Careful. Wear Seatbelts               FAMILY HISTORY:  Family History   Problem Relation Age of Onset    Diabetes Brother     Hypertension Mother     Diabetes Brother        REVIEW OF SYSTEMS: Complete ROS assessed and noted for that which is described above, all else are negative. Eyes: normal  ENT: normal  CVS: normal  Resp: normal  GI: normal  : normal  GYN: normal  Endocrine: normal  Integument: normal  Musculoskeletal: normal  Neuro: normal  Psych: normal      PHYSICAL EXAMINATION:    VITAL SIGNS:  Telemedicine visit    GENERAL: NCAT, Appears well nourished  EYES: EOMI, non-icteric, no proptosis  Ear/Nose/Throat: NCAT, no visible inflammation or masses  CARDIOVASCULAR: no cyanosis, no visible JVD  RESPIRATORY: comfortable respirations observed, no cyanosis  MUSCULOSKELETAL: Normal ROM of upper extremities observed  SKIN: No edema, rash, or other significant changes observed  NEUROLOGIC:  AAOx3  PSYCHIATRIC: Normal affect, Normal insight and judgement    REVIEW OF LABORATORY AND RADIOLOGY DATA:   Labs and documentation have been reviewed as described above. ASSESSMENT AND PLAN:   Caridad Traore is a 80 y.o. female with a PMHx as noted above who was referred to our endocrinology clinic for evaluation of hypercalcemia. Hypercalcemia  History of vitamin D deficiency  Primary Hyperparathyroidism  Multinodular Goiter  Osteopenia    Hyperparathyroidism / Hypercalcemia / Osteopenia:   Stable serum calcium with preference to avoid surgery if possible per patient, noting also negative imaging. We will continue conservative management at this time.    Calcium and D as above, taking 2 gummies BID (500 calcium/1000 D per 2 gummies)  Bone Specific Med: Prolia: Dose # 2 due May 2020, has scheduled at [address] general   Imaging: US/ NM scan w/o evidence of parathyroid adenoma,       DXA: Next imaging Nov 2021 (following 2 years of prolia), not sooner  Labs: reviewed    MNG: I have reviewed the images of her thyroid US personally. Her thyroid nodules mostly appear as pseudonodules, meaning that there is chronic heterogenous appearance giving the resemblance of thyroid nodules whereas most of them are not actually true nodules. The nodules present did not appear suspicious. Ultrasound can be repeated in 2021 with next DXA as long as stable clinically. RTC: 6 months for re-evaluation, to call with any questions or concerns, Oct 6 at 12:10 PM    20 minutes spent toward telemedicine visit today of which >50% of this time was spent in counseling and coordination of care. Sera Dale.  [address] [facility]

## 2020-06-10 ENCOUNTER — TELEPHONE (OUTPATIENT)
Dept: CARDIOLOGY CLINIC | Age: 85
End: 2020-06-10

## 2020-06-10 NOTE — TELEPHONE ENCOUNTER
Pt daughter called on behalf of her mother stating that she is not exactly in the loop of her mother;s visits with Dr. Pete Bonds and she would like to be kept up on this information. She states her mother is having increased SOB and Left leg Edema and wanted to get her mother seen sooner than September. She also wanted to have the appt moved to a Thursday or Friday so that she could bring her. She would like for us to call her back as her mother does not know that she is even calling us. She is listed on her mother's PHI. Please call [phone].

## 2020-06-10 NOTE — TELEPHONE ENCOUNTER
Spoke with the patients daughter. Verified patient with two patient identifiers. Lyly Mcgregor states that the pt is not c/o sob but she notices that she is breathing harder with exertion. She does have noticeable left leg swelling.      Scheduled pt for tomorrow at 10am w/ Jocelyne Vera MD.

## 2020-06-11 ENCOUNTER — CLINICAL SUPPORT (OUTPATIENT)
Dept: CARDIOLOGY CLINIC | Age: 85
End: 2020-06-11

## 2020-06-11 VITALS
WEIGHT: 180 LBS | HEIGHT: 61 IN | DIASTOLIC BLOOD PRESSURE: 70 MMHG | OXYGEN SATURATION: 98 % | SYSTOLIC BLOOD PRESSURE: 118 MMHG | TEMPERATURE: 95.2 F | RESPIRATION RATE: 14 BRPM | HEART RATE: 56 BPM | BODY MASS INDEX: 33.99 KG/M2

## 2020-06-11 DIAGNOSIS — I10 ESSENTIAL HYPERTENSION: ICD-10-CM

## 2020-06-11 DIAGNOSIS — R06.02 SOB (SHORTNESS OF BREATH): ICD-10-CM

## 2020-06-11 DIAGNOSIS — E21.3 HYPERPARATHYROIDISM (HCC): ICD-10-CM

## 2020-06-11 DIAGNOSIS — I50.32 DIASTOLIC CHF, CHRONIC (HCC): ICD-10-CM

## 2020-06-11 DIAGNOSIS — E11.40 TYPE 2 DIABETES MELLITUS WITH DIABETIC NEUROPATHY, WITHOUT LONG-TERM CURRENT USE OF INSULIN (HCC): ICD-10-CM

## 2020-06-11 DIAGNOSIS — R00.1 BRADYCARDIA: Primary | ICD-10-CM

## 2020-06-11 DIAGNOSIS — I35.0 NONRHEUMATIC AORTIC VALVE STENOSIS: ICD-10-CM

## 2020-06-11 DIAGNOSIS — M79.89 LEG SWELLING: ICD-10-CM

## 2020-06-11 DIAGNOSIS — N18.30 CKD (CHRONIC KIDNEY DISEASE) STAGE 3, GFR 30-59 ML/MIN (HCC): ICD-10-CM

## 2020-06-11 RX ORDER — FUROSEMIDE 40 MG/1
40 TABLET ORAL DAILY
Qty: 90 TAB | Refills: 1 | Status: SHIPPED | OUTPATIENT
Start: 2020-06-11 | End: 2020-12-15

## 2020-06-11 NOTE — PROGRESS NOTES
Sayda Kat is a 80 y.o. female is here for symptom-based f/u--more LE swelling, BERMAN. Hx DM II, hypertension/hypertensive CVD, dyslipidemia, anemia of chronic disease. GERD, DJD, Raynaud's, followed by Viki Maria NP,  Also with hx multinodular goiter/parathryoid issues (followed by Endocrine).  Issues with bradycardia, HR high 40's. EKG with sinus tiffanie 48, LVH, PRWP/old AMI, NSST. Had been on Cardizem --reduced to 120 by PCP, now stopped. Holter with sinus tiffanie, HR 42-79, avg 52, occasional PAC's. Echo 6/27/18 with mod LVEF, LVEF 65-70, LAE, mod aortic stenosis (mean 24), mild to mod pulm hypertension. Lexiscan 9/26/19 with no infarct/ischemia, LVEF >65%. Echo 9/19 with LVEF 60-65, mod AS (mean 23), MAC, mild MVP with trace MR, RVSP 44. Carotid doppler 9/19 with mild MAXWELL, otherwise ok. Currently on irbesartan 300, hydralazine 50 tid, spironolactone 25, lasix 20. Recent labs 5/23/20 with BUN/ Cr up to 56/2.73, previously 45/2.1, K 4.2, glc 123. .  The patient denies chest pain, orthopnea, PND,palpitations, syncope, presyncope or fatigue.        Patient Active Problem List    Diagnosis Date Noted    Gastroesophageal reflux disease without esophagitis 01/21/2020    Primary hyperparathyroidism ([address] 10/31/2019    Type 2 diabetes mellitus with diabetic neuropathy ([address] 07/15/2019    CKD (chronic kidney disease) stage 3, GFR 30-59 ml/min ([address] 07/15/2019    Anemia, chronic disease 07/15/2019    Hyperparathyroidism ([address] 08/06/2018    BMI 33.0-33.9,adult 08/06/2018    Bradycardia 08/01/2018    Aortic stenosis, moderate     Pure hypercholesterolemia 06/19/2018    Anemia of chronic disease 06/19/2018    Type 2 diabetes mellitus with nephropathy ([address] 12/19/2017    Well controlled type 2 diabetes mellitus ([address] 06/07/2016    Essential hypertension 06/07/2016    Encounter for long-term (current) use of other medications 08/10/2015      Дмитрий Kelly NP  Past Medical History: Diagnosis Date    Anemia of chronic disease 6/19/2018    Anxiety     Aortic stenosis, moderate     Back pain     CAD (coronary artery disease)     CKD (chronic kidney disease) stage 3, GFR 30-59 ml/min ([facility])     Constipation     Diabetes ([facility]     Diverticulosis 2007    DM (diabetes mellitus) ([facility]     AODM    Fibroid 1972    GERD (gastroesophageal reflux disease)     Goiter, non-toxic     Hernia, hiatal     Hyperlipemia     Hypertension     Hypokalemia     Menopause     Osteoarthritis 2006    bursitis R shoulder    Osteoarthritis of right knee     PVC (premature ventricular contraction)     H/O PVC's    Raynaud disease     Venous stasis     Wears hearing aid     both ears      Past Surgical History:   Procedure Laterality Date    HX BREAST BIOPSY Bilateral     x5    HX CATARACT REMOVAL  2004    bilateral    HX COLONOSCOPY  2002, 08/2007    HX DILATION AND CURETTAGE  1960's    HX HYSTERECTOMY  66's    BSO    HX OTHER SURGICAL  2001    sigmoidoscopy     Allergies   Allergen Reactions    Metformin Other (comments)     Kidneys effected      Family History   Problem Relation Age of Onset    Diabetes Brother     Hypertension Mother     Diabetes Brother       Social History     Socioeconomic History    Marital status:      Spouse name: Not on file    Number of children: Not on file    Years of education: Not on file    Highest education level: Not on file   Occupational History    Not on file   Social Needs    Financial resource strain: Not on file    Food insecurity     Worry: Not on file     Inability: Not on file    Transportation needs     Medical: Not on file     Non-medical: Not on file   Tobacco Use    Smoking status: Never Smoker    Smokeless tobacco: Never Used   Substance and Sexual Activity    Alcohol use: No     Alcohol/week: 0.0 standard drinks    Drug use: No    Sexual activity: Never   Lifestyle    Physical activity     Days per week: Not on file Minutes per session: Not on file    Stress: Not on file   Relationships    Social connections     Talks on phone: Not on file     Gets together: Not on file     Attends Yazdanism service: Not on file     Active member of club or organization: Not on file     Attends meetings of clubs or organizations: Not on file     Relationship status: Not on file    Intimate partner violence     Fear of current or ex partner: Not on file     Emotionally abused: Not on file     Physically abused: Not on file     Forced sexual activity: Not on file   Other Topics Concern    Not on file   Social History Narrative    Happily . No concern for abuse. Exercise: none    Diet: Careful. Wear Seatbelts              Current Outpatient Medications   Medication Sig    furosemide (LASIX) 40 mg tablet Take 1 Tab by mouth daily. Increased dose    gabapentin (NEURONTIN) 100 mg capsule Take 1 Cap by mouth three (3) times daily.  irbesartan (AVAPRO) 300 mg tablet TAKE 1 TAB BY MOUTH NIGHTLY.  cetirizine (ZYRTEC) 10 mg tablet Take 10 mg by mouth.  lovastatin (MEVACOR) 40 mg tablet TAKE 1 TAB BY MOUTH NIGHTLY FOR CHOLESTEROL LOWERING    hydrALAZINE (APRESOLINE) 100 mg tablet Take 1 Tab by mouth three (3) times daily.  omeprazole (PRILOSEC) 40 mg capsule Take 1 Cap by mouth daily.  varicella-zoster recombinant, PF, (SHINGRIX, PF,) 50 mcg/0.5 mL susr injection 0.5mL by IntraMUSCular route once now and then repeat in 2-6 months    fluticasone propionate (FLONASE) 50 mcg/actuation nasal spray SPRAY 1 PUFF IN EACH NOSTRIL ONCE DAILY    calcium phosphate trib/vit D3 (YOGURT+CALCIUM GUMMIES PO) Take  by mouth.  TRUE METRIX GLUCOSE TEST STRIP strip     aspirin delayed-release 81 mg tablet Take 1 Tab by mouth daily.  acetaminophen (TYLENOL EXTRA STRENGTH) 500 mg tablet Take 1 Tab by mouth every six (6) hours as needed for Pain. No current facility-administered medications for this visit.           Review of Symptoms:    CONST  No weight change. No fever, chills, sweats    ENT No visual changes, URI sx, sore throat    CV  See HPI   RESP  No cough, or sputum, wheezing. Also see HPI   GI  No abdominal pain or change in bowel habits. No heartburn or dysphagia. No melena or rectal bleeding.   No dysuria, urgency, frequency, hematuria   MSKEL  No joint pain, swelling. No muscle pain. SKIN  No rash or lesions. NEURO  No headache, syncope, or seizure. No weakness, loss of sensation, or paresthesias. PSYCH  No low mood or depression  No anxiety. HE/LYMPH  No easy bruising, abnormal bleeding, or enlarged glands. Physical ExamPhysical Exam:    Visit Vitals  /70 (BP 1 Location: Left arm, BP Patient Position: Sitting)   Pulse (!) 56   Temp (!) 95.2 °F (35.1 °C)   Resp 14   Ht 5' 1\" (1.549 m)   Wt 180 lb (81.6 kg)   SpO2 98% Comment: ra   BMI 34.01 kg/m²     Gen: NAD  HEENT:  PERRL, throat clear  Neck: no adenopathy, no thyromegaly, no JVD   Heart:  Regular,Nl S1S2,  II(/VI murmur, gallop or rub. Lungs:  clear  Abdomen:   Soft, non-tender, bowel sounds are active. Extremities:  mild to mod edema, L>R  Pulse: symmetric  Neuro: A&O times 3, No focal neuro deficits    Cardiographics    ECG: sinus tiffanie 56.  NST, no acute changes      Labs:   Lab Results   Component Value Date/Time    Sodium 133 (L) 05/21/2020 01:38 PM    Sodium 137 01/21/2020 01:38 PM    Sodium 139 11/14/2019 01:25 PM    Sodium 137 10/15/2019 01:39 PM    Sodium 140 07/15/2019 01:53 PM    Potassium 4.2 05/21/2020 01:38 PM    Potassium 5.3 (H) 01/21/2020 01:38 PM    Potassium 4.8 11/14/2019 01:25 PM    Potassium 5.0 10/15/2019 01:39 PM    Potassium 4.7 07/15/2019 01:53 PM    Chloride 101 05/21/2020 01:38 PM    Chloride 107 (H) 01/21/2020 01:38 PM    Chloride 107 11/14/2019 01:25 PM    Chloride 103 10/15/2019 01:39 PM    Chloride 106 07/15/2019 01:53 PM    CO2 22 05/21/2020 01:38 PM    CO2 16 (L) 01/21/2020 01:38 PM    CO2 21 11/14/2019 01:25 PM    CO2 18 (L) 10/15/2019 01:39 PM    CO2 21 07/15/2019 01:53 PM    Anion gap 10 05/21/2020 01:38 PM    Anion gap 11 11/14/2019 01:25 PM    Anion gap 9 08/09/2018 12:19 PM    Glucose 123 (H) 05/21/2020 01:38 PM    Glucose 108 (H) 01/21/2020 01:38 PM    Glucose 109 (H) 11/14/2019 01:25 PM    Glucose 118 (H) 10/15/2019 01:39 PM    Glucose 111 (H) 07/15/2019 01:53 PM    BUN 56 (H) 05/21/2020 01:38 PM    BUN 45 (H) 01/21/2020 01:38 PM    BUN 64 (H) 11/14/2019 01:25 PM    BUN 51 (H) 10/15/2019 01:39 PM    BUN 54 (H) 07/15/2019 01:53 PM    Creatinine 2.73 (H) 05/21/2020 01:38 PM    Creatinine 2.10 (H) 01/21/2020 01:38 PM    Creatinine 2.63 (H) 11/14/2019 01:25 PM    Creatinine 2.31 (H) 10/15/2019 01:39 PM    Creatinine 2.06 (H) 07/15/2019 01:53 PM    BUN/Creatinine ratio 21 (H) 05/21/2020 01:38 PM    BUN/Creatinine ratio 21 01/21/2020 01:38 PM    BUN/Creatinine ratio 24 (H) 11/14/2019 01:25 PM    BUN/Creatinine ratio 22 10/15/2019 01:39 PM    BUN/Creatinine ratio 26 07/15/2019 01:53 PM    GFR est AA 20 (L) 05/21/2020 01:38 PM    GFR est AA 24 (L) 01/21/2020 01:38 PM    GFR est AA 21 (L) 11/14/2019 01:25 PM    GFR est AA 21 (L) 10/15/2019 01:39 PM    GFR est AA 24 (L) 07/15/2019 01:53 PM    GFR est non-AA 16 (L) 05/21/2020 01:38 PM    GFR est non-AA 21 (L) 01/21/2020 01:38 PM    GFR est non-AA 17 (L) 11/14/2019 01:25 PM    GFR est non-AA 18 (L) 10/15/2019 01:39 PM    GFR est non-AA 21 (L) 07/15/2019 01:53 PM    Calcium 10.3 (H) 05/21/2020 01:38 PM    Calcium 10.1 01/21/2020 01:38 PM    Calcium 10.2 (H) 11/14/2019 01:25 PM    Calcium 10.7 (H) 10/15/2019 01:39 PM    Calcium 10.6 (H) 07/15/2019 01:53 PM    Bilirubin, total 0.2 01/21/2020 01:38 PM    Bilirubin, total 0.2 10/15/2019 01:39 PM    Bilirubin, total 0.2 07/15/2019 01:53 PM    Bilirubin, total 0.3 02/26/2019 03:04 PM    Bilirubin, total 0.3 01/08/2019 05:05 PM    Alk. phosphatase 51 01/21/2020 01:38 PM    Alk.  phosphatase 57 10/15/2019 01:39 PM Alk. phosphatase 48 07/15/2019 01:53 PM    Alk. phosphatase 51 02/26/2019 03:04 PM    Alk. phosphatase 48 01/08/2019 05:05 PM    Protein, total 6.9 01/21/2020 01:38 PM    Protein, total 6.9 10/15/2019 01:39 PM    Protein, total 7.1 07/15/2019 01:53 PM    Protein, total 7.0 02/26/2019 03:04 PM    Protein, total 6.9 01/08/2019 05:05 PM    Albumin 3.5 05/21/2020 01:38 PM    Albumin 3.8 01/21/2020 01:38 PM    Albumin 3.4 (L) 11/14/2019 01:25 PM    Albumin 3.8 10/15/2019 01:39 PM    Albumin 4.2 07/15/2019 01:53 PM    Globulin 3.6 08/09/2018 12:19 PM    A-G Ratio 1.2 01/21/2020 01:38 PM    A-G Ratio 1.2 10/15/2019 01:39 PM    A-G Ratio 1.4 07/15/2019 01:53 PM    A-G Ratio 1.2 02/26/2019 03:04 PM    A-G Ratio 1.2 01/08/2019 05:05 PM    ALT (SGPT) 9 01/21/2020 01:38 PM    ALT (SGPT) 8 10/15/2019 01:39 PM    ALT (SGPT) 9 07/15/2019 01:53 PM    ALT (SGPT) 7 02/26/2019 03:04 PM    ALT (SGPT) 8 01/08/2019 05:05 PM     No results found for: CPK, CPKX, CPX  Lab Results   Component Value Date/Time    Cholesterol, total 113 02/26/2019 03:04 PM    Cholesterol, total 126 06/06/2017 01:47 PM    Cholesterol, total 137 09/07/2016 03:24 PM    Cholesterol, total 115 06/07/2016 04:34 PM    Cholesterol, total 119 01/04/2016 02:24 PM    HDL Cholesterol 38 (L) 02/26/2019 03:04 PM    HDL Cholesterol 40 06/06/2017 01:47 PM    HDL Cholesterol 40 09/07/2016 03:24 PM    HDL Cholesterol 37 (L) 06/07/2016 04:34 PM    HDL Cholesterol 35 (L) 01/04/2016 02:24 PM    LDL, calculated 56 02/26/2019 03:04 PM    LDL, calculated 64 06/06/2017 01:47 PM    LDL, calculated 75 09/07/2016 03:24 PM    LDL, calculated 60 06/07/2016 04:34 PM    LDL, calculated 68 01/04/2016 02:24 PM    Triglyceride 94 02/26/2019 03:04 PM    Triglyceride 108 06/06/2017 01:47 PM    Triglyceride 109 09/07/2016 03:24 PM    Triglyceride 90 06/07/2016 04:34 PM    Triglyceride 80 01/04/2016 02:24 PM     No results found for this or any previous visit.     Assessment:         Patient Active Problem List    Diagnosis Date Noted    Gastroesophageal reflux disease without esophagitis 01/21/2020    Primary hyperparathyroidism ([facility] 10/31/2019    Type 2 diabetes mellitus with diabetic neuropathy ([facility] 07/15/2019    CKD (chronic kidney disease) stage 3, GFR 30-59 ml/min ([facility] 07/15/2019    Anemia, chronic disease 07/15/2019    Hyperparathyroidism ([facility] 08/06/2018    BMI 33.0-33.9,adult 08/06/2018    Bradycardia 08/01/2018    Aortic stenosis, moderate     Pure hypercholesterolemia 06/19/2018    Anemia of chronic disease 06/19/2018    Type 2 diabetes mellitus with nephropathy ([facility] 12/19/2017    Well controlled type 2 diabetes mellitus ([facility] 06/07/2016    Essential hypertension 06/07/2016    Encounter for long-term (current) use of other medications 08/10/2015      80 y.o. female is here for symptom-based f/u--more LE swelling, BERMAN. Hx DM II, hypertension/hypertensive CVD, dyslipidemia, anemia of chronic disease. GERD, DJD, Raynaud's, followed by Renita Reynolds NP,  Also with hx multinodular goiter/parathryoid issues (followed by Endocrine).  Issues with bradycardia, HR high 40's. EKG with sinus tiffanie 48, LVH, PRWP/old AMI, NSST. Had been on Cardizem --reduced to 120 by PCP, now stopped. Holter with sinus tiffanie, HR 42-79, avg 52, occasional PAC's. Echo 6/27/18 with mod LVEF, LVEF 65-70, LAE, mod aortic stenosis (mean 24), mild to mod pulm hypertension. Lexiscan 9/26/19 with no infarct/ischemia, LVEF >65%. Echo 9/19 with LVEF 60-65, mod AS (mean 23), MAC, mild MVP with trace MR, RVSP 44. Carotid doppler 9/19 with mild MAXWELL, otherwise ok. Currently on irbesartan 300, hydralazine 50 tid, spironolactone 25, lasix 20. Recent labs 5/23/20 with BUN/ Cr up to 56/2.73, previously 45/2.1, K 4.2, glc 123. Plan:      Will STOP spironolactone (rising cr)  Increase Lasix to 40mg (now on 20)  Echo/doppler--call w/ results (recheck LVEF aortic stenosis)  followup with PCP today as planned, recheck labs, etc  RTC 3 mos, sooner prn    Vern Noel MD

## 2020-06-11 NOTE — PROGRESS NOTES
Verified patient with two patient identifiers. Medications reviewed/approved by Dr. Della Romero. 1. Have you been to the ER, urgent care clinic since your last visit? Hospitalized since your last visit?no    2. Have you seen or consulted any other health care providers outside of the [address] [place] [address] since your last visit? Include any pap smears or colon screening. No    Pt speaks english language and has a co learner: Beryle Sheriff   Does well with demonstration.     pts visitor - Olga temp: 97.3 F

## 2020-06-18 ENCOUNTER — HOSPITAL ENCOUNTER (OUTPATIENT)
Dept: NON INVASIVE DIAGNOSTICS | Age: 85
Discharge: HOME | End: 2020-06-18
Attending: INTERNAL MEDICINE
Payer: MEDICARE

## 2020-06-18 VITALS
DIASTOLIC BLOOD PRESSURE: 55 MMHG | BODY MASS INDEX: 35.32 KG/M2 | SYSTOLIC BLOOD PRESSURE: 165 MMHG | HEIGHT: 60 IN | WEIGHT: 179.9 LBS

## 2020-06-18 DIAGNOSIS — M79.89 LEG SWELLING: ICD-10-CM

## 2020-06-18 DIAGNOSIS — R06.02 SOB (SHORTNESS OF BREATH): ICD-10-CM

## 2020-06-18 DIAGNOSIS — N18.30 CKD (CHRONIC KIDNEY DISEASE) STAGE 3, GFR 30-59 ML/MIN (HCC): ICD-10-CM

## 2020-06-18 DIAGNOSIS — I50.32 DIASTOLIC CHF, CHRONIC (HCC): ICD-10-CM

## 2020-06-18 PROCEDURE — 93306 TTE W/DOPPLER COMPLETE: CPT

## 2020-06-19 LAB
AV VELOCITY RATIO: 0.36
AV VTI RATIO: 0.4
ECHO AV AREA PEAK VELOCITY: 1 CM2
ECHO AV AREA VTI: 1.3 CM2
ECHO AV MEAN GRADIENT: 29 MMHG
ECHO AV MEAN VELOCITY: 2.54 M/S
ECHO AV PEAK GRADIENT: 51 MMHG
ECHO AV PEAK VELOCITY: 357.25 CM/S
ECHO AV VTI: 83.26 CM
ECHO LA AREA 4C: 18.4 CM2
ECHO LA MAJOR AXIS: 3.87 CM
ECHO LA VOL 4C: 50.52 ML (ref 22–52)
ECHO LA VOLUME INDEX A4C: 28.31 ML/M2 (ref 16–28)
ECHO LV E' LATERAL VELOCITY: 8.43 CM/S
ECHO LV E' SEPTAL VELOCITY: 6.13 CM/S
ECHO LV INTERNAL DIMENSION DIASTOLIC: 4.09 CM (ref 3.9–5.3)
ECHO LV INTERNAL DIMENSION SYSTOLIC: 2.96 CM
ECHO LV IVSD: 1.03 CM (ref 0.6–0.9)
ECHO LV MASS 2D: 169.8 G (ref 67–162)
ECHO LV MASS INDEX 2D: 95.2 G/M2 (ref 43–95)
ECHO LV POSTERIOR WALL DIASTOLIC: 1.14 CM (ref 0.6–0.9)
ECHO LVOT DIAM: 1.9 CM
ECHO LVOT PEAK GRADIENT: 6.7 MMHG
ECHO LVOT PEAK VELOCITY: 129.37 CM/S
ECHO LVOT SV: 105.6 ML
ECHO LVOT VTI: 37.35 CM
ECHO MV A VELOCITY: 116.44 CM/S
ECHO MV E VELOCITY: 78.45 CM/S
ECHO MV E/A RATIO: 0.67
ECHO MV E/E' LATERAL: 9.31
ECHO MV E/E' RATIO (AVERAGED): 11.05
ECHO MV E/E' SEPTAL: 12.8
ECHO RV INTERNAL DIMENSION: 3.2 CM
ECHO RV TAPSE: 2.53 CM (ref 1.5–2)
ECHO TV REGURGITANT MAX VELOCITY: 286.88 CM/S
ECHO TV REGURGITANT PEAK GRADIENT: 32.9 MMHG
LVFS 2D: 27.59 %
LVOT MG: 2.91 MMHG
LVOT MV: 0.75 CM/S

## 2020-06-24 ENCOUNTER — TELEPHONE (OUTPATIENT)
Dept: CARDIOLOGY CLINIC | Age: 85
End: 2020-06-24

## 2020-06-24 NOTE — TELEPHONE ENCOUNTER
----- Message from Christa Garay MD sent at 6/22/2020 10:19 AM EDT -----  Regarding: echo  No major changes--normal LV pumping function, moderate aortic valve stenosis. Thanks Harsha Alfaro with the patients daughter, Larry Coats. Verified patient with two patient identifiers. Results given and questions answered. Patient verbalized understanding.

## 2020-08-12 RX ORDER — HYDRALAZINE HYDROCHLORIDE 100 MG/1
100 TABLET, FILM COATED ORAL 3 TIMES DAILY
Qty: 270 TAB | Refills: 1 | Status: SHIPPED | OUTPATIENT
Start: 2020-08-12 | End: 2021-02-16 | Stop reason: SDUPTHER

## 2020-09-11 ENCOUNTER — OFFICE VISIT (OUTPATIENT)
Dept: CARDIOLOGY CLINIC | Age: 85
End: 2020-09-11

## 2020-09-11 VITALS
HEIGHT: 60 IN | WEIGHT: 176 LBS | OXYGEN SATURATION: 98 % | DIASTOLIC BLOOD PRESSURE: 50 MMHG | TEMPERATURE: 98.6 F | SYSTOLIC BLOOD PRESSURE: 132 MMHG | RESPIRATION RATE: 18 BRPM | HEART RATE: 58 BPM | BODY MASS INDEX: 34.55 KG/M2

## 2020-09-11 DIAGNOSIS — R00.1 BRADYCARDIA: ICD-10-CM

## 2020-09-11 DIAGNOSIS — E11.21 TYPE 2 DIABETES MELLITUS WITH NEPHROPATHY (HCC): ICD-10-CM

## 2020-09-11 DIAGNOSIS — I73.9 PERIPHERAL VASCULAR DISEASE (HCC): ICD-10-CM

## 2020-09-11 DIAGNOSIS — D63.8 ANEMIA OF CHRONIC DISEASE: ICD-10-CM

## 2020-09-11 DIAGNOSIS — E21.3 HYPERPARATHYROIDISM (HCC): ICD-10-CM

## 2020-09-11 DIAGNOSIS — N18.4 CKD (CHRONIC KIDNEY DISEASE), STAGE IV (HCC): ICD-10-CM

## 2020-09-11 DIAGNOSIS — I10 ESSENTIAL HYPERTENSION: ICD-10-CM

## 2020-09-11 DIAGNOSIS — I35.0 AORTIC STENOSIS, MODERATE: Primary | ICD-10-CM

## 2020-09-11 DIAGNOSIS — R25.1 TREMOR: ICD-10-CM

## 2020-09-11 DIAGNOSIS — E78.00 PURE HYPERCHOLESTEROLEMIA: ICD-10-CM

## 2020-09-11 DIAGNOSIS — I50.32 DIASTOLIC CHF, CHRONIC (HCC): ICD-10-CM

## 2020-09-11 NOTE — PROGRESS NOTES
Verified patient with two patient identifiers. Medications reviewed/approved by Dr. Lina Miguel. Chief Complaint   Patient presents with    Aortic Stenosis     3 month follow up      Leg Swelling      June plan: Will STOP spironolactone, Increase Lasix to 40mg (now on 20)    Slow Heart Rate    Hypertension     1. Have you been to the ER, urgent care clinic since your last visit? Hospitalized since your last visit?no    2. Have you seen or consulted any other health care providers outside of the [address] [place] [address] since your last visit? Include any pap smears or colon screening.  Liz Espinoza temp: 97 F

## 2020-09-11 NOTE — PROGRESS NOTES
Kyra Salas is a 80 y.o. female is here for routine f/u. More LE swelling, BERMAN. Hx DM II, hypertension/hypertensive CVD, dyslipidemia, anemia of chronic disease. GERD, DJD, Raynaud's, followed by Nell Hernandez NP,  Also with hx multinodular goiter/parathryoid issues (followed by Endocrine).  Issues with bradycardia, HR high 40's. EKG with sinus tiffanie 48, LVH, PRWP/old AMI, NSST. Had been on Cardizem --reduced to 120 by PCP, now stopped. Holter with sinus tiffanie, HR 42-79, avg 52, occasional PAC's. Echo 6/27/18 with mod LVEF, LVEF 65-70, LAE, mod aortic stenosis (mean 24), mild to mod pulm hypertension. Lexiscan 9/26/19 with no infarct/ischemia, LVEF >65%.  Echo 9/19 with LVEF 60-65, mod AS (mean 23), MAC, mild MVP with trace MR, RVSP 44. Carotid doppler 9/19 with mild MAXWELL, otherwise ok. Currently on irbesartan 300, hydralazine 50 tid, spironolactone 25, lasix 20. Recent labs 5/23/20 with BUN/ Cr up to 56/2.73, previously 45/2.1, K 4.2, glc 123. Maribel Spicer Seen on 6/11/20 here--spironolactone stopped and lasix increased to 40mg. Seen in f/u by PCP and labs repeated--stable. Repeat Echo 6/18/20 with mod LVH, LVEF 60-65, D1, mod AS (mean gradient 29mm Hg). More leg weakness, ataxia, more pronounced tremor (?familial but now more), no hx Parkinson's, ?neuropathy (on gabapentin). The patient denies chest pain, orthopnea, PND,  palpitations, syncope, presyncope or fatigue.        Patient Active Problem List    Diagnosis Date Noted    Gastroesophageal reflux disease without esophagitis 01/21/2020    Primary hyperparathyroidism ([facility] 10/31/2019    Type 2 diabetes mellitus with diabetic neuropathy ([facility] 07/15/2019    CKD (chronic kidney disease) stage 3, GFR 30-59 ml/min ([facility]) 07/15/2019    Anemia, chronic disease 07/15/2019    Hyperparathyroidism ([facility] 08/06/2018    BMI 33.0-33.9,adult 08/06/2018    Bradycardia 08/01/2018    Aortic stenosis, moderate     Pure hypercholesterolemia 06/19/2018    Anemia of chronic disease 06/19/2018    Type 2 diabetes mellitus with nephropathy ([facility] 12/19/2017    Well controlled type 2 diabetes mellitus ([facility] 06/07/2016    Essential hypertension 06/07/2016    Encounter for long-term (current) use of other medications 08/10/2015      Renato SANTA NP  Past Medical History:   Diagnosis Date    Anemia of chronic disease 6/19/2018    Anxiety     Aortic stenosis, moderate     Back pain     CAD (coronary artery disease)     CKD (chronic kidney disease) stage 3, GFR 30-59 ml/min [facility]     Constipation     Diabetes ([facility]     Diverticulosis 2007    DM (diabetes mellitus) ([facility]     AODM    Fibroid 1972    GERD (gastroesophageal reflux disease)     Goiter, non-toxic     Hernia, hiatal     Hyperlipemia     Hypertension     Hypokalemia     Menopause     Osteoarthritis 2006    bursitis R shoulder    Osteoarthritis of right knee     PVC (premature ventricular contraction)     H/O PVC's    Raynaud disease     Venous stasis     Wears hearing aid     both ears      Past Surgical History:   Procedure Laterality Date    HX BREAST BIOPSY Bilateral     x5    HX CATARACT REMOVAL  2004    bilateral    HX COLONOSCOPY  2002, 08/2007    HX DILATION AND CURETTAGE  1960's    HX HYSTERECTOMY  66's    BSO    HX OTHER SURGICAL  2001    sigmoidoscopy     Allergies   Allergen Reactions    Metformin Other (comments)     Kidneys effected      Family History   Problem Relation Age of Onset    Diabetes Brother     Hypertension Mother     Diabetes Brother       Social History     Socioeconomic History    Marital status:      Spouse name: Not on file    Number of children: Not on file    Years of education: Not on file    Highest education level: Not on file   Occupational History    Not on file   Social Needs    Financial resource strain: Not on file    Food insecurity     Worry: Not on file     Inability: Not on file    Transportation needs     Medical: Not on file     Non-medical: Not on file   Tobacco Use    Smoking status: Never Smoker    Smokeless tobacco: Never Used   Substance and Sexual Activity    Alcohol use: No     Alcohol/week: 0.0 standard drinks    Drug use: No    Sexual activity: Never   Lifestyle    Physical activity     Days per week: Not on file     Minutes per session: Not on file    Stress: Not on file   Relationships    Social connections     Talks on phone: Not on file     Gets together: Not on file     Attends Hindu service: Not on file     Active member of club or organization: Not on file     Attends meetings of clubs or organizations: Not on file     Relationship status: Not on file    Intimate partner violence     Fear of current or ex partner: Not on file     Emotionally abused: Not on file     Physically abused: Not on file     Forced sexual activity: Not on file   Other Topics Concern    Not on file   Social History Narrative    Happily . No concern for abuse. Exercise: none    Diet: Careful. Wear Seatbelts              Current Outpatient Medications   Medication Sig    hydrALAZINE (APRESOLINE) 100 mg tablet Take 1 Tab by mouth three (3) times daily.  lovastatin (MEVACOR) 40 mg tablet TAKE 1 TAB BY MOUTH NIGHTLY FOR CHOLESTEROL LOWERING    omeprazole (PRILOSEC) 40 mg capsule TAKE ONE CAPSULE BY MOUTH EVERY MORNING TO CONTROL STOMACH ACID    gabapentin (NEURONTIN) 100 mg capsule Take 1 Cap by mouth three (3) times daily as needed for Pain. Max Daily Amount: 300 mg.    irbesartan (AVAPRO) 300 mg tablet Take 1 Tab by mouth nightly.  furosemide (LASIX) 40 mg tablet Take 1 Tab by mouth daily. Increased dose    cetirizine (ZYRTEC) 10 mg tablet Take 10 mg by mouth.  fluticasone propionate (FLONASE) 50 mcg/actuation nasal spray SPRAY 1 PUFF IN EACH NOSTRIL ONCE DAILY    TRUE METRIX GLUCOSE TEST STRIP strip     aspirin delayed-release 81 mg tablet Take 1 Tab by mouth daily.     acetaminophen (TYLENOL EXTRA STRENGTH) 500 mg tablet Take 1 Tab by mouth every six (6) hours as needed for Pain. No current facility-administered medications for this visit. Review of Symptoms:    CONST  No weight change. No fever, chills, sweats    ENT No visual changes, URI sx, sore throat    CV  See HPI   RESP  No cough, or sputum, wheezing. Also see HPI   GI  No abdominal pain or change in bowel habits. No heartburn or dysphagia. No melena or rectal bleeding.   No dysuria, urgency, frequency, hematuria   MSKEL  No joint pain, swelling. No muscle pain. SKIN  No rash or lesions. NEURO  No headache, syncope, or seizure. No weakness, loss of sensation, or paresthesias. PSYCH  No low mood or depression  No anxiety. HE/LYMPH  No easy bruising, abnormal bleeding, or enlarged glands. Physical ExamPhysical Exam:    Visit Vitals  Temp 98.6 °F (37 °C) (Temporal)   Resp 18   Ht 5' (1.524 m)   BMI 34.98 kg/m²     Gen: NAD  HEENT:  PERRL, throat clear  Neck: no adenopathy, no thyromegaly, no JVD   Heart:  Regular,Nl S1S2,  III/VI murmur, no gallop or rub. Lungs:  clear  Abdomen:   Soft, non-tender, bowel sounds are active.    Extremities:  mild edema L >R  Pulse: symmetric  Neuro: A&O times 3, No focal neuro deficits, +tremor R>L hand, sl gait ataxia      Labs:   Lab Results   Component Value Date/Time    Sodium 134 08/10/2020 02:44 PM    Sodium 133 (L) 05/21/2020 01:38 PM    Sodium 137 01/21/2020 01:38 PM    Sodium 139 11/14/2019 01:25 PM    Sodium 137 10/15/2019 01:39 PM    Potassium 4.0 08/10/2020 02:44 PM    Potassium 4.2 05/21/2020 01:38 PM    Potassium 5.3 (H) 01/21/2020 01:38 PM    Potassium 4.8 11/14/2019 01:25 PM    Potassium 5.0 10/15/2019 01:39 PM    Chloride 97 08/10/2020 02:44 PM    Chloride 101 05/21/2020 01:38 PM    Chloride 107 (H) 01/21/2020 01:38 PM    Chloride 107 11/14/2019 01:25 PM    Chloride 103 10/15/2019 01:39 PM    CO2 21 08/10/2020 02:44 PM    CO2 22 05/21/2020 01:38 PM    CO2 16 (L) 01/21/2020 01:38 PM    CO2 21 11/14/2019 01:25 PM    CO2 18 (L) 10/15/2019 01:39 PM    Anion gap 10 05/21/2020 01:38 PM    Anion gap 11 11/14/2019 01:25 PM    Anion gap 9 08/09/2018 12:19 PM    Glucose 120 (H) 08/10/2020 02:44 PM    Glucose 123 (H) 05/21/2020 01:38 PM    Glucose 108 (H) 01/21/2020 01:38 PM    Glucose 109 (H) 11/14/2019 01:25 PM    Glucose 118 (H) 10/15/2019 01:39 PM    BUN 58 (H) 08/10/2020 02:44 PM    BUN 56 (H) 05/21/2020 01:38 PM    BUN 45 (H) 01/21/2020 01:38 PM    BUN 64 (H) 11/14/2019 01:25 PM    BUN 51 (H) 10/15/2019 01:39 PM    Creatinine 2.47 (H) 08/10/2020 02:44 PM    Creatinine 2.73 (H) 05/21/2020 01:38 PM    Creatinine 2.10 (H) 01/21/2020 01:38 PM    Creatinine 2.63 (H) 11/14/2019 01:25 PM    Creatinine 2.31 (H) 10/15/2019 01:39 PM    BUN/Creatinine ratio 23 08/10/2020 02:44 PM    BUN/Creatinine ratio 21 (H) 05/21/2020 01:38 PM    BUN/Creatinine ratio 21 01/21/2020 01:38 PM    BUN/Creatinine ratio 24 (H) 11/14/2019 01:25 PM    BUN/Creatinine ratio 22 10/15/2019 01:39 PM    GFR est AA 19 (L) 08/10/2020 02:44 PM    GFR est AA 20 (L) 05/21/2020 01:38 PM    GFR est AA 24 (L) 01/21/2020 01:38 PM    GFR est AA 21 (L) 11/14/2019 01:25 PM    GFR est AA 21 (L) 10/15/2019 01:39 PM    GFR est non-AA 17 (L) 08/10/2020 02:44 PM    GFR est non-AA 16 (L) 05/21/2020 01:38 PM    GFR est non-AA 21 (L) 01/21/2020 01:38 PM    GFR est non-AA 17 (L) 11/14/2019 01:25 PM    GFR est non-AA 18 (L) 10/15/2019 01:39 PM    Calcium 9.7 08/10/2020 02:44 PM    Calcium 10.3 (H) 05/21/2020 01:38 PM    Calcium 10.1 01/21/2020 01:38 PM    Calcium 10.2 (H) 11/14/2019 01:25 PM    Calcium 10.7 (H) 10/15/2019 01:39 PM    Bilirubin, total 0.3 08/10/2020 02:44 PM    Bilirubin, total 0.2 01/21/2020 01:38 PM    Bilirubin, total 0.2 10/15/2019 01:39 PM    Bilirubin, total 0.2 07/15/2019 01:53 PM    Bilirubin, total 0.3 02/26/2019 03:04 PM    Alk. phosphatase 53 08/10/2020 02:44 PM    Alk. phosphatase 51 01/21/2020 01:38 PM    Alk. phosphatase 57 10/15/2019 01:39 PM    Alk. phosphatase 48 07/15/2019 01:53 PM    Alk. phosphatase 51 02/26/2019 03:04 PM    Protein, total 6.6 08/10/2020 02:44 PM    Protein, total 6.9 01/21/2020 01:38 PM    Protein, total 6.9 10/15/2019 01:39 PM    Protein, total 7.1 07/15/2019 01:53 PM    Protein, total 7.0 02/26/2019 03:04 PM    Albumin 3.6 08/10/2020 02:44 PM    Albumin 3.5 05/21/2020 01:38 PM    Albumin 3.8 01/21/2020 01:38 PM    Albumin 3.4 (L) 11/14/2019 01:25 PM    Albumin 3.8 10/15/2019 01:39 PM    Globulin 3.6 08/09/2018 12:19 PM    A-G Ratio 1.2 08/10/2020 02:44 PM    A-G Ratio 1.2 01/21/2020 01:38 PM    A-G Ratio 1.2 10/15/2019 01:39 PM    A-G Ratio 1.4 07/15/2019 01:53 PM    A-G Ratio 1.2 02/26/2019 03:04 PM    ALT (SGPT) 7 08/10/2020 02:44 PM    ALT (SGPT) 9 01/21/2020 01:38 PM    ALT (SGPT) 8 10/15/2019 01:39 PM    ALT (SGPT) 9 07/15/2019 01:53 PM    ALT (SGPT) 7 02/26/2019 03:04 PM     No results found for: CPK, CPKX, CPX  Lab Results   Component Value Date/Time    Cholesterol, total 113 02/26/2019 03:04 PM    Cholesterol, total 126 06/06/2017 01:47 PM    Cholesterol, total 137 09/07/2016 03:24 PM    Cholesterol, total 115 06/07/2016 04:34 PM    Cholesterol, total 119 01/04/2016 02:24 PM    HDL Cholesterol 38 (L) 02/26/2019 03:04 PM    HDL Cholesterol 40 06/06/2017 01:47 PM    HDL Cholesterol 40 09/07/2016 03:24 PM    HDL Cholesterol 37 (L) 06/07/2016 04:34 PM    HDL Cholesterol 35 (L) 01/04/2016 02:24 PM    LDL, calculated 56 02/26/2019 03:04 PM    LDL, calculated 64 06/06/2017 01:47 PM    LDL, calculated 75 09/07/2016 03:24 PM    LDL, calculated 60 06/07/2016 04:34 PM    LDL, calculated 68 01/04/2016 02:24 PM    Triglyceride 94 02/26/2019 03:04 PM    Triglyceride 108 06/06/2017 01:47 PM    Triglyceride 109 09/07/2016 03:24 PM    Triglyceride 90 06/07/2016 04:34 PM    Triglyceride 80 01/04/2016 02:24 PM     No results found for this or any previous visit.     Assessment:         Patient Active Problem List    Diagnosis Date Noted    Gastroesophageal reflux disease without esophagitis 01/21/2020    Primary hyperparathyroidism ([facility] 10/31/2019    Type 2 diabetes mellitus with diabetic neuropathy ([facility] 07/15/2019    CKD (chronic kidney disease) stage 3, GFR 30-59 ml/min ([facility]) 07/15/2019    Anemia, chronic disease 07/15/2019    Hyperparathyroidism ([facility] 08/06/2018    BMI 33.0-33.9,adult 08/06/2018    Bradycardia 08/01/2018    Aortic stenosis, moderate     Pure hypercholesterolemia 06/19/2018    Anemia of chronic disease 06/19/2018    Type 2 diabetes mellitus with nephropathy ([facility] 12/19/2017    Well controlled type 2 diabetes mellitus ([facility] 06/07/2016    Essential hypertension 06/07/2016    Encounter for long-term (current) use of other medications 08/10/2015     More LE swelling, BERMAN. Hx DM II, hypertension/hypertensive CVD, dyslipidemia, anemia of chronic disease. GERD, DJD, Raynaud's, followed by Hermelinda Bansal NP,  Also with hx multinodular goiter/parathryoid issues (followed by Endocrine).  Issues with bradycardia, HR high 40's. EKG with sinus tiffanie 48, LVH, PRWP/old AMI, NSST. Had been on Cardizem --reduced to 120 by PCP, now stopped. Holter with sinus tiffanie, HR 42-79, avg 52, occasional PAC's. Echo 6/27/18 with mod LVEF, LVEF 65-70, LAE, mod aortic stenosis (mean 24), mild to mod pulm hypertension. Lexiscan 9/26/19 with no infarct/ischemia, LVEF >65%.  Echo 9/19 with LVEF 60-65, mod AS (mean 23), MAC, mild MVP with trace MR, RVSP 44. Carotid doppler 9/19 with mild MAXWELL, otherwise ok. Currently on irbesartan 300, hydralazine 50 tid, spironolactone 25, lasix 20. Recent labs 5/23/20 with BUN/ Cr up to 56/2.73, previously 45/2.1, K 4.2, glc 123. Lina Randall Seen on 6/11/20 here--spironolactone stopped and lasix increased to 40mg. Seen in f/u by PCP and labs repeated--stable. Repeat Echo 6/18/20 with mod LVH, LVEF 60-65, D1, mod AS (mean gradient 29mm Hg).  More leg weakness, ataxia, more pronounced tremor (?familial but now more), no hx Parkinson's, ?neuropathy (on gabapentin). Plan:     Overall stable from CV standpoint--continue current meds/rx  Fu with PCP as planned--labs, etc  Ataxia, tremor--?familial,Parkinson's, neuropathy)--Neurology consultation (daughter will pursue)  AS moderate, some progression--likely repeat Echo 6 mos    Continue current care and f/u in 6 months.     Ally Romero MD

## 2020-10-06 ENCOUNTER — VIRTUAL VISIT (OUTPATIENT)
Dept: ENDOCRINOLOGY | Age: 85
End: 2020-10-06
Payer: MEDICARE

## 2020-10-06 DIAGNOSIS — E83.52 HYPERCALCEMIA: ICD-10-CM

## 2020-10-06 DIAGNOSIS — E21.0 PRIMARY HYPERPARATHYROIDISM (HCC): Primary | ICD-10-CM

## 2020-10-06 DIAGNOSIS — E55.9 VITAMIN D DEFICIENCY: ICD-10-CM

## 2020-10-06 DIAGNOSIS — M85.852 OSTEOPENIA OF NECK OF LEFT FEMUR: ICD-10-CM

## 2020-10-06 DIAGNOSIS — E04.2 MULTINODULAR GOITER: ICD-10-CM

## 2020-10-06 PROCEDURE — 1090F PRES/ABSN URINE INCON ASSESS: CPT | Performed by: INTERNAL MEDICINE

## 2020-10-06 PROCEDURE — G8427 DOCREV CUR MEDS BY ELIG CLIN: HCPCS | Performed by: INTERNAL MEDICINE

## 2020-10-06 PROCEDURE — 99214 OFFICE O/P EST MOD 30 MIN: CPT | Performed by: INTERNAL MEDICINE

## 2020-10-06 PROCEDURE — 1100F PTFALLS ASSESS-DOCD GE2>/YR: CPT | Performed by: INTERNAL MEDICINE

## 2020-10-06 PROCEDURE — 3288F FALL RISK ASSESSMENT DOCD: CPT | Performed by: INTERNAL MEDICINE

## 2020-10-06 PROCEDURE — G8432 DEP SCR NOT DOC, RNG: HCPCS | Performed by: INTERNAL MEDICINE

## 2020-10-06 NOTE — PROGRESS NOTES
**DUE TO PANDEMIC AND HEALTH CONCERNS IN THE COMMUNITY, THIS PATIENT WAS EITHER ILL OR FOUND TO BE HIGH RISK FOR IN-PERSON EVALUATION WITHIN THE CLINIC. THE FOLLOWING IS A VIRTUAL TELEMEDICINE VIDEO ENCOUNTER VIA [employer], TO WHICH THE PATIENT AGREED. THE PURPOSE IS TO LIMIT INTERRUPTIONS IN HEALTHCARE AND TO PROVIDE FOR ONGOING URGENT NEEDS UNDER THE CURRENT CONDITIONS. CHIEF COMPLAINT: Hyperparathyroidism, MNG    HISTORY OF PRESENT ILLNESS:   Mariah Basilio is a 80 y.o. female with a PMHx as noted below who presents for f/u eval of hypercalcemia and MNG    Elevated calcium as far back as January 2014, as high as 11.5, 10.1 normal in Jan 2020 ! Her Calcium was again normal most recently in August while on supplementation,  Vitamin D status: off per nephrology  Calcium supplementation: Off per nephrology       Parathyroid imaging:    - 7/25/18: NM Parathyroid Scan: Normal Study   - 7/25/18: US: MNG noted.  No parathyroid adenoma described, images not available ([facility])    Rt upper 1.3 x 0.8 x 1.2 cm nodule,     Rt lateral 1.1 x 1 x 0.9 cm nodule,    Rt lower 1.5 x 1 cm nodule,    Lt  Inferior 1.8 x 1.6 x 1.8 cm nodule    Lt  Superior 1.2 x 0.9 x 0.8 cm nodule    Regarding bone health, patient has a history of advanced osteopenia  Bone Density Scan 2016: T= - 2.4 L femoral neck,  Repeat DXA 8/19/18: T= -1.9 R FN, -1.8 L femoral neck  Antiresorptive Therapy: 10/5/18: Received 3mg Reclast: Dose #1 (adjusted for GFR)        11/14/19: Prolia dose #1 (renal function had worsened so switched)         5/21/20: Prolia dose #2 received, tolerated    Review of most recent bone-related metabolic lab panel:  Lab Results   Component Value Date    PTHILT 248 (H) 08/10/2020    PTHILT 74 (H) 09/28/2018    PTHILT CANCELED 09/18/2018    CA 9.7 08/10/2020    CA 10.3 (H) 05/21/2020    CA 10.1 01/21/2020    CILT 6.0 (H) 09/07/2018    CILT 5.9 (H) 07/17/2018    VITD3 49.5 09/28/2018    VITD3 57.9 07/17/2018    GFRNA 17 (L) 08/10/2020    GFRNA 16 (L) 05/21/2020    GFRNA 21 (L) 01/21/2020    GFRAA 19 (L) 08/10/2020    GFRAA 20 (L) 05/21/2020    GFRAA 24 (L) 01/21/2020    TSH 1.130 02/26/2019    PHOS 2.3 (L) 08/10/2020    MG 1.3 (L) 05/21/2020     LAB KEY:  [id number]: PTHrP level  [id number]: Serum NTX level  PTHILT: Intact PTH level  PTHRLT:  PTH-rP  CILT: Ionized Calcium  CALU2: 24 hr urine calcium    PAST MEDICAL/SURGICAL HISTORY:   Past Medical History:   Diagnosis Date    Anemia of chronic disease 6/19/2018    Anxiety     Aortic stenosis, moderate     Back pain     CAD (coronary artery disease)     CKD (chronic kidney disease) stage 3, GFR 30-59 ml/min ([facility])     Constipation     Diabetes [facility] [address]     Diverticulosis 2007    DM (diabetes mellitus) ([facility] [address]     AODM    Fibroid 1972    GERD (gastroesophageal reflux disease)     Goiter, non-toxic     Hernia, hiatal     Hyperlipemia     Hypertension     Hypokalemia     Menopause     Osteoarthritis 2006    bursitis R shoulder    Osteoarthritis of right knee     PVC (premature ventricular contraction)     H/O PVC's    Raynaud disease     Venous stasis     Wears hearing aid     both ears     Past Surgical History:   Procedure Laterality Date    HX BREAST BIOPSY Bilateral     x5    HX CATARACT REMOVAL  2004    bilateral    HX COLONOSCOPY  2002, 08/2007    HX DILATION AND CURETTAGE  1960's    HX HYSTERECTOMY  70's    BSO    HX OTHER SURGICAL  2001    sigmoidoscopy       ALLERGIES:   Allergies   Allergen Reactions    Metformin Other (comments)     Kidneys effected       MEDICATIONS ON ADMISSION:     Current Outpatient Medications:     hydrALAZINE (APRESOLINE) 100 mg tablet, Take 1 Tab by mouth three (3) times daily. , Disp: 270 Tab, Rfl: 1    lovastatin (MEVACOR) 40 mg tablet, TAKE 1 TAB BY MOUTH NIGHTLY FOR CHOLESTEROL LOWERING, Disp: 90 Tab, Rfl: 1    omeprazole (PRILOSEC) 40 mg capsule, TAKE ONE CAPSULE BY MOUTH EVERY MORNING TO CONTROL STOMACH ACID, Disp: 30 Cap, Rfl: 5    gabapentin (NEURONTIN) 100 mg capsule, Take 1 Cap by mouth three (3) times daily as needed for Pain. Max Daily Amount: 300 mg. (Patient taking differently: Take 100 mg by mouth three (3) times daily.), Disp: 90 Cap, Rfl: 3    irbesartan (AVAPRO) 300 mg tablet, Take 1 Tab by mouth nightly., Disp: 90 Tab, Rfl: 1    furosemide (LASIX) 40 mg tablet, Take 1 Tab by mouth daily. Increased dose, Disp: 90 Tab, Rfl: 1    cetirizine (ZYRTEC) 10 mg tablet, Take 10 mg by mouth., Disp: , Rfl:     fluticasone propionate (FLONASE) 50 mcg/actuation nasal spray, SPRAY 1 PUFF IN EACH NOSTRIL ONCE DAILY, Disp: 1 Bottle, Rfl: 5    TRUE METRIX GLUCOSE TEST STRIP strip, , Disp: , Rfl:     aspirin delayed-release 81 mg tablet, Take 1 Tab by mouth daily. , Disp: 30 Tab, Rfl: 0    acetaminophen (TYLENOL EXTRA STRENGTH) 500 mg tablet, Take 1 Tab by mouth every six (6) hours as needed for Pain., Disp: 30 Tab, Rfl: 1    SOCIAL HISTORY:   Social History     Socioeconomic History    Marital status:      Spouse name: Not on file    Number of children: Not on file    Years of education: Not on file    Highest education level: Not on file   Occupational History    Not on file   Social Needs    Financial resource strain: Not on file    Food insecurity     Worry: Not on file     Inability: Not on file    Transportation needs     Medical: Not on file     Non-medical: Not on file   Tobacco Use    Smoking status: Never Smoker    Smokeless tobacco: Never Used   Substance and Sexual Activity    Alcohol use: No     Alcohol/week: 0.0 standard drinks    Drug use: No    Sexual activity: Never   Lifestyle    Physical activity     Days per week: Not on file     Minutes per session: Not on file    Stress: Not on file   Relationships    Social connections     Talks on phone: Not on file     Gets together: Not on file     Attends Jainism service: Not on file     Active member of club or organization: Not on file     Attends meetings of clubs or organizations: Not on file     Relationship status: Not on file    Intimate partner violence     Fear of current or ex partner: Not on file     Emotionally abused: Not on file     Physically abused: Not on file     Forced sexual activity: Not on file   Other Topics Concern    Not on file   Social History Narrative    Happily . No concern for abuse. Exercise: none    Diet: Careful. Wear Seatbelts               FAMILY HISTORY:  Family History   Problem Relation Age of Onset    Diabetes Brother     Hypertension Mother     Diabetes Brother        REVIEW OF SYSTEMS: Complete ROS assessed and noted for that which is described above, all else are negative. Eyes: normal  ENT: normal  CVS: normal  Resp: normal  GI: normal  : normal  GYN: normal  Endocrine: normal  Integument: normal  Musculoskeletal: normal  Neuro: normal  Psych: normal      PHYSICAL EXAMINATION:    VITAL SIGNS:  Telemedicine visit    GENERAL: NCAT, Appears well nourished  EYES: EOMI, non-icteric, no proptosis  Ear/Nose/Throat: NCAT, no visible inflammation or masses  CARDIOVASCULAR: no cyanosis, no visible JVD  RESPIRATORY: comfortable respirations observed, no cyanosis  MUSCULOSKELETAL: Normal ROM of upper extremities observed  SKIN: No edema, rash, or other significant changes observed  NEUROLOGIC:  AAOx3  PSYCHIATRIC: Normal affect, Normal insight and judgement    REVIEW OF LABORATORY AND RADIOLOGY DATA:   Labs and documentation have been reviewed as described above. ASSESSMENT AND PLAN:   [place] is a 80 y.o. female with a PMHx as noted above who was referred to our endocrinology clinic for evaluation of hypercalcemia. Hypercalcemia  History of vitamin D deficiency  Primary Hyperparathyroidism  Multinodular Goiter  Osteopenia    Hyperparathyroidism / Hypercalcemia / Osteopenia:   Stable serum calcium with preference to avoid surgery if possible per patient, noting also negative imaging.  We will continue conservative management at this time. Calcium and D Supplements:    Prev taking 2 gummies BID (500 calcium/1000 D per 2 gummies)   Currently off per nephrology   PTH trended up from 74 to 248 off supplementation   This will adversely affect her bones,    Advised to inquire with nephrology if 1 gummy with B + D is ok,  Bone Specific Med: Prolia: Dose # 3 due Nov 2020, will order dose 3+4 today  Imaging: US/ NM scan w/o evidence of parathyroid adenoma,       DXA: Next imaging Nov 2021 (following 2 years of prolia), not sooner  Labs: reviewed    MNG: I have reviewed the images of her thyroid US personally. Her thyroid nodules mostly appear as pseudonodules, meaning that there is chronic heterogenous appearance giving the resemblance of thyroid nodules whereas most of them are not actually true nodules. The nodules present did not appear suspicious. Ultrasound can be repeated in 2021 with next DXA as long as stable clinically. RTC: 6 months for re-evaluation, Patients daughter notes she will call back to schedule this. 20 minutes spent toward telemedicine visit today of which >50% of this time was spent in counseling and coordination of care. Sha Dupree.  [address] [facility]

## 2020-10-23 PROBLEM — N18.4 CKD (CHRONIC KIDNEY DISEASE) STAGE 4, GFR 15-29 ML/MIN (HCC): Chronic | Status: ACTIVE | Noted: 2019-07-15

## 2020-10-26 PROBLEM — N18.4: Status: ACTIVE | Noted: 2019-07-15

## 2020-10-26 PROBLEM — I13.10: Status: ACTIVE | Noted: 2019-07-15

## 2020-11-02 ENCOUNTER — TELEPHONE (OUTPATIENT)
Dept: ENDOCRINOLOGY | Age: 85
End: 2020-11-02

## 2020-11-02 NOTE — TELEPHONE ENCOUNTER
Christy Beverly,     On 10/12/20 we ordered 2 doses of prolia for patient. The order was scanned into the media section and appears to be there. I continue however to receive notice from the infusion center through [facility] that I need to renew the order. Can you find out why they are sending these renewal notices when we had faxed them the order. Appreciated ! Logan Rosenthal.  [address] [facility]  [address] [place] [address]

## 2020-11-02 NOTE — TELEPHONE ENCOUNTER
I called [facility] and she said she wasn't sure why you were getting these as Ms. Federico Prado has been scheduled for dose #3 on 11/24/2020 at 1:30pm and they will schedule #4 at that visit.   Melecio Ames

## 2020-12-11 RX ORDER — DIPHENHYDRAMINE HYDROCHLORIDE 50 MG/ML
50 INJECTION, SOLUTION INTRAMUSCULAR; INTRAVENOUS AS NEEDED
Status: CANCELLED
Start: 2021-05-18

## 2020-12-11 RX ORDER — ALBUTEROL SULFATE 0.83 MG/ML
2.5 SOLUTION RESPIRATORY (INHALATION) AS NEEDED
Status: CANCELLED
Start: 2020-12-11

## 2020-12-11 RX ORDER — EPINEPHRINE 1 MG/ML
0.3 INJECTION, SOLUTION, CONCENTRATE INTRAVENOUS AS NEEDED
Status: CANCELLED | OUTPATIENT
Start: 2021-05-18

## 2020-12-11 RX ORDER — ACETAMINOPHEN 325 MG/1
650 TABLET ORAL AS NEEDED
Status: CANCELLED
Start: 2020-12-11

## 2020-12-11 RX ORDER — HYDROCORTISONE SODIUM SUCCINATE 100 MG/2ML
100 INJECTION, POWDER, FOR SOLUTION INTRAMUSCULAR; INTRAVENOUS AS NEEDED
Status: CANCELLED | OUTPATIENT
Start: 2021-05-18

## 2020-12-11 RX ORDER — ALBUTEROL SULFATE 0.83 MG/ML
2.5 SOLUTION RESPIRATORY (INHALATION) AS NEEDED
Status: CANCELLED
Start: 2021-05-18

## 2020-12-11 RX ORDER — DIPHENHYDRAMINE HYDROCHLORIDE 50 MG/ML
50 INJECTION, SOLUTION INTRAMUSCULAR; INTRAVENOUS AS NEEDED
Status: CANCELLED
Start: 2020-12-11

## 2020-12-11 RX ORDER — ACETAMINOPHEN 325 MG/1
650 TABLET ORAL AS NEEDED
Status: CANCELLED
Start: 2021-05-18

## 2020-12-11 RX ORDER — ONDANSETRON 2 MG/ML
8 INJECTION INTRAMUSCULAR; INTRAVENOUS AS NEEDED
Status: CANCELLED | OUTPATIENT
Start: 2020-12-11

## 2020-12-11 RX ORDER — HYDROCORTISONE SODIUM SUCCINATE 100 MG/2ML
100 INJECTION, POWDER, FOR SOLUTION INTRAMUSCULAR; INTRAVENOUS AS NEEDED
Status: CANCELLED | OUTPATIENT
Start: 2020-12-11

## 2020-12-11 RX ORDER — EPINEPHRINE 1 MG/ML
0.3 INJECTION, SOLUTION, CONCENTRATE INTRAVENOUS AS NEEDED
Status: CANCELLED | OUTPATIENT
Start: 2020-12-11

## 2020-12-11 RX ORDER — ONDANSETRON 2 MG/ML
8 INJECTION INTRAMUSCULAR; INTRAVENOUS AS NEEDED
Status: CANCELLED | OUTPATIENT
Start: 2021-05-18

## 2020-12-15 PROBLEM — E11.22 TYPE 2 DIABETES MELLITUS WITH STAGE 4 CHRONIC KIDNEY DISEASE, WITHOUT LONG-TERM CURRENT USE OF INSULIN (HCC): Status: ACTIVE | Noted: 2017-12-19

## 2020-12-15 PROBLEM — N18.4 TYPE 2 DIABETES MELLITUS WITH STAGE 4 CHRONIC KIDNEY DISEASE, WITHOUT LONG-TERM CURRENT USE OF INSULIN (HCC): Status: ACTIVE | Noted: 2017-12-19

## 2020-12-15 RX ORDER — FUROSEMIDE 40 MG/1
40 TABLET ORAL DAILY
Qty: 90 TAB | Refills: 1 | Status: SHIPPED | OUTPATIENT
Start: 2020-12-15 | End: 2021-06-14

## 2020-12-16 ENCOUNTER — HOSPITAL ENCOUNTER (OUTPATIENT)
Dept: LAB | Age: 85
Discharge: HOME | End: 2020-12-16
Payer: MEDICARE

## 2020-12-16 PROBLEM — D63.8 ANEMIA, CHRONIC DISEASE: Status: RESOLVED | Noted: 2019-07-15 | Resolved: 2020-12-16

## 2020-12-16 PROCEDURE — 80053 COMPREHEN METABOLIC PANEL: CPT

## 2020-12-16 PROCEDURE — 85025 COMPLETE CBC W/AUTO DIFF WBC: CPT

## 2020-12-16 PROCEDURE — 80061 LIPID PANEL: CPT

## 2020-12-16 PROCEDURE — 83036 HEMOGLOBIN GLYCOSYLATED A1C: CPT

## 2021-01-01 ENCOUNTER — HOSPITAL ENCOUNTER (OUTPATIENT)
Dept: ULTRASOUND IMAGING | Age: 86
Discharge: HOME | End: 2021-10-21
Attending: INTERNAL MEDICINE
Payer: MEDICARE

## 2021-01-01 ENCOUNTER — HOSPITAL ENCOUNTER (OUTPATIENT)
Dept: INFUSION THERAPY | Age: 86
Discharge: HOME | End: 2021-11-29
Payer: MEDICARE

## 2021-01-01 ENCOUNTER — HOSPITAL ENCOUNTER (OUTPATIENT)
Dept: INFUSION THERAPY | Age: 86
Discharge: HOME | End: 2021-11-08

## 2021-01-01 ENCOUNTER — TELEPHONE (OUTPATIENT)
Dept: FAMILY MEDICINE CLINIC | Age: 86
End: 2021-01-01

## 2021-01-01 ENCOUNTER — HOSPITAL ENCOUNTER (OUTPATIENT)
Dept: LAB | Age: 86
Discharge: HOME | End: 2021-10-29

## 2021-01-01 ENCOUNTER — OFFICE VISIT (OUTPATIENT)
Dept: CARDIOLOGY CLINIC | Age: 86
End: 2021-01-01

## 2021-01-01 ENCOUNTER — TELEPHONE (OUTPATIENT)
Dept: CARDIOLOGY CLINIC | Age: 86
End: 2021-01-01

## 2021-01-01 ENCOUNTER — OFFICE VISIT (OUTPATIENT)
Dept: FAMILY MEDICINE CLINIC | Age: 86
End: 2021-01-01
Payer: MEDICARE

## 2021-01-01 ENCOUNTER — OFFICE VISIT (OUTPATIENT)
Dept: FAMILY MEDICINE CLINIC | Age: 86
End: 2021-01-01

## 2021-01-01 VITALS
SYSTOLIC BLOOD PRESSURE: 171 MMHG | TEMPERATURE: 98.4 F | RESPIRATION RATE: 20 BRPM | BODY MASS INDEX: 32.3 KG/M2 | OXYGEN SATURATION: 100 % | WEIGHT: 165.4 LBS | HEART RATE: 61 BPM | DIASTOLIC BLOOD PRESSURE: 74 MMHG

## 2021-01-01 VITALS
OXYGEN SATURATION: 100 % | SYSTOLIC BLOOD PRESSURE: 136 MMHG | DIASTOLIC BLOOD PRESSURE: 50 MMHG | WEIGHT: 161.6 LBS | HEIGHT: 60 IN | BODY MASS INDEX: 31.73 KG/M2 | HEART RATE: 57 BPM | RESPIRATION RATE: 18 BRPM | TEMPERATURE: 98.1 F

## 2021-01-01 VITALS
WEIGHT: 162 LBS | DIASTOLIC BLOOD PRESSURE: 50 MMHG | SYSTOLIC BLOOD PRESSURE: 140 MMHG | RESPIRATION RATE: 14 BRPM | HEART RATE: 58 BPM | OXYGEN SATURATION: 99 % | HEIGHT: 60 IN | TEMPERATURE: 98.6 F | BODY MASS INDEX: 31.8 KG/M2

## 2021-01-01 VITALS
WEIGHT: 162 LBS | HEIGHT: 60 IN | HEART RATE: 58 BPM | BODY MASS INDEX: 31.8 KG/M2 | RESPIRATION RATE: 20 BRPM | DIASTOLIC BLOOD PRESSURE: 60 MMHG | SYSTOLIC BLOOD PRESSURE: 146 MMHG | TEMPERATURE: 98.4 F | OXYGEN SATURATION: 100 %

## 2021-01-01 DIAGNOSIS — I13.10: ICD-10-CM

## 2021-01-01 DIAGNOSIS — I73.9 PERIPHERAL VASCULAR DISEASE (HCC): ICD-10-CM

## 2021-01-01 DIAGNOSIS — K13.0 CHAPPED LIPS: Primary | ICD-10-CM

## 2021-01-01 DIAGNOSIS — I35.0 AORTIC STENOSIS, MODERATE: Primary | ICD-10-CM

## 2021-01-01 DIAGNOSIS — E11.40 TYPE 2 DIABETES MELLITUS WITH DIABETIC NEUROPATHY, UNSPECIFIED WHETHER LONG TERM INSULIN USE (HCC): ICD-10-CM

## 2021-01-01 DIAGNOSIS — E78.00 PURE HYPERCHOLESTEROLEMIA: ICD-10-CM

## 2021-01-01 DIAGNOSIS — K21.9 GASTROESOPHAGEAL REFLUX DISEASE WITHOUT ESOPHAGITIS: ICD-10-CM

## 2021-01-01 DIAGNOSIS — E21.0 PRIMARY HYPERPARATHYROIDISM (HCC): ICD-10-CM

## 2021-01-01 DIAGNOSIS — N18.4: ICD-10-CM

## 2021-01-01 DIAGNOSIS — I35.0 AORTIC STENOSIS, MODERATE: ICD-10-CM

## 2021-01-01 DIAGNOSIS — D63.8 ANEMIA OF CHRONIC DISEASE: ICD-10-CM

## 2021-01-01 DIAGNOSIS — E11.21 TYPE 2 DIABETES MELLITUS WITH NEPHROPATHY (HCC): ICD-10-CM

## 2021-01-01 DIAGNOSIS — I50.32 DIASTOLIC CHF, CHRONIC (HCC): ICD-10-CM

## 2021-01-01 DIAGNOSIS — N18.4 CKD (CHRONIC KIDNEY DISEASE), STAGE IV (HCC): ICD-10-CM

## 2021-01-01 DIAGNOSIS — I10 ESSENTIAL HYPERTENSION: ICD-10-CM

## 2021-01-01 DIAGNOSIS — E11.40 TYPE 2 DIABETES MELLITUS WITH DIABETIC NEUROPATHY, UNSPECIFIED WHETHER LONG TERM INSULIN USE (HCC): Primary | ICD-10-CM

## 2021-01-01 DIAGNOSIS — M85.80 OSTEOPENIA, UNSPECIFIED LOCATION: ICD-10-CM

## 2021-01-01 LAB
ALBUMIN SERPL-MCNC: 3.4 G/DL (ref 3.5–5)
ANION GAP SERPL CALC-SCNC: 10 MMOL/L (ref 5–15)
BUN SERPL-MCNC: 52 MG/DL (ref 6–20)
BUN/CREAT SERPL: 22 (ref 12–20)
CALCIUM SERPL-MCNC: 10.7 MG/DL (ref 8.5–10.1)
CHLORIDE SERPL-SCNC: 103 MMOL/L (ref 97–108)
CO2 SERPL-SCNC: 25 MMOL/L (ref 21–32)
CREAT SERPL-MCNC: 2.37 MG/DL (ref 0.55–1.02)
ECHO AO ROOT DIAM: 3.01 CM
ECHO AV AREA PEAK VELOCITY: 1.16 CM2
ECHO AV AREA VTI: 1.07 CM2
ECHO AV MEAN GRADIENT: 33.54 MMHG
ECHO AV PEAK GRADIENT: 56.05 MMHG
ECHO AV PEAK VELOCITY: 374.34 CM/S
ECHO AV VTI: 102.75 CM
ECHO EST RA PRESSURE: 10 MMHG
ECHO LA AREA 4C: 22.21 CM2
ECHO LA MAJOR AXIS: 3.68 CM
ECHO LA VOL 2C: 51.17 ML (ref 22–52)
ECHO LA VOL 4C: 61.98 ML (ref 22–52)
ECHO LA VOL BP: 68.52 ML (ref 22–52)
ECHO LV E' SEPTAL VELOCITY: 8.09 CM/S
ECHO LV INTERNAL DIMENSION DIASTOLIC: 4.13 CM (ref 3.9–5.3)
ECHO LV INTERNAL DIMENSION SYSTOLIC: 2.69 CM
ECHO LV IVSD: 1.13 CM (ref 0.6–0.9)
ECHO LV MASS 2D: 157 G (ref 67–162)
ECHO LV POSTERIOR WALL DIASTOLIC: 1.11 CM (ref 0.6–0.9)
ECHO LVOT CARDIAC OUTPUT: 18.39 LITER/MINUTE
ECHO LVOT DIAM: 2.04 CM
ECHO LVOT PEAK GRADIENT: 7.08 MMHG
ECHO LVOT PEAK VELOCITY: 133.02 CM/S
ECHO LVOT SV: 110.2 ML
ECHO LVOT VTI: 33.75 CM
ECHO MV A VELOCITY: 115.28 CM/S
ECHO MV E DECELERATION TIME (DT): 270.49 MS
ECHO MV E VELOCITY: 91.82 CM/S
ECHO MV E/A RATIO: 0.8
ECHO MV E/E' SEPTAL: 11.35
ECHO RIGHT VENTRICULAR SYSTOLIC PRESSURE (RVSP): 43.19 MMHG
ECHO TV REGURGITANT MAX VELOCITY: 287.93 CM/S
ECHO TV REGURGITANT PEAK GRADIENT: 33.19 MMHG
GLUCOSE SERPL-MCNC: 157 MG/DL (ref 65–100)
LVOT MG: 3.86 MMHG
MAGNESIUM SERPL-MCNC: 1.7 MG/DL (ref 1.6–2.4)
PHOSPHATE SERPL-MCNC: 2.3 MG/DL (ref 2.6–4.7)
PHOSPHATE SERPL-MCNC: 2.3 MG/DL (ref 2.6–4.7)
POTASSIUM SERPL-SCNC: 3.7 MMOL/L (ref 3.5–5.1)
SODIUM SERPL-SCNC: 138 MMOL/L (ref 136–145)

## 2021-01-01 PROCEDURE — 36415 COLL VENOUS BLD VENIPUNCTURE: CPT

## 2021-01-01 PROCEDURE — 99214 OFFICE O/P EST MOD 30 MIN: CPT | Performed by: NURSE PRACTITIONER

## 2021-01-01 PROCEDURE — G8427 DOCREV CUR MEDS BY ELIG CLIN: HCPCS | Performed by: INTERNAL MEDICINE

## 2021-01-01 PROCEDURE — 1101F PT FALLS ASSESS-DOCD LE1/YR: CPT | Performed by: INTERNAL MEDICINE

## 2021-01-01 PROCEDURE — 93306 TTE W/DOPPLER COMPLETE: CPT | Performed by: INTERNAL MEDICINE

## 2021-01-01 PROCEDURE — 93306 TTE W/DOPPLER COMPLETE: CPT

## 2021-01-01 PROCEDURE — 83735 ASSAY OF MAGNESIUM: CPT

## 2021-01-01 PROCEDURE — 80069 RENAL FUNCTION PANEL: CPT

## 2021-01-01 PROCEDURE — G8417 CALC BMI ABV UP PARAM F/U: HCPCS | Performed by: INTERNAL MEDICINE

## 2021-01-01 PROCEDURE — 99214 OFFICE O/P EST MOD 30 MIN: CPT | Performed by: INTERNAL MEDICINE

## 2021-01-01 PROCEDURE — 84100 ASSAY OF PHOSPHORUS: CPT

## 2021-01-01 PROCEDURE — 96372 THER/PROPH/DIAG INJ SC/IM: CPT

## 2021-01-01 PROCEDURE — G8536 NO DOC ELDER MAL SCRN: HCPCS | Performed by: INTERNAL MEDICINE

## 2021-01-01 PROCEDURE — 93227 XTRNL ECG REC<48 HR R&I: CPT | Performed by: INTERNAL MEDICINE

## 2021-01-01 PROCEDURE — G8510 SCR DEP NEG, NO PLAN REQD: HCPCS | Performed by: INTERNAL MEDICINE

## 2021-01-01 PROCEDURE — 99213 OFFICE O/P EST LOW 20 MIN: CPT | Performed by: FAMILY MEDICINE

## 2021-01-01 PROCEDURE — 74011250636 HC RX REV CODE- 250/636: Performed by: INTERNAL MEDICINE

## 2021-01-01 PROCEDURE — 1090F PRES/ABSN URINE INCON ASSESS: CPT | Performed by: INTERNAL MEDICINE

## 2021-01-01 PROCEDURE — 93225 XTRNL ECG REC<48 HRS REC: CPT | Performed by: INTERNAL MEDICINE

## 2021-01-01 PROCEDURE — G8510 SCR DEP NEG, NO PLAN REQD: HCPCS | Performed by: FAMILY MEDICINE

## 2021-01-01 RX ORDER — LOVASTATIN 40 MG/1
TABLET ORAL
Qty: 90 TABLET | Refills: 1 | Status: SHIPPED | OUTPATIENT
Start: 2021-01-01 | End: 2022-01-01

## 2021-01-01 RX ORDER — GABAPENTIN 100 MG/1
100-200 CAPSULE ORAL 3 TIMES DAILY
Qty: 90 CAPSULE | Refills: 2 | Status: SHIPPED | OUTPATIENT
Start: 2021-01-01 | End: 2022-01-01

## 2021-01-01 RX ORDER — GABAPENTIN 100 MG/1
100 CAPSULE ORAL 3 TIMES DAILY
Qty: 90 CAPSULE | Refills: 2 | Status: SHIPPED | OUTPATIENT
Start: 2021-01-01 | End: 2021-01-01 | Stop reason: SDUPTHER

## 2021-01-01 RX ORDER — OMEPRAZOLE 40 MG/1
CAPSULE, DELAYED RELEASE ORAL
Qty: 90 CAPSULE | Refills: 1 | Status: SHIPPED | OUTPATIENT
Start: 2021-01-01 | End: 2022-01-01

## 2021-01-01 RX ORDER — AMLODIPINE BESYLATE 5 MG/1
5 TABLET ORAL DAILY
Qty: 90 TABLET | Refills: 1 | Status: SHIPPED | OUTPATIENT
Start: 2021-01-01 | End: 2022-01-01

## 2021-01-01 RX ORDER — HYDROCORTISONE VALERATE 2 MG/G
OINTMENT TOPICAL 2 TIMES DAILY
Qty: 15 G | Refills: 0 | Status: ON HOLD | OUTPATIENT
Start: 2021-01-01 | End: 2022-01-01

## 2021-01-01 RX ORDER — FUROSEMIDE 40 MG/1
TABLET ORAL
Qty: 90 TABLET | Refills: 1 | Status: SHIPPED | OUTPATIENT
Start: 2021-01-01 | End: 2022-01-01

## 2021-01-01 RX ADMIN — DENOSUMAB 60 MG: 60 INJECTION SUBCUTANEOUS at 14:40

## 2021-01-30 ENCOUNTER — IMMUNIZATION (OUTPATIENT)
Dept: PEDIATRICS CLINIC | Age: 86
End: 2021-01-30
Payer: MEDICARE

## 2021-01-30 DIAGNOSIS — Z23 ENCOUNTER FOR IMMUNIZATION: Primary | ICD-10-CM

## 2021-01-30 PROCEDURE — 0011A PR IMM ADMN SARSCOV2 100 MCG/0.5 ML 1ST DOSE: CPT | Performed by: FAMILY MEDICINE

## 2021-01-30 PROCEDURE — 91301 COVID-19, MRNA, LNP-S, PF, 100MCG/0.5ML DOSE(MODERNA): CPT | Performed by: FAMILY MEDICINE

## 2021-02-16 RX ORDER — HYDRALAZINE HYDROCHLORIDE 100 MG/1
100 TABLET, FILM COATED ORAL 3 TIMES DAILY
Qty: 270 TAB | Refills: 1 | Status: SHIPPED | OUTPATIENT
Start: 2021-02-16 | End: 2021-08-10

## 2021-02-27 ENCOUNTER — IMMUNIZATION (OUTPATIENT)
Dept: PEDIATRICS CLINIC | Age: 86
End: 2021-02-27
Payer: MEDICARE

## 2021-02-27 DIAGNOSIS — Z23 ENCOUNTER FOR IMMUNIZATION: Primary | ICD-10-CM

## 2021-02-27 PROCEDURE — 0012A COVID-19, MRNA, LNP-S, PF, 100MCG/0.5ML DOSE(MODERNA): CPT | Performed by: FAMILY MEDICINE

## 2021-02-27 PROCEDURE — 91301 COVID-19, MRNA, LNP-S, PF, 100MCG/0.5ML DOSE(MODERNA): CPT | Performed by: FAMILY MEDICINE

## 2021-04-01 ENCOUNTER — OFFICE VISIT (OUTPATIENT)
Dept: CARDIOLOGY CLINIC | Age: 86
End: 2021-04-01
Payer: MEDICARE

## 2021-04-01 VITALS
SYSTOLIC BLOOD PRESSURE: 140 MMHG | WEIGHT: 174.4 LBS | HEART RATE: 56 BPM | HEIGHT: 60 IN | TEMPERATURE: 98.2 F | BODY MASS INDEX: 34.24 KG/M2 | DIASTOLIC BLOOD PRESSURE: 52 MMHG | RESPIRATION RATE: 16 BRPM | OXYGEN SATURATION: 98 %

## 2021-04-01 DIAGNOSIS — E78.00 PURE HYPERCHOLESTEROLEMIA: ICD-10-CM

## 2021-04-01 DIAGNOSIS — I10 ESSENTIAL HYPERTENSION: ICD-10-CM

## 2021-04-01 DIAGNOSIS — I73.9 PERIPHERAL VASCULAR DISEASE (HCC): ICD-10-CM

## 2021-04-01 DIAGNOSIS — R00.1 BRADYCARDIA: ICD-10-CM

## 2021-04-01 DIAGNOSIS — N18.4 CKD (CHRONIC KIDNEY DISEASE), STAGE IV (HCC): ICD-10-CM

## 2021-04-01 DIAGNOSIS — I50.32 DIASTOLIC CHF, CHRONIC (HCC): ICD-10-CM

## 2021-04-01 DIAGNOSIS — D63.8 ANEMIA OF CHRONIC DISEASE: ICD-10-CM

## 2021-04-01 DIAGNOSIS — E11.21 TYPE 2 DIABETES MELLITUS WITH NEPHROPATHY (HCC): ICD-10-CM

## 2021-04-01 DIAGNOSIS — I35.0 AORTIC STENOSIS, MODERATE: Primary | ICD-10-CM

## 2021-04-01 PROCEDURE — G8417 CALC BMI ABV UP PARAM F/U: HCPCS | Performed by: INTERNAL MEDICINE

## 2021-04-01 PROCEDURE — G8510 SCR DEP NEG, NO PLAN REQD: HCPCS | Performed by: INTERNAL MEDICINE

## 2021-04-01 PROCEDURE — 1090F PRES/ABSN URINE INCON ASSESS: CPT | Performed by: INTERNAL MEDICINE

## 2021-04-01 PROCEDURE — G8427 DOCREV CUR MEDS BY ELIG CLIN: HCPCS | Performed by: INTERNAL MEDICINE

## 2021-04-01 PROCEDURE — 1101F PT FALLS ASSESS-DOCD LE1/YR: CPT | Performed by: INTERNAL MEDICINE

## 2021-04-01 PROCEDURE — G8536 NO DOC ELDER MAL SCRN: HCPCS | Performed by: INTERNAL MEDICINE

## 2021-04-01 PROCEDURE — 99214 OFFICE O/P EST MOD 30 MIN: CPT | Performed by: INTERNAL MEDICINE

## 2021-04-01 RX ORDER — LANOLIN ALCOHOL/MO/W.PET/CERES
CREAM (GRAM) TOPICAL
COMMUNITY

## 2021-04-01 NOTE — PROGRESS NOTES
Bernett Cogan is a 80 y.o. female is here for routine f/u. Some ongoing LE swelling, BERMAN.  Hx DM II, hypertension/hypertensive CVD, dyslipidemia, anemia of chronic disease. GERD, DJD, Raynaud's, followed by Deloris Gonsalez NP,  Also with hx multinodular goiter/parathryoid issues (followed by Endocrine).  Issues with bradycardia, HR high 40's. EKG with sinus tiffanie 48, LVH, PRWP/old AMI, NSST. Had been on Cardizem --reduced to 120 by PCP, now stopped. Holter with sinus tiffanie, HR 42-79, avg 52, occasional PAC's. Echo 6/27/18 with mod LVEF, LVEF 65-70, LAE, mod aortic stenosis (mean 24), mild to mod pulm hypertension. Lexiscan 9/26/19 with no infarct/ischemia, LVEF >65%.  Echo 9/19 with LVEF 60-65, mod AS (mean 23), MAC, mild MVP with trace MR, RVSP 44. Carotid doppler 9/19 with mild MAXWELL, otherwise ok. Currently on irbesartan 300, hydralazine 50 tid, spironolactone 25, lasix 20.  Recent labs 5/23/20 with BUN/ Cr up to 56/2.73, previously 45/2.1, K 4.2, glc 123.  . Seen on 6/11/20 here--spironolactone stopped and lasix increased to 40mg. Seen in f/u by PCP and labs repeated--stable. Repeat Echo 6/18/20 with mod LVH, LVEF 60-65, D1, mod AS (mean gradient 29mm Hg). More leg weakness, ataxia, more pronounced tremor (?familial but now more), no hx Parkinson's, ?neuropathy (on gabapentin). The patient denies chest pain/ shortness of breath, orthopnea, PND, LE edema, palpitations, syncope, presyncope or fatigue.        Patient Active Problem List    Diagnosis Date Noted    Peripheral vascular disease ([facility] 09/11/2020    Gastroesophageal reflux disease without esophagitis 01/21/2020    Primary hyperparathyroidism ([facility] 10/31/2019    Type 2 diabetes mellitus with diabetic neuropathy ([facility] 07/15/2019    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 ([facility] 07/15/2019    Hyperparathyroidism ([facility] 08/06/2018    BMI 33.0-33.9,adult 08/06/2018    Bradycardia 08/01/2018    Aortic stenosis, moderate     Pure hypercholesterolemia 06/19/2018    Anemia of chronic disease 06/19/2018    Type 2 diabetes mellitus with stage 4 chronic kidney disease, without long-term current use of insulin ([facility] 12/19/2017    Encounter for long-term (current) use of other medications 08/10/2015      Ori SANTA NP  Past Medical History:   Diagnosis Date    Anemia of chronic disease 6/19/2018    Anxiety     Aortic stenosis, moderate     Back pain     CAD (coronary artery disease)     CKD (chronic kidney disease) stage 3, GFR 30-59 ml/min ([facility]     Constipation     Diabetes ([facility]     Diverticulosis 2007    DM (diabetes mellitus) ([facility]     AODM    Fibroid 1972    GERD (gastroesophageal reflux disease)     Goiter, non-toxic     Hernia, hiatal     Hyperlipemia     Hypertension     Hypokalemia     Menopause     Osteoarthritis 2006    bursitis R shoulder    Osteoarthritis of right knee     PVC (premature ventricular contraction)     H/O PVC's    Raynaud disease     Venous stasis     Wears hearing aid     both ears      Past Surgical History:   Procedure Laterality Date    HX BREAST BIOPSY Bilateral     x5    HX CATARACT REMOVAL  2004    bilateral    HX COLONOSCOPY  2002, 08/2007    HX DILATION AND CURETTAGE  1960's    HX HYSTERECTOMY  66's    BSO    HX OTHER SURGICAL  2001    sigmoidoscopy     Allergies   Allergen Reactions    Metformin Other (comments)     Kidneys effected      Family History   Problem Relation Age of Onset    Diabetes Brother     Hypertension Mother     Diabetes Brother       Social History     Socioeconomic History    Marital status:      Spouse name: Not on file    Number of children: Not on file    Years of education: Not on file    Highest education level: Not on file   Occupational History    Not on file   Social Needs    Financial resource strain: Not on file    Food insecurity     Worry: Not on file     Inability: Not on file   [facility] Transportation needs     Medical: Not on file     Non-medical: Not on file   Tobacco Use    Smoking status: Never Smoker    Smokeless tobacco: Never Used   Substance and Sexual Activity    Alcohol use: No     Alcohol/week: 0.0 standard drinks    Drug use: No    Sexual activity: Never   Lifestyle    Physical activity     Days per week: Not on file     Minutes per session: Not on file    Stress: Not on file   Relationships    Social connections     Talks on phone: Not on file     Gets together: Not on file     Attends Jain service: Not on file     Active member of club or organization: Not on file     Attends meetings of clubs or organizations: Not on file     Relationship status: Not on file    Intimate partner violence     Fear of current or ex partner: Not on file     Emotionally abused: Not on file     Physically abused: Not on file     Forced sexual activity: Not on file   Other Topics Concern    Not on file   Social History Narrative    Happily . No concern for abuse. Exercise: none    Diet: Careful. Wear Seatbelts              Current Outpatient Medications   Medication Sig    gabapentin (NEURONTIN) 100 mg capsule TAKE 1 CAP BY MOUTH THREE (3) TIMES DAILY. MAX DAILY AMOUNT: 300 MG.  lovastatin (MEVACOR) 40 mg tablet TAKE 1 TAB BY MOUTH NIGHTLY FOR CHOLESTEROL LOWERING    omeprazole (PRILOSEC) 40 mg capsule TAKE ONE CAPSULE BY MOUTH EVERY MORNING TO CONTROL STOMACH ACID    hydrALAZINE (APRESOLINE) 100 mg tablet Take 1 Tab by mouth three (3) times daily.  irbesartan (AVAPRO) 300 mg tablet TAKE 1 TAB BY MOUTH NIGHTLY.  furosemide (LASIX) 40 mg tablet TAKE 1 TAB BY MOUTH DAILY. INCREASED DOSE    cetirizine (ZYRTEC) 10 mg tablet Take 10 mg by mouth.  fluticasone propionate (FLONASE) 50 mcg/actuation nasal spray SPRAY 1 PUFF IN EACH NOSTRIL ONCE DAILY    TRUE METRIX GLUCOSE TEST STRIP strip     aspirin delayed-release 81 mg tablet Take 1 Tab by mouth daily.     acetaminophen (TYLENOL EXTRA STRENGTH) 500 mg tablet Take 1 Tab by mouth every six (6) hours as needed for Pain. No current facility-administered medications for this visit. Review of Symptoms:    CONST  No weight change. No fever, chills, sweats    ENT No visual changes, URI sx, sore throat    CV  See HPI   RESP  No cough, or sputum, wheezing. Also see HPI   GI  No abdominal pain or change in bowel habits. No heartburn or dysphagia. No melena or rectal bleeding.   No dysuria, urgency, frequency, hematuria   MSKEL  No joint pain, swelling. No muscle pain. SKIN  No rash or lesions. NEURO  No headache, syncope, or seizure. No weakness, loss of sensation, or paresthesias. PSYCH  No low mood or depression  No anxiety. HE/LYMPH  No easy bruising, abnormal bleeding, or enlarged glands. Physical ExamPhysical Exam:    Visit Vitals  Temp 98.2 °F (36.8 °C) (Temporal)   Ht 5' (1.524 m)   BMI 33.59 kg/m²     Gen: NAD  HEENT:  PERRL, throat clear  Neck: no adenopathy, no thyromegaly, no JVD   Heart:  Regular,Nl S1S2,  II/VI murmur, no gallop or rub. Lungs:  clear  Abdomen:   Soft, non-tender, bowel sounds are active.    Extremities:  MIld to mod edema  Pulse: symmetric  Neuro: A&O times 3, No focal neuro deficits    Cardiographics      Labs:   Lab Results   Component Value Date/Time    Sodium 133 (L) 03/04/2021 01:01 PM    Sodium 137 12/16/2020 12:00 AM    Sodium 136 11/24/2020 01:45 PM    Sodium 131 (L) 11/05/2020 03:23 PM    Sodium 132 (L) 10/22/2020 06:43 PM    Potassium 3.6 03/04/2021 01:01 PM    Potassium 3.8 12/16/2020 12:00 AM    Potassium 3.7 11/24/2020 01:45 PM    Potassium 4.4 11/05/2020 03:23 PM    Potassium 3.8 10/22/2020 06:43 PM    Chloride 100 03/04/2021 01:01 PM    Chloride 102 12/16/2020 12:00 AM    Chloride 100 11/24/2020 01:45 PM    Chloride 97 11/05/2020 03:23 PM    Chloride 98 10/22/2020 06:43 PM    CO2 26 03/04/2021 01:01 PM    CO2 21 12/16/2020 12:00 AM    CO2 23 11/24/2020 01:45 PM    CO2 23 11/05/2020 03:23 PM    CO2 24 10/22/2020 06:43 PM    Anion gap 7 03/04/2021 01:01 PM    Anion gap 13 11/24/2020 01:45 PM    Anion gap 11 11/05/2020 03:23 PM    Anion gap 10 10/22/2020 06:43 PM    Anion gap 10 05/21/2020 01:38 PM    Glucose 174 (H) 03/04/2021 01:01 PM    Glucose 131 (H) 12/16/2020 12:00 AM    Glucose 188 (H) 11/24/2020 01:45 PM    Glucose 99 11/05/2020 03:23 PM    Glucose 142 (H) 10/22/2020 06:43 PM    BUN 47 (H) 03/04/2021 01:01 PM    BUN 49 (H) 12/16/2020 12:00 AM    BUN 70 (H) 11/24/2020 01:45 PM    BUN 63 (H) 11/05/2020 03:23 PM    BUN 61 (H) 10/22/2020 06:43 PM    Creatinine 2.46 (H) 03/04/2021 01:01 PM    Creatinine 2.35 (H) 12/16/2020 12:00 AM    Creatinine 2.90 (H) 11/24/2020 01:45 PM    Creatinine 2.53 (H) 11/05/2020 03:23 PM    Creatinine 2.72 (H) 10/22/2020 06:43 PM    BUN/Creatinine ratio 19 03/04/2021 01:01 PM    BUN/Creatinine ratio 21 12/16/2020 12:00 AM    BUN/Creatinine ratio 24 (H) 11/24/2020 01:45 PM    BUN/Creatinine ratio 25 (H) 11/05/2020 03:23 PM    BUN/Creatinine ratio 22 (H) 10/22/2020 06:43 PM    GFR est AA 22 (L) 03/04/2021 01:01 PM    GFR est AA 21 (L) 12/16/2020 12:00 AM    GFR est AA 19 (L) 11/24/2020 01:45 PM    GFR est AA 22 (L) 11/05/2020 03:23 PM    GFR est AA 20 (L) 10/22/2020 06:43 PM    GFR est non-AA 18 (L) 03/04/2021 01:01 PM    GFR est non-AA 18 (L) 12/16/2020 12:00 AM    GFR est non-AA 15 (L) 11/24/2020 01:45 PM    GFR est non-AA 18 (L) 11/05/2020 03:23 PM    GFR est non-AA 16 (L) 10/22/2020 06:43 PM    Calcium 10.1 03/04/2021 01:01 PM    Calcium 9.7 03/04/2021 01:01 PM    Calcium 10.4 (H) 12/16/2020 12:00 AM    Calcium 10.6 (H) 11/24/2020 01:45 PM    Calcium 11.0 (H) 11/05/2020 03:23 PM    Bilirubin, total <0.2 12/16/2020 12:00 AM    Bilirubin, total 0.2 10/22/2020 06:43 PM    Bilirubin, total 0.3 08/10/2020 02:44 PM    Bilirubin, total 0.2 01/21/2020 01:38 PM    Bilirubin, total 0.2 10/15/2019 01:39 PM    Alk.  phosphatase 68 12/16/2020 12:00 AM    Alk. phosphatase 51 10/22/2020 06:43 PM    Alk. phosphatase 53 08/10/2020 02:44 PM    Alk. phosphatase 51 01/21/2020 01:38 PM    Alk.  phosphatase 57 10/15/2019 01:39 PM    Protein, total 6.7 12/16/2020 12:00 AM    Protein, total 6.8 10/22/2020 06:43 PM    Protein, total 6.6 08/10/2020 02:44 PM    Protein, total 6.9 01/21/2020 01:38 PM    Protein, total 6.9 10/15/2019 01:39 PM    Albumin 3.9 12/16/2020 12:00 AM    Albumin 3.4 (L) 11/24/2020 01:45 PM    Albumin 3.2 (L) 10/22/2020 06:43 PM    Albumin 3.6 08/10/2020 02:44 PM    Albumin 3.5 05/21/2020 01:38 PM    Globulin 3.6 10/22/2020 06:43 PM    Globulin 3.6 08/09/2018 12:19 PM    A-G Ratio 1.4 12/16/2020 12:00 AM    A-G Ratio 0.9 (L) 10/22/2020 06:43 PM    A-G Ratio 1.2 08/10/2020 02:44 PM    A-G Ratio 1.2 01/21/2020 01:38 PM    A-G Ratio 1.2 10/15/2019 01:39 PM    ALT (SGPT) 6 12/16/2020 12:00 AM    ALT (SGPT) 13 10/22/2020 06:43 PM    ALT (SGPT) 7 08/10/2020 02:44 PM    ALT (SGPT) 9 01/21/2020 01:38 PM    ALT (SGPT) 8 10/15/2019 01:39 PM     No results found for: CPK, CPKX, CPX  Lab Results   Component Value Date/Time    Cholesterol, total 108 12/16/2020 12:00 AM    Cholesterol, total 113 02/26/2019 03:04 PM    Cholesterol, total 126 06/06/2017 01:47 PM    Cholesterol, total 137 09/07/2016 03:24 PM    Cholesterol, total 115 06/07/2016 04:34 PM    HDL Cholesterol 35 (L) 12/16/2020 12:00 AM    HDL Cholesterol 38 (L) 02/26/2019 03:04 PM    HDL Cholesterol 40 06/06/2017 01:47 PM    HDL Cholesterol 40 09/07/2016 03:24 PM    HDL Cholesterol 37 (L) 06/07/2016 04:34 PM    LDL, calculated 54 12/16/2020 12:00 AM    LDL, calculated 56 02/26/2019 03:04 PM    LDL, calculated 64 06/06/2017 01:47 PM    LDL, calculated 75 09/07/2016 03:24 PM    LDL, calculated 60 06/07/2016 04:34 PM    LDL, calculated 68 01/04/2016 02:24 PM    Triglyceride 101 12/16/2020 12:00 AM    Triglyceride 94 02/26/2019 03:04 PM    Triglyceride 108 06/06/2017 01:47 PM    Triglyceride 109 09/07/2016 03:24 PM    Triglyceride 90 06/07/2016 04:34 PM     No results found for this or any previous visit. 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 ([facility] 07/15/2019    Hyperparathyroidism ([facility] 08/06/2018    BMI 33.0-33.9,adult 08/06/2018    Bradycardia 08/01/2018    Aortic stenosis, moderate     Pure hypercholesterolemia 06/19/2018    Anemia of chronic disease 06/19/2018    Type 2 diabetes mellitus with stage 4 chronic kidney disease, without long-term current use of insulin ([facility] 12/19/2017    Encounter for long-term (current) use of other medications 08/10/2015     Some ongoing LE swelling, BERMAN.  Hx DM II, hypertension/hypertensive CVD, dyslipidemia, anemia of chronic disease. GERD, DJD, Raynaud's, followed by Deloris Gonsalez NP,  Also with hx multinodular goiter/parathryoid issues (followed by Endocrine).  Issues with bradycardia, HR high 40's. EKG with sinus tiffanie 48, LVH, PRWP/old AMI, NSST. Had been on Cardizem --reduced to 120 by PCP, now stopped. Holter with sinus tiffanie, HR 42-79, avg 52, occasional PAC's. Echo 6/27/18 with mod LVEF, LVEF 65-70, LAE, mod aortic stenosis (mean 24), mild to mod pulm hypertension. Lexiscan 9/26/19 with no infarct/ischemia, LVEF >65%.  Echo 9/19 with LVEF 60-65, mod AS (mean 23), MAC, mild MVP with trace MR, RVSP 44. Carotid doppler 9/19 with mild MAXWELL, otherwise ok. Currently on irbesartan 300, hydralazine 50 tid, spironolactone 25, lasix 20.  Recent labs 5/23/20 with BUN/ Cr up to 56/2.73, previously 45/2.1, K 4.2, glc 123.  . Seen on 6/11/20 here--spironolactone stopped and lasix increased to 40mg.   Seen in f/u by PCP and labs repeated--stable. Repeat Echo 6/18/20 with mod LVH, LVEF 60-65, D1, mod AS (mean gradient 29mm Hg). More leg weakness, ataxia, more pronounced tremor (?familial but now more), no hx Parkinson's, ?neuropathy (on gabapentin). Plan:     Repeat Echo/doppler--recheck aortic valve--call w/ results  Continue current meds/ rx  Labs per PCP   Continue current care and f/u in 6 months.     Vivi Ortiz MD

## 2021-04-01 NOTE — PROGRESS NOTES
Verified patient with two patient identifiers. Medications reviewed/approved by Dr. Arya Reynolds. Chief Complaint   Patient presents with    Aortic Stenosis     6 month follow up    Dizziness     Per daughter, Hussein Le: \"Episode on Sunday (3/28/21) pt got up and became dizzy/nauseous. \" Pts spouse took BP: \"112/55\"    Hypertension    Cholesterol Problem       1. Have you been to the ER, urgent care clinic since your last visit? Hospitalized since your last visit?no    2. Have you seen or consulted any other health care providers outside of the [address] [place] [address] since your last visit? Include any pap smears or colon screening.  No    Visitor temp: 97.4 F

## 2021-04-08 ENCOUNTER — VIRTUAL VISIT (OUTPATIENT)
Dept: ENDOCRINOLOGY | Age: 86
End: 2021-04-08
Payer: MEDICARE

## 2021-04-08 DIAGNOSIS — E55.9 VITAMIN D DEFICIENCY: ICD-10-CM

## 2021-04-08 DIAGNOSIS — M85.852 OSTEOPENIA OF NECK OF LEFT FEMUR: ICD-10-CM

## 2021-04-08 DIAGNOSIS — E83.52 HYPERCALCEMIA: ICD-10-CM

## 2021-04-08 DIAGNOSIS — E21.0 PRIMARY HYPERPARATHYROIDISM (HCC): Primary | ICD-10-CM

## 2021-04-08 DIAGNOSIS — E04.2 MULTINODULAR GOITER: ICD-10-CM

## 2021-04-08 PROCEDURE — G8432 DEP SCR NOT DOC, RNG: HCPCS | Performed by: INTERNAL MEDICINE

## 2021-04-08 PROCEDURE — 1101F PT FALLS ASSESS-DOCD LE1/YR: CPT | Performed by: INTERNAL MEDICINE

## 2021-04-08 PROCEDURE — G8427 DOCREV CUR MEDS BY ELIG CLIN: HCPCS | Performed by: INTERNAL MEDICINE

## 2021-04-08 PROCEDURE — 99214 OFFICE O/P EST MOD 30 MIN: CPT | Performed by: INTERNAL MEDICINE

## 2021-04-08 PROCEDURE — 1090F PRES/ABSN URINE INCON ASSESS: CPT | Performed by: INTERNAL MEDICINE

## 2021-04-08 RX ORDER — AMLODIPINE BESYLATE 5 MG/1
TABLET ORAL
COMMUNITY
Start: 2021-01-16 | End: 2021-06-15

## 2021-04-08 NOTE — PROGRESS NOTES
**DUE TO PANDEMIC AND HEALTH CONCERNS IN THE COMMUNITY, THIS PATIENT WAS EITHER ILL OR FOUND TO BE HIGH RISK FOR IN-PERSON EVALUATION WITHIN THE CLINIC. THE FOLLOWING IS A VIRTUAL TELEMEDICINE VIDEO ENCOUNTER VIA [employer], TO WHICH THE PATIENT AGREED. THE PURPOSE IS TO LIMIT INTERRUPTIONS IN HEALTHCARE AND TO PROVIDE FOR ONGOING URGENT NEEDS UNDER THE CURRENT CONDITIONS. CHIEF COMPLAINT: Hyperparathyroidism, MNG    HISTORY OF PRESENT ILLNESS:   Jessica Blunt is a 80 y.o. female with a PMHx as noted below who presents for f/u eval of hypercalcemia and MNG    Vitamin D status: off per nephrology  Calcium supplementation: Off per nephrology previously, now taking 500mg daily  Calcium level has been normal recently,       Parathyroid imaging:    - 7/25/18: NM Parathyroid Scan: Normal Study   - 7/25/18: US: MNG noted.  No parathyroid adenoma described, images not available ([facility])    Rt upper 1.3 x 0.8 x 1.2 cm nodule,     Rt lateral 1.1 x 1 x 0.9 cm nodule,    Rt lower 1.5 x 1 cm nodule,    Lt  Inferior 1.8 x 1.6 x 1.8 cm nodule    Lt  Superior 1.2 x 0.9 x 0.8 cm nodule    Regarding bone health, patient has a history of advanced osteopenia  Bone Density Scan 2016: T= - 2.4 L femoral neck,  Repeat DXA 8/19/18: T= -1.9 R FN, -1.8 L femoral neck  Antiresorptive Therapy: 10/5/18: Received 3mg Reclast: Dose #1 (adjusted for GFR)        11/14/19: Prolia dose #1 (renal function had worsened so switched)         5/21/20: Prolia dose #2 received, tolerated     11/24/20: Prolia dose #3 received, tolerated    Review of most recent bone-related metabolic lab panel:  Lab Results   Component Value Date    PTHILT 352.9 (H) 03/04/2021    PTHILT 248 (H) 08/10/2020    PTHILT 74 (H) 09/28/2018    CA 10.1 03/04/2021    CA 9.7 03/04/2021    CA 10.4 (H) 12/16/2020    CILT 6.0 (H) 09/07/2018    CILT 5.9 (H) 07/17/2018    VITD3 49.5 09/28/2018    VITD3 57.9 07/17/2018    GFRNA 18 (L) 03/04/2021    GFRNA 18 (L) 12/16/2020    GFRNA 15 (L) 11/24/2020    GFRAA 22 (L) 03/04/2021    GFRAA 21 (L) 12/16/2020    GFRAA 19 (L) 11/24/2020    TSH 1.130 02/26/2019    PHOS 2.5 (L) 03/04/2021    MG 1.7 11/24/2020     LAB KEY:  [id number]: PTHrP level  [id number]: Serum NTX level  PTHILT: Intact PTH level  PTHRLT:  PTH-rP  CILT: Ionized Calcium  CALU2: 24 hr urine calcium    PAST MEDICAL/SURGICAL HISTORY:   Past Medical History:   Diagnosis Date    Anemia of chronic disease 6/19/2018    Anxiety     Aortic stenosis, moderate     Back pain     CAD (coronary artery disease)     CKD (chronic kidney disease) stage 3, GFR 30-59 ml/min ([facility])     Constipation     Diabetes ([facility] [address]     Diverticulosis 2007    DM (diabetes mellitus) ([facility] [address]     AODM    Fibroid 1972    GERD (gastroesophageal reflux disease)     Goiter, non-toxic     Hernia, hiatal     Hyperlipemia     Hypertension     Hypokalemia     Menopause     Osteoarthritis 2006    bursitis R shoulder    Osteoarthritis of right knee     PVC (premature ventricular contraction)     H/O PVC's    Raynaud disease     Venous stasis     Wears hearing aid     both ears     Past Surgical History:   Procedure Laterality Date    HX BREAST BIOPSY Bilateral     x5    HX CATARACT REMOVAL  2004    bilateral    HX COLONOSCOPY  2002, 08/2007    HX DILATION AND CURETTAGE  1960's    HX HYSTERECTOMY  70's    BSO    HX OTHER SURGICAL  2001    sigmoidoscopy       ALLERGIES:   Allergies   Allergen Reactions    Metformin Other (comments)     Kidneys effected       MEDICATIONS ON ADMISSION:     Current Outpatient Medications:     amLODIPine (NORVASC) 5 mg tablet, , Disp: , Rfl:     ferrous sulfate (Iron) 325 mg (65 mg iron) tablet, Take  by mouth Daily (before breakfast). , Disp: , Rfl:     calcium carbonate (CALCIUM 500 PO), Take  by mouth. Gummy (550 mg every day), Disp: , Rfl:     gabapentin (NEURONTIN) 100 mg capsule, TAKE 1 CAP BY MOUTH THREE (3) TIMES DAILY.  MAX DAILY AMOUNT: 300 MG., Disp: 90 Cap, Rfl: 2   lovastatin (MEVACOR) 40 mg tablet, TAKE 1 TAB BY MOUTH NIGHTLY FOR CHOLESTEROL LOWERING, Disp: 90 Tab, Rfl: 1    omeprazole (PRILOSEC) 40 mg capsule, TAKE ONE CAPSULE BY MOUTH EVERY MORNING TO CONTROL STOMACH ACID, Disp: 30 Cap, Rfl: 5    hydrALAZINE (APRESOLINE) 100 mg tablet, Take 1 Tab by mouth three (3) times daily. , Disp: 270 Tab, Rfl: 1    irbesartan (AVAPRO) 300 mg tablet, TAKE 1 TAB BY MOUTH NIGHTLY., Disp: 90 Tab, Rfl: 0    furosemide (LASIX) 40 mg tablet, TAKE 1 TAB BY MOUTH DAILY. INCREASED DOSE, Disp: 90 Tab, Rfl: 1    cetirizine (ZYRTEC) 10 mg tablet, Take 10 mg by mouth., Disp: , Rfl:     fluticasone propionate (FLONASE) 50 mcg/actuation nasal spray, SPRAY 1 PUFF IN EACH NOSTRIL ONCE DAILY, Disp: 1 Bottle, Rfl: 5    aspirin delayed-release 81 mg tablet, Take 1 Tab by mouth daily. , Disp: 30 Tab, Rfl: 0    TRUE METRIX GLUCOSE TEST STRIP strip, , Disp: , Rfl:     acetaminophen (TYLENOL EXTRA STRENGTH) 500 mg tablet, Take 1 Tab by mouth every six (6) hours as needed for Pain., Disp: 30 Tab, Rfl: 1    SOCIAL HISTORY:   Social History     Socioeconomic History    Marital status:      Spouse name: Not on file    Number of children: Not on file    Years of education: Not on file    Highest education level: Not on file   Occupational History    Not on file   Social Needs    Financial resource strain: Not on file    Food insecurity     Worry: Not on file     Inability: Not on file    Transportation needs     Medical: Not on file     Non-medical: Not on file   Tobacco Use    Smoking status: Never Smoker    Smokeless tobacco: Never Used   Substance and Sexual Activity    Alcohol use: No     Alcohol/week: 0.0 standard drinks    Drug use: No    Sexual activity: Never   Lifestyle    Physical activity     Days per week: Not on file     Minutes per session: Not on file    Stress: Not on file   Relationships    Social connections     Talks on phone: Not on file     Gets together: Not on file     Attends Yazidism service: Not on file     Active member of club or organization: Not on file     Attends meetings of clubs or organizations: Not on file     Relationship status: Not on file    Intimate partner violence     Fear of current or ex partner: Not on file     Emotionally abused: Not on file     Physically abused: Not on file     Forced sexual activity: Not on file   Other Topics Concern    Not on file   Social History Narrative    Happily . No concern for abuse. Exercise: none    Diet: Careful. Wear Seatbelts               FAMILY HISTORY:  Family History   Problem Relation Age of Onset    Diabetes Brother     Hypertension Mother     Diabetes Brother        REVIEW OF SYSTEMS: Complete ROS assessed and noted for that which is described above, all else are negative. Eyes: normal  ENT: normal  CVS: normal  Resp: normal  GI: normal  : normal  GYN: normal  Endocrine: normal  Integument: normal  Musculoskeletal: normal  Neuro: normal  Psych: normal    PHYSICAL EXAMINATION:  Telemedicine visit    GENERAL: NCAT, Appears well nourished  EYES: EOMI, non-icteric, no proptosis  Ear/Nose/Throat: NCAT, no visible inflammation or masses  CARDIOVASCULAR: no cyanosis, no visible JVD  RESPIRATORY: comfortable respirations observed, no cyanosis  MUSCULOSKELETAL: Normal ROM of upper extremities observed  SKIN: No edema, rash, or other significant changes observed  NEUROLOGIC:  AAOx3  PSYCHIATRIC: Normal affect, Normal insight and judgement    REVIEW OF LABORATORY AND RADIOLOGY DATA:   Labs and documentation have been reviewed as described above. ASSESSMENT AND PLAN:   Cata Jordan is a 80 y.o. female with a PMHx as noted above who was referred to our endocrinology clinic for evaluation of hypercalcemia.     Hypercalcemia  History of vitamin D deficiency  Primary Hyperparathyroidism  Multinodular Goiter  Osteopenia    Hyperparathyroidism / Hypercalcemia / Osteopenia:   Stable serum calcium (prev high, now normal), surgical evaluation not warranted at this time  Calcium and D Supplements:    Taking about 500mg once daily,    PTH high in setting of CKD  Bone Specific Med: Prolia: Dose #4 (previuosly ordered) scheduled for May 25  Imaging: US/ NM scan w/o evidence of parathyroid adenoma,       DXA: Next imaging Nov 2021 (following 2 years/4 doses of prolia)  Labs: reviewed    MNG: I have reviewed the images of her thyroid US personally. Her thyroid nodules mostly appear as pseudonodules, meaning that there is chronic heterogenous appearance giving the resemblance of thyroid nodules whereas most of them are not actually true nodules. The nodules that were present did not appear suspicious. Ultrasound can be repeated around Nov 2021 together with next Bone density scan. Daljit Dean.  [address] [facility]

## 2021-04-15 ENCOUNTER — HOSPITAL ENCOUNTER (OUTPATIENT)
Dept: ULTRASOUND IMAGING | Age: 86
Discharge: HOME | End: 2021-04-15
Attending: INTERNAL MEDICINE
Payer: MEDICARE

## 2021-04-15 DIAGNOSIS — I35.0 AORTIC STENOSIS, MODERATE: ICD-10-CM

## 2021-04-15 DIAGNOSIS — I10 ESSENTIAL HYPERTENSION: ICD-10-CM

## 2021-04-15 DIAGNOSIS — I50.32 DIASTOLIC CHF, CHRONIC (HCC): ICD-10-CM

## 2021-04-15 PROCEDURE — 93306 TTE W/DOPPLER COMPLETE: CPT

## 2021-04-16 LAB
ECHO AO ROOT DIAM: 2.93 CM
ECHO AV AREA PEAK VELOCITY: 1.17 CM2
ECHO AV AREA VTI: 1.25 CM2
ECHO AV MEAN GRADIENT: 30 MMHG
ECHO AV PEAK GRADIENT: 51.76 MMHG
ECHO AV PEAK VELOCITY: 359.55 CM/S
ECHO AV VTI: 87.93 CM
ECHO EST RA PRESSURE: 10 MMHG
ECHO LA MAJOR AXIS: 3.84 CM
ECHO LV E' SEPTAL VELOCITY: 8.73 CM/S
ECHO LV INTERNAL DIMENSION DIASTOLIC: 5.6 CM (ref 3.9–5.3)
ECHO LV INTERNAL DIMENSION SYSTOLIC: 3.11 CM
ECHO LV IVSD: 1.06 CM (ref 0.6–0.9)
ECHO LV MASS 2D: 237.3 G (ref 67–162)
ECHO LV POSTERIOR WALL DIASTOLIC: 1.06 CM (ref 0.6–0.9)
ECHO LVOT CARDIAC OUTPUT: 6.33 LITER/MINUTE
ECHO LVOT DIAM: 1.94 CM
ECHO LVOT PEAK GRADIENT: 8.16 MMHG
ECHO LVOT PEAK VELOCITY: 142.84 CM/S
ECHO LVOT SV: 109.7 ML
ECHO LVOT VTI: 37.08 CM
ECHO MV A VELOCITY: 103.4 CM/S
ECHO MV E DECELERATION TIME (DT): 251.96 MS
ECHO MV E VELOCITY: 86.78 CM/S
ECHO MV E/A RATIO: 0.84
ECHO MV E/E' SEPTAL: 9.94
ECHO RIGHT VENTRICULAR SYSTOLIC PRESSURE (RVSP): 45.17 MMHG
ECHO TV REGURGITANT MAX VELOCITY: 296.46 CM/S
ECHO TV REGURGITANT PEAK GRADIENT: 35.17 MMHG
LVOT MG: 4.43 MMHG

## 2021-04-20 ENCOUNTER — TELEPHONE (OUTPATIENT)
Dept: CARDIOLOGY CLINIC | Age: 86
End: 2021-04-20

## 2021-04-20 NOTE — TELEPHONE ENCOUNTER
----- Message from Shanika Burks MD sent at 4/16/2021 10:01 AM EDT -----  Regarding: echo  Advise no change--aortic valve gradient 30, previously 29--continue to follow. RTC 6 mos, sooner prn.  Thanks [facility]

## 2021-05-28 ENCOUNTER — HOSPITAL ENCOUNTER (OUTPATIENT)
Dept: INFUSION THERAPY | Age: 86
Discharge: HOME | End: 2021-05-28
Payer: MEDICARE

## 2021-05-28 VITALS
OXYGEN SATURATION: 99 % | HEART RATE: 60 BPM | WEIGHT: 171.2 LBS | TEMPERATURE: 97.8 F | BODY MASS INDEX: 33.44 KG/M2 | DIASTOLIC BLOOD PRESSURE: 73 MMHG | RESPIRATION RATE: 18 BRPM | SYSTOLIC BLOOD PRESSURE: 165 MMHG

## 2021-05-28 LAB
ALBUMIN SERPL-MCNC: 3.4 G/DL (ref 3.5–5)
ANION GAP SERPL CALC-SCNC: 9 MMOL/L (ref 5–15)
BUN SERPL-MCNC: 43 MG/DL (ref 6–20)
BUN/CREAT SERPL: 20 (ref 12–20)
CALCIUM SERPL-MCNC: 10.6 MG/DL (ref 8.5–10.1)
CHLORIDE SERPL-SCNC: 97 MMOL/L (ref 97–108)
CO2 SERPL-SCNC: 26 MMOL/L (ref 21–32)
CREAT SERPL-MCNC: 2.18 MG/DL (ref 0.55–1.02)
GLUCOSE SERPL-MCNC: 117 MG/DL (ref 65–100)
MAGNESIUM SERPL-MCNC: 1.6 MG/DL (ref 1.6–2.4)
PHOSPHATE SERPL-MCNC: 2.9 MG/DL (ref 2.6–4.7)
PHOSPHATE SERPL-MCNC: 3.7 MG/DL (ref 2.6–4.7)
POTASSIUM SERPL-SCNC: 3.5 MMOL/L (ref 3.5–5.1)
SODIUM SERPL-SCNC: 132 MMOL/L (ref 136–145)

## 2021-05-28 PROCEDURE — 96372 THER/PROPH/DIAG INJ SC/IM: CPT

## 2021-05-28 PROCEDURE — 74011250636 HC RX REV CODE- 250/636: Performed by: INTERNAL MEDICINE

## 2021-05-28 PROCEDURE — 83735 ASSAY OF MAGNESIUM: CPT

## 2021-05-28 PROCEDURE — 84100 ASSAY OF PHOSPHORUS: CPT

## 2021-05-28 PROCEDURE — 80069 RENAL FUNCTION PANEL: CPT

## 2021-05-28 PROCEDURE — 36415 COLL VENOUS BLD VENIPUNCTURE: CPT

## 2021-05-28 RX ADMIN — DENOSUMAB 60 MG: 60 INJECTION SUBCUTANEOUS at 14:30

## 2021-05-28 NOTE — PROGRESS NOTES
[place] [id number]    NAME: EMMETT ANAYA                                            /AGE/SEX: 1957 - 62 - M  PHYSICIAN: Imtiaz King M.D.                                    ADMIT DATE: 12/15/19  UNIT #: [id number]                                                DIS DATE:  ACCT #: [id number]                                              LOC/RM/BED: F015-A                                             REPORT # : [id number]  [facility]  _______________________________________________________________________________  Patient Name: Emmett Anaya          Procedure Date: 2019 4:24 PM  MRN: [id number]                       Account Number: [id number]  YOB: 1957              Admit Type: Inpatient  Age: 62                               Room: [place] [address]  Gender: Male  _______________________________________________________________________________    Providers:           Vahe Novoa (Doctor) , Gayla Ashley RN (Nurse),                       Cynthia Gutierres (Technician), Virgen Vaughan (Anesthesia                       Staff)  Procedure:           Upper GI endoscopy  Pre OP Indications:  Cirrhosis rule out esophageal varices  Medicines:       Propofol total dose 200 mg IV  Complications:       No immediate complications.  _______________________________________________________________________________  Procedure:           After obtaining informed consent, the endoscope was                       passed under direct vision. Throughout the procedure,                       the patient's blood pressure, pulse, and oxygen                       saturations were monitored continuously. The Olympus                       Endoscope was introduced through the mouth, and advanced                       to the  [facility] Progress Note    Date: May 28, 2021    Name: Nancy Carl    MRN: [id number]         : 10/5/1931      Ms. Sandra hCoi was assessed and education was provided. She denies new problems. States she hasn't had problems with past Prolia injections. Ms. Jasmine Alfaro vitals were reviewed and patient was observed for 5 minutes prior to treatment. Visit Vitals  BP (!) 165/73 (BP 1 Location: Left upper arm, BP Patient Position: Sitting)   Pulse 60   Temp 97.8 °F (36.6 °C)   Resp 18   Wt 77.7 kg (171 lb 3.2 oz)   SpO2 99%   Breastfeeding No   BMI 33.44 kg/m²       Lab results were obtained and reviewed. Recent Results (from the past 12 hour(s))   RENAL FUNCTION PANEL    Collection Time: 21  1:44 PM   Result Value Ref Range    Sodium 132 (L) 136 - 145 mmol/L    Potassium 3.5 3.5 - 5.1 mmol/L    Chloride 97 97 - 108 mmol/L    CO2 26 21 - 32 mmol/L    Anion gap 9 5 - 15 mmol/L    Glucose 117 (H) 65 - 100 mg/dL    BUN 43 (H) 6 - 20 MG/DL    Creatinine 2.18 (H) 0.55 - 1.02 MG/DL    BUN/Creatinine ratio 20 12 - 20      GFR est AA 26 (L) >60 ml/min/1.73m2    GFR est non-AA 21 (L) >60 ml/min/1.73m2    Calcium 10.6 (H) 8.5 - 10.1 MG/DL    Phosphorus 2.9 2.6 - 4.7 MG/DL    Albumin 3.4 (L) 3.5 - 5.0 g/dL   MAGNESIUM    Collection Time: 21  1:44 PM   Result Value Ref Range    Magnesium 1.6 1.6 - 2.4 mg/dL   PHOSPHORUS    Collection Time: 21  1:44 PM   Result Value Ref Range    Phosphorus 3.7 2.6 - 4.7 MG/DL       Prolia 60 mg was administered subcutaneous in  Left upper arm and site intact. Ms. Sandra Choi tolerated well, and had no complaints. Patient armband removed and shredded. Ms. Sandra Choi was discharged from [place] [address] in stable condition at 1435. No appointment scheduled at present. She is to follow up with her doctor.   Princess Ge RN  May 28, 2021  2:57 PM second part of duodenum. The upper GI endoscopy                       was accomplished without difficulty. The patient                       tolerated the procedure well.    Post OP Findings:       Four columns of grade I varices were found in the distal esophagus, 37       to 40 cm from the incisors. No stigmata of recent bleeding were evident       and no red stefani signs were present.       The Z-line was regular and was found 40 cm from the incisors.       Localized moderate inflammation characterized by erythema and subtle       submucosal hemorrhages was found in the gastric mid-body. Biopsies were       taken with a cold forceps for Helicobacter pylori testing. There were no       gastric varices or changes of portal hypertensive gastropathy.       The examined duodenum was normal.    Impression:       - Non-bleeding grade I esophageal varices.       - Z-line regular, 40 cm from the incisors.       - Non-erosive gastritis. I suspect this is due more to conventional       gastritis (possibly H. pylori related) then portal hypertensive       gastropathy. Biopsied.       - No gastric varices or changes of portal hypertensive gastropathy.       - Normal examined duodenum.  Recommendation:       - Repeat upper endoscopy in 1 year for variceal surveillance.       - No indication for non-selective beta-blockade.       - Treat if gastric biopsies reveal h pylori infection.       - Begin solid food diet.      _______________  Vahe Novoa,  12/17/2019 4:36:23 PM  Number of Addenda: 0    Note Initiated On: 12/17/2019 4:24 PM  CC Letter to:  Total Procedure Duration Time 0 hours 4 minutes 42 seconds    DICTATED: Vahe Novoa M.D.    <Electronically signed by Vahe Novoa M.D. in OV>    Vahe Novoa M.D.  12/17/19 1636    S: Signed  D: 12/17/19 1624  T:   pp    REPORT#: [id number]        CC: Vahe Novoa M.D.                                             REPORT STATUS: Signed                                                   of 2

## 2021-06-15 RX ORDER — AMLODIPINE BESYLATE 5 MG/1
TABLET ORAL
Qty: 90 TABLET | Refills: 0 | Status: SHIPPED | OUTPATIENT
Start: 2021-06-15 | End: 2021-01-01 | Stop reason: SDUPTHER

## 2021-06-15 RX ORDER — FLUTICASONE PROPIONATE 50 MCG
SPRAY, SUSPENSION (ML) NASAL
Qty: 1 BOTTLE | Refills: 5 | Status: SHIPPED | OUTPATIENT
Start: 2021-06-15

## 2021-06-21 ENCOUNTER — OFFICE VISIT (OUTPATIENT)
Dept: FAMILY MEDICINE CLINIC | Age: 86
End: 2021-06-21
Payer: MEDICARE

## 2021-06-21 VITALS
HEART RATE: 60 BPM | BODY MASS INDEX: 32.83 KG/M2 | OXYGEN SATURATION: 100 % | DIASTOLIC BLOOD PRESSURE: 75 MMHG | HEIGHT: 60 IN | TEMPERATURE: 98.2 F | WEIGHT: 167.25 LBS | RESPIRATION RATE: 18 BRPM | SYSTOLIC BLOOD PRESSURE: 140 MMHG

## 2021-06-21 DIAGNOSIS — K21.9 GASTROESOPHAGEAL REFLUX DISEASE WITHOUT ESOPHAGITIS: ICD-10-CM

## 2021-06-21 DIAGNOSIS — M25.511 RIGHT SHOULDER PAIN, UNSPECIFIED CHRONICITY: ICD-10-CM

## 2021-06-21 DIAGNOSIS — E78.00 PURE HYPERCHOLESTEROLEMIA: ICD-10-CM

## 2021-06-21 DIAGNOSIS — E11.22 TYPE 2 DIABETES MELLITUS WITH STAGE 4 CHRONIC KIDNEY DISEASE, WITHOUT LONG-TERM CURRENT USE OF INSULIN (HCC): Primary | ICD-10-CM

## 2021-06-21 DIAGNOSIS — I10 ESSENTIAL HYPERTENSION: ICD-10-CM

## 2021-06-21 DIAGNOSIS — H61.21 IMPACTED CERUMEN OF RIGHT EAR: ICD-10-CM

## 2021-06-21 DIAGNOSIS — N18.4 TYPE 2 DIABETES MELLITUS WITH STAGE 4 CHRONIC KIDNEY DISEASE, WITHOUT LONG-TERM CURRENT USE OF INSULIN (HCC): Primary | ICD-10-CM

## 2021-06-21 PROCEDURE — G8432 DEP SCR NOT DOC, RNG: HCPCS | Performed by: NURSE PRACTITIONER

## 2021-06-21 PROCEDURE — G8536 NO DOC ELDER MAL SCRN: HCPCS | Performed by: NURSE PRACTITIONER

## 2021-06-21 PROCEDURE — G8427 DOCREV CUR MEDS BY ELIG CLIN: HCPCS | Performed by: NURSE PRACTITIONER

## 2021-06-21 PROCEDURE — G8417 CALC BMI ABV UP PARAM F/U: HCPCS | Performed by: NURSE PRACTITIONER

## 2021-06-21 PROCEDURE — 99214 OFFICE O/P EST MOD 30 MIN: CPT | Performed by: NURSE PRACTITIONER

## 2021-06-21 PROCEDURE — 1090F PRES/ABSN URINE INCON ASSESS: CPT | Performed by: NURSE PRACTITIONER

## 2021-06-21 PROCEDURE — 69209 REMOVE IMPACTED EAR WAX UNI: CPT | Performed by: NURSE PRACTITIONER

## 2021-06-21 PROCEDURE — 1101F PT FALLS ASSESS-DOCD LE1/YR: CPT | Performed by: NURSE PRACTITIONER

## 2021-06-21 NOTE — PROGRESS NOTES
Subjective:     CC: diabetes, HTN    Araceli Marie is a 80 y.o. female who presents today to follow up for HTN, diabetes, and CKD stage 4. She has been seeing NP Tomy Lawson who has left the practice. She is accompanied by her  and her son Maddie Frances who is now living with her and her . She has no complaints today other \"wobbly legs\" but denies any falls. Using a walker, son living with them now so she is not alone. We discussed the option of starting PT for leg strengthening exercises but son states she has exceeded her PT limits according to the insurance company. Hypertensive HD with CKD stage IV  BP was high at 161.54 but then came down to 140/75 when rechecked manually. She is on Irbesartan 300mg daily, Amlodipine 5mg daily, Lasix 40mg daily, and Hydralazine 100mg TID. She last saw cardiologist Dr. Shiraz Yuen in April as she has a hx of aortic stenosis. He ordered an ECHO which showed normal EF with not much change in the aortic stenosis. She will follow up with him in 6 months. She denies chest pain, or shortness of breath. She does have some swelling in her ankles. Had a pair of compression stockings but says they were too tight so she is going to get another pair. She is followed by nephrologist Dr Rk Levine. Last seen 2/5/21. Labs done last month. Lab Results   Component Value Date/Time    Creatinine 2.18 (H) 05/28/2021 01:44 PM       Type 2 Diabetes with neuropathy, diet controlled   Lab Results   Component Value Date/Time    Hemoglobin A1c 6.6 (H) 12/16/2020 02:20 PM     Checks BS at home in the morning. BS was 119 this morning. No episodes of symptomatic hypoglycemia. She does not take any medications for her diabetes. She is on Gabapentin 100mg TID prn for neuropathy. Last eye exam: 8/13/2020, next exam coming up this summer  Last foot exam: Sees podiatrist Dr. Rk Levine- saw him recently. HLD  Stable, takes lovastatin.   Lab Results   Component Value Date/Time    Cholesterol, total 108 12/16/2020 12:00 AM    HDL Cholesterol 35 (L) 12/16/2020 12:00 AM    LDL, calculated 54 12/16/2020 12:00 AM    LDL, calculated 56 02/26/2019 03:04 PM    VLDL, calculated 19 12/16/2020 12:00 AM    VLDL, calculated 19 02/26/2019 03:04 PM    Triglyceride 101 12/16/2020 12:00 AM     Chronic right shoulder pain  She was referred to Dr. Laurel Howard recently for right shoulder pain. MRI showed chronic degenerative changes and he is planning on trying injections rather than surgical intervention. She has had 2 injections that have helped relieve the pain. GERD  Stable, no breakthrough heartburn or alarm signs. Taking omeprazole 40 mg daily.     Patient Active Problem List   Diagnosis Code    Encounter for long-term (current) use of other medications Z79.899    Type 2 diabetes mellitus with stage 4 chronic kidney disease, without long-term current use of insulin ([facility] E11.22, N18.4    Pure hypercholesterolemia E78.00    Anemia of chronic disease D63.8    Bradycardia R00.1    Aortic stenosis, moderate I35.0    Hyperparathyroidism (HCC) E21.3    BMI 33.0-33.9,adult Z68.33    Type 2 diabetes mellitus with diabetic neuropathy ([facility]) E11.40    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 ([facility]) I13.10, N18.4    Primary hyperparathyroidism (HCC) E21.0    Gastroesophageal reflux disease without esophagitis K21.9    Peripheral vascular disease ([facility]) I73.9       Past Medical History:   Diagnosis Date    Anemia of chronic disease 6/19/2018    Anxiety     Aortic stenosis, moderate     Back pain     CAD (coronary artery disease)     CKD (chronic kidney disease) stage 3, GFR 30-59 ml/min ([facility])     Constipation     Diabetes ([address]     Diverticulosis 2007    DM (diabetes mellitus) ([address]     AODM    Fibroid 1972    GERD (gastroesophageal reflux disease)     Goiter, non-toxic     Hernia, hiatal     Hyperlipemia     Hypertension     Hypokalemia     Menopause     Osteoarthritis 2006 bursitis R shoulder    Osteoarthritis of right knee     PVC (premature ventricular contraction)     H/O PVC's    Raynaud disease     Venous stasis     Wears hearing aid     both ears         Current Outpatient Medications:     fluticasone propionate (FLONASE) 50 mcg/actuation nasal spray, SPRAY 1 PUFF IN EACH NOSTRIL ONCE DAILY, Disp: 1 Bottle, Rfl: 5    amLODIPine (NORVASC) 5 mg tablet, TAKE 1 TAB BY MOUTH DAILY. , Disp: 90 Tablet, Rfl: 0    furosemide (LASIX) 40 mg tablet, TAKE 1 TAB BY MOUTH DAILY. , Disp: 90 Tablet, Rfl: 1    gabapentin (NEURONTIN) 100 mg capsule, TAKE 1 CAP BY MOUTH THREE (3) TIMES DAILY. MAX DAILY AMOUNT: 300 MG., Disp: 90 Cap, Rfl: 2    irbesartan (AVAPRO) 300 mg tablet, TAKE 1 TAB BY MOUTH NIGHTLY., Disp: 90 Tab, Rfl: 0    ferrous sulfate (Iron) 325 mg (65 mg iron) tablet, Take  by mouth Daily (before breakfast). , Disp: , Rfl:     calcium carbonate (CALCIUM 500 PO), Take  by mouth. Gummy (550 mg every day), Disp: , Rfl:     lovastatin (MEVACOR) 40 mg tablet, TAKE 1 TAB BY MOUTH NIGHTLY FOR CHOLESTEROL LOWERING, Disp: 90 Tab, Rfl: 1    omeprazole (PRILOSEC) 40 mg capsule, TAKE ONE CAPSULE BY MOUTH EVERY MORNING TO CONTROL STOMACH ACID, Disp: 30 Cap, Rfl: 5    hydrALAZINE (APRESOLINE) 100 mg tablet, Take 1 Tab by mouth three (3) times daily. , Disp: 270 Tab, Rfl: 1    cetirizine (ZYRTEC) 10 mg tablet, Take 10 mg by mouth., Disp: , Rfl:     TRUE METRIX GLUCOSE TEST STRIP strip, , Disp: , Rfl:     aspirin delayed-release 81 mg tablet, Take 1 Tab by mouth daily. , Disp: 30 Tab, Rfl: 0    acetaminophen (TYLENOL EXTRA STRENGTH) 500 mg tablet, Take 1 Tab by mouth every six (6) hours as needed for Pain., Disp: 30 Tab, Rfl: 1    Allergies   Allergen Reactions    Metformin Other (comments)     Kidneys effected       Past Surgical History:   Procedure Laterality Date    HX BREAST BIOPSY Bilateral     x5    HX CATARACT REMOVAL  2004    bilateral    HX COLONOSCOPY  2002, 08/2007    HX DILATION AND CURETTAGE  1960's    HX HYSTERECTOMY  66's    BSO    HX OTHER SURGICAL  2001    sigmoidoscopy       Social History     Tobacco Use   Smoking Status Never Smoker   Smokeless Tobacco Never Used       Social History     Socioeconomic History    Marital status:      Spouse name: Not on file    Number of children: Not on file    Years of education: Not on file    Highest education level: Not on file   Tobacco Use    Smoking status: Never Smoker    Smokeless tobacco: Never Used   Substance and Sexual Activity    Alcohol use: No     Alcohol/week: 0.0 standard drinks    Drug use: No    Sexual activity: Never   Social History Narrative    Happily . No concern for abuse. Exercise: none    Diet: Careful. Wear Seatbelts             Social Determinants of Health     Financial Resource Strain:     Difficulty of Paying Living Expenses:    Food Insecurity:     Worried About Running Out of Food in the Last Year:     [address] in the Last Year:    Transportation Needs:     Lack of Transportation (Medical):      Lack of Transportation (Non-Medical):    Physical Activity:     Days of Exercise per Week:     Minutes of Exercise per Session:    Stress:     Feeling of Stress :    Social Connections:     Frequency of Communication with Friends and Family:     Frequency of Social Gatherings with Friends and Family:     Attends Confucianism Services:     Active Member of Clubs or Organizations:     Attends Club or Organization Meetings:     Marital Status:        Family History   Problem Relation Age of Onset    Diabetes Brother     Hypertension Mother     Diabetes Brother        ROS:  Gen: denies fever, chills, or fatigue  HEENT:denies H/A, nasal congestion, or sore throat  Resp: denies dyspnea, cough, or wheezing  CV: denies chest pain, pressure, or palpitations  Extremeties: + edema in ankles  GI[de-identified] denies abdominal pain, nausea, vomiting, diarrhea, or constipation  : denies dysuria, hematuria, urinary frequency or urgency  Musculoskeletal: +improved right shoulder pain  Neuro: + numbness/tingling in feet, denies dizziness, +mild weakness in both legs  Skin: denies rashes or new lesions   Psych: denies anxiety, depression, kenneth, or other changes in mood    Objective:     Visit Vitals  BP (!) 140/75   Pulse 60   Temp 98.2 °F (36.8 °C) (Temporal)   Resp 18   Ht 5' (1.524 m)   Wt 167 lb 4 oz (75.9 kg)   SpO2 100%   BMI 32.66 kg/m²       General: Alert and oriented. No acute distress. Well nourished. HEENT :  Ears:Right: +cerumen impaction  Left: TM normal. Canal clear. Eyes: Sclera white, conjunctiva clear. PERRLA. Extra ocular movements intact. Neck: Supple with FROM. Lungs: Breathing even and unlabored. All lobes clear to auscultation bilaterally   Heart :RRR, +Systolic murmur  Extremities: +edema to bilateral ankles   Neuro: Cranial nerves grossly normal.  Psych: Mood and thought content appropriate for situation. Dressed appropriately and with good hygiene. Skin: Warm, dry, and intact. No lesions or discoloration. Assessment/ Plan:     Hypertensive HD with CKD stage IV  BP stable  Cont current meds  Discussed importance of low sodium diet   Suggested compression stockings for ankle edema  Elevated legs  Go to ER or RTO for CP, SOB, dizziness, or worsening swelling. She recently had labs done for nephrologist Dr Ajay Martin  F/U with cardiologist Dr. Rubi Valencia as scheduled in the fall   F/U here 3 months    Type 2 Diabetes with neuropathy, diet controlled   Recheck A1C  Cont low carb, sugar free diet  Cont to check BS daily  Keep eye exams and podiatry appts up to date  Cont Gabapentin 100mg TID prn for neuropathy  F/U here 3 months    HLD  Cont lovastatin    Chronic right shoulder pain  Improved with injections   F/U with ortho as needed    GERD  Stable  Cont PPI  Avoid triggers     Cerumen impaction, right ear  Left ear flushed with peroxide and water.   Wax expelled  F/U prn ear pain, drainage, or other concerns            Verbal and written instructions (see AVS) provided.  Patient expresses understanding of diagnosis and treatment plan. There are no preventive care reminders to display for this patient. Corbin Morales.  Beau Rivera, FRANCISCO

## 2021-06-21 NOTE — PROGRESS NOTES
right ear lavage completed. Warm water and Hydrogen peroxide mix used to remove cerumen from right ear. Right Canal cleared after 5 lavages. 1. Have you been to the ER, urgent care clinic since your last visit? Hospitalized since your last visit? No    2. Have you seen or consulted any other health care providers outside of the [address] [place] [address] since your last visit? Include any pap smears or colon screening.  No

## 2021-06-21 NOTE — PATIENT INSTRUCTIONS
Leg and Ankle Edema: Care Instructions Your Care Instructions Swelling in the legs, ankles, and feet is called edema. It is common after you sit or stand for a while. Long plane flights or car rides often cause swelling in the legs and feet. You may also have swelling if you have to stand for long periods of time at your job. Problems with the veins in the legs (varicose veins) and changes in hormones can also cause swelling. Sometimes the swelling in the ankles and feet is caused by a more serious problem, such as heart failure, infection, blood clots, or liver or kidney disease. Follow-up care is a key part of your treatment and safety. Be sure to make and go to all appointments, and call your doctor if you are having problems. It's also a good idea to know your test results and keep a list of the medicines you take. How can you care for yourself at home? · If your doctor gave you medicine, take it as prescribed. Call your doctor if you think you are having a problem with your medicine. · Whenever you are resting, raise your legs up. Try to keep the swollen area higher than the level of your heart. · Take breaks from standing or sitting in one position. ? Walk around to increase the blood flow in your lower legs. ? Move your feet and ankles often while you stand, or tighten and relax your leg muscles. · Wear support stockings. Put them on in the morning, before swelling gets worse. · Eat a balanced diet. Lose weight if you need to. · Limit the amount of salt (sodium) in your diet. Salt holds fluid in the body and may increase swelling. When should you call for help? Call 911 anytime you think you may need emergency care. For example, call if: 
  · You have symptoms of a blood clot in your lung (called a pulmonary embolism). These may include: 
? Sudden chest pain. ? Trouble breathing. ? Coughing up blood.   
Call your doctor now or seek immediate medical care if: 
  · You have signs of a blood clot, such as: 
? Pain in your calf, back of the knee, thigh, or groin. ? Redness and swelling in your leg or groin.  
  · You have symptoms of infection, such as: 
? Increased pain, swelling, warmth, or redness. ? Red streaks or pus. ? A fever. Watch closely for changes in your health, and be sure to contact your doctor if: 
  · Your swelling is getting worse.  
  · You have new or worsening pain in your legs.  
  · You do not get better as expected. Where can you learn more? Go to http://www.gray.com/ Enter T276 in the search box to learn more about \"Leg and Ankle Edema: Care Instructions. \" Current as of: February 26, 2020               Content Version: 12.8 © 2006-2021 [employer]. Care instructions adapted under license by [employer] (which disclaims liability or warranty for this information). If you have questions about a medical condition or this instruction, always ask your healthcare professional. [place] [address] any warranty or liability for your use of this information.

## 2021-06-24 ENCOUNTER — LAB ONLY (OUTPATIENT)
Dept: FAMILY MEDICINE CLINIC | Age: 86
End: 2021-06-24

## 2021-06-24 DIAGNOSIS — N18.4 TYPE 2 DIABETES MELLITUS WITH STAGE 4 CHRONIC KIDNEY DISEASE, WITHOUT LONG-TERM CURRENT USE OF INSULIN (HCC): ICD-10-CM

## 2021-06-24 DIAGNOSIS — E11.22 TYPE 2 DIABETES MELLITUS WITH STAGE 4 CHRONIC KIDNEY DISEASE, WITHOUT LONG-TERM CURRENT USE OF INSULIN (HCC): ICD-10-CM

## 2021-06-25 LAB
EST. AVERAGE GLUCOSE BLD GHB EST-MCNC: 128 MG/DL
HBA1C MFR BLD: 6.1 % (ref 4–5.6)

## 2021-06-25 NOTE — PROGRESS NOTES
Patient identified by two patient identifiers, name and date of birth. Spoke with patient's daughter Evie Hernandez on disclosure. She is aware of results and states understanding at this time.

## 2021-07-19 ENCOUNTER — HOSPITAL ENCOUNTER (OUTPATIENT)
Dept: LAB | Age: 86
Discharge: HOME | End: 2021-07-19

## 2021-07-27 RX ORDER — CALCIUM CARBONATE 500(1250)
1 TABLET ORAL 2 TIMES DAILY
Qty: 60 TABLET | Refills: 3
Start: 2021-07-27 | End: 2021-07-27 | Stop reason: ALTCHOICE

## 2021-07-27 NOTE — PROGRESS NOTES
Pt seen by Mariah Broderick. He stopped her calcium supplement due to hypercalcemia.  She is to cont other meds and check labs in 1-2 weeks then follow up in 4 months (Nov)

## 2021-08-06 ENCOUNTER — CLINICAL SUPPORT (OUTPATIENT)
Dept: CARDIOLOGY CLINIC | Age: 86
End: 2021-08-06
Payer: MEDICARE

## 2021-08-06 ENCOUNTER — OFFICE VISIT (OUTPATIENT)
Dept: CARDIOLOGY CLINIC | Age: 86
End: 2021-08-06
Payer: MEDICARE

## 2021-08-06 VITALS
TEMPERATURE: 98.2 F | HEIGHT: 60 IN | DIASTOLIC BLOOD PRESSURE: 62 MMHG | WEIGHT: 163 LBS | SYSTOLIC BLOOD PRESSURE: 140 MMHG | RESPIRATION RATE: 18 BRPM | BODY MASS INDEX: 32 KG/M2 | HEART RATE: 60 BPM | OXYGEN SATURATION: 99 %

## 2021-08-06 DIAGNOSIS — I35.0 AORTIC STENOSIS, MODERATE: Primary | ICD-10-CM

## 2021-08-06 DIAGNOSIS — R55 SYNCOPE, UNSPECIFIED SYNCOPE TYPE: ICD-10-CM

## 2021-08-06 DIAGNOSIS — N18.4 CKD (CHRONIC KIDNEY DISEASE), STAGE IV (HCC): ICD-10-CM

## 2021-08-06 DIAGNOSIS — I10 ESSENTIAL HYPERTENSION: ICD-10-CM

## 2021-08-06 DIAGNOSIS — I73.9 PERIPHERAL VASCULAR DISEASE (HCC): ICD-10-CM

## 2021-08-06 DIAGNOSIS — D63.8 ANEMIA OF CHRONIC DISEASE: ICD-10-CM

## 2021-08-06 DIAGNOSIS — E11.21 TYPE 2 DIABETES MELLITUS WITH NEPHROPATHY (HCC): ICD-10-CM

## 2021-08-06 DIAGNOSIS — I50.32 DIASTOLIC CHF, CHRONIC (HCC): ICD-10-CM

## 2021-08-06 PROCEDURE — 1101F PT FALLS ASSESS-DOCD LE1/YR: CPT | Performed by: INTERNAL MEDICINE

## 2021-08-06 PROCEDURE — 1090F PRES/ABSN URINE INCON ASSESS: CPT | Performed by: INTERNAL MEDICINE

## 2021-08-06 PROCEDURE — G8417 CALC BMI ABV UP PARAM F/U: HCPCS | Performed by: INTERNAL MEDICINE

## 2021-08-06 PROCEDURE — G8510 SCR DEP NEG, NO PLAN REQD: HCPCS | Performed by: INTERNAL MEDICINE

## 2021-08-06 PROCEDURE — 99214 OFFICE O/P EST MOD 30 MIN: CPT | Performed by: INTERNAL MEDICINE

## 2021-08-06 PROCEDURE — 93000 ELECTROCARDIOGRAM COMPLETE: CPT | Performed by: INTERNAL MEDICINE

## 2021-08-06 PROCEDURE — G8536 NO DOC ELDER MAL SCRN: HCPCS | Performed by: INTERNAL MEDICINE

## 2021-08-06 PROCEDURE — G8427 DOCREV CUR MEDS BY ELIG CLIN: HCPCS | Performed by: INTERNAL MEDICINE

## 2021-08-06 NOTE — PROGRESS NOTES
Wilmar Lee is a 80 y.o. female is here for symptom-based f/u--episode of syncope 2 Sundays ago--standing up, using walker--dizzy and brief syncope. . Some LE swelling, SOLORIO.  Hx DM II, hypertension/hypertensive CVD, dyslipidemia, anemia of chronic disease. GERD, DJD, Raynaud's, followed by Deloris Gonsalez NP,  Also with hx multinodular goiter/parathryoid issues (followed by Endocrine).  Issues with bradycardia, HR high 40's. EKG with sinus tiffanie 48, LVH, PRWP/old AMI, NSST. Had been on Cardizem --reduced to 120 by PCP, now stopped. Holter with sinus tiffanie, HR 42-79, avg 52, occasional PAC's. Echo 6/27/18 with mod LVEF, LVEF 65-70, LAE, mod aortic stenosis (mean 24), mild to mod pulm hypertension. Lexiscan 9/26/19 with no infarct/ischemia, LVEF >65%.  Echo 9/19 with LVEF 60-65, mod AS (mean 23), MAC, mild MVP with trace MR, RVSP 44. Carotid doppler 9/19 with mild MAXWELL, otherwise ok. Currently on irbesartan 300, hydralazine 50 tid, spironolactone 25, lasix 20.  Recent labs 5/23/20 with BUN/ Cr up to 56/2.73, previously 45/2.1, K 4.2, glc 123.  . Seen on 6/11/20 here--spironolactone stopped and lasix increased to 40mg.  Seen in f/u by PCP and labs repeated--stable--last labs 5/28/21 with Cr down to 2.18, BUN 43, K 3.5. CBC 3/21 with Hb 8.6 (down from 9.1 previously. Recent OV with Dr. Cristin Solorio and had labs at that time (attempting to get results now from his office). Repeat Echo 6/18/20 with mod LVH, LVEF 60-65, D1, mod AS (mean gradient 29mm Hg). More leg weakness, ataxia, more pronounced tremor (?familial but now more), no hx Parkinson's, ?neuropathy (on gabapentin). Last Echo 4/15/21:  · LV: Estimated LVEF is 65 - 70%. Visually measured ejection fraction. Normal cavity size, systolic function (ejection fraction normal) and diastolic function. Upper normal wall thickness. Age-appropriate left ventricular diastolic function. · LA: Mildly dilated left atrium.   · AV: Aortic valve leaflet calcification present. Aortic valve mean gradient is 30 mmHg. Aortic valve area is 1.2 cm2. Moderate aortic valve stenosis is present. · MV: Mild mitral valve regurgitation is present. · TV: Mild to moderate tricuspid valve regurgitation is present. Right Ventricular Arterial Pressure (RVSP) is 45 mmHg. Pulmonary hypertension found to be mild to moderate. No recurrence of syncope, but some leg weakness/fatigue. The patient denies chest pain, orthopnea, PND. 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 ([address] 07/15/2019    Hyperparathyroidism ([address] 08/06/2018    BMI 33.0-33.9,adult 08/06/2018    Bradycardia 08/01/2018    Aortic stenosis, moderate     Pure hypercholesterolemia 06/19/2018    Anemia of chronic disease 06/19/2018    Type 2 diabetes mellitus with stage 4 chronic kidney disease, without long-term current use of insulin ([address] 12/19/2017    Encounter for long-term (current) use of other medications 08/10/2015      Rissa SANTA NP  Past Medical History:   Diagnosis Date    Anemia of chronic disease 6/19/2018    Anxiety     Aortic stenosis, moderate     Back pain     CAD (coronary artery disease)     CKD (chronic kidney disease) stage 3, GFR 30-59 ml/min ([facility])     Constipation     Diabetes ([address]     Diverticulosis 2007    DM (diabetes mellitus) ([address]     AODM    Fibroid 1972    GERD (gastroesophageal reflux disease)     Goiter, non-toxic     Hernia, hiatal     Hyperlipemia     Hypertension     Hypokalemia     Menopause     Osteoarthritis 2006    bursitis R shoulder    Osteoarthritis of right knee     PVC (premature ventricular contraction)     H/O PVC's    Raynaud disease     Venous stasis     Wears hearing aid     both ears      Past Surgical History:   Procedure Laterality Date    HX BREAST BIOPSY Bilateral     x5    HX CATARACT REMOVAL  2004    bilateral    HX COLONOSCOPY  2002, 08/2007    HX DILATION AND CURETTAGE  1960's    HX HYSTERECTOMY  66's    BSO    HX OTHER SURGICAL  2001    sigmoidoscopy     Allergies   Allergen Reactions    Metformin Other (comments)     Kidneys effected      Family History   Problem Relation Age of Onset    Diabetes Brother     Hypertension Mother     Diabetes Brother       Social History     Socioeconomic History    Marital status:      Spouse name: Not on file    Number of children: Not on file    Years of education: Not on file    Highest education level: Not on file   Occupational History    Not on file   Tobacco Use    Smoking status: Never Smoker    Smokeless tobacco: Never Used   Substance and Sexual Activity    Alcohol use: No     Alcohol/week: 0.0 standard drinks    Drug use: No    Sexual activity: Never   Other Topics Concern    Not on file   Social History Narrative    Happily . No concern for abuse. Exercise: none    Diet: Careful. Wear Seatbelts             Social Determinants of Health     Financial Resource Strain:     Difficulty of Paying Living Expenses:    Food Insecurity:     Worried About Running Out of Food in the Last Year:     [address] in the Last Year:    Transportation Needs:     Lack of Transportation (Medical):      Lack of Transportation (Non-Medical):    Physical Activity:     Days of Exercise per Week:     Minutes of Exercise per Session:    Stress:     Feeling of Stress :    Social Connections:     Frequency of Communication with Friends and Family:     Frequency of Social Gatherings with Friends and Family:     Attends Taoism Services:     Active Member of Clubs or Organizations:     Attends Club or Organization Meetings:     Marital Status: Intimate Partner Violence:     Fear of Current or Ex-Partner:     Emotionally Abused:     Physically Abused:     Sexually Abused:       Current Outpatient Medications   Medication Sig    irbesartan (AVAPRO) 300 mg tablet TAKE 1 TAB BY MOUTH NIGHTLY.  fluticasone propionate (FLONASE) 50 mcg/actuation nasal spray SPRAY 1 PUFF IN EACH NOSTRIL ONCE DAILY    amLODIPine (NORVASC) 5 mg tablet TAKE 1 TAB BY MOUTH DAILY.  furosemide (LASIX) 40 mg tablet TAKE 1 TAB BY MOUTH DAILY.  gabapentin (NEURONTIN) 100 mg capsule TAKE 1 CAP BY MOUTH THREE (3) TIMES DAILY. MAX DAILY AMOUNT: 300 MG.  ferrous sulfate (Iron) 325 mg (65 mg iron) tablet Take  by mouth Daily (before breakfast).  lovastatin (MEVACOR) 40 mg tablet TAKE 1 TAB BY MOUTH NIGHTLY FOR CHOLESTEROL LOWERING    omeprazole (PRILOSEC) 40 mg capsule TAKE ONE CAPSULE BY MOUTH EVERY MORNING TO CONTROL STOMACH ACID    hydrALAZINE (APRESOLINE) 100 mg tablet Take 1 Tab by mouth three (3) times daily.  cetirizine (ZYRTEC) 10 mg tablet Take 10 mg by mouth.  TRUE METRIX GLUCOSE TEST STRIP strip     aspirin delayed-release 81 mg tablet Take 1 Tab by mouth daily.  acetaminophen (TYLENOL EXTRA STRENGTH) 500 mg tablet Take 1 Tab by mouth every six (6) hours as needed for Pain. No current facility-administered medications for this visit. Review of Symptoms:    CONST  No weight change. No fever, chills, sweats    ENT No visual changes, URI sx, sore throat    CV  See HPI   RESP  No cough, or sputum, wheezing. Also see HPI   GI  No abdominal pain or change in bowel habits. No heartburn or dysphagia. No melena or rectal bleeding.   No dysuria, urgency, frequency, hematuria   MSKEL  No joint pain, swelling. No muscle pain. SKIN  No rash or lesions. NEURO  No headache, syncope, or seizure. No weakness, loss of sensation, or paresthesias. PSYCH  No low mood or depression  No anxiety.     HE/LYMPH  No easy bruising, abnormal bleeding, or enlarged glands. Physical ExamPhysical Exam:    Visit Vitals  Temp 98.2 °F (36.8 °C) (Temporal)   Resp 18   Ht 5' (1.524 m)   Wt 163 lb (73.9 kg)   SpO2 99% Comment: ra   BMI 31.83 kg/m²     Gen: NAD  HEENT:  PERRL, throat clear  Neck: no adenopathy, no thyromegaly, no JVD   Heart:  Regular,Nl S1S2,  II/VI murmur, no gallop or rub. Lungs:  clear  Abdomen:   Soft, non-tender, bowel sounds are active.    Extremities:  No edema  Pulse: symmetric  Neuro: A&O times 3, No focal neuro deficits    Cardiographics    ECG: NSR 58, LVH with repol changes, no acute changes      Labs:   Lab Results   Component Value Date/Time    Sodium 132 (L) 05/28/2021 01:44 PM    Sodium 133 (L) 03/04/2021 01:01 PM    Sodium 137 12/16/2020 12:00 AM    Sodium 136 11/24/2020 01:45 PM    Sodium 131 (L) 11/05/2020 03:23 PM    Potassium 3.5 05/28/2021 01:44 PM    Potassium 3.6 03/04/2021 01:01 PM    Potassium 3.8 12/16/2020 12:00 AM    Potassium 3.7 11/24/2020 01:45 PM    Potassium 4.4 11/05/2020 03:23 PM    Chloride 97 05/28/2021 01:44 PM    Chloride 100 03/04/2021 01:01 PM    Chloride 102 12/16/2020 12:00 AM    Chloride 100 11/24/2020 01:45 PM    Chloride 97 11/05/2020 03:23 PM    CO2 26 05/28/2021 01:44 PM    CO2 26 03/04/2021 01:01 PM    CO2 21 12/16/2020 12:00 AM    CO2 23 11/24/2020 01:45 PM    CO2 23 11/05/2020 03:23 PM    Anion gap 9 05/28/2021 01:44 PM    Anion gap 7 03/04/2021 01:01 PM    Anion gap 13 11/24/2020 01:45 PM    Anion gap 11 11/05/2020 03:23 PM    Anion gap 10 10/22/2020 06:43 PM    Glucose 117 (H) 05/28/2021 01:44 PM    Glucose 174 (H) 03/04/2021 01:01 PM    Glucose 131 (H) 12/16/2020 12:00 AM    Glucose 188 (H) 11/24/2020 01:45 PM    Glucose 99 11/05/2020 03:23 PM    BUN 43 (H) 05/28/2021 01:44 PM    BUN 47 (H) 03/04/2021 01:01 PM    BUN 49 (H) 12/16/2020 12:00 AM    BUN 70 (H) 11/24/2020 01:45 PM    BUN 63 (H) 11/05/2020 03:23 PM    Creatinine 2.18 (H) 05/28/2021 01:44 PM    Creatinine 2.46 (H) 03/04/2021 01:01 PM    Creatinine 2.35 (H) 12/16/2020 12:00 AM    Creatinine 2.90 (H) 11/24/2020 01:45 PM    Creatinine 2.53 (H) 11/05/2020 03:23 PM    BUN/Creatinine ratio 20 05/28/2021 01:44 PM    BUN/Creatinine ratio 19 03/04/2021 01:01 PM    BUN/Creatinine ratio 21 12/16/2020 12:00 AM    BUN/Creatinine ratio 24 (H) 11/24/2020 01:45 PM    BUN/Creatinine ratio 25 (H) 11/05/2020 03:23 PM    GFR est AA 26 (L) 05/28/2021 01:44 PM    GFR est AA 22 (L) 03/04/2021 01:01 PM    GFR est AA 21 (L) 12/16/2020 12:00 AM    GFR est AA 19 (L) 11/24/2020 01:45 PM    GFR est AA 22 (L) 11/05/2020 03:23 PM    GFR est non-AA 21 (L) 05/28/2021 01:44 PM    GFR est non-AA 18 (L) 03/04/2021 01:01 PM    GFR est non-AA 18 (L) 12/16/2020 12:00 AM    GFR est non-AA 15 (L) 11/24/2020 01:45 PM    GFR est non-AA 18 (L) 11/05/2020 03:23 PM    Calcium 10.6 (H) 05/28/2021 01:44 PM    Calcium 10.1 03/04/2021 01:01 PM    Calcium 9.7 03/04/2021 01:01 PM    Calcium 10.4 (H) 12/16/2020 12:00 AM    Calcium 10.6 (H) 11/24/2020 01:45 PM    Bilirubin, total <0.2 12/16/2020 12:00 AM    Bilirubin, total 0.2 10/22/2020 06:43 PM    Bilirubin, total 0.3 08/10/2020 02:44 PM    Bilirubin, total 0.2 01/21/2020 01:38 PM    Bilirubin, total 0.2 10/15/2019 01:39 PM    Alk. phosphatase 68 12/16/2020 12:00 AM    Alk. phosphatase 51 10/22/2020 06:43 PM    Alk. phosphatase 53 08/10/2020 02:44 PM    Alk. phosphatase 51 01/21/2020 01:38 PM    Alk.  phosphatase 57 10/15/2019 01:39 PM    Protein, total 6.7 12/16/2020 12:00 AM    Protein, total 6.8 10/22/2020 06:43 PM    Protein, total 6.6 08/10/2020 02:44 PM    Protein, total 6.9 01/21/2020 01:38 PM    Protein, total 6.9 10/15/2019 01:39 PM    Albumin 3.4 (L) 05/28/2021 01:44 PM    Albumin 3.9 12/16/2020 12:00 AM    Albumin 3.4 (L) 11/24/2020 01:45 PM    Albumin 3.2 (L) 10/22/2020 06:43 PM    Albumin 3.6 08/10/2020 02:44 PM    Globulin 3.6 10/22/2020 06:43 PM    Globulin 3.6 08/09/2018 12:19 PM    A-G Ratio 1.4 12/16/2020 12:00 AM    A-G Ratio 0.9 (L) 10/22/2020 06:43 PM    A-G Ratio 1.2 08/10/2020 02:44 PM    A-G Ratio 1.2 01/21/2020 01:38 PM    A-G Ratio 1.2 10/15/2019 01:39 PM    ALT (SGPT) 6 12/16/2020 12:00 AM    ALT (SGPT) 13 10/22/2020 06:43 PM    ALT (SGPT) 7 08/10/2020 02:44 PM    ALT (SGPT) 9 01/21/2020 01:38 PM    ALT (SGPT) 8 10/15/2019 01:39 PM     No results found for: CPK, CPKX, CPX  Lab Results   Component Value Date/Time    Cholesterol, total 108 12/16/2020 12:00 AM    Cholesterol, total 113 02/26/2019 03:04 PM    Cholesterol, total 126 06/06/2017 01:47 PM    Cholesterol, total 137 09/07/2016 03:24 PM    Cholesterol, total 115 06/07/2016 04:34 PM    HDL Cholesterol 35 (L) 12/16/2020 12:00 AM    HDL Cholesterol 38 (L) 02/26/2019 03:04 PM    HDL Cholesterol 40 06/06/2017 01:47 PM    HDL Cholesterol 40 09/07/2016 03:24 PM    HDL Cholesterol 37 (L) 06/07/2016 04:34 PM    LDL, calculated 54 12/16/2020 12:00 AM    LDL, calculated 56 02/26/2019 03:04 PM    LDL, calculated 64 06/06/2017 01:47 PM    LDL, calculated 75 09/07/2016 03:24 PM    LDL, calculated 60 06/07/2016 04:34 PM    LDL, calculated 68 01/04/2016 02:24 PM    Triglyceride 101 12/16/2020 12:00 AM    Triglyceride 94 02/26/2019 03:04 PM    Triglyceride 108 06/06/2017 01:47 PM    Triglyceride 109 09/07/2016 03:24 PM    Triglyceride 90 06/07/2016 04:34 PM     No results found for this or any previous visit.     Assessment:         Patient Active Problem List    Diagnosis Date Noted    Peripheral vascular disease ([facility] 09/11/2020    Gastroesophageal reflux disease without esophagitis 01/21/2020    Primary hyperparathyroidism ([facility] 10/31/2019    Type 2 diabetes mellitus with diabetic neuropathy ([facility] 07/15/2019    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 ([facility] 07/15/2019    Hyperparathyroidism ([facility] 08/06/2018    BMI 33.0-33.9,adult 08/06/2018    Bradycardia 08/01/2018    Aortic stenosis, moderate     Pure hypercholesterolemia 06/19/2018    Anemia of chronic disease 06/19/2018    Type 2 diabetes mellitus with stage 4 chronic kidney disease, without long-term current use of insulin ([facility] [address] 12/19/2017    Encounter for long-term (current) use of other medications 08/10/2015        Plan:     Syncope, r/o orthostatic hypotension (including Parkinson's), worsening (chronic) anemia, dehydration/worsening renal, arrhythmia including tiffanie/tachy  Has mod AS with recent Echo 4/21 (mean gradient 30)  Check CBC, CMP--recently done/office visit with Nephrology--Hb 9.4, BUN 42, Cr 2.01, k 4.2  Holter monitor x 48 hrs (may need longer if neg and recurrent sx)  meds adjustment  Push fluids/orthostatic cautions  Likely 3 mo fu with repeat Echo, sooner prn     Mayi Prado MD

## 2021-08-06 NOTE — PROGRESS NOTES
Identified pt with two pt identifiers(name and ). Reviewed record in preparation for visit and have obtained necessary documentation. Chief Complaint   Patient presents with    Syncope     Problem visit. 21 in the bathroom after unirnation. 3-4pm.    Aortic Stenosis      Vitals:    21 1509   BP: (!) 140/62   Pulse: 60   Resp: 18   Temp: 98.2 °F (36.8 °C)   TempSrc: Temporal   SpO2: 99%   Weight: 163 lb (73.9 kg)   Height: 5' (1.524 m)   PainSc:   5   PainLoc: Face       Medications reviewed/approved by Dr. Anca Crocker. Health Maintenance Review: Patient reminded of \"due or due soon\" health maintenance. I have asked the patient to contact his/her primary care provider (PCP) for follow-up on his/her health maintenance. Coordination of Care Questionnaire:  :   1) Have you been to an emergency room, urgent care, or hospitalized since your last visit? If yes, where when, and reason for visit? no       2. Have seen or consulted any other health care provider since your last visit? If yes, where when, and reason for visit? NO      Patient is accompanied by self and daughter I have received verbal consent from Rhett Grijalva to discuss any/all medical information while they are present in the room.

## 2021-08-06 NOTE — PROGRESS NOTES
OFFICE  hook up  HOLTER 48 hr monitor only. Verified patient with two patient identifiers. Patient verbalized understanding of its use. Ordering MD- Olivia Molina MD- Tiffanie Rodriguez  Reason:  Syncope, unspecified syncope type [R55 (ICD-10-CM)]  Start time: 4:05pm    Patient has been successfully enrolled through [employer]. No LOS.

## 2021-08-10 DIAGNOSIS — E11.40 TYPE 2 DIABETES MELLITUS WITH DIABETIC NEUROPATHY, UNSPECIFIED WHETHER LONG TERM INSULIN USE (HCC): ICD-10-CM

## 2021-08-10 RX ORDER — LOVASTATIN 40 MG/1
TABLET ORAL
Qty: 90 TABLET | Refills: 1 | OUTPATIENT
Start: 2021-08-10

## 2021-08-10 RX ORDER — AMLODIPINE BESYLATE 5 MG/1
TABLET ORAL
Qty: 90 TABLET | Refills: 0 | OUTPATIENT
Start: 2021-08-10

## 2021-08-10 RX ORDER — HYDRALAZINE HYDROCHLORIDE 100 MG/1
TABLET, FILM COATED ORAL
Qty: 270 TABLET | Refills: 1 | Status: SHIPPED | OUTPATIENT
Start: 2021-08-10 | End: 2022-01-01

## 2021-08-10 RX ORDER — GABAPENTIN 100 MG/1
100 CAPSULE ORAL 3 TIMES DAILY
Qty: 90 CAPSULE | Refills: 2 | OUTPATIENT
Start: 2021-08-10

## 2021-08-10 RX ORDER — OMEPRAZOLE 40 MG/1
CAPSULE, DELAYED RELEASE ORAL
Qty: 30 CAPSULE | Refills: 5 | OUTPATIENT
Start: 2021-08-10

## 2021-08-18 NOTE — TELEPHONE ENCOUNTER
checked, Gabapentin last filled 7-12-21 for a 30 day supply.  Will refill Arianna along with other meds

## 2021-08-18 NOTE — TELEPHONE ENCOUNTER
Pt needs refill of Gabapentin 100 MG, Omeprazole 40 MG, Lovastatin 40 MG and Amlodipine 5 MG sent to [facility] today. Pt is out of medications.

## 2021-09-24 NOTE — PROGRESS NOTES
Subjective:     CC: diabetes, HTN    Rasheeda Majano is a 80 y.o. female who presents today for a 3 month follow up for HTN, diabetes, and CKD stage 4. She is accompanied by her  and her son Brian Canchola who is now living with her and her . Hypertensive HD with CKD stage IV  BP was originally elevated but then came down to 136/50 when rechecked manually. She is on Irbesartan 300mg daily, Amlodipine 5mg daily, Lasix 40mg daily, and Hydralazine 100mg TID. States she fell back in June and injured her lips. There is painful bump on her inner upper lip that will not heel. It hurts when she eats. States she fell because of leg weakness. Using a walker, son living with them now so she is not alone. At the last OV we had discussed the option of starting PT for leg strengthening exercises but son stated she had exceeded her PT limits according to the insurance company. After her fall she saw cardiologist Dr Jamshid Bird 8/6/21. He was trying to r/o orthostatic hypotension (including Parkinson's), worsening (chronic) anemia, dehydration/worsening renal, arrhythmia including tiffanie/tachy    A CBC and CMP were recently done/office visit with Nephrology--Hb 9.4, BUN 42, Cr 2.01, k 4.2    Holter monitor x 48 hrs was ordered- it showed normal rhythm with HR , only occasional skipped beats and one 5 beat run of PAT. If recurrent syncope he suggested doing longer monitor. Advised 3 mos fu with repeat Echo, sooner prn     She has a hx of aortic stenosis. ECHO 4/2021, showed normal EF with not much change in the aortic stenosis. She is followed by nephrologist Dr Tim Baker. Next appt 11/17/21. Lab Results   Component Value Date/Time    Creatinine 2.18 (H) 05/28/2021 01:44 PM       Type 2 Diabetes with neuropathy, diet controlled   Lab Results   Component Value Date/Time    Hemoglobin A1c 6.1 (H) 06/24/2021 01:45 PM     Checks BS at home in the morning. States they are doing \"good. \"  No episodes of symptomatic hypoglycemia. She does not take any medications for her diabetes. She is on Gabapentin 100mg TID prn for neuropathy. Has painful tingling in feet at times, sometimes the 100mg is not enough. Last eye exam: this was done in August.   Last foot exam: Sees podiatrist Dr. Miroslava Jeffrey     HLD  Stable, takes lovastatin. Lab Results   Component Value Date/Time    Cholesterol, total 108 12/16/2020 12:00 AM    HDL Cholesterol 35 (L) 12/16/2020 12:00 AM    LDL, calculated 54 12/16/2020 12:00 AM    LDL, calculated 56 02/26/2019 03:04 PM    VLDL, calculated 19 12/16/2020 12:00 AM    VLDL, calculated 19 02/26/2019 03:04 PM    Triglyceride 101 12/16/2020 12:00 AM     Chronic right shoulder pain  She was referred to Dr. Yuridia Valderrama recently for right shoulder pain. MRI showed chronic degenerative changes and he is planning on trying injections rather than surgical intervention. She has had 2 injections that have helped relieve the pain. GERD  Stable, no breakthrough heartburn or alarm signs. Taking omeprazole 40 mg daily. Hyperparathyroidism  She has been followed by Endo Dr Андрей Gold. Last seen 4-2021. According to his last note, \"Her thyroid nodules mostly appear as pseudonodules, meaning that there is chronic heterogenous appearance giving the resemblance of thyroid nodules whereas most of them are not actually true nodules. The nodules that were present did not appear suspicious. Ultrasound can be repeated around Nov 2021 together with next Bone density scan. She is on Prolia for advanced osteopenia and has upcoming injection in November. Plans on getting her flu shot soon.      Patient Active Problem List   Diagnosis Code    Encounter for long-term (current) use of other medications Z79.899    Type 2 diabetes mellitus with stage 4 chronic kidney disease, without long-term current use of insulin (HCC) E11.22, N18.4    Pure hypercholesterolemia E78.00    Anemia of chronic disease D63.8    Bradycardia R00.1    Aortic stenosis, moderate I35.0    Hyperparathyroidism ([facility] E21.3    BMI 33.0-33.9,adult Z68.33    Type 2 diabetes mellitus with diabetic neuropathy ([facility]) E11.40    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 ([facility]) I13.10, N18.4    Primary hyperparathyroidism ([facility]) E21.0    Gastroesophageal reflux disease without esophagitis K21.9    Peripheral vascular disease ([facility]) I73.9       Past Medical History:   Diagnosis Date    Anemia of chronic disease 6/19/2018    Anxiety     Aortic stenosis, moderate     Back pain     CAD (coronary artery disease)     CKD (chronic kidney disease) stage 3, GFR 30-59 ml/min ([facility])     Constipation     Diabetes ([facility]     Diverticulosis 2007    DM (diabetes mellitus) ([facility]     AODM    Fibroid 1972    GERD (gastroesophageal reflux disease)     Goiter, non-toxic     Hernia, hiatal     Hyperlipemia     Hypertension     Hypokalemia     Menopause     Osteoarthritis 2006    bursitis R shoulder    Osteoarthritis of right knee     PVC (premature ventricular contraction)     H/O PVC's    Raynaud disease     Venous stasis     Wears hearing aid     both ears         Current Outpatient Medications:     gabapentin (NEURONTIN) 100 mg capsule, Take 1 Capsule by mouth three (3) times daily. Max Daily Amount: 300 mg., Disp: 90 Capsule, Rfl: 2    omeprazole (PRILOSEC) 40 mg capsule, TAKE ONE CAPSULE BY MOUTH EVERY MORNING TO CONTROL STOMACH ACID, Disp: 90 Capsule, Rfl: 1    lovastatin (MEVACOR) 40 mg tablet, TAKE 1 TAB BY MOUTH NIGHTLY FOR CHOLESTEROL LOWERING, Disp: 90 Tablet, Rfl: 1    amLODIPine (NORVASC) 5 mg tablet, Take 1 Tablet by mouth daily. , Disp: 90 Tablet, Rfl: 1    hydrALAZINE (APRESOLINE) 100 mg tablet, TAKE 1 TAKE ONE TABLET EVERY__ HOURS TAKE ONE TABLET EVERY__ HOURS TAB BY MOUTH THREE(3) TIMES DAILY, Disp: 270 Tablet, Rfl: 1    irbesartan (AVAPRO) 300 mg tablet, TAKE 1 TAB BY MOUTH NIGHTLY., Disp: 90 Tablet, Rfl: 1    fluticasone propionate (FLONASE) 50 mcg/actuation nasal spray, SPRAY 1 PUFF IN EACH NOSTRIL ONCE DAILY, Disp: 1 Bottle, Rfl: 5    furosemide (LASIX) 40 mg tablet, TAKE 1 TAB BY MOUTH DAILY. , Disp: 90 Tablet, Rfl: 1    ferrous sulfate (Iron) 325 mg (65 mg iron) tablet, Take  by mouth Daily (before breakfast). , Disp: , Rfl:     cetirizine (ZYRTEC) 10 mg tablet, Take 10 mg by mouth., Disp: , Rfl:     TRUE METRIX GLUCOSE TEST STRIP strip, , Disp: , Rfl:     aspirin delayed-release 81 mg tablet, Take 1 Tab by mouth daily. , Disp: 30 Tab, Rfl: 0    acetaminophen (TYLENOL EXTRA STRENGTH) 500 mg tablet, Take 1 Tab by mouth every six (6) hours as needed for Pain., Disp: 30 Tab, Rfl: 1    Allergies   Allergen Reactions    Metformin Other (comments)     Kidneys effected       Past Surgical History:   Procedure Laterality Date    HX BREAST BIOPSY Bilateral     x5    HX CATARACT REMOVAL  2004    bilateral    HX COLONOSCOPY  2002, 08/2007    HX DILATION AND CURETTAGE  1960's    HX HYSTERECTOMY  70's    BSO    HX OTHER SURGICAL  2001    sigmoidoscopy       Social History     Tobacco Use   Smoking Status Never Smoker   Smokeless Tobacco Never Used       Social History     Socioeconomic History    Marital status:      Spouse name: Not on file    Number of children: Not on file    Years of education: Not on file    Highest education level: Not on file   Tobacco Use    Smoking status: Never Smoker    Smokeless tobacco: Never Used   Substance and Sexual Activity    Alcohol use: No     Alcohol/week: 0.0 standard drinks    Drug use: No    Sexual activity: Never   Social History Narrative    Happily . No concern for abuse. Exercise: none    Diet: Careful.     Wear Seatbelts             Social Determinants of Health     Financial Resource Strain:     Difficulty of Paying Living Expenses:    Food Insecurity:     Worried About Running Out of Food in the Last Year:     [address] in the Last Year: Transportation Needs:     Lack of Transportation (Medical):  Lack of Transportation (Non-Medical):    Physical Activity:     Days of Exercise per Week:     Minutes of Exercise per Session:    Stress:     Feeling of Stress :    Social Connections:     Frequency of Communication with Friends and Family:     Frequency of Social Gatherings with Friends and Family:     Attends Denominational Services:     Active Member of Clubs or Organizations:     Attends Club or Organization Meetings:     Marital Status:        Family History   Problem Relation Age of Onset    Diabetes Brother     Hypertension Mother     Diabetes Brother        ROS:  Gen: denies fever, chills, or fatigue  HEENT:denies H/A, nasal congestion, or sore throat  Resp: denies dyspnea, cough, or wheezing  CV: denies chest pain, pressure, or palpitations  Extremeties: + edema in ankles  GI[de-identified] denies abdominal pain, nausea, vomiting, diarrhea, or constipation  : denies dysuria, hematuria, urinary frequency or urgency  Neuro: + numbness/tingling in feet- getting painful, denies dizziness, +mild weakness in both legs  Skin: denies rashes or new lesions   Psych: denies anxiety, depression, kenneth, or other changes in mood    Objective:     Visit Vitals  BP (!) 136/50   Pulse (!) 57   Temp 98.1 °F (36.7 °C)   Resp 18   Ht 5' (1.524 m)   Wt 161 lb 9.6 oz (73.3 kg)   SpO2 100%   BMI 31.56 kg/m²         General: Alert and oriented. No acute distress. Well nourished. HEENT :  Eyes: Sclera white, conjunctiva clear. PERRLA. Extra ocular movements intact. Neck: Supple with FROM. Lungs: Breathing even and unlabored. All lobes clear to auscultation bilaterally   Heart :RRR, +Systolic murmur  Extremities: +edema to bilateral ankles   Neuro: Cranial nerves grossly normal.  Psych: Mood and thought content appropriate for situation. Dressed appropriately and with good hygiene. Skin: Warm, dry, and intact. No lesions or discoloration.       Assessment/ Plan: Hypertensive HD with CKD stage IV  BP stable  Cont current meds  Discussed importance of low sodium diet   Elevated legs  Go to ER or RTO for CP, SOB, dizziness, or worsening swelling. F/U with cardiologist Dr. Geneva Homans as scheduled  F/U with nephrologist as scheduled in 11/2021  F/U here 4 months    Type 2 Diabetes with neuropathy, diet controlled   Cont low carb, sugar free diet  Cont to check BS daily  Keep eye exams and podiatry appts up to date  May take 200mg of Gabapentin up to TID prn for neuropathy- advised may cause more swelling so use second pill sparingly  F/U here 4 months    Hyperparathyroidism  Endo Dr Rolando Suazo has left his practice  This will be managed by her nephrologist from now on as it related to her CKD    Osteopenia  Cont Prolia- will renew script  RTO for labs prior to injection- orders placed  Will get repeat DEXA in 11/2021    Thyroid nodules  Will get repeat ultrasound in 11/2021      HLD  Cont lovastatin     GERD  Stable  Cont PPI  Avoid triggers      RTO 4 months      Verbal and written instructions (see AVS) provided.  Patient expresses understanding of diagnosis and treatment plan. Health Maintenance Due   Topic Date Due    Flu Vaccine (1) 09/01/2021               Bibi Mac.  Douglas Salas NP

## 2021-09-24 NOTE — PROGRESS NOTES
1. Have you been to the ER, urgent care clinic since your last visit? Hospitalized since your last visit?no    2. Have you seen or consulted any other health care providers outside of the [address] [place] [address] since your last visit? Include any pap smears or colon screening.  no

## 2021-09-30 NOTE — TELEPHONE ENCOUNTER
Please let pt or her son Alivia Koenig know that I asked Dr Mendel Flores about the tender lesion on her lip and he could not say for sure what we could do about it without examining the lesion in person. He said it could possibly be removed or cauterized so if she would like him to take a look at it she can schedule an appt with him.

## 2021-10-08 NOTE — PROGRESS NOTES
Identified pt with two pt identifiers(name and ). Reviewed record in preparation for visit and have obtained necessary documentation. Chief Complaint   Patient presents with    Aortic Stenosis     3 month follow up    Hypertension    Cholesterol Problem      Vitals:    10/08/21 1358   Height: 5' (1.524 m)       Medications reviewed/approved by Dr. Manuelito Lucero. Health Maintenance Review: Patient reminded of \"due or due soon\" health maintenance. I have asked the patient to contact his/her primary care provider (PCP) for follow-up on his/her health maintenance. Coordination of Care Questionnaire:  :   1) Have you been to an emergency room, urgent care, or hospitalized since your last visit? If yes, where when, and reason for visit? no       2. Have seen or consulted any other health care provider since your last visit? If yes, where when, and reason for visit? NO      Patient is accompanied by son  I have received verbal consent from Jewel Sofia to discuss any/all medical information while they are present in the room.

## 2021-10-08 NOTE — PROGRESS NOTES
1. Have you been to the ER, urgent care clinic since your last visit? Hospitalized since your last visit?no    2. Have you seen or consulted any other health care providers outside of the [address] [place] [address] since your last visit? Include any pap smears or colon screening.  Yes cardiology

## 2021-10-08 NOTE — PROGRESS NOTES
Araceli Mcgrath is a 80 y.o. female is here for routine  Seen earlier this year--dizzy and brief syncope. . Some LE swelling, BERMAN.  Hx DM II, hypertension/hypertensive CVD, dyslipidemia, anemia of chronic disease. GERD, DJD, Raynaud's, followed by Deloris Gonsalez NP,  Also with hx multinodular goiter/parathryoid issues (followed by Endocrine).  Issues with bradycardia, HR high 40's. EKG with sinus tiffanie 48, LVH, PRWP/old AMI, NSST. Had been on Cardizem --reduced to 120 by PCP, now stopped. Holter with sinus tiffanie, HR 42-79, avg 52, occasional PAC's. Echo 6/27/18 with mod LVEF, LVEF 65-70, LAE, mod aortic stenosis (mean 24), mild to mod pulm hypertension. Lexiscan 9/26/19 with no infarct/ischemia, LVEF >65%.  Echo 9/19 with LVEF 60-65, mod AS (mean 23), MAC, mild MVP with trace MR, RVSP 44. Carotid doppler 9/19 with mild MAXWELL, otherwise ok. Currently on irbesartan 300, hydralazine 50 tid, spironolactone 25, lasix 20.  Recent labs 5/23/20 with BUN/ Cr up to 56/2.73, previously 45/2.1, K 4.2, glc 123.  . Seen on 6/11/20 here--spironolactone stopped and lasix increased to 40mg.  Seen in f/u by PCP and labs repeated--stable--last labs 5/28/21 with Cr down to 2.18, BUN 43, K 3.5. CBC 3/21 with Hb 8.6 (down from 9.1 previously. Recent OV with Dr. Faith Killian and had labs at that time (attempting to get results now from his office). Repeat Echo 6/18/20 with mod LVH, LVEF 60-65, D1, mod AS (mean gradient 29mm Hg). More leg weakness, ataxia, more pronounced tremor (?familial but now more), no hx Parkinson's, ?neuropathy (on gabapentin).    Last Echo 4/15/21:  · LV: Estimated LVEF is 65 - 70%. Visually measured ejection fraction. Normal cavity size, systolic function (ejection fraction normal) and diastolic function. Upper normal wall thickness. Age-appropriate left ventricular diastolic function. · LA: Mildly dilated left atrium. · AV: Aortic valve leaflet calcification present. Aortic valve mean gradient is 30 mmHg. Aortic valve area is 1.2 cm2. Moderate aortic valve stenosis is present. · MV: Mild mitral valve regurgitation is present. · TV: Mild to moderate tricuspid valve regurgitation is present. Right Ventricular Arterial Pressure (RVSP) is 45 mmHg. Pulmonary hypertension found to be mild to moderate. No recurrence of syncope, but some leg weakness/fatigue  The patient denies chest pain/ shortness of breath, orthopnea, PND, LE edema, palpitations, syncope, presyncope or fatigue.        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 ([address] 07/15/2019    Hyperparathyroidism ([address] 08/06/2018    BMI 33.0-33.9,adult 08/06/2018    Bradycardia 08/01/2018    Aortic stenosis, moderate     Pure hypercholesterolemia 06/19/2018    Anemia of chronic disease 06/19/2018    Type 2 diabetes mellitus with stage 4 chronic kidney disease, without long-term current use of insulin ([address] 12/19/2017    Encounter for long-term (current) use of other medications 08/10/2015      Jaquelin SANTA NP  Past Medical History:   Diagnosis Date    Anemia of chronic disease 6/19/2018    Anxiety     Aortic stenosis, moderate     Back pain     CAD (coronary artery disease)     CKD (chronic kidney disease) stage 3, GFR 30-59 ml/min ([facility])     Constipation     Diabetes ([address]     Diverticulosis 2007    DM (diabetes mellitus) ([address]     AODM    Fibroid 1972    GERD (gastroesophageal reflux disease)     Goiter, non-toxic     Hernia, hiatal     Hyperlipemia     Hypertension     Hypokalemia     Menopause     Osteoarthritis 2006    bursitis R shoulder    Osteoarthritis of right knee     PVC (premature ventricular contraction) H/O PVC's    Raynaud disease     Venous stasis     Wears hearing aid     both ears      Past Surgical History:   Procedure Laterality Date    HX BREAST BIOPSY Bilateral     x5    HX CATARACT REMOVAL  2004    bilateral    HX COLONOSCOPY  2002, 08/2007    HX DILATION AND CURETTAGE  1960's    HX HYSTERECTOMY  66's    BSO    HX OTHER SURGICAL  2001    sigmoidoscopy     Allergies   Allergen Reactions    Metformin Other (comments)     Kidneys effected      Family History   Problem Relation Age of Onset    Diabetes Brother     Hypertension Mother     Diabetes Brother       Social History     Socioeconomic History    Marital status:      Spouse name: Not on file    Number of children: Not on file    Years of education: Not on file    Highest education level: Not on file   Occupational History    Not on file   Tobacco Use    Smoking status: Never Smoker    Smokeless tobacco: Never Used   Substance and Sexual Activity    Alcohol use: No     Alcohol/week: 0.0 standard drinks    Drug use: No    Sexual activity: Never   Other Topics Concern    Not on file   Social History Narrative    Happily . No concern for abuse. Exercise: none    Diet: Careful. Wear Seatbelts             Social Determinants of Health     Financial Resource Strain:     Difficulty of Paying Living Expenses:    Food Insecurity:     Worried About Running Out of Food in the Last Year:     [address] in the Last Year:    Transportation Needs:     Lack of Transportation (Medical):      Lack of Transportation (Non-Medical):    Physical Activity:     Days of Exercise per Week:     Minutes of Exercise per Session:    Stress:     Feeling of Stress :    Social Connections:     Frequency of Communication with Friends and Family:     Frequency of Social Gatherings with Friends and Family:     Attends Jewish Services:     Active Member of Clubs or Organizations:     Attends Club or Organization Meetings:  Marital Status:    Intimate Partner Violence:     Fear of Current or Ex-Partner:     Emotionally Abused:     Physically Abused:     Sexually Abused:       Current Outpatient Medications   Medication Sig    gabapentin (NEURONTIN) 100 mg capsule Take 1-2 Capsules by mouth three (3) times daily. Max Daily Amount: 600 mg.    omeprazole (PRILOSEC) 40 mg capsule TAKE ONE CAPSULE BY MOUTH EVERY MORNING TO CONTROL STOMACH ACID    lovastatin (MEVACOR) 40 mg tablet TAKE 1 TAB BY MOUTH NIGHTLY FOR CHOLESTEROL LOWERING    amLODIPine (NORVASC) 5 mg tablet Take 1 Tablet by mouth daily.  hydrALAZINE (APRESOLINE) 100 mg tablet TAKE 1 TAKE ONE TABLET EVERY__ HOURS TAKE ONE TABLET EVERY__ HOURS TAB BY MOUTH THREE(3) TIMES DAILY    irbesartan (AVAPRO) 300 mg tablet TAKE 1 TAB BY MOUTH NIGHTLY.  fluticasone propionate (FLONASE) 50 mcg/actuation nasal spray SPRAY 1 PUFF IN EACH NOSTRIL ONCE DAILY    furosemide (LASIX) 40 mg tablet TAKE 1 TAB BY MOUTH DAILY.  ferrous sulfate (Iron) 325 mg (65 mg iron) tablet Take  by mouth Daily (before breakfast).  cetirizine (ZYRTEC) 10 mg tablet Take 10 mg by mouth.  TRUE METRIX GLUCOSE TEST STRIP strip     aspirin delayed-release 81 mg tablet Take 1 Tab by mouth daily.  acetaminophen (TYLENOL EXTRA STRENGTH) 500 mg tablet Take 1 Tab by mouth every six (6) hours as needed for Pain. No current facility-administered medications for this visit. Review of Symptoms:    CONST  No weight change. No fever, chills, sweats    ENT No visual changes, URI sx, sore throat    CV  See HPI   RESP  No cough, or sputum, wheezing. Also see HPI   GI  No abdominal pain or change in bowel habits. No heartburn or dysphagia. No melena or rectal bleeding.   No dysuria, urgency, frequency, hematuria   MSKEL  No joint pain, swelling. No muscle pain. SKIN  No rash or lesions. NEURO  No headache, syncope, or seizure. No weakness, loss of sensation, or paresthesias. PSYCH  No low mood or depression  No anxiety. HE/LYMPH  No easy bruising, abnormal bleeding, or enlarged glands. Physical ExamPhysical Exam:    Visit Vitals  BP (!) 140/50 (BP 1 Location: Left upper arm, BP Patient Position: Sitting, BP Cuff Size: Adult)   Pulse (!) 58   Temp 98.6 °F (37 °C) (Temporal)   Resp 14   Ht 5' (1.524 m)   Wt 162 lb (73.5 kg)   SpO2 99% Comment: ra   BMI 31.64 kg/m²     Gen: NAD  HEENT:  PERRL, throat clear  Neck: no adenopathy, no thyromegaly, no JVD   Heart:  Regular,Nl G3E0,  III/VI systolic murmur, no gallop or rub. Lungs:  clear  Abdomen:   Soft, non-tender, bowel sounds are active.    Extremities:  No edema  Pulse: symmetric  Neuro: A&O times 3, No focal neuro deficits    Labs:   Lab Results   Component Value Date/Time    Sodium 132 (L) 05/28/2021 01:44 PM    Sodium 133 (L) 03/04/2021 01:01 PM    Sodium 137 12/16/2020 12:00 AM    Sodium 136 11/24/2020 01:45 PM    Sodium 131 (L) 11/05/2020 03:23 PM    Potassium 3.5 05/28/2021 01:44 PM    Potassium 3.6 03/04/2021 01:01 PM    Potassium 3.8 12/16/2020 12:00 AM    Potassium 3.7 11/24/2020 01:45 PM    Potassium 4.4 11/05/2020 03:23 PM    Chloride 97 05/28/2021 01:44 PM    Chloride 100 03/04/2021 01:01 PM    Chloride 102 12/16/2020 12:00 AM    Chloride 100 11/24/2020 01:45 PM    Chloride 97 11/05/2020 03:23 PM    CO2 26 05/28/2021 01:44 PM    CO2 26 03/04/2021 01:01 PM    CO2 21 12/16/2020 12:00 AM    CO2 23 11/24/2020 01:45 PM    CO2 23 11/05/2020 03:23 PM    Anion gap 9 05/28/2021 01:44 PM    Anion gap 7 03/04/2021 01:01 PM    Anion gap 13 11/24/2020 01:45 PM    Anion gap 11 11/05/2020 03:23 PM    Anion gap 10 10/22/2020 06:43 PM    Glucose 117 (H) 05/28/2021 01:44 PM    Glucose 174 (H) 03/04/2021 01:01 PM    Glucose 131 (H) 12/16/2020 12:00 AM    Glucose 188 (H) 11/24/2020 01:45 PM    Glucose 99 11/05/2020 03:23 PM    BUN 43 (H) 05/28/2021 01:44 PM    BUN 47 (H) 03/04/2021 01:01 PM    BUN 49 (H) 12/16/2020 12:00 AM    BUN 70 (H) 11/24/2020 01:45 PM    BUN 63 (H) 11/05/2020 03:23 PM    Creatinine 2.18 (H) 05/28/2021 01:44 PM    Creatinine 2.46 (H) 03/04/2021 01:01 PM    Creatinine 2.35 (H) 12/16/2020 12:00 AM    Creatinine 2.90 (H) 11/24/2020 01:45 PM    Creatinine 2.53 (H) 11/05/2020 03:23 PM    BUN/Creatinine ratio 20 05/28/2021 01:44 PM    BUN/Creatinine ratio 19 03/04/2021 01:01 PM    BUN/Creatinine ratio 21 12/16/2020 12:00 AM    BUN/Creatinine ratio 24 (H) 11/24/2020 01:45 PM    BUN/Creatinine ratio 25 (H) 11/05/2020 03:23 PM    GFR est AA 26 (L) 05/28/2021 01:44 PM    GFR est AA 22 (L) 03/04/2021 01:01 PM    GFR est AA 21 (L) 12/16/2020 12:00 AM    GFR est AA 19 (L) 11/24/2020 01:45 PM    GFR est AA 22 (L) 11/05/2020 03:23 PM    GFR est non-AA 21 (L) 05/28/2021 01:44 PM    GFR est non-AA 18 (L) 03/04/2021 01:01 PM    GFR est non-AA 18 (L) 12/16/2020 12:00 AM    GFR est non-AA 15 (L) 11/24/2020 01:45 PM    GFR est non-AA 18 (L) 11/05/2020 03:23 PM    Calcium 10.6 (H) 05/28/2021 01:44 PM    Calcium 10.1 03/04/2021 01:01 PM    Calcium 9.7 03/04/2021 01:01 PM    Calcium 10.4 (H) 12/16/2020 12:00 AM    Calcium 10.6 (H) 11/24/2020 01:45 PM    Bilirubin, total <0.2 12/16/2020 12:00 AM    Bilirubin, total 0.2 10/22/2020 06:43 PM    Bilirubin, total 0.3 08/10/2020 02:44 PM    Bilirubin, total 0.2 01/21/2020 01:38 PM    Bilirubin, total 0.2 10/15/2019 01:39 PM    Alk. phosphatase 68 12/16/2020 12:00 AM    Alk. phosphatase 51 10/22/2020 06:43 PM    Alk. phosphatase 53 08/10/2020 02:44 PM    Alk. phosphatase 51 01/21/2020 01:38 PM    Alk.  phosphatase 57 10/15/2019 01:39 PM    Protein, total 6.7 12/16/2020 12:00 AM    Protein, total 6.8 10/22/2020 06:43 PM    Protein, total 6.6 08/10/2020 02:44 PM    Protein, total 6.9 01/21/2020 01:38 PM    Protein, total 6.9 10/15/2019 01:39 PM    Albumin 3.4 (L) 05/28/2021 01:44 PM    Albumin 3.9 12/16/2020 12:00 AM    Albumin 3.4 (L) 11/24/2020 01:45 PM    Albumin 3.2 (L) 10/22/2020 06:43 PM    Albumin 3.6 08/10/2020 02:44 PM    Globulin 3.6 10/22/2020 06:43 PM    Globulin 3.6 08/09/2018 12:19 PM    A-G Ratio 1.4 12/16/2020 12:00 AM    A-G Ratio 0.9 (L) 10/22/2020 06:43 PM    A-G Ratio 1.2 08/10/2020 02:44 PM    A-G Ratio 1.2 01/21/2020 01:38 PM    A-G Ratio 1.2 10/15/2019 01:39 PM    ALT (SGPT) 6 12/16/2020 12:00 AM    ALT (SGPT) 13 10/22/2020 06:43 PM    ALT (SGPT) 7 08/10/2020 02:44 PM    ALT (SGPT) 9 01/21/2020 01:38 PM    ALT (SGPT) 8 10/15/2019 01:39 PM     No results found for: CPK, CPKX, CPX  Lab Results   Component Value Date/Time    Cholesterol, total 108 12/16/2020 12:00 AM    Cholesterol, total 113 02/26/2019 03:04 PM    Cholesterol, total 126 06/06/2017 01:47 PM    Cholesterol, total 137 09/07/2016 03:24 PM    Cholesterol, total 115 06/07/2016 04:34 PM    HDL Cholesterol 35 (L) 12/16/2020 12:00 AM    HDL Cholesterol 38 (L) 02/26/2019 03:04 PM    HDL Cholesterol 40 06/06/2017 01:47 PM    HDL Cholesterol 40 09/07/2016 03:24 PM    HDL Cholesterol 37 (L) 06/07/2016 04:34 PM    LDL, calculated 54 12/16/2020 12:00 AM    LDL, calculated 56 02/26/2019 03:04 PM    LDL, calculated 64 06/06/2017 01:47 PM    LDL, calculated 75 09/07/2016 03:24 PM    LDL, calculated 60 06/07/2016 04:34 PM    LDL, calculated 68 01/04/2016 02:24 PM    Triglyceride 101 12/16/2020 12:00 AM    Triglyceride 94 02/26/2019 03:04 PM    Triglyceride 108 06/06/2017 01:47 PM    Triglyceride 109 09/07/2016 03:24 PM    Triglyceride 90 06/07/2016 04:34 PM     No results found for this or any previous visit.     Assessment:         Patient Active Problem List    Diagnosis Date Noted    Peripheral vascular disease ([facility] 09/11/2020    Gastroesophageal reflux disease without esophagitis 01/21/2020    Primary hyperparathyroidism ([facility] 10/31/2019    Type 2 diabetes mellitus with diabetic neuropathy ([facility] 07/15/2019    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 ([facility] 07/15/2019    Hyperparathyroidism ([facility] 08/06/2018    BMI 33.0-33.9,adult 08/06/2018    Bradycardia 08/01/2018    Aortic stenosis, moderate     Pure hypercholesterolemia 06/19/2018    Anemia of chronic disease 06/19/2018    Type 2 diabetes mellitus with stage 4 chronic kidney disease, without long-term current use of insulin ([facility] 12/19/2017    Encounter for long-term (current) use of other medications 08/10/2015        Plan:     Doing well with no adverse cardiac symptoms. Repeat Echo--call w. Results  Holter reviewed/discussed. Lipids and labs followed by PCP. Continue current care and f/u in 6 months.     Lovetta Shone, MD

## 2021-10-08 NOTE — PROGRESS NOTES
Shirley Henderson is a 80 y.o. female who presents with the following:  Chief Complaint   Patient presents with    Skin Problem     spot on lip x 3 months after fall       Patient fell face first onto her bathroom floor and ended up with a split upper lip but that is healed but she still has a stinging sensation in both the upper and lower lips. Allergies   Allergen Reactions    Metformin Other (comments)     Kidneys effected       Current Outpatient Medications   Medication Sig    gabapentin (NEURONTIN) 100 mg capsule Take 1-2 Capsules by mouth three (3) times daily. Max Daily Amount: 600 mg.    omeprazole (PRILOSEC) 40 mg capsule TAKE ONE CAPSULE BY MOUTH EVERY MORNING TO CONTROL STOMACH ACID    lovastatin (MEVACOR) 40 mg tablet TAKE 1 TAB BY MOUTH NIGHTLY FOR CHOLESTEROL LOWERING    amLODIPine (NORVASC) 5 mg tablet Take 1 Tablet by mouth daily.  hydrALAZINE (APRESOLINE) 100 mg tablet TAKE 1 TAKE ONE TABLET EVERY__ HOURS TAKE ONE TABLET EVERY__ HOURS TAB BY MOUTH THREE(3) TIMES DAILY    irbesartan (AVAPRO) 300 mg tablet TAKE 1 TAB BY MOUTH NIGHTLY.  fluticasone propionate (FLONASE) 50 mcg/actuation nasal spray SPRAY 1 PUFF IN EACH NOSTRIL ONCE DAILY    furosemide (LASIX) 40 mg tablet TAKE 1 TAB BY MOUTH DAILY.  ferrous sulfate (Iron) 325 mg (65 mg iron) tablet Take  by mouth Daily (before breakfast).  cetirizine (ZYRTEC) 10 mg tablet Take 10 mg by mouth.  TRUE METRIX GLUCOSE TEST STRIP strip     aspirin delayed-release 81 mg tablet Take 1 Tab by mouth daily.  acetaminophen (TYLENOL EXTRA STRENGTH) 500 mg tablet Take 1 Tab by mouth every six (6) hours as needed for Pain.  hydrocortisone valerate (WEST-JACLYN) 0.2 % ointment Apply  to affected area two (2) times a day. use thin layer     No current facility-administered medications for this visit.        Past Medical History:   Diagnosis Date    Anemia of chronic disease 6/19/2018    Anxiety     Aortic stenosis, moderate     Back pain     CAD (coronary artery disease)     CKD (chronic kidney disease) stage 3, GFR 30-59 ml/min ([facility])     Constipation     Diabetes [facility]     Diverticulosis 2007    DM (diabetes mellitus) ([facility]     AODM    Fibroid 1972    GERD (gastroesophageal reflux disease)     Goiter, non-toxic     Hernia, hiatal     Hyperlipemia     Hypertension     Hypokalemia     Menopause     Osteoarthritis 2006    bursitis R shoulder    Osteoarthritis of right knee     PVC (premature ventricular contraction)     H/O PVC's    Raynaud disease     Venous stasis     Wears hearing aid     both ears       Past Surgical History:   Procedure Laterality Date    HX BREAST BIOPSY Bilateral     x5    HX CATARACT REMOVAL  2004    bilateral    HX COLONOSCOPY  2002, 08/2007    HX DILATION AND CURETTAGE  1960's    HX HYSTERECTOMY  70's    BSO    HX OTHER SURGICAL  2001    sigmoidoscopy       Family History   Problem Relation Age of Onset    Diabetes Brother     Hypertension Mother     Diabetes Brother        Social History     Socioeconomic History    Marital status:      Spouse name: Not on file    Number of children: Not on file    Years of education: Not on file    Highest education level: Not on file   Tobacco Use    Smoking status: Never Smoker    Smokeless tobacco: Never Used   Substance and Sexual Activity    Alcohol use: No     Alcohol/week: 0.0 standard drinks    Drug use: No    Sexual activity: Never   Social History Narrative    Happily . No concern for abuse. Exercise: none    Diet: Careful. Wear Seatbelts             Social Determinants of Health     Financial Resource Strain:     Difficulty of Paying Living Expenses:    Food Insecurity:     Worried About Running Out of Food in the Last Year:     [address] in the Last Year:    Transportation Needs:     Lack of Transportation (Medical):      Lack of Transportation (Non-Medical):    Physical Activity:     Days of Exercise per Week:     Minutes of Exercise per Session:    Stress:     Feeling of Stress :    Social Connections:     Frequency of Communication with Friends and Family:     Frequency of Social Gatherings with Friends and Family:     Attends Taoism Services:     Active Member of Clubs or Organizations:     Attends Club or Organization Meetings:     Marital Status:        Review of Systems   Constitutional: Negative for chills, fever, malaise/fatigue and weight loss. HENT: Negative for congestion, hearing loss, sore throat and tinnitus. Eyes: Negative for blurred vision, pain and discharge. Respiratory: Negative for cough, shortness of breath and wheezing. Cardiovascular: Negative for chest pain, palpitations, orthopnea, claudication and leg swelling. Gastrointestinal: Negative for abdominal pain, constipation and heartburn. Genitourinary: Negative for dysuria, frequency and urgency. Musculoskeletal: Negative for falls, joint pain and myalgias. Skin: Negative for itching and rash. Neurological: Negative for dizziness, tingling, tremors and headaches. Endo/Heme/Allergies: Negative for environmental allergies and polydipsia. Psychiatric/Behavioral: Negative for depression and substance abuse. The patient is not nervous/anxious. Visit Vitals  BP (!) 146/60 (BP 1 Location: Left arm)   Pulse (!) 58   Temp 98.4 °F (36.9 °C)   Resp 20   Ht 5' (1.524 m)   Wt 162 lb (73.5 kg)   SpO2 100%   BMI 31.64 kg/m²     Physical Exam  Vitals reviewed. Constitutional:       Appearance: Normal appearance. HENT:      Head: Normocephalic and atraumatic. Right Ear: Tympanic membrane, ear canal and external ear normal.      Left Ear: Tympanic membrane, ear canal and external ear normal.      Nose: Nose normal. No congestion or rhinorrhea. Mouth/Throat:      Mouth: Mucous membranes are moist.      Pharynx: No posterior oropharyngeal erythema. Eyes:      General:         Right eye: No discharge. Left eye: No discharge. Extraocular Movements: Extraocular movements intact. Conjunctiva/sclera: Conjunctivae normal.      Pupils: Pupils are equal, round, and reactive to light. Comments: Cornea anterior chamber and iris are normal.   Neck:      Trachea: No tracheal deviation. Cardiovascular:      Rate and Rhythm: Normal rate and regular rhythm. Pulses: Normal pulses. Heart sounds: Normal heart sounds. No murmur heard. No friction rub. No gallop. Pulmonary:      Effort: Pulmonary effort is normal. No respiratory distress. Breath sounds: Normal breath sounds. No wheezing or rhonchi. Chest:      Chest wall: No tenderness. Abdominal:      General: Bowel sounds are normal. There is no distension. Palpations: Abdomen is soft. There is no mass. Tenderness: There is no abdominal tenderness. There is no guarding or rebound. Musculoskeletal:         General: No tenderness or deformity. Cervical back: Normal range of motion and neck supple. Right lower leg: No edema. Left lower leg: No edema. Lymphadenopathy:      Cervical: No cervical adenopathy. Skin:     General: Skin is warm and dry. Coloration: Skin is not pale. Findings: No erythema or rash. Comments: The wound appears to be well-healed but the patient appears to have chapped lips. Neurological:      General: No focal deficit present. Mental Status: She is alert and oriented to person, place, and time. Cranial Nerves: No cranial nerve deficit. Motor: No abnormal muscle tone. Deep Tendon Reflexes: Reflexes are normal and symmetric. Reflexes normal.      Comments: Cranial nerves II through XII are intact sensory and motor. Biceps triceps knee and ankle DTRs are normal and symmetrical.   Psychiatric:         Mood and Affect: Mood normal.         Behavior: Behavior normal.           ICD-10-CM ICD-9-CM    1.  Chapped lips  K13.0 528.5 hydrocortisone valerate (WEST-JACLYN) 0.2 % ointment       Orders Placed This Encounter    hydrocortisone valerate (WEST-JACLYN) 0.2 % ointment     Sig: Apply  to affected area two (2) times a day. use thin layer     Dispense:  15 g     Refill:  0   Patient is advised to stop the medication as soon as the symptoms go away. Patient is told that overuse of the medication could lead to damage of the lining of the lip. Follow-up and Dispositions    · Return if symptoms worsen or fail to improve.          Shivam Wen MD

## 2021-10-28 NOTE — TELEPHONE ENCOUNTER
----- Message from Tee Lewis MD sent at 10/22/2021  9:06 AM EDT -----  Regarding: echo  Advise minimal change in aortic valve stenosis, still not severe (mean 33, previously 30), normal LV pumping function. RTC 6 mos. Thanks [facility]    Spoke with the patients daughter,RISHABHLUKASZ (EC) . Verified patient with two patient identifiers. Results given and questions answered. LUKASZ KEATING (EC) verbalized understanding.

## 2021-11-08 NOTE — TELEPHONE ENCOUNTER
Message sent to MD to see if feeds should start today. PCP: Dayan Oakley NP    Last appt: 10/8/2021  Future Appointments   Date Time Provider Michelle Bob   11/29/2021  1:30 PM [facility] 3 [id number]   1/14/2022  3:30 PM Dayan Oakley NP [facility] MAIN BS AMB       Requested Prescriptions     Pending Prescriptions Disp Refills    furosemide (LASIX) 40 mg tablet [Pharmacy Med Name: FUROSEMIDE 40MG TABLETS] 90 Tablet 1     Sig: TAKE 1 TABLET BY MOUTH DAILY         Other Comments:  Last renal function lab/ 5/28/2021  Pended lab for Nov by pcp.

## 2021-11-29 NOTE — PROGRESS NOTES
1. Have you been to the ER, urgent care clinic since your last visit? Hospitalized since your last visit? No    2. Have you seen or consulted any other health care providers outside of the [address] [place] [address] since your last visit? Include any pap smears or colon screening.  No Please note, the following data table should not be interpreted in isolation. Please see the corresponding full neuropsychological evaluation report by Dr. Karla Freedman.    Psychometrist time with patient for neuropsychological testing and scorin minutes    APPENDIX                Standard scores (SS; mean = 100, standard deviation = 15), scaled scores (ss; mean = 10, standard deviation = 3), z-scores (mean = 0, standard deviation = 1), and T-scores (mean = 50; standard deviation = 10) are provided when possible to provide comparability across tests/measures. Numeric data are presented only for use by appropriately trained professionals and should never be interpreted without consideration of the information contained in the full evaluation report (e.g., relevant history and behavioral observations). Scores reflect the person’s performance compared to others of similar demographics (e.g., age, education, sex, race).    Exam Date: 2018     Exam Date: 2021       Age: 79, Sex: M, Race/Ethn: C, Edu: 18, DH: R  Age: 83, Sex: M, Race/Ethn: C, Edu: 18, DH: R           Significant changes are marked (*).    DOMAINS & MEASURES Raw Score Score  %ile  Notes Interpretative Range Raw Score Score  %ile  Notes Interpretative Range                   Gross Mental Status/Orientation                MMSE-2  29/30     WNL  27/30     WNL        Registration /3       3/3             Orientation to Time /5       4/5             Orientation to Place /5       5/5             Recall /3       1/3             Attention and Calculations /5       5/5             Naming /2       2/2             Repetition /             Comprehension /3       3/3             Reading /             Writing /             Drawing /                        General Intellectual Functioning                RBANS Total Score *  SS =  100 50 FM-A Average   SS =  75 5 FM-C Below Average                    Attention/Working Memory                RBANS Attention Index  SS =  103 58 FM-A Average   SS =  109 73 FM-C Average        Digit Span 13 ss  = 14 91  Above Average  13 ss  = 15 95  Above Average        Coding 31 ss  = 7 16  Low Average  30 ss  = 8 25  Average                   Processing Speed                Trail Making Test, Part A * 48\" SS =  84 14 Select Low Average  61\" T = 36 8 1-E Below Average                   Language Functioning                CFL/FAS 50 SS =  116 86  High Average  43 T = 51 54  Average   Animals * 15 SS =  88 21  Low Average  8 T = 22 <1  Exceptionally Low   RBANS Language Index  SS =  92 30 FM-A Average   SS =  92 30 FM-C Average        Picture Naming 10   51-75  Average  9   26-50  Average        Semantic Fluency 15 ss  = 7 16  Low Average  12 ss  = 6 9  Low Average                   Visuospatial Processing                Clock Drawing Task (/10) 9     Normal  7     Mild   RBANS Visuospatial Index *  SS =  105 63 FM-A Average   SS =  78 7 FM-C Below Average        Figure Copy 17 ss  = 9 37  Average  15 ss  = 6 9  Low Average        Line Orientation 19   >75  High Average  14   17-25  Low Average                   Learning and Memory                RBANS Immediate Memory Index *  SS =  106 66 FM-A Average   SS =  57 <1 FM-C Exceptionally Low        List Learning * 19 ss  = 9 37  Average  14 ss  = 3 1 3-4-4-3 Exceptionally Low        Story Learning * 23 ss  = 16 98  Exceptionally High 5 ss  = 3 1 2-3 Exceptionally Low   RBANS Delayed Memory Index *  SS =  98 45 FM-A Average   SS =  64 <1 FM-C Exceptionally Low        List Recall * 3   17-25  Low Average  0   3-9  Below Average        List Recognition 19   26-50  Average  18   17-25  Low Average        Story Recall * 9 ss  = 10 50  Average  1 ss  = 3 1  Exceptionally Low        Figure Recall * 9 ss  = 8 25  Average  0 ss  = 1 <1  Exceptionally Low                   Executive Functioning                Trail Making Test, Part B * 97\" SS  =  96 39 Select Average  174\" T = 36 8 1-E Below Average   M-WCST                     Categories Completed        2 T = 30 2  Below Average        Perseverative Errors        13 T = 45 31  Average        Total Errors        32 T = 52 58  Average        Percent Perseverative Errors        40.63 T = 44 27  Average                   Questionnaires                 GDS (15-Item) 4     Min  2     Min   GAI 7     Subclinical  2     Subclinical

## 2021-11-29 NOTE — DISCHARGE INSTRUCTIONS
Patient Education   Denosumab (By injection)   Denosumab ([id number]-[id number])  Treats osteoporosis, bone cancer, hypercalcemia, and other bone problems in patients who have cancer. Brand Name(s): Prolia, Xgeva   There may be other brand names for this medicine. When This Medicine Should Not Be Used: This medicine is not right for everyone. You should not receive it if you had an allergic reaction to denosumab or if you are pregnant. How to Use This Medicine:   Injectable  · A doctor or other health professional will give you this medicine. This medicine is usually given as a shot under the skin of your upper arm, upper thigh, or stomach. · This medicine should come with a Medication Guide. Ask your pharmacist for a copy if you do not have one. · Missed dose: Call your doctor or pharmacist for instructions. Drugs and Foods to Avoid:   Ask your doctor or pharmacist before using any other medicine, including over-the-counter medicines, vitamins, and herbal products. · Do not use Prolia® and Xgeva® together. They contain the same medicine. · Some medicines can affect how denosumab works. Tell your doctor if you are also using medicine that weakens your immune system, including a steroid or cancer medicine. Warnings While Using This Medicine:   · This medicine may cause birth defects if either partner is using it during conception or pregnancy. Tell your doctor right away if you or your partner becomes pregnant. Use an effective form of birth control. Women who are being treated with Arbie Mash should continue using birth control for at least 5 months after the last dose. · Tell your doctor if you are breastfeeding, or if you have kidney disease, diabetes, gum disease, or an allergy to latex. Tell your doctor if you have problems with your thyroid, parathyroid, or digestive system.   · This medicine may cause the following problems:  ¨ Low calcium levels in your blood  ¨ Increased risk of broken thigh bone  ¨ Increased risk of infections  ¨ Serious skin reactions  ¨ Severe bone, joint, or muscle pain  · This medicine can cause jaw problems. You must have regular dental exams while you are being treated with this medicine. Tell your dentist that you are using this medicine. Practice good oral hygiene. · Do not suddenly stop using Prolia® without checking first with your doctor. Doing so may increase risk for more fractures. Talk to your doctor about other medicine that you can take. · Your doctor will do lab tests at regular visits to check on the effects of this medicine. Keep all appointments. Possible Side Effects While Using This Medicine:   Call your doctor right away if you notice any of these side effects:  · Allergic reaction: Itching or hives, swelling in your face or hands, swelling or tingling in your mouth or throat, chest tightness, trouble breathing  · Blistering, peeling, red skin rash  · Chest pain, fast or uneven heartbeat, trouble breathing  · Fever, chills, cough, sore throat, body aches  · Lightheadedness, dizziness, fainting  · Muscle spasms or twitching, numbness or tingling in your fingers, toes, or lips  · Pain or burning during urination, change in how much or how often you urinate  · Pain, swelling, heavy feeling, or numbness in your mouth or jaw, loose teeth or other teeth problems  · Severe bone, joint, or muscle pain  · Unusual pain in your thigh, groin, or hip  If you notice these less serious side effects, talk with your doctor:   · Diarrhea, nausea  · Redness, pain, itching, burning, swelling, or a lump under your skin where the shot was given  · Tiredness or weakness  If you notice other side effects that you think are caused by this medicine, tell your doctor. Call your doctor for medical advice about side effects.  You may report side effects to FDA at 2-970-TVA-2085  © 2017 [facility] Information is for End User's use only and may not be sold, redistributed or otherwise used for commercial purposes. The above information is an  only. It is not intended as medical advice for individual conditions or treatments. Talk to your doctor, nurse or pharmacist before following any medical regimen to see if it is safe and effective for you.

## 2022-01-01 ENCOUNTER — TELEPHONE (OUTPATIENT)
Dept: CARDIOLOGY CLINIC | Age: 87
End: 2022-01-01

## 2022-01-01 ENCOUNTER — HOSPITAL ENCOUNTER (OUTPATIENT)
Dept: INFUSION THERAPY | Age: 87
Discharge: HOME | End: 2022-01-27
Payer: MEDICARE

## 2022-01-01 ENCOUNTER — VIRTUAL VISIT (OUTPATIENT)
Dept: FAMILY MEDICINE CLINIC | Age: 87
End: 2022-01-01
Payer: MEDICARE

## 2022-01-01 ENCOUNTER — TELEPHONE (OUTPATIENT)
Dept: FAMILY MEDICINE CLINIC | Age: 87
End: 2022-01-01

## 2022-01-01 ENCOUNTER — HOSPITAL ENCOUNTER (OUTPATIENT)
Dept: CT IMAGING | Age: 87
Discharge: HOME | End: 2022-05-19
Attending: INTERNAL MEDICINE
Payer: MEDICARE

## 2022-01-01 ENCOUNTER — OFFICE VISIT (OUTPATIENT)
Dept: ONCOLOGY | Age: 87
End: 2022-01-01
Payer: MEDICARE

## 2022-01-01 ENCOUNTER — APPOINTMENT (OUTPATIENT)
Dept: CT IMAGING | Age: 87
End: 2022-01-01
Attending: EMERGENCY MEDICINE
Payer: MEDICARE

## 2022-01-01 ENCOUNTER — TRANSCRIBE ORDER (OUTPATIENT)
Dept: SCHEDULING | Age: 87
End: 2022-01-01

## 2022-01-01 ENCOUNTER — DOCUMENTATION ONLY (OUTPATIENT)
Dept: CARDIOLOGY CLINIC | Age: 87
End: 2022-01-01

## 2022-01-01 ENCOUNTER — OFFICE VISIT (OUTPATIENT)
Dept: FAMILY MEDICINE CLINIC | Age: 87
End: 2022-01-01
Payer: MEDICARE

## 2022-01-01 ENCOUNTER — APPOINTMENT (OUTPATIENT)
Dept: GENERAL RADIOLOGY | Age: 87
End: 2022-01-01
Attending: EMERGENCY MEDICINE
Payer: MEDICARE

## 2022-01-01 ENCOUNTER — HOSPITAL ENCOUNTER (OUTPATIENT)
Dept: INFUSION THERAPY | Age: 87
Discharge: HOME | End: 2022-03-18
Payer: MEDICARE

## 2022-01-01 ENCOUNTER — CLINICAL SUPPORT (OUTPATIENT)
Dept: CARDIOLOGY CLINIC | Age: 87
End: 2022-01-01
Payer: MEDICARE

## 2022-01-01 ENCOUNTER — HOSPITAL ENCOUNTER (EMERGENCY)
Age: 87
Discharge: HOME | End: 2022-02-25
Attending: EMERGENCY MEDICINE
Payer: MEDICARE

## 2022-01-01 ENCOUNTER — OFFICE VISIT (OUTPATIENT)
Dept: CARDIOLOGY CLINIC | Age: 87
End: 2022-01-01
Payer: MEDICARE

## 2022-01-01 ENCOUNTER — DOCUMENTATION ONLY (OUTPATIENT)
Dept: FAMILY MEDICINE CLINIC | Age: 87
End: 2022-01-01

## 2022-01-01 ENCOUNTER — HOSPITAL ENCOUNTER (EMERGENCY)
Age: 87
Discharge: HOME | End: 2022-07-08
Attending: EMERGENCY MEDICINE | Admitting: EMERGENCY MEDICINE
Payer: MEDICARE

## 2022-01-01 ENCOUNTER — HOSPITAL ENCOUNTER (OUTPATIENT)
Dept: INFUSION THERAPY | Age: 87
Discharge: HOME | End: 2022-04-22
Payer: MEDICARE

## 2022-01-01 ENCOUNTER — PATIENT OUTREACH (OUTPATIENT)
Dept: CASE MANAGEMENT | Age: 87
End: 2022-01-01

## 2022-01-01 ENCOUNTER — HOSPITAL ENCOUNTER (OUTPATIENT)
Dept: GENERAL RADIOLOGY | Age: 87
Discharge: HOME | End: 2022-04-29
Payer: MEDICARE

## 2022-01-01 ENCOUNTER — HOSPITAL ENCOUNTER (OUTPATIENT)
Dept: INFUSION THERAPY | Age: 87
End: 2022-01-01

## 2022-01-01 ENCOUNTER — OFFICE VISIT (OUTPATIENT)
Dept: CARDIOTHORACIC SURGERY | Age: 87
End: 2022-01-01
Payer: MEDICARE

## 2022-01-01 ENCOUNTER — HOSPITAL ENCOUNTER (OUTPATIENT)
Dept: ULTRASOUND IMAGING | Age: 87
Discharge: HOME | End: 2022-03-18
Attending: INTERNAL MEDICINE
Payer: MEDICARE

## 2022-01-01 ENCOUNTER — HOSPITAL ENCOUNTER (EMERGENCY)
Age: 87
Discharge: HOME | End: 2022-07-11
Attending: EMERGENCY MEDICINE
Payer: MEDICARE

## 2022-01-01 ENCOUNTER — HOSPITAL ENCOUNTER (OUTPATIENT)
Dept: INFUSION THERAPY | Age: 87
Discharge: HOME | End: 2022-07-15

## 2022-01-01 ENCOUNTER — HOSPITAL ENCOUNTER (OUTPATIENT)
Dept: INFUSION THERAPY | Age: 87
Discharge: HOME | End: 2022-05-13
Payer: MEDICARE

## 2022-01-01 ENCOUNTER — APPOINTMENT (OUTPATIENT)
Dept: VASCULAR SURGERY | Age: 87
End: 2022-01-01
Attending: NURSE PRACTITIONER
Payer: MEDICARE

## 2022-01-01 ENCOUNTER — HOSPITAL ENCOUNTER (OUTPATIENT)
Dept: GENERAL RADIOLOGY | Age: 87
Discharge: HOME | End: 2022-02-04
Payer: MEDICARE

## 2022-01-01 ENCOUNTER — HOSPITAL ENCOUNTER (OUTPATIENT)
Dept: INFUSION THERAPY | Age: 87
Discharge: HOME | End: 2022-06-03
Payer: MEDICARE

## 2022-01-01 ENCOUNTER — HOSPITAL ENCOUNTER (OUTPATIENT)
Dept: INFUSION THERAPY | Age: 87
Discharge: HOME | End: 2022-02-18
Payer: MEDICARE

## 2022-01-01 ENCOUNTER — HOSPITAL ENCOUNTER (OUTPATIENT)
Dept: INFUSION THERAPY | Age: 87
Discharge: HOME | End: 2022-07-01
Payer: MEDICARE

## 2022-01-01 ENCOUNTER — APPOINTMENT (OUTPATIENT)
Dept: GENERAL RADIOLOGY | Age: 87
End: 2022-01-01
Attending: INTERNAL MEDICINE
Payer: MEDICARE

## 2022-01-01 ENCOUNTER — HOSPITAL ENCOUNTER (OUTPATIENT)
Dept: INFUSION THERAPY | Age: 87
Discharge: HOME | End: 2022-03-25
Payer: MEDICARE

## 2022-01-01 ENCOUNTER — HOSPITAL ENCOUNTER (EMERGENCY)
Age: 87
Discharge: HOME | End: 2022-06-19
Attending: EMERGENCY MEDICINE
Payer: MEDICARE

## 2022-01-01 ENCOUNTER — APPOINTMENT (OUTPATIENT)
Dept: GENERAL RADIOLOGY | Age: 87
End: 2022-01-01
Attending: FAMILY MEDICINE
Payer: MEDICARE

## 2022-01-01 ENCOUNTER — TELEPHONE (OUTPATIENT)
Dept: CARDIOTHORACIC SURGERY | Age: 87
End: 2022-01-01

## 2022-01-01 ENCOUNTER — TELEPHONE (OUTPATIENT)
Dept: ONCOLOGY | Age: 87
End: 2022-01-01

## 2022-01-01 ENCOUNTER — HOSPITAL ENCOUNTER (OUTPATIENT)
Dept: INFUSION THERAPY | Age: 87
Discharge: HOME | End: 2022-07-22
Payer: MEDICARE

## 2022-01-01 ENCOUNTER — OFFICE VISIT (OUTPATIENT)
Dept: FAMILY MEDICINE CLINIC | Age: 87
End: 2022-01-01

## 2022-01-01 ENCOUNTER — VIRTUAL VISIT (OUTPATIENT)
Dept: ONCOLOGY | Age: 87
End: 2022-01-01
Payer: MEDICARE

## 2022-01-01 ENCOUNTER — HOSPITAL ENCOUNTER (OUTPATIENT)
Dept: INFUSION THERAPY | Age: 87
Discharge: HOME | End: 2022-03-04
Payer: MEDICARE

## 2022-01-01 ENCOUNTER — HOSPITAL ENCOUNTER (OUTPATIENT)
Dept: ULTRASOUND IMAGING | Age: 87
Discharge: HOME | End: 2022-02-04
Payer: MEDICARE

## 2022-01-01 ENCOUNTER — APPOINTMENT (OUTPATIENT)
Dept: CT IMAGING | Age: 87
End: 2022-01-01
Attending: FAMILY MEDICINE
Payer: MEDICARE

## 2022-01-01 ENCOUNTER — HOSPITAL ENCOUNTER (OUTPATIENT)
Dept: INFUSION THERAPY | Age: 87
Discharge: HOME | End: 2022-02-04
Payer: MEDICARE

## 2022-01-01 ENCOUNTER — HOSPITAL ENCOUNTER (OUTPATIENT)
Dept: INFUSION THERAPY | Age: 87
Discharge: HOME | End: 2022-06-17
Payer: MEDICARE

## 2022-01-01 ENCOUNTER — HOSPITAL ENCOUNTER (OUTPATIENT)
Dept: VASCULAR SURGERY | Age: 87
Discharge: HOME | End: 2022-05-19
Attending: INTERNAL MEDICINE
Payer: MEDICARE

## 2022-01-01 ENCOUNTER — HOSPITAL ENCOUNTER (OUTPATIENT)
Age: 87
Discharge: HOME | End: 2022-03-29
Attending: INTERNAL MEDICINE | Admitting: INTERNAL MEDICINE
Payer: MEDICARE

## 2022-01-01 ENCOUNTER — APPOINTMENT (OUTPATIENT)
Dept: INFUSION THERAPY | Age: 87
End: 2022-01-01
Payer: MEDICARE

## 2022-01-01 ENCOUNTER — HOSPITAL ENCOUNTER (EMERGENCY)
Age: 87
Discharge: HOME | End: 2022-01-19
Attending: FAMILY MEDICINE
Payer: MEDICARE

## 2022-01-01 ENCOUNTER — HOSPITAL ENCOUNTER (OUTPATIENT)
Dept: INFUSION THERAPY | Age: 87
Discharge: HOME | End: 2022-04-01

## 2022-01-01 ENCOUNTER — HOSPITAL ENCOUNTER (OUTPATIENT)
Dept: INFUSION THERAPY | Age: 87
Discharge: HOME | End: 2022-04-08
Payer: MEDICARE

## 2022-01-01 ENCOUNTER — HOSPITAL ENCOUNTER (OUTPATIENT)
Dept: INFUSION THERAPY | Age: 87
Discharge: HOME | End: 2022-04-29
Payer: MEDICARE

## 2022-01-01 ENCOUNTER — HOSPITAL ENCOUNTER (OUTPATIENT)
Age: 87
Discharge: HOME | End: 2022-05-06
Attending: INTERNAL MEDICINE | Admitting: INTERNAL MEDICINE
Payer: MEDICARE

## 2022-01-01 VITALS
RESPIRATION RATE: 18 BRPM | SYSTOLIC BLOOD PRESSURE: 150 MMHG | HEART RATE: 62 BPM | DIASTOLIC BLOOD PRESSURE: 70 MMHG | WEIGHT: 158 LBS | TEMPERATURE: 98.4 F | BODY MASS INDEX: 30.42 KG/M2

## 2022-01-01 VITALS
BODY MASS INDEX: 27.33 KG/M2 | RESPIRATION RATE: 16 BRPM | SYSTOLIC BLOOD PRESSURE: 120 MMHG | HEART RATE: 51 BPM | WEIGHT: 153 LBS | HEIGHT: 60 IN | SYSTOLIC BLOOD PRESSURE: 139 MMHG | OXYGEN SATURATION: 99 % | OXYGEN SATURATION: 95 % | BODY MASS INDEX: 30.04 KG/M2 | RESPIRATION RATE: 16 BRPM | DIASTOLIC BLOOD PRESSURE: 62 MMHG | TEMPERATURE: 97.8 F | HEART RATE: 64 BPM | DIASTOLIC BLOOD PRESSURE: 44 MMHG | TEMPERATURE: 98.2 F | WEIGHT: 159.2 LBS

## 2022-01-01 VITALS
HEIGHT: 60 IN | WEIGHT: 159.4 LBS | RESPIRATION RATE: 16 BRPM | TEMPERATURE: 97.2 F | DIASTOLIC BLOOD PRESSURE: 62 MMHG | OXYGEN SATURATION: 100 % | BODY MASS INDEX: 31.29 KG/M2 | SYSTOLIC BLOOD PRESSURE: 134 MMHG | HEART RATE: 62 BPM

## 2022-01-01 VITALS
WEIGHT: 148.8 LBS | SYSTOLIC BLOOD PRESSURE: 138 MMHG | DIASTOLIC BLOOD PRESSURE: 63 MMHG | TEMPERATURE: 97.9 F | HEART RATE: 68 BPM | OXYGEN SATURATION: 99 % | BODY MASS INDEX: 25.54 KG/M2 | RESPIRATION RATE: 18 BRPM

## 2022-01-01 VITALS
TEMPERATURE: 98.6 F | OXYGEN SATURATION: 100 % | RESPIRATION RATE: 17 BRPM | WEIGHT: 153 LBS | DIASTOLIC BLOOD PRESSURE: 76 MMHG | BODY MASS INDEX: 29.46 KG/M2 | SYSTOLIC BLOOD PRESSURE: 145 MMHG | HEART RATE: 54 BPM

## 2022-01-01 VITALS
DIASTOLIC BLOOD PRESSURE: 64 MMHG | RESPIRATION RATE: 18 BRPM | TEMPERATURE: 98.4 F | SYSTOLIC BLOOD PRESSURE: 138 MMHG | BODY MASS INDEX: 31.02 KG/M2 | HEART RATE: 64 BPM | OXYGEN SATURATION: 99 % | HEIGHT: 60 IN | WEIGHT: 158 LBS

## 2022-01-01 VITALS
OXYGEN SATURATION: 94 % | HEIGHT: 60 IN | RESPIRATION RATE: 17 BRPM | DIASTOLIC BLOOD PRESSURE: 70 MMHG | TEMPERATURE: 98.4 F | SYSTOLIC BLOOD PRESSURE: 156 MMHG | BODY MASS INDEX: 30.51 KG/M2 | HEART RATE: 58 BPM | WEIGHT: 155.42 LBS

## 2022-01-01 VITALS
HEART RATE: 65 BPM | DIASTOLIC BLOOD PRESSURE: 62 MMHG | TEMPERATURE: 98.4 F | OXYGEN SATURATION: 99 % | RESPIRATION RATE: 18 BRPM | BODY MASS INDEX: 25.4 KG/M2 | WEIGHT: 148 LBS | SYSTOLIC BLOOD PRESSURE: 133 MMHG

## 2022-01-01 VITALS
RESPIRATION RATE: 12 BRPM | OXYGEN SATURATION: 100 % | WEIGHT: 153 LBS | HEART RATE: 60 BPM | TEMPERATURE: 97.9 F | HEIGHT: 64 IN | DIASTOLIC BLOOD PRESSURE: 47 MMHG | SYSTOLIC BLOOD PRESSURE: 156 MMHG | BODY MASS INDEX: 26.12 KG/M2

## 2022-01-01 VITALS
WEIGHT: 149.47 LBS | TEMPERATURE: 97.3 F | HEART RATE: 69 BPM | DIASTOLIC BLOOD PRESSURE: 59 MMHG | BODY MASS INDEX: 25.66 KG/M2 | SYSTOLIC BLOOD PRESSURE: 129 MMHG | RESPIRATION RATE: 17 BRPM

## 2022-01-01 VITALS
BODY MASS INDEX: 26.26 KG/M2 | HEART RATE: 64 BPM | SYSTOLIC BLOOD PRESSURE: 147 MMHG | TEMPERATURE: 98.3 F | OXYGEN SATURATION: 99 % | WEIGHT: 153 LBS | RESPIRATION RATE: 18 BRPM | DIASTOLIC BLOOD PRESSURE: 65 MMHG

## 2022-01-01 VITALS
SYSTOLIC BLOOD PRESSURE: 147 MMHG | DIASTOLIC BLOOD PRESSURE: 55 MMHG | WEIGHT: 156.7 LBS | HEART RATE: 67 BPM | TEMPERATURE: 97.8 F | BODY MASS INDEX: 30.77 KG/M2 | RESPIRATION RATE: 18 BRPM | HEIGHT: 60 IN | OXYGEN SATURATION: 100 %

## 2022-01-01 VITALS
OXYGEN SATURATION: 98 % | RESPIRATION RATE: 17 BRPM | TEMPERATURE: 97.6 F | SYSTOLIC BLOOD PRESSURE: 136 MMHG | DIASTOLIC BLOOD PRESSURE: 75 MMHG | HEART RATE: 76 BPM

## 2022-01-01 VITALS
WEIGHT: 163.25 LBS | OXYGEN SATURATION: 98 % | HEART RATE: 60 BPM | DIASTOLIC BLOOD PRESSURE: 49 MMHG | BODY MASS INDEX: 32.05 KG/M2 | TEMPERATURE: 98.2 F | SYSTOLIC BLOOD PRESSURE: 145 MMHG | HEIGHT: 60 IN | RESPIRATION RATE: 18 BRPM

## 2022-01-01 VITALS
WEIGHT: 156.5 LBS | DIASTOLIC BLOOD PRESSURE: 63 MMHG | TEMPERATURE: 97.4 F | SYSTOLIC BLOOD PRESSURE: 166 MMHG | BODY MASS INDEX: 30.13 KG/M2 | RESPIRATION RATE: 18 BRPM | HEART RATE: 58 BPM | OXYGEN SATURATION: 97 %

## 2022-01-01 VITALS
WEIGHT: 147 LBS | OXYGEN SATURATION: 95 % | BODY MASS INDEX: 25.1 KG/M2 | HEART RATE: 65 BPM | SYSTOLIC BLOOD PRESSURE: 130 MMHG | TEMPERATURE: 98.3 F | HEIGHT: 64 IN | DIASTOLIC BLOOD PRESSURE: 56 MMHG | RESPIRATION RATE: 18 BRPM

## 2022-01-01 VITALS
HEART RATE: 62 BPM | DIASTOLIC BLOOD PRESSURE: 42 MMHG | OXYGEN SATURATION: 99 % | RESPIRATION RATE: 16 BRPM | TEMPERATURE: 98.6 F | SYSTOLIC BLOOD PRESSURE: 123 MMHG | HEIGHT: 64 IN | WEIGHT: 144 LBS | BODY MASS INDEX: 24.59 KG/M2

## 2022-01-01 VITALS
TEMPERATURE: 97.8 F | RESPIRATION RATE: 16 BRPM | SYSTOLIC BLOOD PRESSURE: 139 MMHG | HEART RATE: 64 BPM | WEIGHT: 159.2 LBS | OXYGEN SATURATION: 95 % | BODY MASS INDEX: 27.18 KG/M2 | HEIGHT: 64 IN | DIASTOLIC BLOOD PRESSURE: 62 MMHG

## 2022-01-01 VITALS
HEIGHT: 64 IN | HEART RATE: 60 BPM | WEIGHT: 173 LBS | RESPIRATION RATE: 16 BRPM | BODY MASS INDEX: 29.53 KG/M2 | OXYGEN SATURATION: 100 % | DIASTOLIC BLOOD PRESSURE: 56 MMHG | TEMPERATURE: 98.5 F | SYSTOLIC BLOOD PRESSURE: 151 MMHG

## 2022-01-01 VITALS
DIASTOLIC BLOOD PRESSURE: 50 MMHG | SYSTOLIC BLOOD PRESSURE: 120 MMHG | HEART RATE: 60 BPM | HEIGHT: 64 IN | BODY MASS INDEX: 26.12 KG/M2 | WEIGHT: 153 LBS | OXYGEN SATURATION: 99 % | RESPIRATION RATE: 18 BRPM

## 2022-01-01 VITALS
TEMPERATURE: 98.8 F | DIASTOLIC BLOOD PRESSURE: 42 MMHG | SYSTOLIC BLOOD PRESSURE: 133 MMHG | OXYGEN SATURATION: 100 % | HEART RATE: 63 BPM | RESPIRATION RATE: 18 BRPM

## 2022-01-01 VITALS
DIASTOLIC BLOOD PRESSURE: 47 MMHG | HEIGHT: 64 IN | HEART RATE: 57 BPM | BODY MASS INDEX: 26.12 KG/M2 | RESPIRATION RATE: 22 BRPM | SYSTOLIC BLOOD PRESSURE: 152 MMHG | WEIGHT: 153 LBS | TEMPERATURE: 97.3 F | OXYGEN SATURATION: 99 %

## 2022-01-01 VITALS
TEMPERATURE: 97.3 F | BODY MASS INDEX: 22.83 KG/M2 | SYSTOLIC BLOOD PRESSURE: 133 MMHG | RESPIRATION RATE: 18 BRPM | DIASTOLIC BLOOD PRESSURE: 64 MMHG | WEIGHT: 133 LBS

## 2022-01-01 VITALS
TEMPERATURE: 97.4 F | HEART RATE: 64 BPM | HEIGHT: 60 IN | BODY MASS INDEX: 31.26 KG/M2 | RESPIRATION RATE: 18 BRPM | DIASTOLIC BLOOD PRESSURE: 55 MMHG | OXYGEN SATURATION: 97 % | WEIGHT: 159.25 LBS | SYSTOLIC BLOOD PRESSURE: 144 MMHG

## 2022-01-01 VITALS
SYSTOLIC BLOOD PRESSURE: 146 MMHG | RESPIRATION RATE: 18 BRPM | HEART RATE: 78 BPM | HEIGHT: 64 IN | DIASTOLIC BLOOD PRESSURE: 54 MMHG | TEMPERATURE: 98.1 F | BODY MASS INDEX: 29.53 KG/M2 | OXYGEN SATURATION: 99 % | WEIGHT: 173 LBS

## 2022-01-01 VITALS
RESPIRATION RATE: 18 BRPM | SYSTOLIC BLOOD PRESSURE: 138 MMHG | HEART RATE: 122 BPM | TEMPERATURE: 98.6 F | DIASTOLIC BLOOD PRESSURE: 63 MMHG | OXYGEN SATURATION: 93 %

## 2022-01-01 VITALS — HEART RATE: 57 BPM | DIASTOLIC BLOOD PRESSURE: 50 MMHG | SYSTOLIC BLOOD PRESSURE: 120 MMHG

## 2022-01-01 DIAGNOSIS — H92.01 RIGHT EAR PAIN: ICD-10-CM

## 2022-01-01 DIAGNOSIS — D64.9 ANEMIA, UNSPECIFIED TYPE: Primary | ICD-10-CM

## 2022-01-01 DIAGNOSIS — R53.81 DEBILITY: Primary | ICD-10-CM

## 2022-01-01 DIAGNOSIS — R55 SYNCOPE, UNSPECIFIED SYNCOPE TYPE: ICD-10-CM

## 2022-01-01 DIAGNOSIS — E04.1 THYROID NODULE: ICD-10-CM

## 2022-01-01 DIAGNOSIS — Z86.16 HISTORY OF COVID-19: ICD-10-CM

## 2022-01-01 DIAGNOSIS — I35.0 AORTIC VALVE STENOSIS, ETIOLOGY OF CARDIAC VALVE DISEASE UNSPECIFIED: ICD-10-CM

## 2022-01-01 DIAGNOSIS — I35.0 SEVERE AORTIC STENOSIS: ICD-10-CM

## 2022-01-01 DIAGNOSIS — I65.23 BILATERAL CAROTID ARTERY STENOSIS: ICD-10-CM

## 2022-01-01 DIAGNOSIS — D64.9 ANEMIA, UNSPECIFIED TYPE: ICD-10-CM

## 2022-01-01 DIAGNOSIS — M85.80 OSTEOPENIA, UNSPECIFIED LOCATION: ICD-10-CM

## 2022-01-01 DIAGNOSIS — R09.89 GLOBUS SENSATION: ICD-10-CM

## 2022-01-01 DIAGNOSIS — I10 ESSENTIAL HYPERTENSION: ICD-10-CM

## 2022-01-01 DIAGNOSIS — R00.1 BRADYCARDIA: ICD-10-CM

## 2022-01-01 DIAGNOSIS — R53.1 WEAKNESS GENERALIZED: Primary | ICD-10-CM

## 2022-01-01 DIAGNOSIS — I50.32 DIASTOLIC CHF, CHRONIC (HCC): ICD-10-CM

## 2022-01-01 DIAGNOSIS — I35.0 NONRHEUMATIC AORTIC (VALVE) STENOSIS: Primary | ICD-10-CM

## 2022-01-01 DIAGNOSIS — E78.00 PURE HYPERCHOLESTEROLEMIA: ICD-10-CM

## 2022-01-01 DIAGNOSIS — J01.10 ACUTE NON-RECURRENT FRONTAL SINUSITIS: ICD-10-CM

## 2022-01-01 DIAGNOSIS — N18.4 CKD (CHRONIC KIDNEY DISEASE), STAGE IV (HCC): ICD-10-CM

## 2022-01-01 DIAGNOSIS — U07.1 COVID-19: ICD-10-CM

## 2022-01-01 DIAGNOSIS — I35.0 AORTIC VALVE STENOSIS, ETIOLOGY OF CARDIAC VALVE DISEASE UNSPECIFIED: Primary | ICD-10-CM

## 2022-01-01 DIAGNOSIS — N18.4 CKD (CHRONIC KIDNEY DISEASE), STAGE IV (HCC): Primary | ICD-10-CM

## 2022-01-01 DIAGNOSIS — R07.9 CHEST PAIN, UNSPECIFIED TYPE: ICD-10-CM

## 2022-01-01 DIAGNOSIS — I35.0 NONRHEUMATIC AORTIC VALVE STENOSIS: Primary | ICD-10-CM

## 2022-01-01 DIAGNOSIS — I35.0 AORTIC STENOSIS, MODERATE: Primary | ICD-10-CM

## 2022-01-01 DIAGNOSIS — R40.4 TRANSIENT ALTERATION OF AWARENESS: Primary | ICD-10-CM

## 2022-01-01 DIAGNOSIS — G25.0 BENIGN FAMILIAL TREMOR: ICD-10-CM

## 2022-01-01 DIAGNOSIS — Z00.00 MEDICARE ANNUAL WELLNESS VISIT, SUBSEQUENT: Primary | ICD-10-CM

## 2022-01-01 DIAGNOSIS — J01.10 ACUTE NON-RECURRENT FRONTAL SINUSITIS: Primary | ICD-10-CM

## 2022-01-01 DIAGNOSIS — I35.0 AORTIC STENOSIS, SEVERE: ICD-10-CM

## 2022-01-01 DIAGNOSIS — I49.5 SSS (SICK SINUS SYNDROME) (HCC): ICD-10-CM

## 2022-01-01 DIAGNOSIS — G25.0 ESSENTIAL TREMOR: Primary | ICD-10-CM

## 2022-01-01 DIAGNOSIS — U07.1 COVID-19 VIRUS INFECTION: Primary | ICD-10-CM

## 2022-01-01 DIAGNOSIS — I13.10: ICD-10-CM

## 2022-01-01 DIAGNOSIS — M54.31 SCIATICA, RIGHT SIDE: ICD-10-CM

## 2022-01-01 DIAGNOSIS — R68.89 RIGORS: ICD-10-CM

## 2022-01-01 DIAGNOSIS — I73.9 PERIPHERAL VASCULAR DISEASE (HCC): ICD-10-CM

## 2022-01-01 DIAGNOSIS — R00.1 BRADYCARDIA: Primary | ICD-10-CM

## 2022-01-01 DIAGNOSIS — N18.4: ICD-10-CM

## 2022-01-01 DIAGNOSIS — E11.40 TYPE 2 DIABETES MELLITUS WITH DIABETIC NEUROPATHY, WITHOUT LONG-TERM CURRENT USE OF INSULIN (HCC): ICD-10-CM

## 2022-01-01 DIAGNOSIS — E21.0 PRIMARY HYPERPARATHYROIDISM (HCC): ICD-10-CM

## 2022-01-01 DIAGNOSIS — M85.89 OSTEOPENIA OF MULTIPLE SITES: ICD-10-CM

## 2022-01-01 DIAGNOSIS — Z01.810 PREOP CARDIOVASCULAR EXAM: ICD-10-CM

## 2022-01-01 DIAGNOSIS — U07.1 COVID-19: Primary | ICD-10-CM

## 2022-01-01 DIAGNOSIS — M54.31 SCIATICA, RIGHT SIDE: Primary | ICD-10-CM

## 2022-01-01 DIAGNOSIS — K21.9 GASTROESOPHAGEAL REFLUX DISEASE WITHOUT ESOPHAGITIS: ICD-10-CM

## 2022-01-01 DIAGNOSIS — I35.0 NONRHEUMATIC AORTIC VALVE STENOSIS: ICD-10-CM

## 2022-01-01 DIAGNOSIS — R53.81 PHYSICAL DECONDITIONING: Primary | ICD-10-CM

## 2022-01-01 DIAGNOSIS — I35.0 NONRHEUMATIC AORTIC (VALVE) STENOSIS: ICD-10-CM

## 2022-01-01 DIAGNOSIS — D63.8 ANEMIA, CHRONIC DISEASE: Primary | ICD-10-CM

## 2022-01-01 DIAGNOSIS — E04.1 THYROID NODULE: Primary | ICD-10-CM

## 2022-01-01 DIAGNOSIS — E11.40 TYPE 2 DIABETES MELLITUS WITH DIABETIC NEUROPATHY, UNSPECIFIED WHETHER LONG TERM INSULIN USE (HCC): ICD-10-CM

## 2022-01-01 DIAGNOSIS — R09.89 GLOBUS SENSATION: Primary | ICD-10-CM

## 2022-01-01 DIAGNOSIS — I35.0 AORTIC STENOSIS, MODERATE: ICD-10-CM

## 2022-01-01 DIAGNOSIS — R53.81 GENERAL PHYSICAL DETERIORATION: ICD-10-CM

## 2022-01-01 DIAGNOSIS — M81.0 AGE-RELATED OSTEOPOROSIS WITHOUT CURRENT PATHOLOGICAL FRACTURE: ICD-10-CM

## 2022-01-01 DIAGNOSIS — R00.1 SINUS BRADYCARDIA: ICD-10-CM

## 2022-01-01 LAB
ALBUMIN SERPL-MCNC: 3.2 G/DL (ref 3.5–5)
ALBUMIN SERPL-MCNC: 3.3 G/DL (ref 3.5–5)
ALBUMIN SERPL-MCNC: 3.4 G/DL (ref 3.5–5)
ALBUMIN SERPL-MCNC: 3.5 G/DL (ref 3.5–5)
ALBUMIN SERPL-MCNC: 3.5 G/DL (ref 3.5–5)
ALBUMIN SERPL-MCNC: 3.6 G/DL (ref 3.5–5)
ALBUMIN/GLOB SERPL: 0.9 {RATIO} (ref 1.1–2.2)
ALP SERPL-CCNC: 50 U/L (ref 45–117)
ALP SERPL-CCNC: 52 U/L (ref 45–117)
ALP SERPL-CCNC: 53 U/L (ref 45–117)
ALP SERPL-CCNC: 58 U/L (ref 45–117)
ALP SERPL-CCNC: 58 U/L (ref 45–117)
ALT SERPL-CCNC: 10 U/L (ref 12–78)
ALT SERPL-CCNC: 11 U/L (ref 12–78)
ALT SERPL-CCNC: 12 U/L (ref 12–78)
ALT SERPL-CCNC: 14 U/L (ref 12–78)
ALT SERPL-CCNC: 8 U/L (ref 12–78)
ANION GAP SERPL CALC-SCNC: 10 MMOL/L (ref 5–15)
ANION GAP SERPL CALC-SCNC: 14 MMOL/L (ref 5–15)
ANION GAP SERPL CALC-SCNC: 14 MMOL/L (ref 5–15)
ANION GAP SERPL CALC-SCNC: 4 MMOL/L (ref 5–15)
ANION GAP SERPL CALC-SCNC: 7 MMOL/L (ref 5–15)
ANION GAP SERPL CALC-SCNC: 8 MMOL/L (ref 5–15)
ANION GAP SERPL CALC-SCNC: 9 MMOL/L (ref 5–15)
APPEARANCE UR: CLEAR
AST SERPL-CCNC: 17 U/L (ref 15–37)
AST SERPL-CCNC: 20 U/L (ref 15–37)
AST SERPL-CCNC: 20 U/L (ref 15–37)
AST SERPL-CCNC: 43 U/L (ref 15–37)
AST SERPL-CCNC: 72 U/L (ref 15–37)
ATRIAL RATE: 71 BPM
BACTERIA SPEC CULT: NORMAL
BACTERIA URNS QL MICRO: NEGATIVE /HPF
BASOPHILS # BLD: 0 K/UL (ref 0–0.1)
BASOPHILS NFR BLD: 0 % (ref 0–1)
BASOPHILS NFR BLD: 1 % (ref 0–1)
BASOPHILS NFR BLD: 1 % (ref 0–1)
BILIRUB SERPL-MCNC: 0.3 MG/DL (ref 0.2–1)
BILIRUB SERPL-MCNC: 0.3 MG/DL (ref 0.2–1)
BILIRUB SERPL-MCNC: 0.4 MG/DL (ref 0.2–1)
BILIRUB SERPL-MCNC: 0.5 MG/DL (ref 0.2–1)
BILIRUB SERPL-MCNC: 0.6 MG/DL (ref 0.2–1)
BILIRUB UR QL: NEGATIVE
BUN SERPL-MCNC: 39 MG/DL (ref 6–20)
BUN SERPL-MCNC: 42 MG/DL (ref 6–20)
BUN SERPL-MCNC: 43 MG/DL (ref 6–20)
BUN SERPL-MCNC: 47 MG/DL (ref 6–20)
BUN SERPL-MCNC: 48 MG/DL (ref 6–20)
BUN SERPL-MCNC: 49 MG/DL (ref 6–20)
BUN SERPL-MCNC: 57 MG/DL (ref 6–20)
BUN/CREAT SERPL: 13 (ref 12–20)
BUN/CREAT SERPL: 14 (ref 12–20)
BUN/CREAT SERPL: 14 (ref 12–20)
BUN/CREAT SERPL: 15 (ref 12–20)
BUN/CREAT SERPL: 17 (ref 12–20)
BUN/CREAT SERPL: 18 (ref 12–20)
BUN/CREAT SERPL: 20 (ref 12–20)
CALCIUM SERPL-MCNC: 10 MG/DL (ref 8.5–10.1)
CALCIUM SERPL-MCNC: 10.4 MG/DL (ref 8.5–10.1)
CALCIUM SERPL-MCNC: 8.5 MG/DL (ref 8.5–10.1)
CALCIUM SERPL-MCNC: 8.6 MG/DL (ref 8.5–10.1)
CALCIUM SERPL-MCNC: 9.1 MG/DL (ref 8.5–10.1)
CALCIUM SERPL-MCNC: 9.1 MG/DL (ref 8.5–10.1)
CALCIUM SERPL-MCNC: 9.5 MG/DL (ref 8.5–10.1)
CALCULATED P AXIS, ECG09: 44 DEGREES
CALCULATED R AXIS, ECG10: 16 DEGREES
CALCULATED T AXIS, ECG11: -24 DEGREES
CHLORIDE SERPL-SCNC: 100 MMOL/L (ref 97–108)
CHLORIDE SERPL-SCNC: 104 MMOL/L (ref 97–108)
CHLORIDE SERPL-SCNC: 94 MMOL/L (ref 97–108)
CHLORIDE SERPL-SCNC: 95 MMOL/L (ref 97–108)
CHLORIDE SERPL-SCNC: 96 MMOL/L (ref 97–108)
CHLORIDE SERPL-SCNC: 96 MMOL/L (ref 97–108)
CHLORIDE SERPL-SCNC: 99 MMOL/L (ref 97–108)
CO2 SERPL-SCNC: 22 MMOL/L (ref 21–32)
CO2 SERPL-SCNC: 23 MMOL/L (ref 21–32)
CO2 SERPL-SCNC: 26 MMOL/L (ref 21–32)
CO2 SERPL-SCNC: 27 MMOL/L (ref 21–32)
CO2 SERPL-SCNC: 28 MMOL/L (ref 21–32)
CO2 SERPL-SCNC: 28 MMOL/L (ref 21–32)
CO2 SERPL-SCNC: 29 MMOL/L (ref 21–32)
COLOR UR: ABNORMAL
COLOR UR: NORMAL
COLOR UR: NORMAL
CREAT SERPL-MCNC: 2.43 MG/DL (ref 0.55–1.02)
CREAT SERPL-MCNC: 2.59 MG/DL (ref 0.55–1.02)
CREAT SERPL-MCNC: 2.73 MG/DL (ref 0.55–1.02)
CREAT SERPL-MCNC: 2.79 MG/DL (ref 0.55–1.02)
CREAT SERPL-MCNC: 2.97 MG/DL (ref 0.55–1.02)
CREAT SERPL-MCNC: 3.29 MG/DL (ref 0.55–1.02)
CREAT SERPL-MCNC: 3.64 MG/DL (ref 0.55–1.02)
DIAGNOSIS, 93000: NORMAL
DIFFERENTIAL METHOD BLD: ABNORMAL
ECHO AO ROOT DIAM: 3 CM
ECHO AV AREA PEAK VELOCITY: 0.9 CM2
ECHO AV AREA VTI: 0.9 CM2
ECHO AV MEAN GRADIENT: 39 MMHG
ECHO AV MEAN VELOCITY: 3 M/S
ECHO AV PEAK GRADIENT: 70 MMHG
ECHO AV PEAK VELOCITY: 4.2 M/S
ECHO AV VELOCITY RATIO: 0.31
ECHO AV VTI: 111.9 CM
ECHO EST RA PRESSURE: 10 MMHG
ECHO LA DIAMETER: 4.7 CM
ECHO LA TO AORTIC ROOT RATIO: 1.57
ECHO LA VOL 2C: 121 ML (ref 22–52)
ECHO LA VOL 4C: 62 ML (ref 22–52)
ECHO LA VOLUME AREA LENGTH: 99 ML
ECHO LV E' SEPTAL VELOCITY: 8 CM/S
ECHO LV FRACTIONAL SHORTENING: 35 % (ref 28–44)
ECHO LV INTERNAL DIMENSION DIASTOLIC: 5.1 CM (ref 3.9–5.3)
ECHO LV INTERNAL DIMENSION SYSTOLIC: 3.3 CM
ECHO LV IVSD: 1.1 CM (ref 0.6–0.9)
ECHO LV MASS 2D: 213.9 G (ref 67–162)
ECHO LV POSTERIOR WALL DIASTOLIC: 1.1 CM (ref 0.6–0.9)
ECHO LV RELATIVE WALL THICKNESS RATIO: 0.43
ECHO LVOT AREA: 3.1 CM2
ECHO LVOT AV VTI INDEX: 0.29
ECHO LVOT DIAM: 2 CM
ECHO LVOT MEAN GRADIENT: 4 MMHG
ECHO LVOT PEAK GRADIENT: 6 MMHG
ECHO LVOT PEAK VELOCITY: 1.3 M/S
ECHO LVOT SV: 101.4 ML
ECHO LVOT VTI: 32.3 CM
ECHO MV A VELOCITY: 0.99 M/S
ECHO MV E DECELERATION TIME (DT): 226.9 MS
ECHO MV E VELOCITY: 0.9 M/S
ECHO MV E/A RATIO: 0.91
ECHO MV E/E' SEPTAL: 11.25
ECHO PULMONARY ARTERY SYSTOLIC PRESSURE (PASP): 49 MMHG
ECHO RIGHT VENTRICULAR SYSTOLIC PRESSURE (RVSP): 57 MMHG
ECHO TV REGURGITANT MAX VELOCITY: 3.41 M/S
ECHO TV REGURGITANT PEAK GRADIENT: 47 MMHG
EOSINOPHIL # BLD: 0 K/UL (ref 0–0.4)
EOSINOPHIL NFR BLD: 0 % (ref 0–7)
EOSINOPHIL NFR BLD: 0 % (ref 0–7)
EOSINOPHIL NFR BLD: 1 % (ref 0–7)
EPITH CASTS URNS QL MICRO: ABNORMAL /LPF
EPO SERPL-ACNC: 17.6 MIU/ML (ref 2.6–18.5)
ERYTHROCYTE [DISTWIDTH] IN BLOOD BY AUTOMATED COUNT: 12.1 % (ref 11.5–14.5)
ERYTHROCYTE [DISTWIDTH] IN BLOOD BY AUTOMATED COUNT: 12.4 % (ref 11.5–14.5)
ERYTHROCYTE [DISTWIDTH] IN BLOOD BY AUTOMATED COUNT: 13.1 % (ref 11.5–14.5)
ERYTHROCYTE [DISTWIDTH] IN BLOOD BY AUTOMATED COUNT: 13.3 % (ref 11.5–14.5)
ERYTHROCYTE [DISTWIDTH] IN BLOOD BY AUTOMATED COUNT: 14.8 % (ref 11.5–14.5)
ERYTHROCYTE [DISTWIDTH] IN BLOOD BY AUTOMATED COUNT: 15 % (ref 11.5–14.5)
ERYTHROCYTE [DISTWIDTH] IN BLOOD BY AUTOMATED COUNT: 15.2 % (ref 11.5–14.5)
EST. AVERAGE GLUCOSE BLD GHB EST-MCNC: 126 MG/DL
FERRITIN SERPL-MCNC: 340 NG/ML (ref 8–252)
FERRITIN SERPL-MCNC: 400 NG/ML (ref 8–252)
FERRITIN SERPL-MCNC: 426 NG/ML (ref 8–252)
FERRITIN SERPL-MCNC: 446 NG/ML (ref 8–252)
FLUAV RNA SPEC QL NAA+PROBE: NOT DETECTED
FLUBV RNA SPEC QL NAA+PROBE: NOT DETECTED
GLOBULIN SER CALC-MCNC: 3.5 G/DL (ref 2–4)
GLOBULIN SER CALC-MCNC: 3.6 G/DL (ref 2–4)
GLOBULIN SER CALC-MCNC: 3.8 G/DL (ref 2–4)
GLOBULIN SER CALC-MCNC: 3.8 G/DL (ref 2–4)
GLOBULIN SER CALC-MCNC: 4 G/DL (ref 2–4)
GLUCOSE BLD STRIP.AUTO-MCNC: 109 MG/DL (ref 65–117)
GLUCOSE BLD STRIP.AUTO-MCNC: 114 MG/DL (ref 65–117)
GLUCOSE BLD STRIP.AUTO-MCNC: 144 MG/DL (ref 65–117)
GLUCOSE SERPL-MCNC: 104 MG/DL (ref 65–100)
GLUCOSE SERPL-MCNC: 105 MG/DL (ref 65–100)
GLUCOSE SERPL-MCNC: 123 MG/DL (ref 65–100)
GLUCOSE SERPL-MCNC: 135 MG/DL (ref 65–100)
GLUCOSE SERPL-MCNC: 85 MG/DL (ref 65–100)
GLUCOSE SERPL-MCNC: 94 MG/DL (ref 65–100)
GLUCOSE SERPL-MCNC: 98 MG/DL (ref 65–100)
GLUCOSE UR STRIP.AUTO-MCNC: NEGATIVE MG/DL
HBA1C MFR BLD: 6 % (ref 4–5.6)
HCT VFR BLD AUTO: 26.2 % (ref 35–47)
HCT VFR BLD AUTO: 26.2 % (ref 35–47)
HCT VFR BLD AUTO: 26.5 % (ref 35–47)
HCT VFR BLD AUTO: 26.7 % (ref 35–47)
HCT VFR BLD AUTO: 27 % (ref 35–47)
HCT VFR BLD AUTO: 28.3 % (ref 35–47)
HCT VFR BLD AUTO: 28.9 % (ref 35–47)
HCT VFR BLD AUTO: 29.1 % (ref 35–47)
HCT VFR BLD AUTO: 29.2 % (ref 35–47)
HCT VFR BLD AUTO: 29.2 % (ref 35–47)
HCT VFR BLD AUTO: 29.4 % (ref 35–47)
HCT VFR BLD AUTO: 29.9 % (ref 35–47)
HCT VFR BLD AUTO: 30.6 % (ref 35–47)
HCT VFR BLD AUTO: 31 % (ref 35–47)
HCT VFR BLD AUTO: 31.3 % (ref 35–47)
HCT VFR BLD AUTO: 32.3 % (ref 35–47)
HCT VFR BLD AUTO: 33.8 % (ref 35–47)
HGB BLD-MCNC: 10 G/DL (ref 11.5–16)
HGB BLD-MCNC: 10 G/DL (ref 11.5–16)
HGB BLD-MCNC: 10.1 G/DL (ref 11.5–16)
HGB BLD-MCNC: 10.3 G/DL (ref 11.5–16)
HGB BLD-MCNC: 10.4 G/DL (ref 11.5–16)
HGB BLD-MCNC: 10.7 G/DL (ref 11.5–16)
HGB BLD-MCNC: 8.7 G/DL (ref 11.5–16)
HGB BLD-MCNC: 8.8 G/DL (ref 11.5–16)
HGB BLD-MCNC: 8.8 G/DL (ref 11.5–16)
HGB BLD-MCNC: 8.9 G/DL (ref 11.5–16)
HGB BLD-MCNC: 8.9 G/DL (ref 11.5–16)
HGB BLD-MCNC: 9.4 G/DL (ref 11.5–16)
HGB BLD-MCNC: 9.4 G/DL (ref 11.5–16)
HGB BLD-MCNC: 9.5 G/DL (ref 11.5–16)
HGB BLD-MCNC: 9.5 G/DL (ref 11.5–16)
HGB BLD-MCNC: 9.6 G/DL (ref 11.5–16)
HGB BLD-MCNC: 9.7 G/DL (ref 11.5–16)
HGB UR QL STRIP: NEGATIVE
HYALINE CASTS URNS QL MICRO: ABNORMAL /LPF (ref 0–2)
IMM GRANULOCYTES # BLD AUTO: 0 K/UL (ref 0–0.04)
IMM GRANULOCYTES NFR BLD AUTO: 0 % (ref 0–0.5)
IMM GRANULOCYTES NFR BLD AUTO: 1 % (ref 0–0.5)
INR PPP: 1 (ref 0.9–1.1)
INR PPP: 1.1 (ref 0.9–1.1)
INR PPP: 1.1 (ref 0.9–1.1)
IRON SATN MFR SERPL: 38 % (ref 20–50)
IRON SATN MFR SERPL: 39 % (ref 20–50)
IRON SATN MFR SERPL: 45 % (ref 20–50)
IRON SATN MFR SERPL: 51 % (ref 20–50)
IRON SATN MFR SERPL: 60 % (ref 20–50)
IRON SERPL-MCNC: 100 UG/DL (ref 50–170)
IRON SERPL-MCNC: 118 UG/DL (ref 50–170)
IRON SERPL-MCNC: 75 UG/DL (ref 50–170)
IRON SERPL-MCNC: 80 UG/DL (ref 50–170)
IRON SERPL-MCNC: 87 UG/DL (ref 50–170)
KETONES UR QL STRIP.AUTO: NEGATIVE MG/DL
LACTATE SERPL-SCNC: 0.8 MMOL/L (ref 0.4–2)
LEFT CCA DIST DIAS: 12.6 CM/S
LEFT CCA DIST DIAS: 13.1 CM/S
LEFT CCA DIST SYS: 71.1 CM/S
LEFT CCA DIST SYS: 73.5 CM/S
LEFT CCA PROX DIAS: 11.6 CM/S
LEFT CCA PROX DIAS: 8.9 CM/S
LEFT CCA PROX SYS: 69 CM/S
LEFT CCA PROX SYS: 72.9 CM/S
LEFT ECA DIAS: 0 CM/S
LEFT ECA DIAS: 0 CM/S
LEFT ECA SYS: 58.1 CM/S
LEFT ECA SYS: 65.6 CM/S
LEFT ICA DIST DIAS: 14.1 CM/S
LEFT ICA DIST DIAS: 14.4 CM/S
LEFT ICA DIST SYS: 71 CM/S
LEFT ICA DIST SYS: 76.8 CM/S
LEFT ICA MID DIAS: 15.3 CM/S
LEFT ICA MID DIAS: 16.3 CM/S
LEFT ICA MID SYS: 75.7 CM/S
LEFT ICA MID SYS: 76.5 CM/S
LEFT ICA PROX DIAS: 14.2 CM/S
LEFT ICA PROX DIAS: 18.1 CM/S
LEFT ICA PROX SYS: 43.9 CM/S
LEFT ICA PROX SYS: 63.7 CM/S
LEFT ICA/CCA SYS: 1.03 NO UNITS
LEFT ICA/CCA SYS: 1.1 NO UNITS
LEFT VERTEBRAL DIAS: 10.88 CM/S
LEFT VERTEBRAL DIAS: 14.4 CM/S
LEFT VERTEBRAL SYS: 62.5 CM/S
LEFT VERTEBRAL SYS: 71.1 CM/S
LEUKOCYTE ESTERASE UR QL STRIP.AUTO: ABNORMAL
LEUKOCYTE ESTERASE UR QL STRIP.AUTO: NEGATIVE
LEUKOCYTE ESTERASE UR QL STRIP.AUTO: NEGATIVE
LYMPHOCYTES # BLD: 0.7 K/UL (ref 0.8–3.5)
LYMPHOCYTES # BLD: 0.9 K/UL (ref 0.8–3.5)
LYMPHOCYTES # BLD: 1 K/UL (ref 0.8–3.5)
LYMPHOCYTES # BLD: 1.1 K/UL (ref 0.8–3.5)
LYMPHOCYTES # BLD: 1.2 K/UL (ref 0.8–3.5)
LYMPHOCYTES # BLD: 1.7 K/UL (ref 0.8–3.5)
LYMPHOCYTES NFR BLD: 22 % (ref 12–49)
LYMPHOCYTES NFR BLD: 22 % (ref 12–49)
LYMPHOCYTES NFR BLD: 27 % (ref 12–49)
LYMPHOCYTES NFR BLD: 33 % (ref 12–49)
LYMPHOCYTES NFR BLD: 37 % (ref 12–49)
LYMPHOCYTES NFR BLD: 41 % (ref 12–49)
MAGNESIUM SERPL-MCNC: 1.5 MG/DL (ref 1.6–2.4)
MCH RBC QN AUTO: 31.8 PG (ref 26–34)
MCH RBC QN AUTO: 32.1 PG (ref 26–34)
MCH RBC QN AUTO: 32.2 PG (ref 26–34)
MCH RBC QN AUTO: 33 PG (ref 26–34)
MCH RBC QN AUTO: 33 PG (ref 26–34)
MCH RBC QN AUTO: 33.1 PG (ref 26–34)
MCH RBC QN AUTO: 33.3 PG (ref 26–34)
MCHC RBC AUTO-ENTMCNC: 31.7 G/DL (ref 30–36.5)
MCHC RBC AUTO-ENTMCNC: 32.2 G/DL (ref 30–36.5)
MCHC RBC AUTO-ENTMCNC: 32.5 G/DL (ref 30–36.5)
MCHC RBC AUTO-ENTMCNC: 32.6 G/DL (ref 30–36.5)
MCHC RBC AUTO-ENTMCNC: 32.9 G/DL (ref 30–36.5)
MCHC RBC AUTO-ENTMCNC: 33.6 G/DL (ref 30–36.5)
MCHC RBC AUTO-ENTMCNC: 34 G/DL (ref 30–36.5)
MCV RBC AUTO: 100.6 FL (ref 80–99)
MCV RBC AUTO: 102.5 FL (ref 78–102)
MCV RBC AUTO: 104.3 FL (ref 80–99)
MCV RBC AUTO: 94.9 FL (ref 80–99)
MCV RBC AUTO: 98.1 FL (ref 80–99)
MCV RBC AUTO: 98.5 FL (ref 80–99)
MCV RBC AUTO: 98.8 FL (ref 80–99)
MONOCYTES # BLD: 0.3 K/UL (ref 0–1)
MONOCYTES # BLD: 0.4 K/UL (ref 0–1)
MONOCYTES # BLD: 0.5 K/UL (ref 0–1)
MONOCYTES # BLD: 0.5 K/UL (ref 0–1)
MONOCYTES NFR BLD: 10 % (ref 5–13)
MONOCYTES NFR BLD: 10 % (ref 5–13)
MONOCYTES NFR BLD: 11 % (ref 5–13)
MONOCYTES NFR BLD: 11 % (ref 5–13)
MONOCYTES NFR BLD: 12 % (ref 5–13)
MONOCYTES NFR BLD: 14 % (ref 5–13)
NEUTS SEG # BLD: 1.3 K/UL (ref 1.8–8)
NEUTS SEG # BLD: 1.9 K/UL (ref 1.8–8)
NEUTS SEG # BLD: 2 K/UL (ref 1.8–8)
NEUTS SEG # BLD: 2.4 K/UL (ref 1.8–8)
NEUTS SEG # BLD: 2.4 K/UL (ref 1.8–8)
NEUTS SEG # BLD: 2.5 K/UL (ref 1.8–8)
NEUTS SEG NFR BLD: 47 % (ref 32–75)
NEUTS SEG NFR BLD: 48 % (ref 32–75)
NEUTS SEG NFR BLD: 54 % (ref 32–75)
NEUTS SEG NFR BLD: 61 % (ref 32–75)
NEUTS SEG NFR BLD: 65 % (ref 32–75)
NEUTS SEG NFR BLD: 65 % (ref 32–75)
NITRITE UR QL STRIP.AUTO: NEGATIVE
NRBC # BLD: 0 K/UL (ref 0–0.01)
NRBC BLD-RTO: 0 PER 100 WBC
P-R INTERVAL, ECG05: 206 MS
PH UR STRIP: 5.5 [PH] (ref 5–8)
PH UR STRIP: 5.5 [PH] (ref 5–8)
PH UR STRIP: 6 [PH] (ref 5–8)
PHOSPHATE SERPL-MCNC: 3.1 MG/DL (ref 2.6–4.7)
PLATELET # BLD AUTO: 145 K/UL (ref 150–400)
PLATELET # BLD AUTO: 153 K/UL (ref 150–400)
PLATELET # BLD AUTO: 167 K/UL (ref 150–400)
PLATELET # BLD AUTO: 169 K/UL (ref 150–400)
PLATELET # BLD AUTO: 191 K/UL (ref 150–400)
PLATELET # BLD AUTO: 207 K/UL (ref 150–400)
PLATELET # BLD AUTO: 215 K/UL (ref 150–400)
PLATELET COMMENTS,PCOM: ABNORMAL
PMV BLD AUTO: 10.4 FL (ref 8.9–12.9)
PMV BLD AUTO: 8.9 FL (ref 8.9–12.9)
PMV BLD AUTO: 8.9 FL (ref 8.9–12.9)
PMV BLD AUTO: 9.2 FL (ref 8.9–12.9)
PMV BLD AUTO: 9.3 FL (ref 8.9–12.9)
PMV BLD AUTO: 9.6 FL (ref 8.9–12.9)
PMV BLD AUTO: 9.9 FL (ref 8.9–12.9)
POTASSIUM SERPL-SCNC: 3.7 MMOL/L (ref 3.5–5.1)
POTASSIUM SERPL-SCNC: 3.7 MMOL/L (ref 3.5–5.1)
POTASSIUM SERPL-SCNC: 3.9 MMOL/L (ref 3.5–5.1)
POTASSIUM SERPL-SCNC: 4.2 MMOL/L (ref 3.5–5.1)
PROT SERPL-MCNC: 6.8 G/DL (ref 6.4–8.2)
PROT SERPL-MCNC: 6.8 G/DL (ref 6.4–8.2)
PROT SERPL-MCNC: 7.2 G/DL (ref 6.4–8.2)
PROT SERPL-MCNC: 7.3 G/DL (ref 6.4–8.2)
PROT SERPL-MCNC: 7.5 G/DL (ref 6.4–8.2)
PROT UR STRIP-MCNC: NEGATIVE MG/DL
PROTHROMBIN TIME: 10.4 SEC (ref 9–11.1)
PROTHROMBIN TIME: 10.4 SEC (ref 9–11.1)
PROTHROMBIN TIME: 10.7 SEC (ref 9–11.1)
Q-T INTERVAL, ECG07: 378 MS
QRS DURATION, ECG06: 88 MS
QTC CALCULATION (BEZET), ECG08: 410 MS
RBC # BLD AUTO: 2.67 M/UL (ref 3.8–5.2)
RBC # BLD AUTO: 2.71 M/UL (ref 3.8–5.2)
RBC # BLD AUTO: 2.76 M/UL (ref 3.8–5.2)
RBC # BLD AUTO: 2.85 M/UL (ref 3.8–5.2)
RBC # BLD AUTO: 3.11 M/UL (ref 3.8–5.2)
RBC # BLD AUTO: 3.24 M/UL (ref 3.8–5.2)
RBC # BLD AUTO: 3.27 M/UL (ref 3.8–5.2)
RBC #/AREA URNS HPF: ABNORMAL /HPF (ref 0–5)
RBC MORPH BLD: ABNORMAL
RETICS # AUTO: 0.03 M/UL (ref 0.02–0.08)
RETICS/RBC NFR AUTO: 1.1 % (ref 0.7–2.1)
RIGHT CCA DIST DIAS: 14.4 CM/S
RIGHT CCA DIST DIAS: 9 CM/S
RIGHT CCA DIST SYS: 53.4 CM/S
RIGHT CCA DIST SYS: 76.5 CM/S
RIGHT CCA PROX DIAS: 10.8 CM/S
RIGHT CCA PROX DIAS: 9 CM/S
RIGHT CCA PROX SYS: 107.6 CM/S
RIGHT CCA PROX SYS: 75.5 CM/S
RIGHT ECA DIAS: 0 CM/S
RIGHT ECA DIAS: 0 CM/S
RIGHT ECA SYS: 54.6 CM/S
RIGHT ECA SYS: 84.7 CM/S
RIGHT ICA DIST DIAS: 11.6 CM/S
RIGHT ICA DIST DIAS: 12.6 CM/S
RIGHT ICA DIST SYS: 72.9 CM/S
RIGHT ICA DIST SYS: 84.7 CM/S
RIGHT ICA MID DIAS: 11.6 CM/S
RIGHT ICA MID DIAS: 7.1 CM/S
RIGHT ICA MID SYS: 59.9 CM/S
RIGHT ICA MID SYS: 85.7 CM/S
RIGHT ICA PROX DIAS: 10.8 CM/S
RIGHT ICA PROX DIAS: 9 CM/S
RIGHT ICA PROX SYS: 61.2 CM/S
RIGHT ICA PROX SYS: 72.9 CM/S
RIGHT ICA/CCA SYS: 1.1 NO UNITS
RIGHT ICA/CCA SYS: 1.6 NO UNITS
RIGHT VERTEBRAL DIAS: 10.8 CM/S
RIGHT VERTEBRAL DIAS: 11.61 CM/S
RIGHT VERTEBRAL SYS: 45.5 CM/S
RIGHT VERTEBRAL SYS: 50.7 CM/S
SARS-COV-2, COV2: DETECTED
SERVICE CMNT-IMP: ABNORMAL
SERVICE CMNT-IMP: NORMAL
SODIUM SERPL-SCNC: 131 MMOL/L (ref 136–145)
SODIUM SERPL-SCNC: 131 MMOL/L (ref 136–145)
SODIUM SERPL-SCNC: 132 MMOL/L (ref 136–145)
SODIUM SERPL-SCNC: 132 MMOL/L (ref 136–145)
SODIUM SERPL-SCNC: 133 MMOL/L (ref 136–145)
SODIUM SERPL-SCNC: 137 MMOL/L (ref 136–145)
SODIUM SERPL-SCNC: 137 MMOL/L (ref 136–145)
SP GR UR REFRACTOMETRY: 1.01
SP GR UR REFRACTOMETRY: 1.01 (ref 1–1.03)
SP GR UR REFRACTOMETRY: 1.01 (ref 1–1.03)
TIBC SERPL-MCNC: 193 UG/DL (ref 250–450)
TIBC SERPL-MCNC: 195 UG/DL (ref 250–450)
TIBC SERPL-MCNC: 196 UG/DL (ref 250–450)
TIBC SERPL-MCNC: 198 UG/DL (ref 250–450)
TIBC SERPL-MCNC: 207 UG/DL (ref 250–450)
UA: UC IF INDICATED,UAUC: ABNORMAL
UROBILINOGEN UR QL STRIP.AUTO: 0.2 EU/DL (ref 0.2–1)
VAS LEFT SUBCLAVIAN PROX EDV: 0 CM/S
VAS LEFT SUBCLAVIAN PROX EDV: 0 CM/S
VAS LEFT SUBCLAVIAN PROX PSV: 104.3 CM/S
VAS LEFT SUBCLAVIAN PROX PSV: 96.4 CM/S
VAS RIGHT SUBCLAVIAN PROX EDV: 0 CM/S
VAS RIGHT SUBCLAVIAN PROX EDV: 0 CM/S
VAS RIGHT SUBCLAVIAN PROX PSV: 109.4 CM/S
VAS RIGHT SUBCLAVIAN PROX PSV: 82.2 CM/S
VENTRICULAR RATE, ECG03: 71 BPM
WBC # BLD AUTO: 2.7 K/UL (ref 3.6–11)
WBC # BLD AUTO: 3.4 K/UL (ref 3.6–11)
WBC # BLD AUTO: 3.7 K/UL (ref 3.6–11)
WBC # BLD AUTO: 3.8 K/UL (ref 3.6–11)
WBC # BLD AUTO: 3.9 K/UL (ref 3.6–11)
WBC # BLD AUTO: 4 K/UL (ref 3.6–11)
WBC # BLD AUTO: 4.1 K/UL (ref 3.6–11)
WBC URNS QL MICRO: ABNORMAL /HPF (ref 0–4)

## 2022-01-01 PROCEDURE — 76536 US EXAM OF HEAD AND NECK: CPT

## 2022-01-01 PROCEDURE — 93306 TTE W/DOPPLER COMPLETE: CPT

## 2022-01-01 PROCEDURE — 74011250636 HC RX REV CODE- 250/636: Performed by: INTERNAL MEDICINE

## 2022-01-01 PROCEDURE — 71046 X-RAY EXAM CHEST 2 VIEWS: CPT

## 2022-01-01 PROCEDURE — 74011000636 HC RX REV CODE- 636: Performed by: INTERNAL MEDICINE

## 2022-01-01 PROCEDURE — G8427 DOCREV CUR MEDS BY ELIG CLIN: HCPCS | Performed by: INTERNAL MEDICINE

## 2022-01-01 PROCEDURE — G2025 DIS SITE TELE SVCS RHC/FQHC: HCPCS | Performed by: NURSE PRACTITIONER

## 2022-01-01 PROCEDURE — 1101F PT FALLS ASSESS-DOCD LE1/YR: CPT | Performed by: INTERNAL MEDICINE

## 2022-01-01 PROCEDURE — 81003 URINALYSIS AUTO W/O SCOPE: CPT

## 2022-01-01 PROCEDURE — 36415 COLL VENOUS BLD VENIPUNCTURE: CPT

## 2022-01-01 PROCEDURE — G8432 DEP SCR NOT DOC, RNG: HCPCS | Performed by: INTERNAL MEDICINE

## 2022-01-01 PROCEDURE — 82728 ASSAY OF FERRITIN: CPT

## 2022-01-01 PROCEDURE — 99205 OFFICE O/P NEW HI 60 MIN: CPT | Performed by: NURSE PRACTITIONER

## 2022-01-01 PROCEDURE — G8536 NO DOC ELDER MAL SCRN: HCPCS | Performed by: NURSE PRACTITIONER

## 2022-01-01 PROCEDURE — 80053 COMPREHEN METABOLIC PANEL: CPT

## 2022-01-01 PROCEDURE — C1898 LEAD, PMKR, OTHER THAN TRANS: HCPCS | Performed by: INTERNAL MEDICINE

## 2022-01-01 PROCEDURE — 83540 ASSAY OF IRON: CPT

## 2022-01-01 PROCEDURE — 85610 PROTHROMBIN TIME: CPT

## 2022-01-01 PROCEDURE — 82668 ASSAY OF ERYTHROPOIETIN: CPT

## 2022-01-01 PROCEDURE — 99285 EMERGENCY DEPT VISIT HI MDM: CPT

## 2022-01-01 PROCEDURE — G8536 NO DOC ELDER MAL SCRN: HCPCS | Performed by: INTERNAL MEDICINE

## 2022-01-01 PROCEDURE — 99153 MOD SED SAME PHYS/QHP EA: CPT | Performed by: INTERNAL MEDICINE

## 2022-01-01 PROCEDURE — 99213 OFFICE O/P EST LOW 20 MIN: CPT | Performed by: NURSE PRACTITIONER

## 2022-01-01 PROCEDURE — C1769 GUIDE WIRE: HCPCS | Performed by: INTERNAL MEDICINE

## 2022-01-01 PROCEDURE — 99214 OFFICE O/P EST MOD 30 MIN: CPT | Performed by: NURSE PRACTITIONER

## 2022-01-01 PROCEDURE — 77030038269 HC DRN EXT URIN PURWCK BARD -A

## 2022-01-01 PROCEDURE — 77030002996 HC SUT SLK J&J -A: Performed by: INTERNAL MEDICINE

## 2022-01-01 PROCEDURE — 82962 GLUCOSE BLOOD TEST: CPT

## 2022-01-01 PROCEDURE — 1090F PRES/ABSN URINE INCON ASSESS: CPT | Performed by: INTERNAL MEDICINE

## 2022-01-01 PROCEDURE — 77030042317 HC BND COMPR HEMSTAT -B: Performed by: INTERNAL MEDICINE

## 2022-01-01 PROCEDURE — 99214 OFFICE O/P EST MOD 30 MIN: CPT | Performed by: INTERNAL MEDICINE

## 2022-01-01 PROCEDURE — G8510 SCR DEP NEG, NO PLAN REQD: HCPCS | Performed by: INTERNAL MEDICINE

## 2022-01-01 PROCEDURE — 85018 HEMOGLOBIN: CPT

## 2022-01-01 PROCEDURE — 71045 X-RAY EXAM CHEST 1 VIEW: CPT

## 2022-01-01 PROCEDURE — 99205 OFFICE O/P NEW HI 60 MIN: CPT | Performed by: INTERNAL MEDICINE

## 2022-01-01 PROCEDURE — 99152 MOD SED SAME PHYS/QHP 5/>YRS: CPT | Performed by: INTERNAL MEDICINE

## 2022-01-01 PROCEDURE — 2709999900 HC NON-CHARGEABLE SUPPLY: Performed by: INTERNAL MEDICINE

## 2022-01-01 PROCEDURE — 92960 CARDIOVERSION ELECTRIC EXT: CPT | Performed by: INTERNAL MEDICINE

## 2022-01-01 PROCEDURE — 93005 ELECTROCARDIOGRAM TRACING: CPT

## 2022-01-01 PROCEDURE — 83605 ASSAY OF LACTIC ACID: CPT

## 2022-01-01 PROCEDURE — C1785 PMKR, DUAL, RATE-RESP: HCPCS | Performed by: INTERNAL MEDICINE

## 2022-01-01 PROCEDURE — 96372 THER/PROPH/DIAG INJ SC/IM: CPT

## 2022-01-01 PROCEDURE — 74011000250 HC RX REV CODE- 250: Performed by: INTERNAL MEDICINE

## 2022-01-01 PROCEDURE — 93005 ELECTROCARDIOGRAM TRACING: CPT | Performed by: INTERNAL MEDICINE

## 2022-01-01 PROCEDURE — 83735 ASSAY OF MAGNESIUM: CPT

## 2022-01-01 PROCEDURE — 1101F PT FALLS ASSESS-DOCD LE1/YR: CPT | Performed by: NURSE PRACTITIONER

## 2022-01-01 PROCEDURE — G8427 DOCREV CUR MEDS BY ELIG CLIN: HCPCS | Performed by: NURSE PRACTITIONER

## 2022-01-01 PROCEDURE — G8432 DEP SCR NOT DOC, RNG: HCPCS | Performed by: NURSE PRACTITIONER

## 2022-01-01 PROCEDURE — 85025 COMPLETE CBC W/AUTO DIFF WBC: CPT

## 2022-01-01 PROCEDURE — 85045 AUTOMATED RETICULOCYTE COUNT: CPT

## 2022-01-01 PROCEDURE — 99284 EMERGENCY DEPT VISIT MOD MDM: CPT

## 2022-01-01 PROCEDURE — 1090F PRES/ABSN URINE INCON ASSESS: CPT | Performed by: NURSE PRACTITIONER

## 2022-01-01 PROCEDURE — 93010 ELECTROCARDIOGRAM REPORT: CPT | Performed by: INTERNAL MEDICINE

## 2022-01-01 PROCEDURE — 70450 CT HEAD/BRAIN W/O DYE: CPT

## 2022-01-01 PROCEDURE — 77030010507 HC ADH SKN DERMBND J&J -B: Performed by: INTERNAL MEDICINE

## 2022-01-01 PROCEDURE — 80048 BASIC METABOLIC PNL TOTAL CA: CPT

## 2022-01-01 PROCEDURE — 96372 THER/PROPH/DIAG INJ SC/IM: CPT | Performed by: NURSE PRACTITIONER

## 2022-01-01 PROCEDURE — 1123F ACP DISCUSS/DSCN MKR DOCD: CPT | Performed by: NURSE PRACTITIONER

## 2022-01-01 PROCEDURE — 93306 TTE W/DOPPLER COMPLETE: CPT | Performed by: INTERNAL MEDICINE

## 2022-01-01 PROCEDURE — 74011250637 HC RX REV CODE- 250/637: Performed by: EMERGENCY MEDICINE

## 2022-01-01 PROCEDURE — G8417 CALC BMI ABV UP PARAM F/U: HCPCS | Performed by: INTERNAL MEDICINE

## 2022-01-01 PROCEDURE — C1894 INTRO/SHEATH, NON-LASER: HCPCS | Performed by: INTERNAL MEDICINE

## 2022-01-01 PROCEDURE — 77030010221 HC SPLNT WR POS TELE -B: Performed by: INTERNAL MEDICINE

## 2022-01-01 PROCEDURE — 33208 INSRT HEART PM ATRIAL & VENT: CPT | Performed by: INTERNAL MEDICINE

## 2022-01-01 PROCEDURE — 83036 HEMOGLOBIN GLYCOSYLATED A1C: CPT

## 2022-01-01 PROCEDURE — 87040 BLOOD CULTURE FOR BACTERIA: CPT

## 2022-01-01 PROCEDURE — 77030015766: Performed by: INTERNAL MEDICINE

## 2022-01-01 PROCEDURE — G8510 SCR DEP NEG, NO PLAN REQD: HCPCS | Performed by: NURSE PRACTITIONER

## 2022-01-01 PROCEDURE — 81001 URINALYSIS AUTO W/SCOPE: CPT

## 2022-01-01 PROCEDURE — 93460 R&L HRT ART/VENTRICLE ANGIO: CPT | Performed by: INTERNAL MEDICINE

## 2022-01-01 PROCEDURE — G8417 CALC BMI ABV UP PARAM F/U: HCPCS | Performed by: NURSE PRACTITIONER

## 2022-01-01 PROCEDURE — 93880 EXTRACRANIAL BILAT STUDY: CPT

## 2022-01-01 PROCEDURE — G0439 PPPS, SUBSEQ VISIT: HCPCS | Performed by: NURSE PRACTITIONER

## 2022-01-01 PROCEDURE — C1893 INTRO/SHEATH, FIXED,NON-PEEL: HCPCS | Performed by: INTERNAL MEDICINE

## 2022-01-01 PROCEDURE — 87636 SARSCOV2 & INF A&B AMP PRB: CPT

## 2022-01-01 PROCEDURE — 77030022704 HC SUT VLOC COVD -B: Performed by: INTERNAL MEDICINE

## 2022-01-01 PROCEDURE — 74011000272 HC RX REV CODE- 272: Performed by: INTERNAL MEDICINE

## 2022-01-01 PROCEDURE — 77030008543 HC TBNG MON PRSS MRTM -A: Performed by: INTERNAL MEDICINE

## 2022-01-01 PROCEDURE — 74174 CTA ABD&PLVS W/CONTRAST: CPT

## 2022-01-01 PROCEDURE — 99203 OFFICE O/P NEW LOW 30 MIN: CPT | Performed by: INTERNAL MEDICINE

## 2022-01-01 PROCEDURE — 74011250636 HC RX REV CODE- 250/636: Performed by: EMERGENCY MEDICINE

## 2022-01-01 PROCEDURE — 93451 RIGHT HEART CATH: CPT | Performed by: INTERNAL MEDICINE

## 2022-01-01 PROCEDURE — 77030004549 HC CATH ANGI DX PRF MRTM -A: Performed by: INTERNAL MEDICINE

## 2022-01-01 PROCEDURE — 77030019698 HC SYR ANGI MDLON MRTM -A: Performed by: INTERNAL MEDICINE

## 2022-01-01 PROCEDURE — 71275 CT ANGIOGRAPHY CHEST: CPT

## 2022-01-01 PROCEDURE — 99283 EMERGENCY DEPT VISIT LOW MDM: CPT

## 2022-01-01 PROCEDURE — APPNB30 APP NON BILLABLE TIME 0-30 MINS: Performed by: NURSE PRACTITIONER

## 2022-01-01 PROCEDURE — 85027 COMPLETE CBC AUTOMATED: CPT

## 2022-01-01 PROCEDURE — 80069 RENAL FUNCTION PANEL: CPT

## 2022-01-01 PROCEDURE — 93228 REMOTE 30 DAY ECG REV/REPORT: CPT | Performed by: INTERNAL MEDICINE

## 2022-01-01 PROCEDURE — 93880 EXTRACRANIAL BILAT STUDY: CPT | Performed by: PSYCHIATRY & NEUROLOGY

## 2022-01-01 PROCEDURE — 74011250637 HC RX REV CODE- 250/637: Performed by: INTERNAL MEDICINE

## 2022-01-01 PROCEDURE — 70490 CT SOFT TISSUE NECK W/O DYE: CPT

## 2022-01-01 PROCEDURE — C1892 INTRO/SHEATH,FIXED,PEEL-AWAY: HCPCS | Performed by: INTERNAL MEDICINE

## 2022-01-01 PROCEDURE — 77080 DXA BONE DENSITY AXIAL: CPT

## 2022-01-01 PROCEDURE — G0463 HOSPITAL OUTPT CLINIC VISIT: HCPCS | Performed by: INTERNAL MEDICINE

## 2022-01-01 PROCEDURE — 77030018729 HC ELECTRD DEFIB PAD CARD -B: Performed by: INTERNAL MEDICINE

## 2022-01-01 DEVICE — LEAD PACEMKR VENTRIC BIPLR CAPSURE 45CM [507645] [MEDTRONIC CARDIAC RTHYM MGT]: Type: IMPLANTABLE DEVICE | Status: FUNCTIONAL

## 2022-01-01 DEVICE — LEAD 5076-52 MRI US RCMCRD
Type: IMPLANTABLE DEVICE | Status: FUNCTIONAL
Brand: CAPSUREFIX NOVUS MRI™ SURESCAN®

## 2022-01-01 DEVICE — IPG W1DR01 AZURE XT DR MRI USA
Type: IMPLANTABLE DEVICE | Status: FUNCTIONAL
Brand: AZURE™ XT DR MRI SURESCAN™

## 2022-01-01 RX ORDER — MIDAZOLAM HYDROCHLORIDE 1 MG/ML
INJECTION, SOLUTION INTRAMUSCULAR; INTRAVENOUS AS NEEDED
Status: DISCONTINUED | OUTPATIENT
Start: 2022-01-01 | End: 2022-01-01 | Stop reason: HOSPADM

## 2022-01-01 RX ORDER — HEPARIN SODIUM 200 [USP'U]/100ML
INJECTION, SOLUTION INTRAVENOUS
Status: COMPLETED | OUTPATIENT
Start: 2022-01-01 | End: 2022-01-01

## 2022-01-01 RX ORDER — HEPARIN SODIUM 1000 [USP'U]/ML
INJECTION, SOLUTION INTRAVENOUS; SUBCUTANEOUS AS NEEDED
Status: DISCONTINUED | OUTPATIENT
Start: 2022-01-01 | End: 2022-01-01 | Stop reason: HOSPADM

## 2022-01-01 RX ORDER — LIDOCAINE 50 MG/G
PATCH TOPICAL
Qty: 30 EACH | Refills: 0 | Status: SHIPPED | OUTPATIENT
Start: 2022-01-01 | End: 2022-01-01

## 2022-01-01 RX ORDER — FENTANYL CITRATE 50 UG/ML
INJECTION, SOLUTION INTRAMUSCULAR; INTRAVENOUS AS NEEDED
Status: DISCONTINUED | OUTPATIENT
Start: 2022-01-01 | End: 2022-01-01 | Stop reason: HOSPADM

## 2022-01-01 RX ORDER — LOVASTATIN 40 MG/1
TABLET ORAL
Qty: 90 TABLET | Refills: 1 | Status: SHIPPED | OUTPATIENT
Start: 2022-01-01 | End: 2022-01-01

## 2022-01-01 RX ORDER — SODIUM CHLORIDE 0.9 % (FLUSH) 0.9 %
5-40 SYRINGE (ML) INJECTION EVERY 8 HOURS
Status: DISCONTINUED | OUTPATIENT
Start: 2022-01-01 | End: 2022-01-01 | Stop reason: HOSPADM

## 2022-01-01 RX ORDER — AMLODIPINE BESYLATE 5 MG/1
5 TABLET ORAL DAILY
Qty: 90 TABLET | Refills: 1 | Status: SHIPPED | OUTPATIENT
Start: 2022-01-01 | End: 2022-01-01

## 2022-01-01 RX ORDER — GUAIFENESIN 100 MG/5ML
LIQUID (ML) ORAL
Status: DISCONTINUED
Start: 2022-01-01 | End: 2022-01-01 | Stop reason: HOSPADM

## 2022-01-01 RX ORDER — AMLODIPINE BESYLATE 5 MG/1
5 TABLET ORAL DAILY
Qty: 90 TABLET | Refills: 1 | Status: SHIPPED | OUTPATIENT
Start: 2022-01-01

## 2022-01-01 RX ORDER — GABAPENTIN 100 MG/1
CAPSULE ORAL
Qty: 180 CAPSULE | Refills: 1 | Status: SHIPPED | OUTPATIENT
Start: 2022-01-01 | End: 2022-01-01

## 2022-01-01 RX ORDER — GABAPENTIN 100 MG/1
100-200 CAPSULE ORAL
Qty: 180 CAPSULE | Refills: 3
Start: 2022-01-01 | End: 2022-01-01

## 2022-01-01 RX ORDER — LIDOCAINE HYDROCHLORIDE 10 MG/ML
INJECTION INFILTRATION; PERINEURAL AS NEEDED
Status: DISCONTINUED | OUTPATIENT
Start: 2022-01-01 | End: 2022-01-01 | Stop reason: HOSPADM

## 2022-01-01 RX ORDER — SODIUM CHLORIDE 0.9 % (FLUSH) 0.9 %
5-40 SYRINGE (ML) INJECTION AS NEEDED
Status: DISCONTINUED | OUTPATIENT
Start: 2022-01-01 | End: 2022-01-01 | Stop reason: HOSPADM

## 2022-01-01 RX ORDER — HYDRALAZINE HYDROCHLORIDE 100 MG/1
TABLET, FILM COATED ORAL
Qty: 270 TABLET | Refills: 1 | Status: SHIPPED | OUTPATIENT
Start: 2022-01-01 | End: 2022-01-01

## 2022-01-01 RX ORDER — PROPRANOLOL HYDROCHLORIDE 20 MG/1
20 TABLET ORAL 2 TIMES DAILY
Qty: 20 TABLET | Refills: 0 | Status: SHIPPED | OUTPATIENT
Start: 2022-01-01 | End: 2022-01-01

## 2022-01-01 RX ORDER — IRBESARTAN 300 MG/1
TABLET ORAL
Qty: 90 TABLET | Refills: 1 | Status: SHIPPED | OUTPATIENT
Start: 2022-01-01

## 2022-01-01 RX ORDER — FUROSEMIDE 40 MG/1
TABLET ORAL
Qty: 90 TABLET | Refills: 1 | Status: SHIPPED | OUTPATIENT
Start: 2022-01-01

## 2022-01-01 RX ORDER — TIZANIDINE 2 MG/1
TABLET ORAL
Qty: 30 TABLET | Refills: 0 | Status: SHIPPED | OUTPATIENT
Start: 2022-01-01 | End: 2022-01-01

## 2022-01-01 RX ORDER — LIDOCAINE 50 MG/G
PATCH TOPICAL
Qty: 90 PATCH | Refills: 1 | Status: SHIPPED | OUTPATIENT
Start: 2022-01-01

## 2022-01-01 RX ORDER — HYDROCODONE BITARTRATE AND ACETAMINOPHEN 5; 325 MG/1; MG/1
1 TABLET ORAL
Status: DISCONTINUED | OUTPATIENT
Start: 2022-01-01 | End: 2022-01-01 | Stop reason: HOSPADM

## 2022-01-01 RX ORDER — LOVASTATIN 40 MG/1
TABLET ORAL
Qty: 90 TABLET | Refills: 1 | Status: SHIPPED | OUTPATIENT
Start: 2022-01-01

## 2022-01-01 RX ORDER — METHYLPREDNISOLONE ACETATE 80 MG/ML
80 INJECTION, SUSPENSION INTRA-ARTICULAR; INTRALESIONAL; INTRAMUSCULAR; SOFT TISSUE ONCE
Status: COMPLETED | OUTPATIENT
Start: 2022-01-01 | End: 2022-01-01

## 2022-01-01 RX ORDER — ACETAMINOPHEN 325 MG/1
975 TABLET ORAL ONCE
Status: COMPLETED | OUTPATIENT
Start: 2022-01-01 | End: 2022-01-01

## 2022-01-01 RX ORDER — CEFDINIR 300 MG/1
300 CAPSULE ORAL DAILY
Qty: 10 CAPSULE | Refills: 0 | Status: SHIPPED | OUTPATIENT
Start: 2022-01-01 | End: 2022-01-01

## 2022-01-01 RX ORDER — OMEPRAZOLE 40 MG/1
CAPSULE, DELAYED RELEASE ORAL
Qty: 90 CAPSULE | Refills: 1 | Status: SHIPPED | OUTPATIENT
Start: 2022-01-01

## 2022-01-01 RX ORDER — NEOMYCIN SULFATE, POLYMYXIN B SULFATE AND HYDROCORTISONE 10; 3.5; 1 MG/ML; MG/ML; [USP'U]/ML
3 SUSPENSION/ DROPS AURICULAR (OTIC) 3 TIMES DAILY
Qty: 10 ML | Refills: 0 | Status: SHIPPED | OUTPATIENT
Start: 2022-01-01 | End: 2022-01-01

## 2022-01-01 RX ORDER — VERAPAMIL HYDROCHLORIDE 2.5 MG/ML
INJECTION, SOLUTION INTRAVENOUS AS NEEDED
Status: DISCONTINUED | OUTPATIENT
Start: 2022-01-01 | End: 2022-01-01 | Stop reason: HOSPADM

## 2022-01-01 RX ORDER — CEFAZOLIN SODIUM/WATER 2 G/20 ML
SYRINGE (ML) INTRAVENOUS
Status: COMPLETED | OUTPATIENT
Start: 2022-01-01 | End: 2022-01-01

## 2022-01-01 RX ORDER — OMEPRAZOLE 40 MG/1
CAPSULE, DELAYED RELEASE ORAL
Qty: 90 CAPSULE | Refills: 1 | Status: SHIPPED | OUTPATIENT
Start: 2022-01-01 | End: 2022-01-01

## 2022-01-01 RX ORDER — GABAPENTIN 100 MG/1
CAPSULE ORAL
Qty: 180 CAPSULE | Refills: 3 | Status: SHIPPED | OUTPATIENT
Start: 2022-01-01

## 2022-01-01 RX ORDER — IRBESARTAN 300 MG/1
TABLET ORAL
Qty: 90 TABLET | Refills: 1 | Status: SHIPPED | OUTPATIENT
Start: 2022-01-01 | End: 2022-01-01

## 2022-01-01 RX ORDER — NALOXONE HYDROCHLORIDE 0.4 MG/ML
0.4 INJECTION, SOLUTION INTRAMUSCULAR; INTRAVENOUS; SUBCUTANEOUS AS NEEDED
Status: DISCONTINUED | OUTPATIENT
Start: 2022-01-01 | End: 2022-01-01 | Stop reason: HOSPADM

## 2022-01-01 RX ORDER — HYDRALAZINE HYDROCHLORIDE 100 MG/1
TABLET, FILM COATED ORAL
Qty: 270 TABLET | Refills: 1 | Status: SHIPPED | OUTPATIENT
Start: 2022-01-01

## 2022-01-01 RX ORDER — CEFDINIR 300 MG/1
300 CAPSULE ORAL DAILY
Qty: 5 CAPSULE | Refills: 0 | Status: SHIPPED | OUTPATIENT
Start: 2022-01-01 | End: 2022-01-01

## 2022-01-01 RX ORDER — GUAIFENESIN 100 MG/5ML
81 LIQUID (ML) ORAL
Status: COMPLETED | OUTPATIENT
Start: 2022-01-01 | End: 2022-01-01

## 2022-01-01 RX ORDER — ACETAMINOPHEN 325 MG/1
650 TABLET ORAL
Status: DISCONTINUED | OUTPATIENT
Start: 2022-01-01 | End: 2022-01-01 | Stop reason: HOSPADM

## 2022-01-01 RX ORDER — FAMOTIDINE 20 MG/1
20 TABLET, FILM COATED ORAL 2 TIMES DAILY
Qty: 180 TABLET | Refills: 0 | Status: ON HOLD
Start: 2022-01-01 | End: 2022-01-01

## 2022-01-01 RX ORDER — CINACALCET 30 MG/1
30 TABLET, FILM COATED ORAL DAILY
COMMUNITY

## 2022-01-01 RX ADMIN — EPOETIN ALFA-EPBX 20000 UNITS: 20000 INJECTION, SOLUTION INTRAVENOUS; SUBCUTANEOUS at 14:19

## 2022-01-01 RX ADMIN — EPOETIN ALFA-EPBX 20000 UNITS: 20000 INJECTION, SOLUTION INTRAVENOUS; SUBCUTANEOUS at 12:52

## 2022-01-01 RX ADMIN — EPOETIN ALFA-EPBX 20000 UNITS: 20000 INJECTION, SOLUTION INTRAVENOUS; SUBCUTANEOUS at 14:55

## 2022-01-01 RX ADMIN — SODIUM CHLORIDE 500 ML: 9 INJECTION, SOLUTION INTRAVENOUS at 19:43

## 2022-01-01 RX ADMIN — EPOETIN ALFA-EPBX 20000 UNITS: 20000 INJECTION, SOLUTION INTRAVENOUS; SUBCUTANEOUS at 14:16

## 2022-01-01 RX ADMIN — EPOETIN ALFA-EPBX 20000 UNITS: 20000 INJECTION, SOLUTION INTRAVENOUS; SUBCUTANEOUS at 14:33

## 2022-01-01 RX ADMIN — ASPIRIN 81 MG: 81 TABLET, CHEWABLE ORAL at 08:07

## 2022-01-01 RX ADMIN — METHYLPREDNISOLONE ACETATE 80 MG: 80 INJECTION, SUSPENSION INTRA-ARTICULAR; INTRALESIONAL; INTRAMUSCULAR; SOFT TISSUE at 16:27

## 2022-01-01 RX ADMIN — IOPAMIDOL 100 ML: 755 INJECTION, SOLUTION INTRAVENOUS at 12:37

## 2022-01-01 RX ADMIN — EPOETIN ALFA-EPBX 20000 UNITS: 20000 INJECTION, SOLUTION INTRAVENOUS; SUBCUTANEOUS at 13:56

## 2022-01-01 RX ADMIN — EPOETIN ALFA-EPBX 20000 UNITS: 40000 INJECTION, SOLUTION INTRAVENOUS; SUBCUTANEOUS at 14:24

## 2022-01-01 RX ADMIN — ACETAMINOPHEN 325MG 650 MG: 325 TABLET ORAL at 17:15

## 2022-01-01 RX ADMIN — ACETAMINOPHEN 325MG 975 MG: 325 TABLET ORAL at 20:57

## 2022-01-01 RX ADMIN — EPOETIN ALFA-EPBX 20000 UNITS: 20000 INJECTION, SOLUTION INTRAVENOUS; SUBCUTANEOUS at 14:46

## 2022-01-04 NOTE — TELEPHONE ENCOUNTER
----- Message from Lay Moses sent at 1/4/2022  9:49 AM EST -----  Subject: Appointment Request    Reason for Call: Routine (Patient Request) No Script    QUESTIONS  Type of Appointment? Established Patient  Reason for appointment request? No appointments available during search  Additional Information for Provider? Pts son called and stated that the pt   needs to be seen for right ear pain. No availabilities for on our system. Please call the pt back to schedule appointment.   ---------------------------------------------------------------------------  --------------  [address]  What is the best way for the office to contact you? OK to leave message on   voicemail  Preferred Call Back Phone Number? [phone]  ---------------------------------------------------------------------------  --------------  SCRIPT ANSWERS  Relationship to Patient? Other  Representative Name? Son/Wilber  Additional information verified (besides Name and Date of Birth)? Phone   Number  Specialty Confirmation? Primary Care  (Is the patient requesting to see the provider for a procedure?)? No  (Is the patient requesting to see the provider urgently  today or   tomorrow. )? No  Have you been diagnosed with, awaiting test results for, or told that you   are suspected of having COVID-19 (Coronavirus)? (If patient has tested   negative or was tested as a requirement for work, school, or travel and   not based on symptoms, answer no)? No  Within the past two weeks have you developed any of the following symptoms   (answer no if symptoms have been present longer than 2 weeks or began   more than 2 weeks ago)? Fever or Chills, Cough, Shortness of breath or   difficulty breathing, Loss of taste or smell, Sore throat, Nasal   congestion, Sneezing or runny nose, Fatigue or generalized body aches   (answer no if pain is specific to a body part e.g. back pain), Diarrhea,   Headache?  No  Have you had close contact with someone with COVID-19 in the last 14 days? No  (Service Expert  click yes below to proceed with [facility] As Usual   Scheduling)?  Yes

## 2022-01-05 NOTE — PROGRESS NOTES
Subjective:     Chief Complaint   Patient presents with    Ear Pain     right       Corey Martinez is a 80 y.o. female who presents today with c/o right ear pain that started a few days. Associated with dizziness and nausea. Pain is sharp and shooting. It comes and goes. Also reports tinnitis and hearing loss in that ear. States it has been a problem for years (Meniere's ?). Lately she has had more nasal congestion that usual from her allergies. She has had some headaches. No fever or chills. No sore throat, cough, wheezing, SOB, or CP.      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 ([facility]) I13.10, N18.4    Primary hyperparathyroidism ([facility]) E21.0    Gastroesophageal reflux disease without esophagitis K21.9    Peripheral vascular disease ([facility]) I73.9       Past Medical History:   Diagnosis Date    Anemia of chronic disease 6/19/2018    Anxiety     Aortic stenosis, moderate     Back pain     CAD (coronary artery disease)     CKD (chronic kidney disease) stage 3, GFR 30-59 ml/min ([facility])     Constipation     Diabetes ([address]     Diverticulosis 2007    DM (diabetes mellitus) ([address]     AODM    Fibroid 1972    GERD (gastroesophageal reflux disease)     Goiter, non-toxic     Hernia, hiatal     Hyperlipemia     Hypertension     Hypokalemia     Menopause     Osteoarthritis 2006    bursitis R shoulder    Osteoarthritis of right knee     PVC (premature ventricular contraction) H/O PVC's    Raynaud disease     Venous stasis     Wears hearing aid     both ears         Current Outpatient Medications:     cinacalcet (SENSIPAR) 30 mg tablet, cinacalcet 30 mg tablet, Disp: , Rfl:     furosemide (LASIX) 40 mg tablet, TAKE 1 TABLET BY MOUTH DAILY, Disp: 90 Tablet, Rfl: 1    hydrocortisone valerate (WEST-JACLYN) 0.2 % ointment, Apply  to affected area two (2) times a day. use thin layer, Disp: 15 g, Rfl: 0    gabapentin (NEURONTIN) 100 mg capsule, Take 1-2 Capsules by mouth three (3) times daily. Max Daily Amount: 600 mg., Disp: 90 Capsule, Rfl: 2    omeprazole (PRILOSEC) 40 mg capsule, TAKE ONE CAPSULE BY MOUTH EVERY MORNING TO CONTROL STOMACH ACID, Disp: 90 Capsule, Rfl: 1    lovastatin (MEVACOR) 40 mg tablet, TAKE 1 TAB BY MOUTH NIGHTLY FOR CHOLESTEROL LOWERING, Disp: 90 Tablet, Rfl: 1    amLODIPine (NORVASC) 5 mg tablet, Take 1 Tablet by mouth daily. , Disp: 90 Tablet, Rfl: 1    hydrALAZINE (APRESOLINE) 100 mg tablet, TAKE 1 TAKE ONE TABLET EVERY__ HOURS TAKE ONE TABLET EVERY__ HOURS TAB BY MOUTH THREE(3) TIMES DAILY, Disp: 270 Tablet, Rfl: 1    irbesartan (AVAPRO) 300 mg tablet, TAKE 1 TAB BY MOUTH NIGHTLY., Disp: 90 Tablet, Rfl: 1    fluticasone propionate (FLONASE) 50 mcg/actuation nasal spray, SPRAY 1 PUFF IN EACH NOSTRIL ONCE DAILY, Disp: 1 Bottle, Rfl: 5    ferrous sulfate (Iron) 325 mg (65 mg iron) tablet, Take  by mouth Daily (before breakfast). , Disp: , Rfl:     cetirizine (ZYRTEC) 10 mg tablet, Take 10 mg by mouth., Disp: , Rfl:     TRUE METRIX GLUCOSE TEST STRIP strip, , Disp: , Rfl:     aspirin delayed-release 81 mg tablet, Take 1 Tab by mouth daily. , Disp: 30 Tab, Rfl: 0    acetaminophen (TYLENOL EXTRA STRENGTH) 500 mg tablet, Take 1 Tab by mouth every six (6) hours as needed for Pain., Disp: 30 Tab, Rfl: 1    Allergies   Allergen Reactions    Metformin Other (comments)     Kidneys effected       Past Surgical History:   Procedure Laterality Date    HX BREAST BIOPSY Bilateral     x5    HX CATARACT REMOVAL  2004    bilateral    HX COLONOSCOPY  2002, 08/2007    HX DILATION AND CURETTAGE  1960's    HX HYSTERECTOMY  66's    BSO    HX OTHER SURGICAL  2001    sigmoidoscopy       Social History     Tobacco Use   Smoking Status Never Smoker   Smokeless Tobacco Never Used       Social History     Socioeconomic History    Marital status:    Tobacco Use    Smoking status: Never Smoker    Smokeless tobacco: Never Used   Substance and Sexual Activity    Alcohol use: No     Alcohol/week: 0.0 standard drinks    Drug use: No    Sexual activity: Never   Social History Narrative    Happily . No concern for abuse. Exercise: none    Diet: Careful. Wear Seatbelts               Family History   Problem Relation Age of Onset    Diabetes Brother     Hypertension Mother     Diabetes Brother        ROS:  Gen: denies fever or chills, + fatigue  HEENT:+right ear pain H/A, tinnitus, hearing loss, and nasal congestion, denies ear fullness   Denies epistaxis, vision changes, eye pain, or sore throat  Resp: denies dyspnea, cough, or wheezing  CV: denies chest pain, pressure, or palpitations  Extremeties: denies edema  GI[de-identified] denies abdominal pain, +nausea, denies vomiting  Musculoskeletal: no body aches  Neuro: denies numbness/tingling +dizziness  Skin: denies rashes or new lesions   Psych: denies anxiety, depression, kenneth, or other changes in mood      Objective:     Visit Vitals  BP (!) 145/49 (BP 1 Location: Left arm, BP Patient Position: Sitting)   Pulse 60   Temp 98.2 °F (36.8 °C) (Temporal)   Resp 18   Ht 5' (1.524 m)   Wt 163 lb 4 oz (74 kg)   SpO2 98%   BMI 31.88 kg/m²     Body mass index is 31.88 kg/m². General: Alert and oriented. No acute distress. Well nourished. HEENT :   Ears:Right: +moderate amount of cerumen present without impaction.  Able to visualize a portion of the TM which is pearly gray  Left: TM normal, canal clear   Eyes: Sclera white, conjunctiva clear. PERRLA. Extra ocular movements intact. Nose: Patent. Nasal mucosa pink and edematous, without active drainage. Mouth: Pharynx non-erythematous, without exudate   Neck: Supple with FROM. No lymphadenopathy  Lungs: Breathing even and unlabored. All lobes clear to auscultation bilaterally   Heart :RRR, S1 and S2 normal intensity, no extra heart sounds   Neuro: Cranial nerves grossly normal.  Psych: Mood and thought content appropriate for situation. Dressed appropriately and with good hygiene. Skin: Warm, dry, and intact. No lesions or discoloration. Assessment/ Plan:     Acute sinusitis  Start Cefdinir 300mg once daily x 5 days- reduced dose due to CKD stage 4  F/U prn if symptoms worsen or do not improve, will try Debrox ear drops with Meclizine. Orders Placed This Encounter    cinacalcet (SENSIPAR) 30 mg tablet     Sig: cinacalcet 30 mg tablet         Verbal and written instructions (see AVS) provided.  Patient expresses understanding of diagnosis and treatment plan. Health Maintenance Due   Topic Date Due    COVID-19 Vaccine (3 - Booster for Moderna series) 08/27/2021    Flu Vaccine (1) 09/01/2021    Medicare Yearly Exam  10/28/2021    MICROALBUMIN Q1  12/16/2021    Foot Exam Q1  12/17/2021    Lipid Screen  12/16/2021               Beau Allan D. Camille Litten, NP

## 2022-01-05 NOTE — PROGRESS NOTES
1. Have you been to the ER, urgent care clinic since your last visit? Hospitalized since your last visit? No    2. Have you seen or consulted any other health care providers outside of the [address] [place] [address] since your last visit? Include any pap smears or colon screening.  No     Chief Complaint   Patient presents with    Ear Pain     right     Visit Vitals  BP (!) 145/49 (BP 1 Location: Left arm, BP Patient Position: Sitting)   Pulse 60   Temp 98.2 °F (36.8 °C) (Temporal)   Resp 18   Ht 5' (1.524 m)   Wt 163 lb 4 oz (74 kg)   SpO2 98%   BMI 31.88 kg/m²

## 2022-01-19 NOTE — DISCHARGE INSTRUCTIONS
We discussed trying starting Inderal to treat your tremor. We started a low-dose, 20 mg twice a day. If your heart rate is slower than 60, if your blood pressure drops below 110 on the top, if you feel weak and tired or lightheaded on standing we need to stop the medication and reconsider other means to treat your tremor. Follow-up with your primary care doctor on Monday as planned. Return if symptoms get worse or if you have change in symptoms. Discuss adjusting the dose of medication if it seems to be helping with your primary care doctor next week, or you may benefit from referral back to Neurology. After talking with your son, he states that you had a pressure of 99 systolic at home yesterday. I think that would be too low blood pressure to start taking the medication prescribed.   I think it might be better if you contact your primary care doctor today to try to set up an appointment with neurology to address treatment of your tremors, rather than starting the Inderal.

## 2022-01-19 NOTE — ED NOTES
Pt assisted OOB to [facility]. Pt voided without difficulty. Assisted pt back to bed. Denies pain. Urine specimen sent to lab.

## 2022-01-19 NOTE — ED TRIAGE NOTES
Sudden onset of tremors this afternoon and not feeling well, sugar at home was 87. Ate crackers because poor appetite. .  On day 5 of Omnicef (second 5 day round in 2 wks for sinusitis)

## 2022-01-19 NOTE — ED PROVIDER NOTES
[address] [place] [address]   EMERGENCY DEPARTMENT          Date: 1/18/2022  Patient: Shweta Jacobo MRN: [id number]  SSN: [id number]    YOB: 1931  Age: 80 y.o. Sex: female      PCP: Germán Bella NP  The patient arrived by private car and is accompanied by spouse and child. History of Presenting Illness     Chief Complaint   Patient presents with    Chills       History Provided By: Patient, Patient's  and Patient's Son    HPI: Shweta Jacobo is a 80 y.o. female, pmhx anemia of chronic disease, aortic stenosis, coronary artery disease, chronic kidney disease, diabetes, hypertension, hyperlipidemia, who presents ambulatory to the ED c/o shaking. She has not been feeling well recently has had increased fatigue. Today she notes that she has had increased episodes of tremors and shaking. She denies fever she denies sweats. She is on her second course of Omnicef for sinusitis. She states that she has had a lot of drainage from her sinuses. No sore throat. No headache. No stiff neck. No cough or shortness of breath. No chest pain heaviness pressure neck arm or jaw pain. No abdominal pain. No dysuria urgency or frequency. No flank pain. No rashes been noted. Patient has had tremors for a while and review of her chart reveals a visit to neurology in 2020. They felt that she had benign essential tremor or familial tremor as she has a sister with similar symptoms. Tremor of the arms and legs was noted at that time. Patient is not taking metoclopramide or other medications over-the-counter medication list.  She notes that her sugar today was in the 90s, and that eating did not make her tremors better. No recent falls or head trauma or headache. Appetites been poor recently.       Past History     Past Medical History:   Diagnosis Date    Anemia of chronic disease 6/19/2018    Anxiety     Aortic stenosis, moderate     Back pain     CAD (coronary artery disease)     CKD (chronic kidney disease) stage 3, GFR 30-59 ml/min ([facility])     Constipation     [facility] [address] [address]     Diverticulosis 2007    DM (diabetes mellitus) ([facility] [address]     AODM    Fibroid 1972    GERD (gastroesophageal reflux disease)     Goiter, non-toxic     Hernia, hiatal     Hyperlipemia     Hypertension     Hypokalemia     Menopause     Osteoarthritis 2006    bursitis R shoulder    Osteoarthritis of right knee     PVC (premature ventricular contraction)     H/O PVC's    Raynaud disease     Venous stasis     Wears hearing aid     both ears       Past Surgical History:   Procedure Laterality Date    HX BREAST BIOPSY Bilateral     x5    HX CATARACT REMOVAL  2004    bilateral    HX COLONOSCOPY  2002, 08/2007    HX DILATION AND CURETTAGE  1960's    HX HYSTERECTOMY  70's    BSO    HX OTHER SURGICAL  2001    sigmoidoscopy       Family History   Problem Relation Age of Onset    Diabetes Brother     Hypertension Mother     Diabetes Brother        Social History     Tobacco Use    Smoking status: Never Smoker    Smokeless tobacco: Never Used   Substance Use Topics    Alcohol use: No     Alcohol/week: 0.0 standard drinks    Drug use: No       Allergies   Allergen Reactions    Metformin Other (comments)     Kidneys effected       Current Outpatient Medications   Medication Sig Dispense Refill    propranoloL (INDERAL) 20 mg tablet Take 1 Tablet by mouth two (2) times a day. 20 Tablet 0    gabapentin (NEURONTIN) 100 mg capsule TAKE 1 TO 2 CAPSULES BY MOUTH THREE TIMES DAILY. MAX DAILY AMOUNT: 600  Capsule 1    cinacalcet (SENSIPAR) 30 mg tablet cinacalcet 30 mg tablet      furosemide (LASIX) 40 mg tablet TAKE 1 TABLET BY MOUTH DAILY 90 Tablet 1    hydrocortisone valerate (WEST-JACLYN) 0.2 % ointment Apply  to affected area two (2) times a day.  use thin layer 15 g 0    omeprazole (PRILOSEC) 40 mg capsule TAKE ONE CAPSULE BY MOUTH EVERY MORNING TO CONTROL STOMACH ACID 90 Capsule 1    lovastatin (MEVACOR) 40 mg tablet TAKE 1 TAB BY MOUTH NIGHTLY FOR CHOLESTEROL LOWERING 90 Tablet 1    amLODIPine (NORVASC) 5 mg tablet Take 1 Tablet by mouth daily. 90 Tablet 1    hydrALAZINE (APRESOLINE) 100 mg tablet TAKE 1 TAKE ONE TABLET EVERY__ HOURS TAKE ONE TABLET EVERY__ HOURS TAB BY MOUTH THREE(3) TIMES DAILY 270 Tablet 1    irbesartan (AVAPRO) 300 mg tablet TAKE 1 TAB BY MOUTH NIGHTLY. 90 Tablet 1    fluticasone propionate (FLONASE) 50 mcg/actuation nasal spray SPRAY 1 PUFF IN EACH NOSTRIL ONCE DAILY 1 Bottle 5    ferrous sulfate (Iron) 325 mg (65 mg iron) tablet Take  by mouth Daily (before breakfast).  cetirizine (ZYRTEC) 10 mg tablet Take 10 mg by mouth.  TRUE METRIX GLUCOSE TEST STRIP strip       aspirin delayed-release 81 mg tablet Take 1 Tab by mouth daily. 30 Tab 0    acetaminophen (TYLENOL EXTRA STRENGTH) 500 mg tablet Take 1 Tab by mouth every six (6) hours as needed for Pain. 30 Tab 1         Review of Systems     Review of Systems   All other systems reviewed and are negative. Physical Exam     Physical Exam  Vitals and nursing note reviewed. Constitutional:       General: She is not in acute distress. Appearance: She is well-developed. She is not ill-appearing, toxic-appearing or diaphoretic. HENT:      Head: Normocephalic. Eyes:      Extraocular Movements: Extraocular movements intact. Pupils: Pupils are equal, round, and reactive to light. Neck:      Vascular: No JVD. Cardiovascular:      Rate and Rhythm: Normal rate and regular rhythm. Heart sounds: Heart sounds not distant. Murmur heard. No systolic murmur is present. No diastolic murmur is present. No friction rub. No gallop. Comments: 3/6 systolic ejection murmur loudest at the sternum and radiating into the neck, no gallop or rub appreciated  Pulmonary:      Effort: Pulmonary effort is normal. No tachypnea, accessory muscle usage or respiratory distress.       Breath sounds: Normal breath sounds. Chest:      Chest wall: No tenderness. Abdominal:      Palpations: Abdomen is soft. There is no hepatomegaly or splenomegaly. Tenderness: There is no abdominal tenderness. There is no guarding or rebound. Musculoskeletal:         General: Normal range of motion. Cervical back: Normal range of motion and neck supple. Right lower leg: No tenderness. No edema. Left lower leg: No tenderness. No edema. Lymphadenopathy:      Cervical: No cervical adenopathy. Skin:     General: Skin is warm and dry. Capillary Refill: Capillary refill takes less than 2 seconds. Findings: No rash. Neurological:      Mental Status: She is alert and oriented to person, place, and time. Cranial Nerves: No cranial nerve deficit. Sensory: No sensory deficit. Motor: No weakness. Comments: Patient had tremor of the arms and legs which is worse with intention or movement. Finger-nose-finger was abnormal.  Heel shin was almost normal.  Motor strength judged to be 5 out of 5 in the left arm and both lower extremities. Right arm had limited range of motion due to pain at the shoulder.  appear to be 5 out of 5. Sensation intact to light touch throughout DTRs 2+ and symmetric in the knees and elbows. Psychiatric:         Mood and Affect: Mood normal.         Behavior: Behavior normal.         Thought Content:  Thought content normal.         Judgment: Judgment normal.         Diagnostic Study Results     Labs -     Recent Results (from the past 48 hour(s))   GLUCOSE, POC    Collection Time: 01/18/22  9:12 PM   Result Value Ref Range    Glucose (POC) 144 (H) 65 - 117 mg/dL    Performed by Thor Winters  (RN)    CBC WITH AUTOMATED DIFF    Collection Time: 01/18/22  9:20 PM   Result Value Ref Range    WBC 4.0 3.6 - 11.0 K/uL    RBC 2.76 (L) 3.80 - 5.20 M/uL    HGB 8.9 (L) 11.5 - 16.0 g/dL    HCT 26.2 (L) 35.0 - 47.0 %    MCV 94.9 80.0 - 99.0 FL    MCH 32.2 26.0 - 34.0 PG MCHC 34.0 30.0 - 36.5 g/dL    RDW 12.1 11.5 - 14.5 %    PLATELET 983 477 - 327 K/uL    MPV 9.9 8.9 - 12.9 FL    NRBC 0.0 0  WBC    ABSOLUTE NRBC 0.00 0.00 - 0.01 K/uL    NEUTROPHILS 48 32 - 75 %    LYMPHOCYTES 41 12 - 49 %    MONOCYTES 11 5 - 13 %    EOSINOPHILS 0 0 - 7 %    BASOPHILS 0 0 - 1 %    IMMATURE GRANULOCYTES 0 0.0 - 0.5 %    ABS. NEUTROPHILS 1.9 1.8 - 8.0 K/UL    ABS. LYMPHOCYTES 1.7 0.8 - 3.5 K/UL    ABS. MONOCYTES 0.5 0.0 - 1.0 K/UL    ABS. EOSINOPHILS 0.0 0.0 - 0.4 K/UL    ABS. BASOPHILS 0.0 0.0 - 0.1 K/UL    ABS. IMM. GRANS. 0.0 0.00 - 0.04 K/UL    DF AUTOMATED     METABOLIC PANEL, COMPREHENSIVE    Collection Time: 01/18/22  9:20 PM   Result Value Ref Range    Sodium 131 (L) 136 - 145 mmol/L    Potassium 3.7 3.5 - 5.1 mmol/L    Chloride 95 (L) 97 - 108 mmol/L    CO2 22 21 - 32 mmol/L    Anion gap 14 5 - 15 mmol/L    Glucose 135 (H) 65 - 100 mg/dL    BUN 42 (H) 6 - 20 MG/DL    Creatinine 2.43 (H) 0.55 - 1.02 MG/DL    BUN/Creatinine ratio 17 12 - 20      GFR est AA 23 (L) >60 ml/min/1.73m2    GFR est non-AA 19 (L) >60 ml/min/1.73m2    Calcium 8.5 8.5 - 10.1 MG/DL    Bilirubin, total 0.3 0.2 - 1.0 MG/DL    ALT (SGPT) 12 12 - 78 U/L    AST (SGOT) 20 15 - 37 U/L    Alk.  phosphatase 58 45 - 117 U/L    Protein, total 7.3 6.4 - 8.2 g/dL    Albumin 3.5 3.5 - 5.0 g/dL    Globulin 3.8 2.0 - 4.0 g/dL    A-G Ratio 0.9 (L) 1.1 - 2.2     URINALYSIS W/ RFLX MICROSCOPIC    Collection Time: 01/18/22 10:55 PM   Result Value Ref Range    Color YELLOW/STRAW      Appearance CLEAR CLEAR      Specific gravity 1.010 1.003 - 1.030      pH (UA) 6.0 5.0 - 8.0      Protein Negative NEG mg/dL    Glucose Negative NEG mg/dL    Ketone Negative NEG mg/dL    Bilirubin Negative NEG      Blood Negative NEG      Urobilinogen 0.2 0.2 - 1.0 EU/dL    Nitrites Negative NEG      Leukocyte Esterase Negative NEG          Radiologic Studies -   CT Results  (Last 48 hours)               01/19/22 0100  CT HEAD WO CONT Final result Impression:  No acute findings. Narrative:  EXAM: CT HEAD WO CONT       INDICATION: tremor, hx sinusitis       COMPARISON: 2018. CONTRAST: None. TECHNIQUE: Unenhanced CT of the head was performed using 5 mm images. Brain and   bone windows were generated. Coronal and sagittal reformats. CT dose reduction   was achieved through use of a standardized protocol tailored for this   examination and automatic exposure control for dose modulation. FINDINGS:   Ventricles and sulci are enlarged. There is periventricular hypoattenuation   compatible with chronic small vessel ischemic changes. There is no intracranial   hemorrhage, extra-axial collection, or mass effect. The basilar cisterns are   open. No CT evidence of acute infarct. The bone windows demonstrate no abnormalities. The visualized portions of the   paranasal sinuses and mastoid air cells are clear. CT HEAD WO CONT   Final Result   No acute findings. XR CHEST PA LAT   Final Result   No acute process or change compared to the prior exam.               Procedures     Procedures      Medical Decision Making     Records Reviewed: Nursing Notes, Old Medical Records and Previous Laboratory Studies    Vital Signs  Patient Vitals for the past 24 hrs:   Temp Pulse Resp BP SpO2   01/19/22 0202  67  (!) 147/55 100 %   01/19/22 0129  65  (!) 138/43 100 %   01/19/22 0029  62  (!) 143/44 99 %   01/18/22 2329  68  (!) 142/50 99 %   01/18/22 2259  65  (!) 164/50 99 %   01/18/22 2159  63 18 (!) 117/40 99 %   01/18/22 2140     100 %   01/18/22 2108 97.8 °F (36.6 °C) 78 20 (!) 155/48 99 %       Pulse Oximetry Analysis - 100% on RA      I am the first provider for this patient.     I reviewed the vital signs, available nursing notes, past medical history, past surgical history, family history and social history, performed a physical exam and reviewed labs and xrays from this visit    MDM  Number of Diagnoses or Management Options  Essential tremor: established, worsening     Amount and/or Complexity of Data Reviewed  Clinical lab tests: ordered and reviewed  Tests in the radiology section of CPT®: ordered and reviewed  Tests in the medicine section of CPT®: reviewed and ordered    Risk of Complications, Morbidity, and/or Mortality  Presenting problems: high  Diagnostic procedures: high  Management options: moderate  General comments: Differential includes essential tremor, familial tremor, medication effect, Parkinson's disease or other movement disorder chills. No evidence of infection noted on exam or lab data today. Patient appears to have a tremor which is worse with intention, worsening of extremities compared to the lower extremities. Chart reviewed and patient's presentation appears to be similar to presentation when she saw neurologist in 2020. We discussed treatment options with her at this point we initially discussed possibly starting her on Inderal but son was able to give history of blood pressure noted 99 systolic range at home so we discussed not trying Inderal and discussing possible neurology referral by her primary care practitioner tomorrow morning. Patient Progress  Patient progress: stable      ED Course     Initial assessment performed. The patients presenting problems have been discussed, and they are in agreement with the care plan formulated and outlined with them. I have encouraged them to ask questions as they arise throughout their visit. ED Course as of 01/19/22 0432   Wed Jan 19, 2022   0430 Patient was stable in the ED. Hemoglobin appears to be stable at 8.9, chronic renal failure with BUN 42 and creatinine 2.43 appears to be stable, sugar was 135, anion gap was normal, sodium was 131, urinalysis without acute process. Evidence did not appear to have evidence of sepsis or infection on exam.  CT of the head was without evidence of sinusitis or other acute process. Chest x-ray was unremarkable. Results were discussed with patient and family. She should follow-up with primary care doctor to discuss possible referral to neurology. I suggested that they asked Dr. Marco Daley to review labs from today, as she was supposed to have an appointment with him later on today for evaluation of her anemia. [PS]      ED Course User Index  [PS] Cuba Rowell MD        Orders Placed This Encounter    XR CHEST PA LAT    CT HEAD WO CONT    CBC WITH AUTOMATED DIFF    METABOLIC PANEL, COMPREHENSIVE    URINALYSIS W/ RFLX MICROSCOPIC    POC GLUCOSE    GLUCOSE, POC    SALINE LOCK IV ONE TIME STAT    propranoloL (INDERAL) 20 mg tablet       MEDICATIONS GIVEN:    Medications - No data to display      Diagnosis     Clinical Impression:   1. Essential tremor            Disposition       Orders Placed This Encounter    XR CHEST PA LAT    CT HEAD WO CONT    CBC WITH AUTOMATED DIFF    METABOLIC PANEL, COMPREHENSIVE    URINALYSIS W/ RFLX MICROSCOPIC    POC GLUCOSE    GLUCOSE, POC    SALINE LOCK IV ONE TIME STAT    propranoloL (INDERAL) 20 mg tablet         MEDICATIONS GIVEN:    No current facility-administered medications for this encounter. Current Outpatient Medications   Medication Sig    propranoloL (INDERAL) 20 mg tablet Take 1 Tablet by mouth two (2) times a day.  gabapentin (NEURONTIN) 100 mg capsule TAKE 1 TO 2 CAPSULES BY MOUTH THREE TIMES DAILY. MAX DAILY AMOUNT: 600 MG    cinacalcet (SENSIPAR) 30 mg tablet cinacalcet 30 mg tablet    furosemide (LASIX) 40 mg tablet TAKE 1 TABLET BY MOUTH DAILY    hydrocortisone valerate (WEST-JACLYN) 0.2 % ointment Apply  to affected area two (2) times a day. use thin layer    omeprazole (PRILOSEC) 40 mg capsule TAKE ONE CAPSULE BY MOUTH EVERY MORNING TO CONTROL STOMACH ACID    lovastatin (MEVACOR) 40 mg tablet TAKE 1 TAB BY MOUTH NIGHTLY FOR CHOLESTEROL LOWERING    amLODIPine (NORVASC) 5 mg tablet Take 1 Tablet by mouth daily.     hydrALAZINE (APRESOLINE) 100 mg tablet TAKE 1 TAKE ONE TABLET EVERY__ HOURS TAKE ONE TABLET EVERY__ HOURS TAB BY MOUTH THREE(3) TIMES DAILY    irbesartan (AVAPRO) 300 mg tablet TAKE 1 TAB BY MOUTH NIGHTLY.  fluticasone propionate (FLONASE) 50 mcg/actuation nasal spray SPRAY 1 PUFF IN EACH NOSTRIL ONCE DAILY    ferrous sulfate (Iron) 325 mg (65 mg iron) tablet Take  by mouth Daily (before breakfast).  cetirizine (ZYRTEC) 10 mg tablet Take 10 mg by mouth.  TRUE METRIX GLUCOSE TEST STRIP strip     aspirin delayed-release 81 mg tablet Take 1 Tab by mouth daily.  acetaminophen (TYLENOL EXTRA STRENGTH) 500 mg tablet Take 1 Tab by mouth every six (6) hours as needed for Pain. Patient Vitals for the past 8 hrs:   Temp Pulse Resp BP SpO2   01/19/22 0202  67  (!) 147/55 100 %   01/19/22 0129  65  (!) 138/43 100 %   01/19/22 0029  62  (!) 143/44 99 %   01/18/22 2329  68  (!) 142/50 99 %   01/18/22 2259  65  (!) 164/50 99 %   01/18/22 2159  63 18 (!) 117/40 99 %   01/18/22 2140     100 %   01/18/22 2108 97.8 °F (36.6 °C) 78 20 (!) 155/48 99 %       Medications - No data to display    Discharge note:    I have reviewed all pertinent and currently available diagnostic test results for this visit including, but not limited to, labs, xrays, and EKGs. I have reviewed all pertinent and currently available medical records. My plan of care and further evaluation and/or disposition is based on these results, as well as the initial, and subsequent, history and physical exam, as well as any additional complaints during the visit. Pt has been re-examined, appears to be stable and have no new complaints. Patient has nontoxic appearance and condition is stable for discharge. Laboratory tests, medications, x-rays, diagnosis, follow up plan and return instructions have been reviewed and discussed with the patient and/or family.  Pt and/or family were instructed on symptoms that may arise after discharge requiring re-evaluation by a physician. Pt and/or family have had the opportunity to ask questions about their care. Patient and/or family verbalized understanding and agreement with care plan, follow up and return instructions. Patient and/or family agree to return if their symptoms are not improving or immediately if they have any change in their condition. I have also put together some discharge instructions for the patient that include: 1) educational information regarding their diagnosis, 2) how to care for their diagnosis at home, as well a 3) list of reasons why they would want to return to the ED prior to their follow-up appointment, should their condition change as well as instructions to return to the ED should sx worsen at any time. Vital signs stable for discharge. Corky Dowling MD      Disposition     Clinical Impression:     ICD-10-CM ICD-9-CM    1. Essential tremor  G25.0 333.1          PLAN:  1. Discharge home in stable condition  2. Discharge Medication List as of 1/19/2022  2:16 AM        3. Follow-up Information     Follow up With Specialties Details Why Contact Felicity Varner NP Nurse Practitioner In 1 week  [address] [place], [place] [id number] [phone]          4. Discharged    Return to ED if worse    Please note, this dictation was completed with [employer], the computer voice recognition software. Quite often unanticipated grammatical, syntax, homophones, and other interpretive errors are inadvertently transcribed by the computer software. Please disregard these errors. Please excuse any errors that have escaped final proof reading.

## 2022-01-24 NOTE — PATIENT INSTRUCTIONS

## 2022-01-24 NOTE — PROGRESS NOTES
1. Have you been to the ER, urgent care clinic since your last visit? Hospitalized since your last visit? Yes When: ER 1-18-22 ER    2. Have you seen or consulted any other health care providers outside of the [address] [place] [address] since your last visit? Include any pap smears or colon screening. No   This is the Subsequent Medicare Annual Wellness Exam, performed 12 months or more after the Initial AWV or the last Subsequent AWV    I have reviewed the patient's medical history in detail and updated the computerized patient record. Assessment/Plan   Education and counseling provided:  Are appropriate based on today's review and evaluation  End-of-Life planning (with patient's consent)  Influenza Vaccine  Diabetes screening test     Medicare Annual Wellness Visit  Flu shot done back in   A1C ordered, she has diet controlled type 2 diabetes    Depression Risk Factor Screening     3 most recent PHQ Screens 1/24/2022   PHQ Not Done -   Little interest or pleasure in doing things Not at all   Feeling down, depressed, irritable, or hopeless Not at all   Total Score PHQ 2 0       Alcohol & Drug Abuse Risk Screen    Do you average more than 1 drink per night or more than 7 drinks a week:  No    On any one occasion in the past three months have you have had more than 3 drinks containing alcohol:  No          Functional Ability and Level of Safety    Hearing: Hearing is good. Activities of Daily Living: The home contains: grab bars  Patient needs help with:  transportation, shopping, preparing meals, laundry, housework, managing medications and managing money     Functional Ability:   Does the patient exhibit a steady gait?  no   How long did it take the patient to get up and walk from a sitting position? wheelchair   Is the patient self reliant?  (ie can do own laundry, meals, household chores)  no     Does the patient handle his/her own medications?  no     Does the patient handle his/her own money? no     Is the patients home safe (ie good lighting, handrails on stairs and bath, etc.)? yes     Did you notice or did patient express any hearing difficulties? no     Did you notice or did patient express any vision difficulties?   no     Were distance and reading eye charts used? no       Advance Care Planning:   Patient was offered the opportunity to discuss advance care planning:  yes     Does patient have an Advance Directive:  no   If no, did you provide information on Caring Connections? yes     ADL Assessment 1/24/2022   Feeding yourself No Help Needed   Getting from bed to chair No Help Needed   Getting dressed No Help Needed   Bathing or showering No Help Needed   Walk across the room (includes cane/walker) No Help Needed   Using the telphone No Help Needed   Taking your medications Help Needed   Preparing meals Help Needed   Managing money (expenses/bills) Help Needed   Moderately strenuous housework (laundry) Help Needed   Shopping for personal items (toiletries/medicines) Help Needed   Shopping for groceries Help Needed   Driving Help Needed   Climbing a flight of stairs Help Needed   Getting to places beyond walking distances No Help Needed       Abuse Screening Questionnaire 1/24/2022   Do you ever feel afraid of your partner? N   Are you in a relationship with someone who physically or mentally threatens you? N   Is it safe for you to go home? Y           Ambulation: with difficulty, uses a walker     Fall Risk:  Fall Risk Assessment, last 12 mths 1/24/2022   Able to walk? Yes   Fall in past 12 months? 0   Do you feel unsteady? 1   Are you worried about falling 1   Is TUG test greater than 12 seconds? 1   Is the gait abnormal? 1   Number of falls in past 12 months 0   Fall with injury?  -      Abuse Screen:  Patient is not abused       Cognitive Screening    Has your family/caregiver stated any concerns about your memory: no     Cognitive Screening: Normal - Clock Drawing Test    Health Maintenance Due     Health Maintenance Due   Topic Date Due    Lipid Screen  12/16/2021    Foot Exam Q1  12/17/2021       Patient Care Team   Patient Care Team:  Joanna Yarbrough NP as PCP - General (Nurse Practitioner)  Joanna Yarbrough NP [facility] PCP [facility]  Angela Mendoza MD (Nephrology)    History     Patient Active Problem List   Diagnosis Code    Encounter for long-term (current) use of other medications Z79.899    Type 2 diabetes mellitus with stage 4 chronic kidney disease, without long-term current use of insulin ([address] E11.22, N18.4    Pure hypercholesterolemia E78.00    Anemia of chronic disease D63.8    Bradycardia R00.1    Aortic stenosis, moderate I35.0    Hyperparathyroidism ([address] E21.3    BMI 33.0-33.9,adult Z68.33    Type 2 diabetes mellitus with diabetic neuropathy ([address] E11.40    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 (HCC) I13.10, N18.4    Primary hyperparathyroidism ([address] E21.0    Gastroesophageal reflux disease without esophagitis K21.9    Peripheral vascular disease ([address] I73.9     Past Medical History:   Diagnosis Date    Anemia of chronic disease 6/19/2018    Anxiety     Aortic stenosis, moderate     Back pain     CAD (coronary artery disease)     CKD (chronic kidney disease) stage 3, GFR 30-59 ml/min ([address]     Constipation     Diabetes ([address]     Diverticulosis 2007    DM (diabetes mellitus) ([address]     AODM    Fibroid 1972    GERD (gastroesophageal reflux disease)     Goiter, non-toxic     Hernia, hiatal     Hyperlipemia     Hypertension     Hypokalemia     Menopause     Osteoarthritis 2006    bursitis R shoulder    Osteoarthritis of right knee     PVC (premature ventricular contraction)     H/O PVC's    Raynaud disease     Venous stasis     Wears hearing aid     both ears      Past Surgical History:   Procedure Laterality Date    HX BREAST BIOPSY Bilateral     x5    HX CATARACT REMOVAL  2004 bilateral    HX COLONOSCOPY  2002, 08/2007    HX DILATION AND CURETTAGE  1960's    HX HYSTERECTOMY  70's    BSO    HX OTHER SURGICAL  2001    sigmoidoscopy     Current Outpatient Medications   Medication Sig Dispense Refill    propranoloL (INDERAL) 20 mg tablet Take 1 Tablet by mouth two (2) times a day. 20 Tablet 0    gabapentin (NEURONTIN) 100 mg capsule TAKE 1 TO 2 CAPSULES BY MOUTH THREE TIMES DAILY. MAX DAILY AMOUNT: 600  Capsule 1    cinacalcet (SENSIPAR) 30 mg tablet cinacalcet 30 mg tablet      furosemide (LASIX) 40 mg tablet TAKE 1 TABLET BY MOUTH DAILY 90 Tablet 1    hydrocortisone valerate (WEST-JACLYN) 0.2 % ointment Apply  to affected area two (2) times a day. use thin layer 15 g 0    omeprazole (PRILOSEC) 40 mg capsule TAKE ONE CAPSULE BY MOUTH EVERY MORNING TO CONTROL STOMACH ACID 90 Capsule 1    lovastatin (MEVACOR) 40 mg tablet TAKE 1 TAB BY MOUTH NIGHTLY FOR CHOLESTEROL LOWERING 90 Tablet 1    amLODIPine (NORVASC) 5 mg tablet Take 1 Tablet by mouth daily. 90 Tablet 1    hydrALAZINE (APRESOLINE) 100 mg tablet TAKE 1 TAKE ONE TABLET EVERY__ HOURS TAKE ONE TABLET EVERY__ HOURS TAB BY MOUTH THREE(3) TIMES DAILY 270 Tablet 1    irbesartan (AVAPRO) 300 mg tablet TAKE 1 TAB BY MOUTH NIGHTLY. 90 Tablet 1    fluticasone propionate (FLONASE) 50 mcg/actuation nasal spray SPRAY 1 PUFF IN EACH NOSTRIL ONCE DAILY 1 Bottle 5    ferrous sulfate (Iron) 325 mg (65 mg iron) tablet Take  by mouth Daily (before breakfast).  cetirizine (ZYRTEC) 10 mg tablet Take 10 mg by mouth.  TRUE METRIX GLUCOSE TEST STRIP strip       aspirin delayed-release 81 mg tablet Take 1 Tab by mouth daily. 30 Tab 0    acetaminophen (TYLENOL EXTRA STRENGTH) 500 mg tablet Take 1 Tab by mouth every six (6) hours as needed for Pain.  30 Tab 1     Allergies   Allergen Reactions    Metformin Other (comments)     Kidneys effected       Family History   Problem Relation Age of Onset    Diabetes Brother    Vikas David Hypertension Mother     Diabetes Brother      Social History     Tobacco Use    Smoking status: Never Smoker    Smokeless tobacco: Never Used   Substance Use Topics    Alcohol use: No     Alcohol/week: 0.0 standard drinks         Andrews Sargent NP

## 2022-01-24 NOTE — PROGRESS NOTES
Subjective:     CC: AWV    Zhao Soto is a 80 y.o. female who presents today for an AWV. This is also a 3 month follow up for HTN, diabetes, and CKD stage 4. She is accompanied by her  and her son Gayathri Handy who lives with her and her . New issues:  She went to the ER on 1-18-22 with c/o shaking. She was stable in the ED. Hemoglobin was stable at 8.9, BUN 42 and creatinine 2.43 (stable), sugar was 135, anion gap was normal, sodium was 131, urinalysis without acute process. Evidence did not appear to have evidence of sepsis or infection on exam.  CT of the head was without evidence of sinusitis or other acute process. Chest x-ray was unremarkable. Results were discussed with patient and family. 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 The ER doctor recommended Propranolol however he asked her to check with her PCP first as her DBP was very low. Today her tremor has calmed down but it is still present in both hands. Her DBP is again very low so we discussed the option of increasing her dose of Gabapentin instead and she is agreeable. Right ear pain  Her right ear is still bothering her. Associated with dizziness and nausea. Pain is sharp and shooting. It comes and goes. Also reports tinnitis and hearing loss in that ear. States it has been a problem for years (Meniere's ? ). At her last OV on 1-5-22 she reported more nasal congestion than usual from her allergies. She also reported headaches. She was tx'd with an oral cephalosporin, but it has not improved the ear pain. HTN with aortic stenosis  BP stable for her age and comorbidities. She denies CP or SOB. Minimal leg swelling. She is on Irbesartan 300mg daily, Amlodipine 5mg daily, Lasix 40mg daily, and Hydralazine 100mg TID. Over the summer she had reported a fall at home due to leg weakness.  Using a walker, son living with them now so she is not alone. At the last OV we had discussed the option of starting PT for leg strengthening exercises but son stated she had exceeded her PT limits according to the insurance company. After her fall she saw cardiologist Dr Alton Palomino 8/6/21 who reported this was actually a syncopal episode. He was trying to r/o orthostatic hypotension (including Parkinson's), worsening (chronic) anemia, dehydration/worsening renal, arrhythmia including tiffanie/tachy. Holter monitor x 48 hrs was ordered- it showed normal rhythm with HR , only occasional skipped beats and one 5 beat run of PAT. ECHO done in  showed:     · LV: Estimated LVEF is 60 - 65%. Visually measured ejection fraction. Normal cavity size, systolic function (ejection fraction normal) and diastolic function. Upper normal wall thickness. Age-appropriate left ventricular diastolic function. · LA: Severely dilated left atrium. Left Atrium volume index is 40 mL/m2. · AV: Aortic valve leaflet calcification present. Aortic valve mean gradient is 33.5 mmHg. Aortic valve area is 1.2 cm2.  · MV: Mild mitral valve regurgitation is present. · TV: Mild tricuspid valve regurgitation is present. Right Ventricular Arterial Pressure (RVSP) is 43 mmHg. Pulmonary hypertension found to be mild. CKD stage 4 with anemia  She is followed by nephrologist Dr Kelly Smith. Hx of iron infusions for anemia. She is now on Sensipar for secondary hyperparathyroidism. Lab Results   Component Value Date/Time    Creatinine 2.43 (H) 01/18/2022 09:20 PM     Lab Results   Component Value Date/Time    WBC 4.0 01/18/2022 09:20 PM    HGB (POC) 6.6 (A) 11/17/2014 11:59 AM    HGB 8.9 (L) 01/18/2022 09:20 PM    HCT (POC) 19.5 (A) 11/17/2014 11:59 AM    HCT 26.2 (L) 01/18/2022 09:20 PM    PLATELET 357 73/62/6940 09:20 PM    MCV 94.9 01/18/2022 09:20 PM     Son mentions she has been referred to heme-onc Dr Steph Canas but he is not sure why. Upcoming appt soon.     Type 2 Diabetes with neuropathy, diet controlled   Lab Results   Component Value Date/Time    Hemoglobin A1c 6.1 (H) 06/24/2021 01:45 PM     Checks BS at home in the morning. States they are doing \"good. \"  No episodes of symptomatic hypoglycemia. She does not take any medications for her diabetes. She is on Gabapentin 100mg TID prn for neuropathy. Has painful tingling in feet at times, sometimes the 100mg is not enough. Last eye exam: this was done in August.   Last foot exam: Sees podiatrist Dr. Arlen Gonzalez     HLD  Stable, takes lovastatin. Lab Results   Component Value Date/Time    Cholesterol, total 108 12/16/2020 12:00 AM    HDL Cholesterol 35 (L) 12/16/2020 12:00 AM    LDL, calculated 54 12/16/2020 12:00 AM    LDL, calculated 56 02/26/2019 03:04 PM    VLDL, calculated 19 12/16/2020 12:00 AM    VLDL, calculated 19 02/26/2019 03:04 PM    Triglyceride 101 12/16/2020 12:00 AM     Chronic right shoulder pain  She was referred to Dr. Edgardo Johnston recently for right shoulder pain. MRI showed chronic degenerative changes and he is planning on trying injections rather than surgical intervention. She has had 2 injections that have helped relieve the pain. GERD  Stable, no breakthrough heartburn or alarm signs. Taking omeprazole 40 mg daily. Hyperparathyroidism  She has been followed by Endo Dr Cecilia Jo. Last seen 4-2021. According to his last note, \"Her thyroid nodules mostly appear as pseudonodules, meaning that there is chronic heterogenous appearance giving the resemblance of thyroid nodules whereas most of them are not actually true nodules. The nodules that were present did not appear suspicious. Ultrasound can be repeated around Nov 2021 together with next Bone density scan. Osteopenia  She is on Prolia for advanced osteopenia. Last injection was in November 2021.      Healthcare maintenance  PNA vaccines up to date  Shingrix up to date  She rec'd both Moderna vaccines and was boosted in   Flu shot up to date    Patient Active Problem List   Diagnosis Code    Encounter for long-term (current) use of other medications Z79.899    Type 2 diabetes mellitus with stage 4 chronic kidney disease, without long-term current use of insulin ([facility]) E11.22, N18.4    Pure hypercholesterolemia E78.00    Anemia of chronic disease D63.8    Bradycardia R00.1    Aortic stenosis, moderate I35.0    Hyperparathyroidism ([facility]) E21.3    BMI 33.0-33.9,adult Z68.33    Type 2 diabetes mellitus with diabetic neuropathy ([facility]) E11.40    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     Diverticulosis 2007    DM (diabetes mellitus) ([facility]     AODM    Fibroid 1972    GERD (gastroesophageal reflux disease)     Goiter, non-toxic     Hernia, hiatal     Hyperlipemia     Hypertension     Hypokalemia     Menopause     Osteoarthritis 2006    bursitis R shoulder    Osteoarthritis of right knee     PVC (premature ventricular contraction)     H/O PVC's    Raynaud disease     Venous stasis     Wears hearing aid     both ears         Current Outpatient Medications:     propranoloL (INDERAL) 20 mg tablet, Take 1 Tablet by mouth two (2) times a day., Disp: 20 Tablet, Rfl: 0    gabapentin (NEURONTIN) 100 mg capsule, TAKE 1 TO 2 CAPSULES BY MOUTH THREE TIMES DAILY.  MAX DAILY AMOUNT: 600 MG, Disp: 180 Capsule, Rfl: 1    cinacalcet (SENSIPAR) 30 mg tablet, cinacalcet 30 mg tablet, Disp: , Rfl:     furosemide (LASIX) 40 mg tablet, TAKE 1 TABLET BY MOUTH DAILY, Disp: 90 Tablet, Rfl: 1   hydrocortisone valerate (WEST-JACLYN) 0.2 % ointment, Apply  to affected area two (2) times a day. use thin layer, Disp: 15 g, Rfl: 0    omeprazole (PRILOSEC) 40 mg capsule, TAKE ONE CAPSULE BY MOUTH EVERY MORNING TO CONTROL STOMACH ACID, Disp: 90 Capsule, Rfl: 1    lovastatin (MEVACOR) 40 mg tablet, TAKE 1 TAB BY MOUTH NIGHTLY FOR CHOLESTEROL LOWERING, Disp: 90 Tablet, Rfl: 1    amLODIPine (NORVASC) 5 mg tablet, Take 1 Tablet by mouth daily. , Disp: 90 Tablet, Rfl: 1    hydrALAZINE (APRESOLINE) 100 mg tablet, TAKE 1 TAKE ONE TABLET EVERY__ HOURS TAKE ONE TABLET EVERY__ HOURS TAB BY MOUTH THREE(3) TIMES DAILY, Disp: 270 Tablet, Rfl: 1    irbesartan (AVAPRO) 300 mg tablet, TAKE 1 TAB BY MOUTH NIGHTLY., Disp: 90 Tablet, Rfl: 1    fluticasone propionate (FLONASE) 50 mcg/actuation nasal spray, SPRAY 1 PUFF IN EACH NOSTRIL ONCE DAILY, Disp: 1 Bottle, Rfl: 5    ferrous sulfate (Iron) 325 mg (65 mg iron) tablet, Take  by mouth Daily (before breakfast). , Disp: , Rfl:     cetirizine (ZYRTEC) 10 mg tablet, Take 10 mg by mouth., Disp: , Rfl:     TRUE METRIX GLUCOSE TEST STRIP strip, , Disp: , Rfl:     aspirin delayed-release 81 mg tablet, Take 1 Tab by mouth daily. , Disp: 30 Tab, Rfl: 0    acetaminophen (TYLENOL EXTRA STRENGTH) 500 mg tablet, Take 1 Tab by mouth every six (6) hours as needed for Pain., Disp: 30 Tab, Rfl: 1    Allergies   Allergen Reactions    Metformin Other (comments)     Kidneys effected       Past Surgical History:   Procedure Laterality Date    HX BREAST BIOPSY Bilateral     x5    HX CATARACT REMOVAL  2004    bilateral    HX COLONOSCOPY  2002, 08/2007    HX DILATION AND CURETTAGE  1960's    HX HYSTERECTOMY  66's    BSO    HX OTHER SURGICAL  2001    sigmoidoscopy       Social History     Tobacco Use   Smoking Status Never Smoker   Smokeless Tobacco Never Used       Social History     Socioeconomic History    Marital status:    Tobacco Use    Smoking status: Never Smoker    Smokeless tobacco: Never Used   Substance and Sexual Activity    Alcohol use: No     Alcohol/week: 0.0 standard drinks    Drug use: No    Sexual activity: Never   Social History Narrative    Happily . No concern for abuse. Exercise: none    Diet: Careful. Wear Seatbelts               Family History   Problem Relation Age of Onset    Diabetes Brother     Hypertension Mother     Diabetes Brother        ROS:  Gen: denies fever, chills, or fatigue  HEENT:+chronic right ear pain w/ tinnitus and hearing loss. Denies H/A, nasal congestion, or sore throat  Resp: denies dyspnea, cough, or wheezing  CV: denies chest pain, pressure, or palpitations  Extremeties: + mild swelling in ankles  GI[de-identified] denies abdominal pain, nausea, vomiting, diarrhea, or constipation  : denies dysuria, hematuria, urinary frequency or urgency  Neuro: + occas dizziness r/t right ear, +chronic tremor in both hands, +chronic numbness/tingling in feet, +mild weakness in both legs  Skin: denies rashes or new lesions   Psych: denies anxiety, depression, kenneth, or other changes in mood    Objective:     Visit Vitals  BP (!) 144/55 (BP 1 Location: Left upper arm)   Pulse 64   Temp 97.4 °F (36.3 °C) (Temporal)   Resp 18   Ht 5' (1.524 m)   Wt 159 lb 4 oz (72.2 kg)   SpO2 97%   BMI 31.10 kg/m²     General: Alert and oriented. No acute distress. Sitting in W/C. Well nourished. HEENT :  Eyes: Sclera white, conjunctiva clear. PERRLA. Extra ocular movements intact. Ears: R: +canal mildly erythematous and edematous. Small amount of cerumen present. TM is normal.  L: TM normal, canal clear  Neck: Supple with FROM. Lungs: Breathing even and unlabored. All lobes clear to auscultation bilaterally   Heart :RRR, +chronic systolic murmur  Extremities: +mild edema to bilateral ankles   Musculoskeletal: +multiple ganglion cysts to bilateral wrists, non-tender  Neuro: +mild tremor in bilateral hands.  Cranial nerves grossly normal.  Psych: Mood and thought content appropriate for situation. Dressed appropriately and with good hygiene. Skin: Warm, dry, and intact. No lesions or discoloration. Assessment/ Plan:     Hypertensive HD with CKD stage IV  BP stable  Cont current meds  Low sodium diet   Elevated legs  Go to ER or RTO for CP, SOB, dizziness, or worsening swelling. F/U with cardiologist as scheduled  F/U with nephrologist as scheduled    Benign Familial Tremor  Recommend avoiding propranolol due to low DBP  Try increasing Gabapentin to 200mg TID- cautioned about drowsiness and swelling  Stop med if any occur and notify me    Chronic right ear pain  Try Cortisporin ear drops  F/U prn if symptoms worsen or do not improve, will refer to ENT. Type 2 Diabetes with neuropathy, diet controlled   Check A1C- she would like to do this another day because she has not had much water today and knows she has poor veins  Cont low carb, sugar free diet  Cont to check BS daily  Keep eye exams and podiatry appts up to date  Cont Gabapentin TID prn for neuropathy    Hyperparathyroidism  Endo Dr Diane Lopez has left his practice  This will be managed by her nephrologist from now on as it related to her CKD  She is now on Sensipar    Osteopenia  Cont Prolia injections  Need repeat DEXA - order placed    Thyroid nodules  Repeat ultrasound due- order placed    HLD  Cont lovastatin     GERD  Stable  Cont PPI  Avoid triggers      RTO 4 months      Verbal and written instructions (see AVS) provided.  Patient expresses understanding of diagnosis and treatment plan. Health Maintenance Due   Topic Date Due    Medicare Yearly Exam  10/28/2021    MICROALBUMIN Q1  12/16/2021    Lipid Screen  12/16/2021    Foot Exam Q1  12/17/2021               Deloris Alves NP

## 2022-01-25 PROBLEM — M67.431 BILATERAL GANGLION CYSTS OF WRISTS: Chronic | Status: ACTIVE | Noted: 2022-01-01

## 2022-01-25 PROBLEM — M67.432 BILATERAL GANGLION CYSTS OF WRISTS: Chronic | Status: ACTIVE | Noted: 2022-01-01

## 2022-01-25 PROBLEM — E04.2 MULTIPLE THYROID NODULES: Chronic | Status: ACTIVE | Noted: 2022-01-01

## 2022-01-25 PROBLEM — M85.89 OSTEOPENIA OF MULTIPLE SITES: Chronic | Status: ACTIVE | Noted: 2022-01-01

## 2022-01-25 PROBLEM — G25.0 BENIGN FAMILIAL TREMOR: Chronic | Status: ACTIVE | Noted: 2022-01-01

## 2022-01-25 NOTE — TELEPHONE ENCOUNTER
Please call patient's son Saqib Gallo and ask him a few questions that I did not have time to address yesterday:    Did his mom recieve her Prolia injection back in November? This is for her osteopenia. Also did she see the endocrinologist in November? Also did she have a thyroid ultrasound in November to check her nodules? Has she had a DEXA scan in the past year? If not, I will order the thyroid ultrasound and DEXA scan.

## 2022-01-26 NOTE — TELEPHONE ENCOUNTER
Sayra Chou I forgot I think her kidney doctor is going to manage her hyperparathyroidism from now on but she does still need a thyroid ultrasound and DEXA so I have ordered both tests. Please have her son schedule them for her. Thanks!

## 2022-01-27 PROBLEM — D64.9 ANEMIA: Status: ACTIVE | Noted: 2022-01-01

## 2022-01-27 NOTE — PROGRESS NOTES
Reason for Visit:   Shweta Jacobo is a 80 y.o. female who is seen for eval of anemia    Treatment History:   Dx: Anemia--CKD  Tx: EPO to maintain hgb>10    History of Present Illness:   Referred by Nephrology for possible JANAY. Hx of CKD for years, progressive decline in hgb. Was told needed JANAY and would like to have done here due to difficulties with transportation. No symptoms. No bleeding or bruising.   Only complaint is of tremor which is familial.    Past Medical History:   Diagnosis Date    Anemia of chronic disease 6/19/2018    Anxiety     Aortic stenosis, moderate     Back pain     CAD (coronary artery disease)     CKD (chronic kidney disease) stage 3, GFR 30-59 ml/min ([facility])     Constipation     Diabetes ([facility]     Diverticulosis 2007    DM (diabetes mellitus) ([facility]     AODM    Fibroid 1972    GERD (gastroesophageal reflux disease)     Goiter, non-toxic     Hernia, hiatal     Hyperlipemia     Hypertension     Hypokalemia     Menopause     Multiple thyroid nodules 1/25/2022    Osteoarthritis 2006    bursitis R shoulder    Osteoarthritis of right knee     PVC (premature ventricular contraction)     H/O PVC's    Raynaud disease     Venous stasis     Wears hearing aid     both ears      Past Surgical History:   Procedure Laterality Date    HX BREAST BIOPSY Bilateral     x5    HX CATARACT REMOVAL  2004    bilateral    HX COLONOSCOPY  2002, 08/2007    HX DILATION AND CURETTAGE  1960's    HX HYSTERECTOMY  70's    BSO    HX OTHER SURGICAL  2001    sigmoidoscopy      Social History     Tobacco Use    Smoking status: Never Smoker    Smokeless tobacco: Never Used   Substance Use Topics    Alcohol use: No     Alcohol/week: 0.0 standard drinks      Family History   Problem Relation Age of Onset    Diabetes Brother     Hypertension Mother     Diabetes Brother      Current Outpatient Medications   Medication Sig    gabapentin (NEURONTIN) 100 mg capsule Take 1-2 Capsules by mouth three (3) times daily as needed (pain or tremor). Max Daily Amount: 600 mg.    neomycin-polymyxin-hydrocortisone, buffered, (PEDIOTIC) 3.5-10,000-1 mg/mL-unit/mL-% otic suspension Administer 3 Drops in right ear three (3) times daily for 10 days.  cinacalcet (SENSIPAR) 30 mg tablet cinacalcet 30 mg tablet    furosemide (LASIX) 40 mg tablet TAKE 1 TABLET BY MOUTH DAILY    omeprazole (PRILOSEC) 40 mg capsule TAKE ONE CAPSULE BY MOUTH EVERY MORNING TO CONTROL STOMACH ACID    lovastatin (MEVACOR) 40 mg tablet TAKE 1 TAB BY MOUTH NIGHTLY FOR CHOLESTEROL LOWERING    amLODIPine (NORVASC) 5 mg tablet Take 1 Tablet by mouth daily.  hydrALAZINE (APRESOLINE) 100 mg tablet TAKE 1 TAKE ONE TABLET EVERY__ HOURS TAKE ONE TABLET EVERY__ HOURS TAB BY MOUTH THREE(3) TIMES DAILY    irbesartan (AVAPRO) 300 mg tablet TAKE 1 TAB BY MOUTH NIGHTLY.  fluticasone propionate (FLONASE) 50 mcg/actuation nasal spray SPRAY 1 PUFF IN EACH NOSTRIL ONCE DAILY    ferrous sulfate (Iron) 325 mg (65 mg iron) tablet Take  by mouth Daily (before breakfast).  cetirizine (ZYRTEC) 10 mg tablet Take 10 mg by mouth.  aspirin delayed-release 81 mg tablet Take 1 Tab by mouth daily.  acetaminophen (TYLENOL EXTRA STRENGTH) 500 mg tablet Take 1 Tab by mouth every six (6) hours as needed for Pain.  hydrocortisone valerate (WEST-JACLYN) 0.2 % ointment Apply  to affected area two (2) times a day. use thin layer (Patient not taking: Reported on 1/27/2022)    TRUE METRIX GLUCOSE TEST STRIP strip  (Patient not taking: Reported on 1/27/2022)     No current facility-administered medications for this visit. Allergies   Allergen Reactions    Metformin Other (comments)     Kidneys effected        Review of Systems: A complete review of systems was obtained, negative except as described above.     Physical Exam:     Visit Vitals  /62   Pulse 62   Temp 97.2 °F (36.2 °C) (Oral)   Resp 16   Ht 5' 0.43\" (1.535 m)   Wt 159 lb 6.4 oz (72.3 kg)   SpO2 100%   BMI 30.69 kg/m²     ECOG PS: 1-2  General: No distress  Eyes: PERRLA, anicteric sclerae  HENT: Atraumatic, OP clear  Neck: Supple  Lymphatic: No cervical, supraclavicular, or inguinal adenopathy  Respiratory: CTAB, normal respiratory effort  CV: Normal rate, regular rhythm, no murmurs, no peripheral edema  GI: Soft, nontender, nondistended, no masses, no hepatomegaly, no splenomegaly  MS: Normal gait and station. Digits without clubbing or cyanosis. Skin: No rashes, ecchymoses, or petechiae. Normal temperature, turgor, and texture. Psych: Alert, oriented, appropriate affect, normal judgment/insight    Results:     Lab Results   Component Value Date/Time    WBC 4.0 01/18/2022 09:20 PM    HGB 8.9 (L) 01/18/2022 09:20 PM    HCT 26.2 (L) 01/18/2022 09:20 PM    PLATELET 514 88/17/2527 09:20 PM    MCV 94.9 01/18/2022 09:20 PM    ABS. NEUTROPHILS 1.9 01/18/2022 09:20 PM    HGB (POC) 6.6 (A) 11/17/2014 11:59 AM    HCT (POC) 19.5 (A) 11/17/2014 11:59 AM     Lab Results   Component Value Date/Time    Sodium 131 (L) 01/18/2022 09:20 PM    Potassium 3.7 01/18/2022 09:20 PM    Chloride 95 (L) 01/18/2022 09:20 PM    CO2 22 01/18/2022 09:20 PM    Glucose 135 (H) 01/18/2022 09:20 PM    BUN 42 (H) 01/18/2022 09:20 PM    Creatinine 2.43 (H) 01/18/2022 09:20 PM    GFR est AA 23 (L) 01/18/2022 09:20 PM    GFR est non-AA 19 (L) 01/18/2022 09:20 PM    Calcium 8.5 01/18/2022 09:20 PM    Glucose (POC) 144 (H) 01/18/2022 09:12 PM     Lab Results   Component Value Date/Time    Bilirubin, total 0.3 01/18/2022 09:20 PM    ALT (SGPT) 12 01/18/2022 09:20 PM    Alk. phosphatase 58 01/18/2022 09:20 PM    Protein, total 7.3 01/18/2022 09:20 PM    Albumin 3.5 01/18/2022 09:20 PM    Globulin 3.8 01/18/2022 09:20 PM           Assessment:   1) Anemia  2) CKD-- Stage IV      Plan:   1) Check EPO and start analogue. If doesn't improve then do more extensive work up. Follow up with me in 3 months.   Will go ahead and start retacrit.     Signed By: Cam Burch MD

## 2022-01-27 NOTE — PROGRESS NOTES
Jose Manuel Dominguez is a 80 y.o. female new patient today referred for Anemia. Patient takes 1 iron tab daily.

## 2022-01-31 NOTE — PROGRESS NOTES
Blood sugar looks great, kidney function is slightly worse and her magnesium level is low- please forward results to her kidney doctor Dr Shana Franklin as he may want her to start a magnesium supplement daily

## 2022-02-04 NOTE — PROGRESS NOTES
Mom aware- agreeable [facility] Progress Note    Date: 2022    Name: Bhavya Gibson    MRN: [id number]         : 10/5/1931      Ms. Prakash Wilson was assessed and education was provided. Ms. Maikel Smallwood vitals were reviewed and patient was observed for 5 minutes prior to treatment. Visit Vitals  /75 (BP 1 Location: Left upper arm)   Pulse 76   Temp 97.6 °F (36.4 °C)   Resp 17   SpO2 98%   Breastfeeding No         Retacrit 20  was administered subcutaneous in left arm. Ms. Prakash Wilson tolerated well, and had no complaints. Patient 's armband removed and shredded. Ms. Prakash Wilson was discharged from [place] [address] in stable condition at 1430 . She is to return on   for her next appointment.     Cristine Pryor RN  2022  4:40 PM

## 2022-02-05 NOTE — PROGRESS NOTES
Please let her son Yajaira Velazquez know that her thyroid ultrasound shows unchanged nodules from previous ultrasound.  We can cont to monitor

## 2022-02-07 PROBLEM — M81.6 LOCALIZED OSTEOPOROSIS WITHOUT CURRENT PATHOLOGICAL FRACTURE: Status: ACTIVE | Noted: 2022-01-01

## 2022-02-07 NOTE — PROGRESS NOTES
Bone thinning has worsened, she now has Osteoporosis. Her kidney failure could be contributing. Recommend continuing Prolia.

## 2022-02-18 NOTE — PROGRESS NOTES
[facility] Progress Note    Date: 2022    Name: Dulce Maria Velazquez    MRN: [id number]         : 10/5/1931      Ms. Marbella Martin was assessed and education was provided. She denies new problems. Arrived per wheelchair but states she walks with walker at home. Ms. Perry James vitals were reviewed and patient was observed for 5 minutes prior to treatment. Lab work drawn left antecubital without difficulty. Visit Vitals  BP (!) 150/70 (BP 1 Location: Left upper arm, BP Patient Position: Sitting)   Pulse 62   Temp 98.4 °F (36.9 °C)   Resp 18   Wt 71.7 kg (158 lb)   BMI 30.42 kg/m²       Lab results were obtained and reviewed. Recent Results (from the past 12 hour(s))   HGB & HCT    Collection Time: 22  2:15 PM   Result Value Ref Range    HGB 9.5 (L) 11.5 - 16.0 g/dL    HCT 29.2 (L) 35.0 - 47.0 %   FERRITIN    Collection Time: 22  2:15 PM   Result Value Ref Range    Ferritin 340 (H) 8 - 252 NG/ML       Retacrit 28045 units was administered subcutaneous in  Left upper arm and site intact. Ms. Marbella Martin tolerated well, and had no complaints. Patient armband removed and shredded. Ms. Marbella Martin was discharged from [place] [address] in stable condition at 1500. She is to return on  at 2 pm for her next appointment.     Sophia Albright RN  2022  3:09 PM

## 2022-02-24 NOTE — TELEPHONE ENCOUNTER
----- Message from Bigg Mendez sent at 2/24/2022  7:56 AM EST -----  Subject: Message to Provider    QUESTIONS  Information for Provider? Please call daughter Soy Hagan back. Mother is   having a hard time swallowing when eating. No available appts today.  ---------------------------------------------------------------------------  --------------  CALL BACK INFO  What is the best way for the office to contact you? OK to leave message on   voicemail  Preferred Call Back Phone Number? [phone]  ---------------------------------------------------------------------------  --------------  SCRIPT ANSWERS  Relationship to Patient? Other  Representative Name? Debbie Coronel  Is the Representative on the appropriate HIPAA document in Epic?  Yes

## 2022-02-24 NOTE — TELEPHONE ENCOUNTER
Patients daughter said this has been going on for two weeks and patient feels it isnt the food but feels like something in her her throat

## 2022-02-24 NOTE — TELEPHONE ENCOUNTER
Returned daughter's call. Mom is c/o \"globus sensation. \" Because her mom was c/o right ear pain at the last visit I am going to refer her to ENT so he can address both problems. She may have an ear infection so will send script for Cefdinir. Daughter was given ENT Dr Naif Parikh and instructed to call and make appt. Referral order placed.

## 2022-02-25 NOTE — ED PROVIDER NOTES
EMERGENCY DEPARTMENT HISTORY AND PHYSICAL EXAM      Date: 2/25/2022  Patient Name: Linda Hong    History of Presenting Illness     Chief Complaint   Patient presents with    Sore Throat       History Provided By: Patient    HPI: Linda Daily, 80 y.o. female with PMHx significant for hypertension, CKD, aortic stenosis, hyperlipidemia, presents ambulatory to the ED with cc of \" feels like something is stuck in my throat. \"  Is been going on for a month. She also reports some right ear pain. She denies any change in the symptoms over the past month. She states she came today because she was just tired of it. She has been eating and drinking without difficulty. She spoke with her primary doctor yesterday about the symptoms and they referred her to ENT Dr. Demario Luther and prescribed her cefdinir. She has had 1 dose of the antibiotic. She denies any fevers or chills. She just has a sensation that something is stuck in her throat. Denies any nausea or vomiting. Denies any external neck swelling. No rashes. No cough. No voice change. Chart review does show the presence of prior diagnoses of goiter and multiple thyroid nodules. She has been followed by endocrinology Dr. Ángela Marsh. PMHx: Significant for CKd, hypertension, aortic stenosis, DM, chronic anemia  PSHx: Significant for hysterectomy, cataract surgery  Social Hx:     There are no other complaints, changes, or physical findings at this time. PCP: Kingsley Liriano NP    No current facility-administered medications on file prior to encounter. Current Outpatient Medications on File Prior to Encounter   Medication Sig Dispense Refill    cefdinir (OMNICEF) 300 mg capsule Take 1 Capsule by mouth daily for 10 days.  10 Capsule 0    omeprazole (PRILOSEC) 40 mg capsule TAKE 1 CAPSULE BY MOUTH EVERY MORNING TO CONTROL STOMACH ACID 90 Capsule 1    amLODIPine (NORVASC) 5 mg tablet TAKE 1 TABLET BY MOUTH DAILY 90 Tablet 1    lovastatin (MEVACOR) 40 mg tablet TAKE 1 TABLET BY MOUTH NIGHTLY FOR CHOLESTEROL LOWERING 90 Tablet 1    irbesartan (AVAPRO) 300 mg tablet TAKE 1 TABLET BY MOUTH NIGHTLY 90 Tablet 1    hydrALAZINE (APRESOLINE) 100 mg tablet TAKE 1 TABLET BY MOUTH THREE TIMES DAILY 270 Tablet 1    gabapentin (NEURONTIN) 100 mg capsule Take 1-2 Capsules by mouth three (3) times daily as needed (pain or tremor). Max Daily Amount: 600 mg. 180 Capsule 3    cinacalcet (SENSIPAR) 30 mg tablet cinacalcet 30 mg tablet      furosemide (LASIX) 40 mg tablet TAKE 1 TABLET BY MOUTH DAILY 90 Tablet 1    hydrocortisone valerate (WEST-JACLYN) 0.2 % ointment Apply  to affected area two (2) times a day. use thin layer (Patient not taking: Reported on 1/27/2022) 15 g 0    fluticasone propionate (FLONASE) 50 mcg/actuation nasal spray SPRAY 1 PUFF IN EACH NOSTRIL ONCE DAILY 1 Bottle 5    ferrous sulfate (Iron) 325 mg (65 mg iron) tablet Take  by mouth Daily (before breakfast).  cetirizine (ZYRTEC) 10 mg tablet Take 10 mg by mouth.  TRUE METRIX GLUCOSE TEST STRIP strip  (Patient not taking: Reported on 1/27/2022)      aspirin delayed-release 81 mg tablet Take 1 Tab by mouth daily. 30 Tab 0    acetaminophen (TYLENOL EXTRA STRENGTH) 500 mg tablet Take 1 Tab by mouth every six (6) hours as needed for Pain.  30 Tab 1       Past History     Past Medical History:  Past Medical History:   Diagnosis Date    Anemia 1/27/2022    Anemia of chronic disease 6/19/2018    Anxiety     Aortic stenosis, moderate     Back pain     CAD (coronary artery disease)     CKD (chronic kidney disease) stage 3, GFR 30-59 ml/min ([facility]     Constipation     Diabetes ([address]     Diverticulosis 2007    DM (diabetes mellitus) ([facility]     AODM    Fibroid 1972    GERD (gastroesophageal reflux disease)     Goiter, non-toxic     Hernia, hiatal     Hyperlipemia     Hypertension     Hypokalemia     Menopause     Multiple thyroid nodules 1/25/2022    Osteoarthritis 2006    bursitis R shoulder    Osteoarthritis of right knee     PVC (premature ventricular contraction)     H/O PVC's    Raynaud disease     Venous stasis     Wears hearing aid     both ears       Past Surgical History:  Past Surgical History:   Procedure Laterality Date    HX BREAST BIOPSY Bilateral     x5    HX CATARACT REMOVAL  2004    bilateral    HX COLONOSCOPY  2002, 08/2007    HX DILATION AND CURETTAGE  1960's    HX HYSTERECTOMY  66's    BSO    HX OTHER SURGICAL  2001    sigmoidoscopy       Family History:  Family History   Problem Relation Age of Onset    Diabetes Brother     Hypertension Mother     Diabetes Brother        Social History:  Social History     Tobacco Use    Smoking status: Never Smoker    Smokeless tobacco: Never Used   Vaping Use    Vaping Use: Never used   Substance Use Topics    Alcohol use: No     Alcohol/week: 0.0 standard drinks    Drug use: No       Allergies: Allergies   Allergen Reactions    Metformin Other (comments)     Kidneys effected         Review of Systems   Review of Systems   Constitutional: Negative for activity change, chills and fever. HENT: Positive for ear pain. Negative for congestion. Eyes: Negative for pain and redness. Respiratory: Negative for cough, chest tightness and shortness of breath. Cardiovascular: Negative for chest pain and palpitations. Gastrointestinal: Negative for abdominal pain, diarrhea, nausea and vomiting. Genitourinary: Negative for dysuria, frequency and urgency. Musculoskeletal: Negative for back pain and neck pain. Skin: Negative for rash. Neurological: Negative for syncope, light-headedness and headaches. Psychiatric/Behavioral: Negative for confusion. All other systems reviewed and are negative. Physical Exam   Physical Exam  Vitals and nursing note reviewed. Constitutional:       General: She is not in acute distress. Appearance: She is well-developed. She is not diaphoretic.    HENT:      Head: Normocephalic. Right Ear: Tympanic membrane and ear canal normal.      Left Ear: Tympanic membrane and ear canal normal.      Nose: Nose normal.      Mouth/Throat:      Mouth: Mucous membranes are moist.      Pharynx: Oropharynx is clear. Uvula midline. No pharyngeal swelling, oropharyngeal exudate, posterior oropharyngeal erythema or uvula swelling. Eyes:      General: No scleral icterus. Conjunctiva/sclera: Conjunctivae normal.      Pupils: Pupils are equal, round, and reactive to light. Neck:      Thyroid: No thyromegaly. Vascular: No JVD. Trachea: No tracheal deviation. Cardiovascular:      Rate and Rhythm: Normal rate and regular rhythm. Heart sounds: No murmur heard. No friction rub. No gallop. Pulmonary:      Effort: Pulmonary effort is normal. No respiratory distress. Breath sounds: Normal breath sounds. No stridor. No wheezing or rales. Abdominal:      General: Bowel sounds are normal. There is no distension. Palpations: Abdomen is soft. Tenderness: There is no abdominal tenderness. There is no guarding or rebound. Musculoskeletal:         General: Normal range of motion. Cervical back: Normal range of motion and neck supple. Lymphadenopathy:      Cervical: No cervical adenopathy. Skin:     General: Skin is warm and dry. Findings: No erythema or rash. Neurological:      Mental Status: She is alert and oriented to person, place, and time. Cranial Nerves: No cranial nerve deficit. Motor: No abnormal muscle tone. Coordination: Coordination normal.   Psychiatric:         Behavior: Behavior normal.             Diagnostic Study Results     Labs -   No results found for this or any previous visit (from the past 12 hour(s)). Radiologic Studies -   CT NECK SOFT TISSUE WO CONT   Final Result      Cervical torticollis   Thyromegaly with at least one nodule   Bilateral anterior TMJ subluxation.    Incidental bilateral vertebral and carotid artery calcification. CT Results  (Last 48 hours)               02/25/22 1449  CT NECK SOFT TISSUE WO CONT Final result    Impression:      Cervical torticollis   Thyromegaly with at least one nodule   Bilateral anterior TMJ subluxation. Incidental bilateral vertebral and carotid artery calcification. Narrative:  EXAM: CT NECK SOFT TISSUE WO CONT       INDICATION: throat pain       COMPARISON: None. CONTRAST: None. TECHNIQUE: Multislice helical CT was performed from the mid calvarium to the   aortic arch without intravenous contrast administration. Contiguous 2.5 mm   axial images were reconstructed and lung and soft tissue windows were generated. Coronal and sagittal reformations were generated. CT dose reduction was   achieved through use of a standardized protocol tailored for this examination   and automatic exposure control for dose modulation. FINDINGS:       Bilateral vertebral artery calcification is present. Bilateral carotid artery   calcification is present. No mass or adenopathy is identified within the neck. The thyroid gland, submandibular salivary glands and parotid glands are normal.       There is moderate thyromegaly. With at least a 9 mm right thyroid nodule (series   2, image 48). No nasopharyngeal, pharyngeal or laryngeal mass is identified. No abnormalities are identified in the visualized portions of the brain or   orbits. The visualized mastoid air cells and paranasal sinuses are clear. The visualized portions of the lung apices are clear. There is anterior subluxation of both mandibular condyles. There is a cervical torticollis, the apex directed to the left. CXR Results  (Last 48 hours)    None            Medical Decision Making   I am the first provider for this patient.     I reviewed the vital signs, available nursing notes, past medical history, past surgical history, family history and social history. Vital Signs-Reviewed the patient's vital signs. Patient Vitals for the past 12 hrs:   Temp Pulse Resp BP SpO2   02/25/22 1336 98.4 °F (36.9 °C) 64 18 138/64 99 %           Records Reviewed: Nursing notes reviewed    Provider Notes (Medical Decision Making):   DDX: Globus sensation x1 month. Possibly related to previously diagnosed goiter and thyroid nodules. Will obtain CT soft tissue neck. Patient tolerating p.o. solids and liquids without difficulty. ED Course:   Initial assessment performed. The patients presenting problems have been discussed, and they are in agreement with the care plan formulated and outlined with them. I have encouraged them to ask questions as they arise throughout their visit. PROGRESS NOTE    Pt reevaluated. The soft tissue neck without any acute findings. Enlarged thyroid with an millimeter right thyroid nodule. Suspect this is because of patient's symptoms. No nasopharyngeal pharyngeal or laryngeal mass identified. Discharge home with ENT follow-up as planned. Written by Donavon De Guzman MD     Progress note:    Pt noted to be feeling better and ready for discharge. Updated pt and/or family on all final lab and/or  imaging findings. Will follow up as instructed. All questions have been answered, pt voiced understanding and agreement with plan. Specific return precautions provided as well as instructions to return to the ED should sx worsen at any time. Vital signs stable for discharge. I have also put together some discharge instructions for them that include: 1) educational information regarding their diagnosis, 2) how to care for their diagnosis at home, as well a 3) list of reasons why they would want to return to the ED prior to their follow-up appointment, should their condition change. Written by Donavon De Guzman MD        Critical Care Time:   0    Disposition:  Discharge    PLAN:  1.    Current Discharge Medication List 2.   Follow-up Information     Follow up With Specialties Details Why Contact Info    Nima Alcala NP Nurse Practitioner Schedule an appointment as soon as possible for a visit in 1 week  [address] [place] [address].      Junior Koby MD Otolaryngology Schedule an appointment as soon as possible for a visit in 1 week  [facility]  [phone]          Return to ED if worse     Diagnosis     Clinical Impression:   1. Thyroid nodule    2. Globus sensation              Please note that this dictation was completed with [employer], the computer voice recognition software. Quite often unanticipated grammatical, syntax, homophones, and other interpretive errors are inadvertently transcribed by the computer software. Please disregard these errors. Please excuse any errors that have escaped final proofreading.

## 2022-02-25 NOTE — ED NOTES
Written and verbal discharge instructions reviewed with patient. All discharge medications reviewed and explained. Understanding verbalized, all questions answered. ambualted with assistance to passenger side of the car

## 2022-03-04 NOTE — TELEPHONE ENCOUNTER
Verified patient with two patient identifiers. Pts daughter called to report that her mother was witnessed to have a syncopal episode by pts spouse. \"Pt came to within a few seconds. \"   No slurred speech, unusual weakness. Pt did not hit her head. Pt has been complaining of intermittent dizziness per her daughter. No BP, HR readings to report. Pts last echo shows AS mean gradient of 33. Scheduled an appt for 3/22 to hopefully have repeat echo done prior. Will discuss with NP regarding.

## 2022-03-10 NOTE — TELEPHONE ENCOUNTER
Spoke with the patient. Verified patient with two patient identifiers. Bridget's orders given and questions answered. Patients daughter verbalized understanding. 1.) Reschedule follow up to after the echo on the 24th.  2.) Arrange event monitor application appt. Dtr will call back.

## 2022-03-11 NOTE — PROGRESS NOTES
Subjective:     Chief Complaint   Patient presents with    Hip Pain     right hip down to foot and through the leg       Mellissa Donis is a 80 y.o. female who presents today with c/o posterior right hip pain that radiates all the way down the right leg. Reports muscle cramping in the calf. She has chronic tingling in the toes r/t diabetic neuropathy. The pain is aggravated with standing and walking. Started 1-2 weeks ago. Denies any injury. She has tried Tylenol without relief but riding her stationary bike does relieve the pain. Has not yet tried a heating pad. She cannot take NSAIDS due to CKD. Accompanied by daughter today. BP is elevated today but she rates her pain an 8/10 and normally it is much lower at home.       Patient Active Problem List   Diagnosis Code    Encounter for long-term (current) use of other medications Z79.899    Type 2 diabetes mellitus with stage 4 chronic kidney disease, without long-term current use of insulin ([facility] [address] E11.22, N18.4    Pure hypercholesterolemia E78.00    Anemia of chronic disease D63.8    Bradycardia R00.1    Aortic stenosis, moderate I35.0    Hyperparathyroidism ([address] E21.3    BMI 33.0-33.9,adult Z68.33    Type 2 diabetes mellitus with diabetic neuropathy (HCC) E11.40    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 (HCC) I13.10, N18.4    Primary hyperparathyroidism (HCC) E21.0    Gastroesophageal reflux disease without esophagitis K21.9    Peripheral vascular disease (HCC) I73.9    Benign familial tremor G25.0    Bilateral ganglion cysts of wrists M67.431, M67.432    Localized osteoporosis without current pathological fracture M81.6    Multiple thyroid nodules E04.2    Anemia D64.9       Past Medical History:   Diagnosis Date    Anemia 1/27/2022    Anemia of chronic disease 6/19/2018    Anxiety     Aortic stenosis, moderate     Back pain     CAD (coronary artery disease)     CKD (chronic kidney disease) stage 3, GFR 30-59 ml/min (HCC)     Constipation     Diabetes ([facility] [address]     Diverticulosis 2007    DM (diabetes mellitus) ([facility] [address]     AODM    Fibroid 1972    GERD (gastroesophageal reflux disease)     Goiter, non-toxic     Hernia, hiatal     Hyperlipemia     Hypertension     Hypokalemia     Menopause     Multiple thyroid nodules 1/25/2022    Osteoarthritis 2006    bursitis R shoulder    Osteoarthritis of right knee     PVC (premature ventricular contraction)     H/O PVC's    Raynaud disease     Venous stasis     Wears hearing aid     both ears         Current Outpatient Medications:     omeprazole (PRILOSEC) 40 mg capsule, TAKE 1 CAPSULE BY MOUTH EVERY MORNING TO CONTROL STOMACH ACID, Disp: 90 Capsule, Rfl: 1    amLODIPine (NORVASC) 5 mg tablet, TAKE 1 TABLET BY MOUTH DAILY, Disp: 90 Tablet, Rfl: 1    lovastatin (MEVACOR) 40 mg tablet, TAKE 1 TABLET BY MOUTH NIGHTLY FOR CHOLESTEROL LOWERING, Disp: 90 Tablet, Rfl: 1    irbesartan (AVAPRO) 300 mg tablet, TAKE 1 TABLET BY MOUTH NIGHTLY, Disp: 90 Tablet, Rfl: 1    hydrALAZINE (APRESOLINE) 100 mg tablet, TAKE 1 TABLET BY MOUTH THREE TIMES DAILY, Disp: 270 Tablet, Rfl: 1    gabapentin (NEURONTIN) 100 mg capsule, Take 1-2 Capsules by mouth three (3) times daily as needed (pain or tremor). Max Daily Amount: 600 mg., Disp: 180 Capsule, Rfl: 3    cinacalcet (SENSIPAR) 30 mg tablet, cinacalcet 30 mg tablet, Disp: , Rfl:     furosemide (LASIX) 40 mg tablet, TAKE 1 TABLET BY MOUTH DAILY, Disp: 90 Tablet, Rfl: 1    hydrocortisone valerate (WEST-JACLYN) 0.2 % ointment, Apply  to affected area two (2) times a day. use thin layer, Disp: 15 g, Rfl: 0    fluticasone propionate (FLONASE) 50 mcg/actuation nasal spray, SPRAY 1 PUFF IN EACH NOSTRIL ONCE DAILY, Disp: 1 Bottle, Rfl: 5    ferrous sulfate (Iron) 325 mg (65 mg iron) tablet, Take  by mouth Daily (before breakfast). , Disp: , Rfl:     cetirizine (ZYRTEC) 10 mg tablet, Take 10 mg by mouth., Disp: , Rfl:     TRUE METRIX GLUCOSE TEST STRIP strip, , Disp: , Rfl:     aspirin delayed-release 81 mg tablet, Take 1 Tab by mouth daily. , Disp: 30 Tab, Rfl: 0    acetaminophen (TYLENOL EXTRA STRENGTH) 500 mg tablet, Take 1 Tab by mouth every six (6) hours as needed for Pain., Disp: 30 Tab, Rfl: 1    Allergies   Allergen Reactions    Metformin Other (comments)     Kidneys effected       Past Surgical History:   Procedure Laterality Date    HX BREAST BIOPSY Bilateral     x5    HX CATARACT REMOVAL  2004    bilateral    HX COLONOSCOPY  2002, 08/2007    HX DILATION AND CURETTAGE  1960's    HX HYSTERECTOMY  70's    BSO    HX OTHER SURGICAL  2001    sigmoidoscopy       Social History     Tobacco Use   Smoking Status Never Smoker   Smokeless Tobacco Never Used       Social History     Socioeconomic History    Marital status:    Tobacco Use    Smoking status: Never Smoker    Smokeless tobacco: Never Used   Vaping Use    Vaping Use: Never used   Substance and Sexual Activity    Alcohol use: No     Alcohol/week: 0.0 standard drinks    Drug use: No    Sexual activity: Never   Social History Narrative    Happily . No concern for abuse. Exercise: none    Diet: Careful.     Wear Seatbelts               Family History   Problem Relation Age of Onset    Diabetes Brother     Hypertension Mother     Diabetes Brother        ROS:  Gen: denies fever, chills, or fatigue  Resp: denies dyspnea, cough, or wheezing  CV: denies chest pain, pressure, or palpitations  Extremeties: denies edema  Musculoskeletal: + pain in right hip and right leg, + muscle cramps in right calf  Denies joint heat, redness, or swelling  Neuro: +chronic numbness/tingling in toes, denies extremity weakness or dizziness  Skin: denies rashes or new lesions   Psych: denies anxiety, depression, kenneth, or other changes in mood      Objective:     Visit Vitals  BP (!) 166/63 (BP 1 Location: Left arm, BP Patient Position: Sitting)   Pulse (!) 58   Temp 97.4 °F (36.3 °C) (Temporal)   Resp 18   Wt 156 lb 8 oz (71 kg)   SpO2 97%   BMI 30.13 kg/m²     Body mass index is 30.13 kg/m². General: Alert and oriented. No acute distress but appears uncomfortable and she is leaning on her left side in the W/C. HEENT :  Eyes: Sclera white, conjunctiva clear. PERRLA. Extra ocular movements intact. Neck: Supple with FROM. Lungs: Breathing even and unlabored. All lobes clear to auscultation bilaterally   Heart :+chronic systolic murmur, Reg Rhythm  Extremities: Non-edematous, no palpable cords or knots  Back: No tenderness to palpation. Unable to assess ROM due to pain (sitting in W/C)  Musculoskeletal: R HIP: +TTP in anterior hip, there is no TTP over the lateral hip or trochanteric bursa. Unable to assess ROM due to pain (sitting in W/C)  R Knee: No TTP, heat, or erythema, FROM  Neuro: Cranial nerves grossly normal.  Sensory: Sensation intact to RLE. Psych: Mood and thought content appropriate for situation. Dressed appropriately and with good hygiene. Skin: Warm, dry, and intact. No lesions or discoloration. Assessment/ Plan:     R sciatica  She cannot take NSAIDS due to CKD  She has very well controlled type 2 diabetes so will give 80mg of methylprednisone IM today  She will call for any problems with her BS  Script sent for Tizanidine 2mg q HS prn pain (#30,0) - cautioned about drowsiness  Try heating pad to left hip/ lower back  Try to stretching the muscles in the lower back and R leg daily   F/U prn if symptoms worsen or do not improve. Verbal and written instructions (see AVS) provided.  Patient expresses understanding of diagnosis and treatment plan. Health Maintenance Due   Topic Date Due    Lipid Screen  12/16/2021    Foot Exam Q1  12/17/2021               Deloris Chavez NP

## 2022-03-11 NOTE — PATIENT INSTRUCTIONS
Sciatica: Care Instructions  Your Care Instructions     Sciatica (say \"zyr-CW-br-kuh\") is an irritation of one of the sciatic nerves, which come from the spinal cord in the lower back. The sciatic nerves and their branches extend down through the buttock to the foot. Sciatica can develop when an injured disc in the back irritates or presses against a spinal nerve root. Its main symptom is pain, numbness, or weakness that is often worse in the leg or foot than in the back. Sciatica often will improve and go away with time. Early treatment usually includes medicines and exercises to relieve pain. Follow-up care is a key part of your treatment and safety. Be sure to make and go to all appointments, and call your doctor if you are having problems. It's also a good idea to know your test results and keep a list of the medicines you take. How can you care for yourself at home? · Take pain medicines exactly as directed. ? If the doctor gave you a prescription medicine for pain, take it as prescribed. ? If you are not taking a prescription pain medicine, ask your doctor if you can take an over-the-counter medicine. · Use heat or ice to relieve pain. ? To apply heat, put a warm water bottle, heating pad set on low, or warm cloth on your back. Do not go to sleep with a heating pad on your skin. ? To use ice, put ice or a cold pack on the area for 10 to 20 minutes at a time. Put a thin cloth between the ice and your skin. · Avoid sitting if possible, unless it feels better than standing. · Alternate lying down with short walks. Increase your walking distance as you are able to without making your symptoms worse. · Do not do anything that makes your symptoms worse. When should you call for help? Call 911 anytime you think you may need emergency care. For example, call if:    · You are unable to move a leg at all.    Call your doctor now or seek immediate medical care if:    · You have new or worse symptoms in your legs or buttocks. Symptoms may include:  ? Numbness or tingling. ? Weakness. ? Pain.     · You lose bladder or bowel control. Watch closely for changes in your health, and be sure to contact your doctor if:    · You are not getting better as expected. Where can you learn more? Go to http://www.gray.com/  Enter Z239 in the search box to learn more about \"Sciatica: Care Instructions. \"  Current as of: July 1, 2021               Content Version: 13.2  © 2006-2022 [employer]. Care instructions adapted under license by [employer] (which disclaims liability or warranty for this information). If you have questions about a medical condition or this instruction, always ask your healthcare professional. [address] any warranty or liability for your use of this information. Sciatica: Exercises  Introduction  Here are some examples of typical rehabilitation exercises for your condition. Start each exercise slowly. Ease off the exercise if you start to have pain. Your doctor or physical therapist will tell you when you can start these exercises and which ones will work best for you. When you are not being active, find a comfortable position for rest. Some people are comfortable on the floor or a medium-firm bed with a small pillow under their head and another under their knees. Some people prefer to lie on their side with a pillow between their knees. Don't stay in one position for too long. Take short walks (10 to 20 minutes) every 2 to 3 hours. Avoid slopes, hills, and stairs until you feel better. Walk only distances you can manage without pain, especially leg pain. How to do the exercises  Back stretches    1. Get down on your hands and knees on the floor. 2. Relax your head and allow it to droop. Round your back up toward the ceiling until you feel a nice stretch in your upper, middle, and lower back.  Hold this stretch for as long as it feels comfortable, or about 15 to 30 seconds. 3. Return to the starting position with a flat back while you are on your hands and knees. 4. Let your back sway by pressing your stomach toward the floor. Lift your buttocks toward the ceiling. 5. Hold this position for 15 to 30 seconds. 6. Repeat 2 to 4 times. Follow-up care is a key part of your treatment and safety. Be sure to make and go to all appointments, and call your doctor if you are having problems. It's also a good idea to know your test results and keep a list of the medicines you take. Where can you learn more? Go to http://www.gray.com/  Enter C491 in the search box to learn more about \"Sciatica: Exercises. \"  Current as of: July 1, 2021               Content Version: 13.2  © 6650-4577 [employer]. Care instructions adapted under license by [employer] (which disclaims liability or warranty for this information). If you have questions about a medical condition or this instruction, always ask your healthcare professional. [address] any warranty or liability for your use of this information. Methylprednisolone (By injection)   Methylprednisolone (meth-il-pred-NIS-oh-lone)  Treats inflammation, severe allergies, flare-ups of ongoing illnesses, and many other medical problems. May also be used to decrease some symptoms of cancer. This medicine is a corticosteroid (cortisone-like medicine or steroid). Brand Name(s): Active Injection Kit L, Active Injection Kit LM-DEP, Active Injection Kit LM-Dep-1, Active Injection Kit LM-Dep-2, DEPO-Medrol, DEPO-Medrol Novaplus, Dyural-40, Dyural-80, Dyural-L, Dyural-LM, Medroloan II YESENIA Luis, Multi-Specialty Kit, P-Care D40, P-Care D40G   There may be other brand names for this medicine. When This Medicine Should Not Be Used: This medicine is not right for everyone.  You should not receive it if you had an allergic reaction to methylprednisolone, or if you have a fungus infection that affects your whole body. You should not have this medicine injected into a muscle if you have idiopathic thrombocytopenic purpura. Some strengths of Solu-Medrol® that contain benzyl alcohol should not be used in premature babies. How to Use This Medicine:   Injectable  · A nurse or other trained health professional will give you this medicine. This medicine may be given through a needle placed in one of your veins, as a shot into a muscle or joint, or as a shot into a lesion on your skin. · A nurse or other health provider will give you this medicine. · Your doctor may give you a few doses of this medicine until your condition improves, and then switch you to an oral medicine that works the same way. If you have any concerns about this, talk to your doctor. · Missed dose: You must use this medicine on a fixed schedule. Call your doctor or pharmacist if you miss a dose. Drugs and Foods to Avoid:   Ask your doctor or pharmacist before using any other medicine, including over-the-counter medicines, vitamins, and herbal products. · Some medicines can affect how this medicine works. Tell your doctor if you are using any of the following:  ¨ Aminoglutethimide, amphotericin B, azithromycin, carbamazepine, cholestyramine, clarithromycin, cyclosporine, digoxin, erythromycin, isoniazid, ketoconazole, phenobarbital, phenytoin, rifampin, or troleandomycin  ¨ Birth control pills  ¨ Blood thinner (including warfarin)  ¨ Diabetes medicine  ¨ Diuretic (water pill)  ¨ NSAID pain or arthritis medicine (including aspirin, celecoxib, diclofenac, ibuprofen, naproxen)  · This medicine may interfere with vaccines. Ask your doctor before you get a flu shot or any other vaccines.   Warnings While Using This Medicine:   · Tell your doctor if you are pregnant or breastfeeding, or if you have kidney disease, liver disease, adrenal gland problems, cataracts, congestive heart failure, diabetes, glaucoma, high blood pressure, mental health problems, stomach or bowel problems (including ulcers, ulcerative colitis), myasthenia gravis, osteoporosis, thyroid problems, or a recent heart attack. Tell your doctor if you have an infection (including herpes eye infection, tuberculosis, or threadworm), or you have recently spent time in a tropical climate. · If this medicine is being injected into a joint, tell your doctor about any other problems you have had with that joint. · This medicine may cause the following problems:  ¨ High risk of infection  ¨ Changes in vision  ¨ Changes in mood or behavior  ¨ Slow growth in children  ¨ Liver damage  ¨ Osteoporosis  · If you use this medicine for a long time, tell your doctor about any extra stress or anxiety in your life, including other health concerns and emotional stress. Your dose might need to be changed for a short time while you have extra stress. · Do not stop using this medicine suddenly. Your doctor will need to slowly decrease your dose before you stop it completely. · Tell any doctor or dentist who treats you that you are using this medicine. This medicine may affect certain medical test results. · Your doctor will do lab tests at regular visits to check on the effects of this medicine. Keep all appointments.   Possible Side Effects While Using This Medicine:   Call your doctor right away if you notice any of these side effects:  · Allergic reaction: Itching or hives, swelling in your face or hands, swelling or tingling in your mouth or throat, chest tightness, trouble breathing  · Blurred vision, eye pain, changes in vision  · Changes in how much or how often you urinate, increased hunger or thirst  · Color changes on the skin, dark freckles, easy bruising, muscle weakness, round, puffy face  · Dark urine or pale stools, nausea, vomiting, loss of appetite, stomach pain, yellow skin or eyes  · Dry mouth, increased thirst, muscle cramps, nausea, vomiting  · Fast, slow, pounding, or uneven heartbeat  · Fever, chills, cough, sore throat, body aches  · Mood swings, unusual thoughts or behavior  · Muscle pain, weakness, or cramps, sudden joint pain  · Swelling in your hands, ankles, or feet  · Unusual bleeding or bruising  If you notice these less serious side effects, talk with your doctor:   · Diarrhea  · Easy bruising  · Red, pink, purple, or brown flat spots or bumps on your skin  · Skin looks sunken or indented where the shot was given  If you notice other side effects that you think are caused by this medicine, tell your doctor. Call your doctor for medical advice about side effects. You may report side effects to FDA at 6-340-FDA-9465  © 2017 [facility] Information is for End User's use only and may not be sold, redistributed or otherwise used for commercial purposes. The above information is an  only. It is not intended as medical advice for individual conditions or treatments. Talk to your doctor, nurse or pharmacist before following any medical regimen to see if it is safe and effective for you.

## 2022-03-11 NOTE — PROGRESS NOTES
1. \"Have you been to the ER, urgent care clinic since your last visit? Hospitalized since your last visit? \" No    2. \"Have you seen or consulted any other health care providers outside of the [address] [place] [address] since your last visit? \" No , Did see cardiology and had 30 day event monitor placed and will also have echo completed next Friday due to sycnhope episodes X 2.    3. For patients aged 39-70: Has the patient had a colonoscopy / FIT/ Cologuard? NA - based on age      If the patient is female:    4. For patients aged 41-77: Has the patient had a mammogram within the past 2 years? NA - based on age or sex      11. For patients aged 21-65: Has the patient had a pap smear? NA - based on age or sex      Chief Complaint   Patient presents with    Hip Pain     right hip down to foot and through the leg     Visit Vitals  BP (!) 166/63 (BP 1 Location: Left arm, BP Patient Position: Sitting)   Pulse (!) 58   Temp 97.4 °F (36.3 °C) (Temporal)   Resp 18   Wt 156 lb 8 oz (71 kg)   SpO2 97%   BMI 30.13 kg/m²     Methylprednisolone 80 mg administered in left deltoid Per David Armas NP's order. Patient tolerated medication well. AVS provided to patient with medication information. Patient states understanding.

## 2022-03-11 NOTE — PROGRESS NOTES
OFFICE  hook up  EVENT monitor only. Verified patient with two patient identifiers. Patient Patient & Arch Guerin  verbalized understanding of its use. Ordering Provider: Shauna Felipe NP  [id number] Nick MD  Reason: Syncope, Aortic stenosis      Patient has been successfully enrolled through [employer]. No LOS.

## 2022-03-18 PROBLEM — N18.4 TYPE 2 DIABETES MELLITUS WITH STAGE 4 CHRONIC KIDNEY DISEASE, WITHOUT LONG-TERM CURRENT USE OF INSULIN (HCC): Status: ACTIVE | Noted: 2017-12-19

## 2022-03-18 PROBLEM — M81.6 LOCALIZED OSTEOPOROSIS WITHOUT CURRENT PATHOLOGICAL FRACTURE: Status: ACTIVE | Noted: 2022-01-01

## 2022-03-18 PROBLEM — G25.0 BENIGN FAMILIAL TREMOR: Status: ACTIVE | Noted: 2022-01-01

## 2022-03-18 PROBLEM — D64.9 ANEMIA: Status: ACTIVE | Noted: 2022-01-01

## 2022-03-18 PROBLEM — E78.00 PURE HYPERCHOLESTEROLEMIA: Status: ACTIVE | Noted: 2018-06-19

## 2022-03-18 PROBLEM — E11.22 TYPE 2 DIABETES MELLITUS WITH STAGE 4 CHRONIC KIDNEY DISEASE, WITHOUT LONG-TERM CURRENT USE OF INSULIN (HCC): Status: ACTIVE | Noted: 2017-12-19

## 2022-03-18 NOTE — PROGRESS NOTES
Mrs. Sri Montalvo in for lab work for LOCK8. Hgb 10.0 and Retacrit held. Discharged in stable condition at 1410.  She returns April 1st at 2 pm.

## 2022-03-18 NOTE — PROGRESS NOTES
Asim Hudson is a 80 y.o. female is here for routine follow up.   Hx aortic stenosis,  DM II, hypertension/hypertensive CVD, dyslipidemia, anemia of chronic disease. GERD, DJD, Raynaud's, followed by Deloris Gonsalez NP,  Also with hx multinodular goiter/parathryoid issues (followed by Endocrine).  Issues with bradycardia, HR high 40's      Last seen by Dr Colby Pedro 9/2021:    Seen earlier this year--dizzy and brief syncope. . Some LE swelling, BERMAN.  Hx DM II, hypertension/hypertensive CVD, dyslipidemia, anemia of chronic disease. GERD, DJD, Raynaud's, followed by Deloris Gonsalez NP,  Also with hx multinodular goiter/parathryoid issues (followed by Endocrine).  Issues with bradycardia, HR high 40's. EKG with sinus tiffanie 48, LVH, PRWP/old AMI, NSST. Had been on Cardizem --reduced to 120 by PCP, now stopped. Holter with sinus tiffanie, HR 42-79, avg 52, occasional PAC's. Echo 6/27/18 with mod LVEF, LVEF 65-70, LAE, mod aortic stenosis (mean 24), mild to mod pulm hypertension. Lexiscan 9/26/19 with no infarct/ischemia, LVEF >65%.  Echo 9/19 with LVEF 60-65, mod AS (mean 23), MAC, mild MVP with trace MR, RVSP 44. Carotid doppler 9/19 with mild MAXWELL, otherwise ok. Currently on irbesartan 300, hydralazine 50 tid, spironolactone 25, lasix 20.  Recent labs 5/23/20 with BUN/ Cr up to 56/2.73, previously 45/2.1, K 4.2, glc 123.  . Seen on 6/11/20 here--spironolactone stopped and lasix increased to 40mg.  Seen in f/u by PCP and labs repeated--stable--last labs 5/28/21 with Cr down to 2.18, BUN 43, K 3.5. CBC 3/21 with Hb 8.6 (down from 9.1 previously. Recent OV with Dr. Gardenia Sebastian and had labs at that time (attempting to get results now from his office). Repeat Echo 6/18/20 with mod LVH, LVEF 60-65, D1, mod AS (mean gradient 29mm Hg).  More leg weakness, ataxia, more pronounced tremor (?familial but now more), no hx Parkinson's, ?neuropathy (on gabapentin).     Called our office 3/4/2022:    Pts daughter called to report that her mother was witnessed to have a syncopal episode by pts spouse. \"Pt came to within a few seconds. \"   No slurred speech, unusual weakness. Pt did not hit her head.     Pt has been complaining of intermittent dizziness per her daughter. Today  Echo as posted below, 1 mos event monitor ongoing. Her son Vin Batista is with her during this visit. No further syncope since last pt call. The patient denies chest pain/ shortness of breath, orthopnea, PND, LE edema, palpitations, or fatigue. Echo 3/21/2022  Left Ventricle: Left ventricle size is normal. Normal wall thickness. Normal wall motion. Normal left ventricular systolic function with a visually estimated EF of 60 - 65%. Grade I diastolic dysfunction with normal LAP.   Left Atrium: Left atrium is moderately dilated.   Aortic Valve: Moderately calcified cusp. Moderate to severe stenosis of the aortic valve. AV mean gradient is 39 mmHg. AV peak gradient is 70 mmHg. AV area by continuity VTI is 0.9 cm2.    Mitral Valve: Valve structure is normal. Mildly calcified leaflet, at the posterior leaflet. Mild transvalvular regurgitation.   Tricuspid Valve: Mild transvalvular regurgitation.   Pulmonary Arteries: Moderate pulmonary hypertension present.  The estimated pulmonary artery systolic pressure is 49 mmHg.         Patient Active Problem List    Diagnosis Date Noted    Anemia 01/27/2022    Benign familial tremor 01/25/2022    Bilateral ganglion cysts of wrists 01/25/2022    Localized osteoporosis without current pathological fracture 01/25/2022    Multiple thyroid nodules 01/25/2022    Peripheral vascular disease ([address] 09/11/2020    Gastroesophageal reflux disease without esophagitis 01/21/2020    Primary hyperparathyroidism ([address] 10/31/2019    Type 2 diabetes mellitus with diabetic neuropathy ([address] 07/15/2019    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 ([address] 07/15/2019    Hyperparathyroidism ([address] 08/06/2018    BMI 33.0-33.9,adult 08/06/2018    Bradycardia 08/01/2018    Aortic stenosis, moderate     Pure hypercholesterolemia 06/19/2018    Anemia of chronic disease 06/19/2018    Type 2 diabetes mellitus with stage 4 chronic kidney disease, without long-term current use of insulin ([facility] 12/19/2017    Encounter for long-term (current) use of other medications 08/10/2015      Tello SANTA NP  Past Medical History:   Diagnosis Date    Anemia 1/27/2022    Anemia of chronic disease 6/19/2018    Anxiety     Aortic stenosis, moderate     Back pain     CAD (coronary artery disease)     CKD (chronic kidney disease) stage 3, GFR 30-59 ml/min ([facility])     Constipation     Diabetes ([address]     Diverticulosis 2007    DM (diabetes mellitus) ([facility]     AODM    Fibroid 1972    GERD (gastroesophageal reflux disease)     Goiter, non-toxic     Hernia, hiatal     Hyperlipemia     Hypertension     Hypokalemia     Menopause     Multiple thyroid nodules 1/25/2022    Osteoarthritis 2006    bursitis R shoulder    Osteoarthritis of right knee     PVC (premature ventricular contraction)     H/O PVC's    Raynaud disease     Venous stasis     Wears hearing aid     both ears      Past Surgical History:   Procedure Laterality Date    HX BREAST BIOPSY Bilateral     x5    HX CATARACT REMOVAL  2004    bilateral    HX COLONOSCOPY  2002, 08/2007    HX DILATION AND CURETTAGE  1960's    HX HYSTERECTOMY  66's    BSO    HX OTHER SURGICAL  2001    sigmoidoscopy     Allergies   Allergen Reactions    Metformin Other (comments)     Kidneys effected      Family History   Problem Relation Age of Onset    Diabetes Brother     Hypertension Mother     Diabetes Brother       Social History     Socioeconomic History    Marital status:      Spouse name: Not on file    Number of children: Not on file    Years of education: Not on file    Highest education level: Not on file   Occupational History    Not on file   Tobacco Use    Smoking status: Never Smoker    Smokeless tobacco: Never Used   Vaping Use    Vaping Use: Never used   Substance and Sexual Activity    Alcohol use: No     Alcohol/week: 0.0 standard drinks    Drug use: No    Sexual activity: Never   Other Topics Concern    Not on file   Social History Narrative    Happily . No concern for abuse. Exercise: none    Diet: Careful. Wear Seatbelts             Social Determinants of Health     Financial Resource Strain:     Difficulty of Paying Living Expenses: Not on file   Food Insecurity:     Worried About Running Out of Food in the Last Year: Not on file    Sunshine of Food in the Last Year: Not on file   Transportation Needs:     Lack of Transportation (Medical): Not on file    Lack of Transportation (Non-Medical):  Not on file   Physical Activity:     Days of Exercise per Week: Not on file    Minutes of Exercise per Session: Not on file   Stress:     Feeling of Stress : Not on file   Social Connections:     Frequency of Communication with Friends and Family: Not on file    Frequency of Social Gatherings with Friends and Family: Not on file    Attends Anabaptist Services: Not on file    Active Member of [address] [place] [address] [employer] or Organizations: Not on file    Attends Club or Organization Meetings: Not on file    Marital Status: Not on file   Intimate Partner Violence:     Fear of Current or Ex-Partner: Not on file    Emotionally Abused: Not on file    Physically Abused: Not on file    Sexually Abused: Not on file   Housing Stability:     Unable to Pay for Housing in the Last Year: Not on file    Number of [place] in the Last Year: Not on file    Unstable Housing in the Last Year: Not on file      Current Outpatient Medications   Medication Sig    tiZANidine (ZANAFLEX) 2 mg tablet Take 1 tab by mouth at bedtime as needed for pain    omeprazole (PRILOSEC) 40 mg capsule TAKE 1 CAPSULE BY MOUTH EVERY MORNING TO CONTROL STOMACH ACID    amLODIPine (NORVASC) 5 mg tablet TAKE 1 TABLET BY MOUTH DAILY    lovastatin (MEVACOR) 40 mg tablet TAKE 1 TABLET BY MOUTH NIGHTLY FOR CHOLESTEROL LOWERING    irbesartan (AVAPRO) 300 mg tablet TAKE 1 TABLET BY MOUTH NIGHTLY    hydrALAZINE (APRESOLINE) 100 mg tablet TAKE 1 TABLET BY MOUTH THREE TIMES DAILY    gabapentin (NEURONTIN) 100 mg capsule Take 1-2 Capsules by mouth three (3) times daily as needed (pain or tremor). Max Daily Amount: 600 mg.    cinacalcet (SENSIPAR) 30 mg tablet cinacalcet 30 mg tablet    furosemide (LASIX) 40 mg tablet TAKE 1 TABLET BY MOUTH DAILY    hydrocortisone valerate (WEST-JACLYN) 0.2 % ointment Apply  to affected area two (2) times a day. use thin layer    fluticasone propionate (FLONASE) 50 mcg/actuation nasal spray SPRAY 1 PUFF IN EACH NOSTRIL ONCE DAILY    ferrous sulfate (Iron) 325 mg (65 mg iron) tablet Take  by mouth Daily (before breakfast).  cetirizine (ZYRTEC) 10 mg tablet Take 10 mg by mouth.  TRUE METRIX GLUCOSE TEST STRIP strip     aspirin delayed-release 81 mg tablet Take 1 Tab by mouth daily.  acetaminophen (TYLENOL EXTRA STRENGTH) 500 mg tablet Take 1 Tab by mouth every six (6) hours as needed for Pain. No current facility-administered medications for this visit. Review of Symptoms:    CONST  No weight change. No fever, chills, sweats    ENT No visual changes, URI sx, sore throat    CV  See HPI   RESP  No cough, or sputum, wheezing. Also see HPI   GI  No abdominal pain or change in bowel habits. No heartburn or dysphagia. No melena or rectal bleeding.   No dysuria, urgency, frequency, hematuria   MSKEL  No joint pain, swelling. No muscle pain. SKIN  No rash or lesions. NEURO  No headache, syncope, or seizure. No weakness, loss of sensation, or paresthesias. PSYCH  No low mood or depression  No anxiety. HE/LYMPH  No easy bruising, abnormal bleeding, or enlarged glands.         Physical ExamPhysical Exam:    There were no vitals taken for this visit. Gen: NAD  HEENT:  PERRL, throat clear  Neck: no adenopathy, no thyromegaly, no JVD   Heart:  Regular,Nl U0D4,  III/VI systolic murmur, no gallop or rub. Lungs:  clear  Abdomen:   Soft, non-tender, bowel sounds are active.    Extremities:  No edema  Pulse: symmetric  Neuro: A&O times 3, No focal neuro deficits    Labs:   Lab Results   Component Value Date/Time    Sodium 137 01/27/2022 03:10 PM    Sodium 131 (L) 01/18/2022 09:20 PM    Sodium 138 11/29/2021 01:45 PM    Sodium 132 (L) 05/28/2021 01:44 PM    Sodium 133 (L) 03/04/2021 01:01 PM    Potassium 3.9 01/27/2022 03:10 PM    Potassium 3.7 01/18/2022 09:20 PM    Potassium 3.7 11/29/2021 01:45 PM    Potassium 3.5 05/28/2021 01:44 PM    Potassium 3.6 03/04/2021 01:01 PM    Chloride 100 01/27/2022 03:10 PM    Chloride 95 (L) 01/18/2022 09:20 PM    Chloride 103 11/29/2021 01:45 PM    Chloride 97 05/28/2021 01:44 PM    Chloride 100 03/04/2021 01:01 PM    CO2 23 01/27/2022 03:10 PM    CO2 22 01/18/2022 09:20 PM    CO2 25 11/29/2021 01:45 PM    CO2 26 05/28/2021 01:44 PM    CO2 26 03/04/2021 01:01 PM    Anion gap 14 01/27/2022 03:10 PM    Anion gap 14 01/18/2022 09:20 PM    Anion gap 10 11/29/2021 01:45 PM    Anion gap 9 05/28/2021 01:44 PM    Anion gap 7 03/04/2021 01:01 PM    Glucose 98 01/27/2022 03:10 PM    Glucose 135 (H) 01/18/2022 09:20 PM    Glucose 157 (H) 11/29/2021 01:45 PM    Glucose 117 (H) 05/28/2021 01:44 PM    Glucose 174 (H) 03/04/2021 01:01 PM    BUN 47 (H) 01/27/2022 03:10 PM    BUN 42 (H) 01/18/2022 09:20 PM    BUN 52 (H) 11/29/2021 01:45 PM    BUN 43 (H) 05/28/2021 01:44 PM    BUN 47 (H) 03/04/2021 01:01 PM    Creatinine 2.59 (H) 01/27/2022 03:10 PM    Creatinine 2.43 (H) 01/18/2022 09:20 PM    Creatinine 2.37 (H) 11/29/2021 01:45 PM    Creatinine 2.18 (H) 05/28/2021 01:44 PM    Creatinine 2.46 (H) 03/04/2021 01:01 PM    BUN/Creatinine ratio 18 01/27/2022 03:10 PM    BUN/Creatinine ratio 17 01/18/2022 09:20 PM BUN/Creatinine ratio 22 (H) 11/29/2021 01:45 PM    BUN/Creatinine ratio 20 05/28/2021 01:44 PM    BUN/Creatinine ratio 19 03/04/2021 01:01 PM    GFR est AA 21 (L) 01/27/2022 03:10 PM    GFR est AA 23 (L) 01/18/2022 09:20 PM    GFR est AA 23 (L) 11/29/2021 01:45 PM    GFR est AA 26 (L) 05/28/2021 01:44 PM    GFR est AA 22 (L) 03/04/2021 01:01 PM    GFR est non-AA 17 (L) 01/27/2022 03:10 PM    GFR est non-AA 19 (L) 01/18/2022 09:20 PM    GFR est non-AA 19 (L) 11/29/2021 01:45 PM    GFR est non-AA 21 (L) 05/28/2021 01:44 PM    GFR est non-AA 18 (L) 03/04/2021 01:01 PM    Calcium 8.6 01/27/2022 03:10 PM    Calcium 8.5 01/18/2022 09:20 PM    Calcium 10.7 (H) 11/29/2021 01:45 PM    Calcium 10.6 (H) 05/28/2021 01:44 PM    Calcium 10.1 03/04/2021 01:01 PM    Calcium 9.7 03/04/2021 01:01 PM    Bilirubin, total 0.3 01/18/2022 09:20 PM    Bilirubin, total <0.2 12/16/2020 12:00 AM    Bilirubin, total 0.2 10/22/2020 06:43 PM    Bilirubin, total 0.3 08/10/2020 02:44 PM    Bilirubin, total 0.2 01/21/2020 01:38 PM    Alk. phosphatase 58 01/18/2022 09:20 PM    Alk. phosphatase 68 12/16/2020 12:00 AM    Alk. phosphatase 51 10/22/2020 06:43 PM    Alk. phosphatase 53 08/10/2020 02:44 PM    Alk.  phosphatase 51 01/21/2020 01:38 PM    Protein, total 7.3 01/18/2022 09:20 PM    Protein, total 6.7 12/16/2020 12:00 AM    Protein, total 6.8 10/22/2020 06:43 PM    Protein, total 6.6 08/10/2020 02:44 PM    Protein, total 6.9 01/21/2020 01:38 PM    Albumin 3.6 01/27/2022 03:10 PM    Albumin 3.5 01/18/2022 09:20 PM    Albumin 3.4 (L) 11/29/2021 01:45 PM    Albumin 3.4 (L) 05/28/2021 01:44 PM    Albumin 3.9 12/16/2020 12:00 AM    Globulin 3.8 01/18/2022 09:20 PM    Globulin 3.6 10/22/2020 06:43 PM    Globulin 3.6 08/09/2018 12:19 PM    A-G Ratio 0.9 (L) 01/18/2022 09:20 PM    A-G Ratio 1.4 12/16/2020 12:00 AM    A-G Ratio 0.9 (L) 10/22/2020 06:43 PM    A-G Ratio 1.2 08/10/2020 02:44 PM    A-G Ratio 1.2 01/21/2020 01:38 PM    ALT (SGPT) 12 01/18/2022 09:20 PM    ALT (SGPT) 6 12/16/2020 12:00 AM    ALT (SGPT) 13 10/22/2020 06:43 PM    ALT (SGPT) 7 08/10/2020 02:44 PM    ALT (SGPT) 9 01/21/2020 01:38 PM     No results found for: CPK, CPKX, CPX  Lab Results   Component Value Date/Time    Cholesterol, total 108 12/16/2020 12:00 AM    Cholesterol, total 113 02/26/2019 03:04 PM    Cholesterol, total 126 06/06/2017 01:47 PM    Cholesterol, total 137 09/07/2016 03:24 PM    Cholesterol, total 115 06/07/2016 04:34 PM    HDL Cholesterol 35 (L) 12/16/2020 12:00 AM    HDL Cholesterol 38 (L) 02/26/2019 03:04 PM    HDL Cholesterol 40 06/06/2017 01:47 PM    HDL Cholesterol 40 09/07/2016 03:24 PM    HDL Cholesterol 37 (L) 06/07/2016 04:34 PM    LDL, calculated 54 12/16/2020 12:00 AM    LDL, calculated 56 02/26/2019 03:04 PM    LDL, calculated 64 06/06/2017 01:47 PM    LDL, calculated 75 09/07/2016 03:24 PM    LDL, calculated 60 06/07/2016 04:34 PM    LDL, calculated 68 01/04/2016 02:24 PM    Triglyceride 101 12/16/2020 12:00 AM    Triglyceride 94 02/26/2019 03:04 PM    Triglyceride 108 06/06/2017 01:47 PM    Triglyceride 109 09/07/2016 03:24 PM    Triglyceride 90 06/07/2016 04:34 PM     No results found for this or any previous visit.     Assessment:         Patient Active Problem List    Diagnosis Date Noted    Anemia 01/27/2022    Benign familial tremor 01/25/2022    Bilateral ganglion cysts of wrists 01/25/2022    Localized osteoporosis without current pathological fracture 01/25/2022    Multiple thyroid nodules 01/25/2022    Peripheral vascular disease ([address] 09/11/2020    Gastroesophageal reflux disease without esophagitis 01/21/2020    Primary hyperparathyroidism ([address] 10/31/2019    Type 2 diabetes mellitus with diabetic neuropathy ([address] 07/15/2019    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 ([address] 07/15/2019    Hyperparathyroidism ([facility] 08/06/2018    BMI 33.0-33.9,adult 08/06/2018    Bradycardia 08/01/2018    Aortic stenosis, moderate     Pure hypercholesterolemia 06/19/2018    Anemia of chronic disease 06/19/2018    Type 2 diabetes mellitus with stage 4 chronic kidney disease, without long-term current use of insulin ([facility] 12/19/2017    Encounter for long-term (current) use of other medications 08/10/2015     Doc Darlyn Holiday 9/2021   Seen earlier this year--dizzy and brief syncope. . Some LE swelling, BERMAN.  Hx DM II, hypertension/hypertensive CVD, dyslipidemia, anemia of chronic disease. GERD, DJD, Raynaud's, followed by Deloris Gonsalez NP,  Also with hx multinodular goiter/parathryoid issues (followed by Endocrine).  Issues with bradycardia, HR high 40's. EKG with sinus tiffanie 48, LVH, PRWP/old AMI, NSST. Had been on Cardizem --reduced to 120 by PCP, now stopped. Holter with sinus tiffanie, HR 42-79, avg 52, occasional PAC's. Echo 6/27/18 with mod LVEF, LVEF 65-70, LAE, mod aortic stenosis (mean 24), mild to mod pulm hypertension. Lexiscan 9/26/19 with no infarct/ischemia, LVEF >65%.  Echo 9/19 with LVEF 60-65, mod AS (mean 23), MAC, mild MVP with trace MR, RVSP 44. Carotid doppler 9/19 with mild MAXWELL, otherwise ok. Currently on irbesartan 300, hydralazine 50 tid, spironolactone 25, lasix 20.  Recent labs 5/23/20 with BUN/ Cr up to 56/2.73, previously 45/2.1, K 4.2, glc 123.  . Seen on 6/11/20 here--spironolactone stopped and lasix increased to 40mg.  Seen in f/u by PCP and labs repeated--stable--last labs 5/28/21 with Cr down to 2.18, BUN 43, K 3.5. CBC 3/21 with Hb 8.6 (down from 9.1 previously. Recent OV with Dr. Steve Olvera and had labs at that time (attempting to get results now from his office). Repeat Echo 6/18/20 with mod LVH, LVEF 60-65, D1, mod AS (mean gradient 29mm Hg). More leg weakness, ataxia, more pronounced tremor (?familial but now more), no hx Parkinson's, ?neuropathy (on gabapentin).          Plan: Aortic stenosis  Echo 3/21/2022: EF 60-65%  Moderate to severe stenosis of the aortic valve.  AV mean gradient is 39 mmHg. AV peak gradient is 70 mmHg. AV area by continuity VTI is 0.9 cm2. Echo 10/2021:   EF 60-65%  mod aortic stenosis :  Aortic valve mean gradient is 33.5 mmHg. Aortic valve area is 1.2 cm2 ; mild MR   Symptomatic with recurrent syncope  Pacheco Cordova (son) wants to discuss L/R heart catheterization with other family members. I have ordered precath labwork. He will call our office when willing to proceed. Recurrent syncope likely d/t above  Most recent echo 3/18/2022  1 mos event week event, ongoing  Holter in 8/2021:  Normal rhythm with HR , only occasional skipped beats and one 5 beat run of PAT  Holter in 2018 with sinus tiffanie, HR 42-79, avg 52, occasional PAC's. Prev on Dilt which has been dc since  Hgb 10.1 in 3/2022      Lexiscan 9/26/19 with no infarct/ischemia, LVEF >65%. HFpEF  Stable.      HTN  Controlled with current therapy      HLD  12/2020 LDL 54 On Lovastatin   Labs and lipids per PCP    CKD IV  Serum Cr 2.43 in 1/2022  Followed by Dr Maria R Bashir    Anemia of chronic disease  Followed by heme/onc  Hgb 10.1 in 3/2022      Continue current care and f/u in 6 mos, sooner if needed      Ruben Page NP

## 2022-03-19 PROBLEM — K21.9 GASTROESOPHAGEAL REFLUX DISEASE WITHOUT ESOPHAGITIS: Status: ACTIVE | Noted: 2020-01-21

## 2022-03-19 PROBLEM — M67.432 BILATERAL GANGLION CYSTS OF WRISTS: Status: ACTIVE | Noted: 2022-01-01

## 2022-03-19 PROBLEM — E04.2 MULTIPLE THYROID NODULES: Status: ACTIVE | Noted: 2022-01-01

## 2022-03-19 PROBLEM — I73.9 PERIPHERAL VASCULAR DISEASE: Status: ACTIVE | Noted: 2020-09-11

## 2022-03-19 PROBLEM — N18.4: Status: ACTIVE | Noted: 2019-07-15

## 2022-03-19 PROBLEM — I13.10: Status: ACTIVE | Noted: 2019-07-15

## 2022-03-19 PROBLEM — M67.431 BILATERAL GANGLION CYSTS OF WRISTS: Status: ACTIVE | Noted: 2022-01-01

## 2022-03-19 PROBLEM — E21.3 HYPERPARATHYROIDISM: Status: ACTIVE | Noted: 2018-08-06

## 2022-03-19 PROBLEM — E21.0 PRIMARY HYPERPARATHYROIDISM: Status: ACTIVE | Noted: 2019-10-31

## 2022-03-20 PROBLEM — D63.8 ANEMIA OF CHRONIC DISEASE: Status: ACTIVE | Noted: 2018-06-19

## 2022-03-20 PROBLEM — E11.40 TYPE 2 DIABETES MELLITUS WITH DIABETIC NEUROPATHY (HCC): Status: ACTIVE | Noted: 2019-07-15

## 2022-03-20 PROBLEM — R00.1 BRADYCARDIA: Status: ACTIVE | Noted: 2018-08-01

## 2022-03-21 NOTE — PROGRESS NOTES
Echo showed normal pumping heart strength,  aortic valve looks more stiff / narrow.   Will further discuss at follow up

## 2022-03-22 NOTE — PROGRESS NOTES
Identified pt with two pt identifiers(name and ). Reviewed record in preparation for visit and have obtained necessary documentation. Chief Complaint   Patient presents with    Results     Follow up post echocardiogram (currently wearing EM - no alerts from [address])    Syncope     no more episodes since last reported.  Aortic Stenosis      Vitals:    22 1311   BP: (!) 120/44   Pulse: (!) 51   Resp: 16   Temp: 98.2 °F (36.8 °C)   TempSrc: Temporal   SpO2: 99%   Weight: 153 lb (69.4 kg)   Height: 5' 0.43\" (1.535 m)   PainSc:   0 - No pain       Medications reviewed/approved by provider. Health Maintenance Review: Patient reminded of \"due or due soon\" health maintenance. I have asked the patient to contact his/her primary care provider (PCP) for follow-up on his/her health maintenance. Coordination of Care Questionnaire:  :   1) Have you been to an emergency room, urgent care, or hospitalized since your last visit? If yes, where when, and reason for visit? no       2. Have seen or consulted any other health care provider since your last visit? If yes, where when, and reason for visit? NO      Patient is accompanied by self I have received verbal consent from Alexandro Tong to discuss any/all medical information while they are present in the room.

## 2022-03-23 NOTE — TELEPHONE ENCOUNTER
I spoke with the patient's daughter Pavan Ovalles at the patient's request about severe aortic stenosis with recurrent syncopal spells. Discussed with Dr. Karmen Nj as well. I discussed the risks and benefits of cardiac catheterization +/- PCI, then valve clinic evaluation for possible TAVR including risk of DARIEN which could rarely lead to ESRD with HD with the patient's daughter who expressed understanding and likely wishes to proceed. She will discuss with family. We discussed that all options at the age of 80 are risky, but that untreated severe AS with recurrent syncope seems likely to be the highest risk.

## 2022-03-26 NOTE — PROGRESS NOTES
Pt seen by ENT Dr Marci Boyle on 3-24-22 for chronic R ear pain and globus sensation. He feels her problem is related to GERD and GOITER. He recommended adding pepcid 20mg bid to her omeprazole that she has been on for years. She will follow up in 1 month. He will then address the GOITER.

## 2022-03-29 PROBLEM — Z98.890 S/P CARDIAC CATH: Status: ACTIVE | Noted: 2022-01-01

## 2022-03-29 NOTE — PROGRESS NOTES
Lashonda Mcadams is recovering post-procedure. R radial site dressing is CDI without swelling or bleeding. VSS. Rhythm sinus. Lashonda Mcadams denies complaints at this time. If recovery continues to progress without complication, discharge is planned for later today. Follow up scheduled with Dr. Aurora Cerrato and Dr. Marci Lazaro.            Emily Armas NP  DNP, RN, [facility]

## 2022-03-29 NOTE — PROGRESS NOTES
1215  From cath lab at this time. Left and right heart cath no stents. Given 1 mg Versed and 25 mcg Fentanyl for the procedure. Alert. Respirations not labored. Eating lunch at this time. Right radial cath entry/exit no bleeding;no hematoma. TR band in place. Patent hemostasis at 9. Family at bedside. 1345  TR band off at this time. Tegaderm with gauze to right radial cath exit site. Pending Dr. Frances Graf to come speak to patient and family status post procedure. 1423  Patient off the floor for bilateral carotid doppler. 1605    Patient up to bathroom hardwired for bowel movement. Tolerated activity well. 1735  Verbal and written discharge instructions given to patient and family. Signature obtained. Home with family at this time.

## 2022-03-29 NOTE — Clinical Note
TRANSFER - OUT REPORT:     Verbal report given to: Arnold Garcia. Report consisted of patient's Situation, Background, Assessment and   Recommendations(SBAR). Opportunity for questions and clarification was provided.

## 2022-03-29 NOTE — DISCHARGE INSTRUCTIONS
[address] [place] [address]  [phone]        Patient ID:  Kavin Mike  [id number]  71 y.o.  10/5/1931    Admit Date: 3/29/2022    Discharge Date: 3/29/2022     Admitting Physician: Saul Carranza MD     Discharge Physician: Saul Carranza MD    Admission Diagnoses:   Chest pain, unspecified type [R07.9]    Discharge Diagnoses: Active Problems:    S/P cardiac cath (3/29/2022)      Overview: 3/29/22:  No significant CAD         Discharge Condition: Good    Cardiology Procedures this Admission:  Diagnostic left heart catheterization    Disposition: home    Reference discharge instructions provided by nursing for diet and activity. Signed:  Jose Miguel Oropeza NP  3/29/2022  5:07 PM        Radial Cardiac Catheterization/Angiography Discharge Instructions    It is normal to feel tired the first couple days. Take it easy and follow the physicians instructions. CHECK THE CATHETER INSERTION SITE DAILY:    Remove the wrist dressing 24 hours after the procedure. You may shower 24 hours after the procedure. Wash with soap and water and pat dry. Gentle cleaning of the site with soap and water is sufficient, cover with a dry clean dressing or bandage. Do not apply creams or powders to the area. No soaking the wrist for 3 days. Leave the puncture site open to air after 24 hours post-procedure. CALL THE PHYSICIANS:     If the site becomes red, swollen or feels warm to the touch  If there is bleeding or drainage or if there is unusual pain at the radial site. If there is any minor oozing, you may apply a band-aid and remove after 12 hours. If the bleeding continues, hold pressure with the middle finger against the puncture site and the thumb against the back of the wrist,call 911 to be transported to the hospital.  DO NOT DRIVE YOURSELF, [place] [address].     ACTIVITY:   For the first 24 hours do not manipulate the wrist.  No lifting, pushing or pulling over 3-5 pounds with the affected wrist for 7 daysand no straining the insertion site. Do not life grocery bags or the garbage can, do not run the vacuum  or  for 7 days. Start with short walks as in the hospital and gradually increase as tolerated each day. It is recommended to walk 30 minutes 5-7 days per week. Follow your physicians instructions on activity. Avoid walking outside in extremes of heat or cold. Walk inside when it is cold and windy or hot and humid. Things to keep in mind:  No driving for at least 24 hours, or as designated by your physician. Limit the number of times you go up and down the stairs  Take rests and pace yourself with activity. Be careful and do not strain with bowel movements. MEDICATIONS:    Take all medications as prescribed  Call your physician if you have any questions  Keep an updated list of your medications with you at all times and give a list to your physician and pharmacist    SIGNS AND SYMPTOMS:   Be cautious of symptoms of angina or recurrent symptoms such as chest discomfort, unusual shortness of breath or fatigue. These could be symptoms of restenosis, a new blockage or a heart attack. If your symptoms are relieved with rest it is still recommended that you notify your physician of recurrent chest pain or discomfort. For CHEST PAIN or symptoms of angina not relieved with rest:  If the discomfort is not relieved with rest, and you have been prescribed Nitroglycerin, take as directed (taken under the tongue, one at a time 5 minutes apart for a total of 3 doses). If the discomfort is not relieved after the 3rd nitroglycerin, call 911. If you have not been prescribed Nitroglycerin  and your chest discomfort is not relieved with rest, call 911. AFTER CARE:   Follow up with your physician as instructed.   Follow a heart healthy diet with proper portion control, daily stress management, daily exercise, blood pressure and cholesterol control , and smoking cessation.

## 2022-03-31 NOTE — CARDIO/PULMONARY
Cardiac Rehab Note: chart review/referral     Cardiac cath 3/29/22:  \"No significant CAD \"    Smoking history assessed. Patient is a non smoker.    Smoking Cessation Program link has not been added to the AVS.

## 2022-03-31 NOTE — TELEPHONE ENCOUNTER
Pts son, Acosta Wise called in regards to the event monitor that pt has been wearing since 3/11/22. I reviewed the results through [employer] since application: sinus rhythm, sinus bradycardia w/ pvcs, pacs. Pt had cardiac cath performed and is scheduled to see Dr. Le Kunz in May. Pt also has an appt with Dr. Mariana Hickman on 4/14.      Will discuss with NP.

## 2022-04-01 NOTE — TELEPHONE ENCOUNTER
Mr. Milagro Hernández called on behalf of his mother. She had a procedure done on 3/29 and her monitor was taken off for that and not put back on. Is that something you want them to continue to do?

## 2022-04-01 NOTE — PROGRESS NOTES
Patient did not show for appointment. Called and spoke with son, rescheduled for April 8 at 2 pm for labs and retacrit.

## 2022-04-04 NOTE — TELEPHONE ENCOUNTER
Pts son, Yeyo Vega has been informed to return the monitor back to the [employer]. Understanding verbalized.

## 2022-04-04 NOTE — TELEPHONE ENCOUNTER
Pts son, Amado Michele has been informed to return the monitor back to the [employer]. Understanding verbalized.

## 2022-04-08 NOTE — PROGRESS NOTES
[facility] Progress Note    Date: 2022    Name: Wilmar Lee    MRN: [id number]         : 10/5/1931      Ms. Dali Myers was assessed and education was provided. She reports significant pain right hip and leg- for about 3 months. Has seen PCP and is taking Tizanidine for that- only at bedtime and states it isn't helping very much. Advised her and son to see PCP sooner for further evaluation which he stated he planned to do. Ms. Nikolas Bui vitals were reviewed and patient was observed for 5 minutes prior to treatment. Visit Vitals  BP (!) 147/65 (BP 1 Location: Left upper arm, BP Patient Position: Sitting)   Pulse 64   Temp 98.3 °F (36.8 °C)   Resp 18   Wt 69.4 kg (153 lb)   SpO2 99%   Breastfeeding No   BMI 26.26 kg/m²       Lab results were obtained and reviewed. Recent Results (from the past 12 hour(s))   HGB & HCT    Collection Time: 22  1:45 PM   Result Value Ref Range    HGB 9.4 (L) 11.5 - 16.0 g/dL    HCT 28.9 (L) 35.0 - 47.0 %   IRON PROFILE    Collection Time: 22  1:45 PM   Result Value Ref Range    Iron 80 50 - 170 ug/dL    TIBC 207 (L) 250 - 450 ug/dL    Iron % saturation 39 20 - 50 %   FERRITIN    Collection Time: 22  1:45 PM   Result Value Ref Range    Ferritin 426 (H) 8 - 252 NG/ML       Retacrit 24551 units was administered subcutaneous in  Left upper arm and site intact. Ms. Dali Myers tolerated well, and had no complaints. Patient armband removed and shredded. Ms. Dali Myers was discharged from [place] [address] in stable condition at [phone]. She is to return on  at 2 pm for her next appointment.     Linda Gastelum RN  2022  2:34 PM

## 2022-04-17 NOTE — PROGRESS NOTES
Please advise the patient that she had some slow heartbeat which could be dangerous and could cause fainting. Please advise her not to drive until further notice, and schedule follow-up with electrophysiology this week. Advise to go to the ER if any severe lightheadedness or near fainting.

## 2022-04-18 NOTE — TELEPHONE ENCOUNTER
----- Message from Hodan Porter NP sent at 4/17/2022  1:41 PM EDT -----  Ling Big to you because she's [facility] patient  Thanks  ----- Message -----  From: Xu Polo MD  Sent: 4/17/2022  12:03 PM EDT  To: Kristin Donohue LPN, Sebastien Drew, #    Please advise the patient that she had some slow heartbeat which could be dangerous and could cause fainting. Please advise her not to drive until further notice, and schedule follow-up with electrophysiology this week. Advise to go to the ER if any severe lightheadedness or near fainting.

## 2022-04-18 NOTE — TELEPHONE ENCOUNTER
Son informed states patient has many appointments he will try to schedule but needs more time for transporting. Please call to schedule with Dr Heather Vicente when available nows to go to ED is any signs of syncope or lightheadedness she hasn't driven in years. Daniel Matute

## 2022-04-18 NOTE — TELEPHONE ENCOUNTER
4-18-22 @ 9am, email from Dr Travis Rivero, referring pt to see Dr Alan Perez due to slow heartbeat. Left voicemail for son to contact ofc to sched, waiting for pt callback.     4-18-22 @ 9:06am - np, slow heartbeat, refer Dr Estefanía Flor, spoke to son, due to distance, sched 4-20 @ 2:45pm    . 4-18-22 @ 9:37am - son called back rsched to thurs 4-21 @ 10:30am-makenna

## 2022-04-18 NOTE — TELEPHONE ENCOUNTER
----- Message from Eyal Majano MD sent at 4/17/2022 12:03 PM EDT -----  Please advise the patient that she had some slow heartbeat which could be dangerous and could cause fainting. Please advise her not to drive until further notice, and schedule follow-up with electrophysiology this week. Advise to go to the ER if any severe lightheadedness or near fainting.

## 2022-04-18 NOTE — TELEPHONE ENCOUNTER
Spoke with the patients sonWilber. Verified patient with two patient identifiers. Results given and questions answered. Advised ER if syncope reoccurs. Patients son verbalized understanding.

## 2022-04-20 NOTE — LETTER
4/20/2022    Patient: Gabriela Arriola   YOB: 1931   Date of Visit: 4/20/2022     Tessa Florentino NP  [address] [facility]  Via In Basket    Dear Tessa Florentino NP,      Thank you for referring Ms. Kelly Singh to [facility] for evaluation. My notes for this consultation are attached. If you have questions, please do not hesitate to call me. I look forward to following your patient along with you.       Sincerely,    Nathaniel Mina MD

## 2022-04-20 NOTE — H&P (VIEW-ONLY)
Subjective:      Hilda Ulrich is a 80 y.o. female is here for EP consult. She presented to cardiology with two episodes of syncope without prodrome. W/u revealed severe AS and she is scheduled to see cardiac surgery on 5/2. Monitor demonstrated junctional rhythm in the 30s while asleep.     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 ([address] 07/15/2019    Hyperparathyroidism ([address] 08/06/2018    BMI 33.0-33.9,adult 08/06/2018    Bradycardia 08/01/2018    Aortic stenosis, moderate     Pure hypercholesterolemia 06/19/2018    Anemia of chronic disease 06/19/2018    Type 2 diabetes mellitus with stage 4 chronic kidney disease, without long-term current use of insulin ([address] 12/19/2017    Encounter for long-term (current) use of other medications 08/10/2015      Sunny SANTA NP  Past Medical History:   Diagnosis Date    Anemia 1/27/2022    Anemia of chronic disease 6/19/2018    Anxiety     Aortic stenosis, moderate     Back pain     CAD (coronary artery disease)     Chronic kidney disease     CKD (chronic kidney disease) stage 3, GFR 30-59 ml/min (HCC)     Clotting disorder (HCC)     Constipation     Diabetes ([address]     Diverticulosis 2007    DM (diabetes mellitus) ([address]     AODM    Fibroid 1972    GERD (gastroesophageal reflux disease)     Goiter, non-toxic     Hernia, hiatal     Hyperlipemia     Hypertension     Hypokalemia     Menopause     Multiple thyroid nodules 1/25/2022    Murmur     Osteoarthritis 2006    bursitis R shoulder    Osteoarthritis of right knee     PVC (premature ventricular contraction)     H/O PVC's    Raynaud disease     Venous stasis     Wears hearing aid     both ears      Past Surgical History:   Procedure Laterality Date    HX BREAST BIOPSY Bilateral     x5    HX CATARACT REMOVAL  2004    bilateral    HX COLONOSCOPY  2002, 08/2007    HX DILATION AND CURETTAGE  1960's    HX HYSTERECTOMY  66's    BSO    HX OTHER SURGICAL  2001    sigmoidoscopy     Allergies   Allergen Reactions    Metformin Other (comments)     Kidneys effected      Family History   Problem Relation Age of Onset    Diabetes Brother     Hypertension Mother     Diabetes Brother     negative for cardiac disease  Social History     Socioeconomic History    Marital status:    Tobacco Use    Smoking status: Never Smoker    Smokeless tobacco: Never Used   Vaping Use    Vaping Use: Never used   Substance and Sexual Activity    Alcohol use: No     Alcohol/week: 0.0 standard drinks    Drug use: No    Sexual activity: Never   Social History Narrative    Happily . No concern for abuse. Exercise: none    Diet: Careful. Wear Seatbelts             Current Outpatient Medications   Medication Sig    gabapentin (NEURONTIN) 100 mg capsule TAKE 1 TO 2 CAPSULES BY MOUTH THREE TIMES DAILY. MAX DAILY AMOUNT: 600 MG    famotidine (PEPCID) 20 mg tablet Take 1 Tablet by mouth two (2) times a day.  (Patient not taking: Reported on 3/29/2022)    omeprazole (PRILOSEC) 40 mg capsule TAKE 1 CAPSULE BY MOUTH EVERY MORNING TO CONTROL STOMACH ACID    amLODIPine (NORVASC) 5 mg tablet TAKE 1 TABLET BY MOUTH DAILY    lovastatin (MEVACOR) 40 mg tablet TAKE 1 TABLET BY MOUTH NIGHTLY FOR CHOLESTEROL LOWERING    irbesartan (AVAPRO) 300 mg tablet TAKE 1 TABLET BY MOUTH NIGHTLY    hydrALAZINE (APRESOLINE) 100 mg tablet TAKE 1 TABLET BY MOUTH THREE TIMES DAILY    cinacalcet (SENSIPAR) 30 mg tablet Take 30 mg by mouth daily.  furosemide (LASIX) 40 mg tablet TAKE 1 TABLET BY MOUTH DAILY    fluticasone propionate (FLONASE) 50 mcg/actuation nasal spray SPRAY 1 PUFF IN EACH NOSTRIL ONCE DAILY    ferrous sulfate (Iron) 325 mg (65 mg iron) tablet Take  by mouth Daily (before breakfast).  cetirizine (ZYRTEC) 10 mg tablet Take 10 mg by mouth daily as needed.  TRUE METRIX GLUCOSE TEST STRIP strip     aspirin delayed-release 81 mg tablet Take 1 Tab by mouth daily.  acetaminophen (TYLENOL EXTRA STRENGTH) 500 mg tablet Take 1 Tab by mouth every six (6) hours as needed for Pain.  hydrocortisone valerate (WEST-JACLYN) 0.2 % ointment Apply  to affected area two (2) times a day. use thin layer (Patient not taking: Reported on 3/29/2022)     No current facility-administered medications for this visit. Vitals:    04/20/22 1335   BP: (!) 120/50   Pulse: 60   Resp: 18   SpO2: 99%   Weight: 153 lb (69.4 kg)   Height: 5' 4\" (1.626 m)       I have reviewed the nurses notes, vitals, problem list, allergy list, medical history, family, social history and medications. Review of Symptoms:    General: Pt denies excessive weight gain or loss. Pt is able to conduct ADL's  HEENT: Denies blurred vision, headaches, hearing loss, epistaxis and difficulty swallowing. Respiratory: Denies cough, congestion, shortness of breath, BERMAN, wheezing or stridor. Cardiovascular: +syncope, Denies precordial pain, palpitations, edema or PND  Gastrointestinal: Denies poor appetite, indigestion, abdominal pain or blood in stool  Genitourinary: Denies hematuria, dysuria, increased urinary frequency  Musculoskeletal: Denies joint pain or swelling from muscles or joints  Neurologic: Denies tremor, paresthesias, headache, or sensory motor disturbance  Psychiatric: Denies confusion, insomnia, depression  Integumentray: Denies rash, itching or ulcers.   Hematologic: Denies easy bruising, bleeding    Physical Exam:      General: Well developed, in no acute distress. HEENT: Eyes - PERRL, no jvd  Heart:  Normal S1/S2 negative S3 or S4. Regular, no murmur, gallop or rub. Respiratory: Clear bilaterally x 4, no wheezing or rales  Abdomen:   Soft, non-tender, bowel sounds are active. Extremities:  No edema, normal cap refill, no cyanosis. Musculoskeletal: No clubbing  Neuro: A&Ox3, speech clear, gait stable. Skin: Skin color is normal. No rashes or lesions.  Non diaphoretic, no ulcers or subcutaneous nodule  Vascular: 2+ pulses symmetric in all extremities  Psych - judgement intact and orientation is wnl     Cardiographics    Ekg: nsr    Monitor - junctional rhythm in the 30s around 5 am    Results for orders placed or performed during the hospital encounter of 10/22/20   EKG, 12 LEAD, INITIAL   Result Value Ref Range    Ventricular Rate 59 BPM    Atrial Rate 59 BPM    P-R Interval 144 ms    QRS Duration 86 ms    Q-T Interval 392 ms    QTC Calculation (Bezet) 388 ms    Calculated P Axis 63 degrees    Calculated R Axis 10 degrees    Calculated T Axis -27 degrees    Diagnosis       Sinus bradycardia  Possible Left atrial enlargement  Left ventricular hypertrophy  Nonspecific ST and T wave abnormality - minor  Abnormal ECG  No previous ECGs available  Confirmed by Amna Braga., MD, --- ([id number]) on 10/23/2020 1:22:53 PM           Lab Results   Component Value Date/Time    WBC 4.1 03/25/2022 02:55 PM    HGB (POC) 6.6 (A) 11/17/2014 11:59 AM    HGB 9.4 (L) 04/08/2022 01:45 PM    HCT (POC) 19.5 (A) 11/17/2014 11:59 AM    HCT 28.9 (L) 04/08/2022 01:45 PM    PLATELET 340 93/54/1165 02:55 PM    MCV 98.8 03/25/2022 02:55 PM      Lab Results   Component Value Date/Time    Sodium 131 (L) 03/25/2022 02:55 PM    Potassium 4.2 03/25/2022 02:55 PM    Chloride 94 (L) 03/25/2022 02:55 PM    CO2 28 03/25/2022 02:55 PM    Anion gap 9 03/25/2022 02:55 PM    Glucose 123 (H) 03/25/2022 02:55 PM BUN 57 (H) 03/25/2022 02:55 PM    Creatinine 2.79 (H) 03/25/2022 02:55 PM    BUN/Creatinine ratio 20 03/25/2022 02:55 PM    GFR est AA 19 (L) 03/25/2022 02:55 PM    GFR est non-AA 16 (L) 03/25/2022 02:55 PM    Calcium 9.5 03/25/2022 02:55 PM    Bilirubin, total 0.3 01/18/2022 09:20 PM    Alk. phosphatase 58 01/18/2022 09:20 PM    Protein, total 7.3 01/18/2022 09:20 PM    Albumin 3.6 01/27/2022 03:10 PM    Globulin 3.8 01/18/2022 09:20 PM    A-G Ratio 0.9 (L) 01/18/2022 09:20 PM    ALT (SGPT) 12 01/18/2022 09:20 PM         Assessment:     Assessment:        ICD-10-CM ICD-9-CM    1. Bradycardia  R00.1 427.89 AMB POC EKG ROUTINE W/ 12 LEADS, INTER & REP      CBC WITH AUTOMATED DIFF      PROTHROMBIN TIME + INR      METABOLIC PANEL, COMPREHENSIVE      XR CHEST PA LAT   2. Essential hypertension  I10 401.9 CBC WITH AUTOMATED DIFF      PROTHROMBIN TIME + INR      METABOLIC PANEL, COMPREHENSIVE      XR CHEST PA LAT   3. Syncope, unspecified syncope type  R55 780.2 CBC WITH AUTOMATED DIFF      PROTHROMBIN TIME + INR      METABOLIC PANEL, COMPREHENSIVE      XR CHEST PA LAT   4. SSS (sick sinus syndrome) ([facility])  I49.5 427.81 CBC WITH AUTOMATED DIFF      PROTHROMBIN TIME + INR      METABOLIC PANEL, COMPREHENSIVE      XR CHEST PA LAT   5.  Aortic stenosis, severe  I35.0 424.1 CBC WITH AUTOMATED DIFF      PROTHROMBIN TIME + INR      METABOLIC PANEL, COMPREHENSIVE      XR CHEST PA LAT     Orders Placed This Encounter    XR CHEST PA LAT     Standing Status:   Future     Standing Expiration Date:   5/20/2023     Order Specific Question:   Reason for Exam     Answer:   pre op    CBC WITH AUTOMATED DIFF     Standing Status:   Future     Standing Expiration Date:   10/20/2022    PROTHROMBIN TIME + INR     Standing Status:   Future     Standing Expiration Date:   42/63/7090    METABOLIC PANEL, COMPREHENSIVE     Standing Status:   Future     Standing Expiration Date:   10/20/2022    AMB POC EKG ROUTINE W/ 12 LEADS, INTER & REP Order Specific Question:   Reason for Exam:     Answer:   routine        Plan:   Kimberley Morejon is a pleasant lady with severe AS to be seen for possible TAVR. She had documented junctional rhythm on her monitor while asleep. I explained to her and her daughter that she will likely need ppm at the time of TAVR - high risk for chb. We can perform during that hospitalization. I also offered to implant the dc ppm prior. I explained to them that the most likely cause of syncope is the severe AS but I cannot rule out sick sinus syndrome unless we had a monitor on her the next time she had a syncopal episode. They are going to try and contact [facility] to see if they move their TAVR eval up closer. I discussed the risks/benefits/alternatives of the procedure (dc ppm) with the patient. Risks include (but are not limited to) bleeding, heart block, infection, cva/mi/tamponade/death. The patient understands and agrees to proceed. Thank you for this interesting consultation. Thank you for allowing me to participate in Kimberley Morejon 's care.     Feli Aldana MD, Jasper Loomis

## 2022-04-20 NOTE — PROGRESS NOTES
1. Have you been to the ER, urgent care clinic since your last visit? Hospitalized since your last visit? No    2. Have you seen or consulted any other health care providers outside of the [address] [place] [address] since your last visit? Include any pap smears or colon screening. No    Chief Complaint   Patient presents with    Slow Heart Rate     NP, ref by Dr. Claudine Fischer, bradycardia. Denied cardiac symptoms.

## 2022-04-20 NOTE — PROGRESS NOTES
Subjective:      Feliciano Vela is a 80 y.o. female is here for EP consult. She presented to cardiology with two episodes of syncope without prodrome. W/u revealed severe AS and she is scheduled to see cardiac surgery on 5/2. Monitor demonstrated junctional rhythm in the 30s while asleep.     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 ([address] 07/15/2019    Hyperparathyroidism ([address] 08/06/2018    BMI 33.0-33.9,adult 08/06/2018    Bradycardia 08/01/2018    Aortic stenosis, moderate     Pure hypercholesterolemia 06/19/2018    Anemia of chronic disease 06/19/2018    Type 2 diabetes mellitus with stage 4 chronic kidney disease, without long-term current use of insulin ([address] 12/19/2017    Encounter for long-term (current) use of other medications 08/10/2015      Manjinder SANTA NP  Past Medical History:   Diagnosis Date    Anemia 1/27/2022    Anemia of chronic disease 6/19/2018    Anxiety     Aortic stenosis, moderate     Back pain     CAD (coronary artery disease)     Chronic kidney disease     CKD (chronic kidney disease) stage 3, GFR 30-59 ml/min (HCC)     [facility])     Constipation     Diabetes ([address]     Diverticulosis 2007    DM (diabetes mellitus) ([address]     AODM    Fibroid 1972    GERD (gastroesophageal reflux disease)     Goiter, non-toxic     Hernia, hiatal     Hyperlipemia     Hypertension     Hypokalemia     Menopause     Multiple thyroid nodules 1/25/2022    Murmur     Osteoarthritis 2006    bursitis R shoulder    Osteoarthritis of right knee     PVC (premature ventricular contraction)     H/O PVC's    Raynaud disease     Venous stasis     Wears hearing aid     both ears      Past Surgical History:   Procedure Laterality Date    HX BREAST BIOPSY Bilateral     x5    HX CATARACT REMOVAL  2004    bilateral    HX COLONOSCOPY  2002, 08/2007    HX DILATION AND CURETTAGE  1960's    HX HYSTERECTOMY  66's    BSO    HX OTHER SURGICAL  2001    sigmoidoscopy     Allergies   Allergen Reactions    Metformin Other (comments)     Kidneys effected      Family History   Problem Relation Age of Onset    Diabetes Brother     Hypertension Mother     Diabetes Brother     negative for cardiac disease  Social History     Socioeconomic History    Marital status:    Tobacco Use    Smoking status: Never Smoker    Smokeless tobacco: Never Used   Vaping Use    Vaping Use: Never used   Substance and Sexual Activity    Alcohol use: No     Alcohol/week: 0.0 standard drinks    Drug use: No    Sexual activity: Never   Social History Narrative    Happily . No concern for abuse. Exercise: none    Diet: Careful. Wear Seatbelts             Current Outpatient Medications   Medication Sig    gabapentin (NEURONTIN) 100 mg capsule TAKE 1 TO 2 CAPSULES BY MOUTH THREE TIMES DAILY. MAX DAILY AMOUNT: 600 MG    famotidine (PEPCID) 20 mg tablet Take 1 Tablet by mouth two (2) times a day.  (Patient not taking: Reported on 3/29/2022)    omeprazole (PRILOSEC) 40 mg capsule TAKE 1 CAPSULE BY MOUTH EVERY MORNING TO CONTROL STOMACH ACID    amLODIPine (NORVASC) 5 mg tablet TAKE 1 TABLET BY MOUTH DAILY    lovastatin (MEVACOR) 40 mg tablet TAKE 1 TABLET BY MOUTH NIGHTLY FOR CHOLESTEROL LOWERING    irbesartan (AVAPRO) 300 mg tablet TAKE 1 TABLET BY MOUTH NIGHTLY    hydrALAZINE (APRESOLINE) 100 mg tablet TAKE 1 TABLET BY MOUTH THREE TIMES DAILY    cinacalcet (SENSIPAR) 30 mg tablet Take 30 mg by mouth daily.  furosemide (LASIX) 40 mg tablet TAKE 1 TABLET BY MOUTH DAILY    fluticasone propionate (FLONASE) 50 mcg/actuation nasal spray SPRAY 1 PUFF IN EACH NOSTRIL ONCE DAILY    ferrous sulfate (Iron) 325 mg (65 mg iron) tablet Take  by mouth Daily (before breakfast).  cetirizine (ZYRTEC) 10 mg tablet Take 10 mg by mouth daily as needed.  TRUE METRIX GLUCOSE TEST STRIP strip     aspirin delayed-release 81 mg tablet Take 1 Tab by mouth daily.  acetaminophen (TYLENOL EXTRA STRENGTH) 500 mg tablet Take 1 Tab by mouth every six (6) hours as needed for Pain.  hydrocortisone valerate (WEST-JACLYN) 0.2 % ointment Apply  to affected area two (2) times a day. use thin layer (Patient not taking: Reported on 3/29/2022)     No current facility-administered medications for this visit. Vitals:    04/20/22 1335   BP: (!) 120/50   Pulse: 60   Resp: 18   SpO2: 99%   Weight: 153 lb (69.4 kg)   Height: 5' 4\" (1.626 m)       I have reviewed the nurses notes, vitals, problem list, allergy list, medical history, family, social history and medications. Review of Symptoms:    General: Pt denies excessive weight gain or loss. Pt is able to conduct ADL's  HEENT: Denies blurred vision, headaches, hearing loss, epistaxis and difficulty swallowing. Respiratory: Denies cough, congestion, shortness of breath, BERMAN, wheezing or stridor. Cardiovascular: +syncope, Denies precordial pain, palpitations, edema or PND  Gastrointestinal: Denies poor appetite, indigestion, abdominal pain or blood in stool  Genitourinary: Denies hematuria, dysuria, increased urinary frequency  Musculoskeletal: Denies joint pain or swelling from muscles or joints  Neurologic: Denies tremor, paresthesias, headache, or sensory motor disturbance  Psychiatric: Denies confusion, insomnia, depression  Integumentray: Denies rash, itching or ulcers.   Hematologic: Denies easy bruising, bleeding    Physical Exam:      General: Well developed, in no acute distress. HEENT: Eyes - PERRL, no jvd  Heart:  Normal S1/S2 negative S3 or S4. Regular, no murmur, gallop or rub. Respiratory: Clear bilaterally x 4, no wheezing or rales  Abdomen:   Soft, non-tender, bowel sounds are active. Extremities:  No edema, normal cap refill, no cyanosis. Musculoskeletal: No clubbing  Neuro: A&Ox3, speech clear, gait stable. Skin: Skin color is normal. No rashes or lesions.  Non diaphoretic, no ulcers or subcutaneous nodule  Vascular: 2+ pulses symmetric in all extremities  Psych - judgement intact and orientation is wnl     Cardiographics    Ekg: nsr    Monitor - junctional rhythm in the 30s around 5 am    Results for orders placed or performed during the hospital encounter of 10/22/20   EKG, 12 LEAD, INITIAL   Result Value Ref Range    Ventricular Rate 59 BPM    Atrial Rate 59 BPM    P-R Interval 144 ms    QRS Duration 86 ms    Q-T Interval 392 ms    QTC Calculation (Bezet) 388 ms    Calculated P Axis 63 degrees    Calculated R Axis 10 degrees    Calculated T Axis -27 degrees    Diagnosis       Sinus bradycardia  Possible Left atrial enlargement  Left ventricular hypertrophy  Nonspecific ST and T wave abnormality - minor  Abnormal ECG  No previous ECGs available  Confirmed by Bijan Ronquillo MD, --- ([id number]) on 10/23/2020 1:22:53 PM           Lab Results   Component Value Date/Time    WBC 4.1 03/25/2022 02:55 PM    HGB (POC) 6.6 (A) 11/17/2014 11:59 AM    HGB 9.4 (L) 04/08/2022 01:45 PM    HCT (POC) 19.5 (A) 11/17/2014 11:59 AM    HCT 28.9 (L) 04/08/2022 01:45 PM    PLATELET 516 44/51/9977 02:55 PM    MCV 98.8 03/25/2022 02:55 PM      Lab Results   Component Value Date/Time    Sodium 131 (L) 03/25/2022 02:55 PM    Potassium 4.2 03/25/2022 02:55 PM    Chloride 94 (L) 03/25/2022 02:55 PM    CO2 28 03/25/2022 02:55 PM    Anion gap 9 03/25/2022 02:55 PM    Glucose 123 (H) 03/25/2022 02:55 PM BUN 57 (H) 03/25/2022 02:55 PM    Creatinine 2.79 (H) 03/25/2022 02:55 PM    BUN/Creatinine ratio 20 03/25/2022 02:55 PM    GFR est AA 19 (L) 03/25/2022 02:55 PM    GFR est non-AA 16 (L) 03/25/2022 02:55 PM    Calcium 9.5 03/25/2022 02:55 PM    Bilirubin, total 0.3 01/18/2022 09:20 PM    Alk. phosphatase 58 01/18/2022 09:20 PM    Protein, total 7.3 01/18/2022 09:20 PM    Albumin 3.6 01/27/2022 03:10 PM    Globulin 3.8 01/18/2022 09:20 PM    A-G Ratio 0.9 (L) 01/18/2022 09:20 PM    ALT (SGPT) 12 01/18/2022 09:20 PM         Assessment:     Assessment:        ICD-10-CM ICD-9-CM    1. Bradycardia  R00.1 427.89 AMB POC EKG ROUTINE W/ 12 LEADS, INTER & REP      CBC WITH AUTOMATED DIFF      PROTHROMBIN TIME + INR      METABOLIC PANEL, COMPREHENSIVE      XR CHEST PA LAT   2. Essential hypertension  I10 401.9 CBC WITH AUTOMATED DIFF      PROTHROMBIN TIME + INR      METABOLIC PANEL, COMPREHENSIVE      XR CHEST PA LAT   3. Syncope, unspecified syncope type  R55 780.2 CBC WITH AUTOMATED DIFF      PROTHROMBIN TIME + INR      METABOLIC PANEL, COMPREHENSIVE      XR CHEST PA LAT   4. SSS (sick sinus syndrome) ([facility])  I49.5 427.81 CBC WITH AUTOMATED DIFF      PROTHROMBIN TIME + INR      METABOLIC PANEL, COMPREHENSIVE      XR CHEST PA LAT   5.  Aortic stenosis, severe  I35.0 424.1 CBC WITH AUTOMATED DIFF      PROTHROMBIN TIME + INR      METABOLIC PANEL, COMPREHENSIVE      XR CHEST PA LAT     Orders Placed This Encounter    XR CHEST PA LAT     Standing Status:   Future     Standing Expiration Date:   5/20/2023     Order Specific Question:   Reason for Exam     Answer:   pre op    CBC WITH AUTOMATED DIFF     Standing Status:   Future     Standing Expiration Date:   10/20/2022    PROTHROMBIN TIME + INR     Standing Status:   Future     Standing Expiration Date:   20/05/5794    METABOLIC PANEL, COMPREHENSIVE     Standing Status:   Future     Standing Expiration Date:   10/20/2022    AMB POC EKG ROUTINE W/ 12 LEADS, INTER & REP Order Specific Question:   Reason for Exam:     Answer:   routine        Plan:   Glory Conde is a pleasant lady with severe AS to be seen for possible TAVR. She had documented junctional rhythm on her monitor while asleep. I explained to her and her daughter that she will likely need ppm at the time of TAVR - high risk for chb. We can perform during that hospitalization. I also offered to implant the dc ppm prior. I explained to them that the most likely cause of syncope is the severe AS but I cannot rule out sick sinus syndrome unless we had a monitor on her the next time she had a syncopal episode. They are going to try and contact [facility] to see if they move their TAVR eval up closer. I discussed the risks/benefits/alternatives of the procedure (dc ppm) with the patient. Risks include (but are not limited to) bleeding, heart block, infection, cva/mi/tamponade/death. The patient understands and agrees to proceed. Thank you for this interesting consultation. Thank you for allowing me to participate in Glory Conde 's care.     Tatianna Patton MD, Attila Steen

## 2022-04-22 NOTE — PROGRESS NOTES
[facility] Progress Note    Date: 2022    Name: Tahira Glaser    MRN: [id number]         : 10/5/1931      Ms. Frank Harvey was assessed and education was provided. Pt denied any new concerns, but her daughter reported she fell two nights ago. No injuries to report. Ms. Anjelica Loera vitals were reviewed and patient was observed for 5 minutes prior to treatment. Visit Vitals  /63 (BP 1 Location: Left arm, BP Patient Position: Sitting)   Temp 98.6 °F (37 °C)   Resp 18   SpO2 93%   Breastfeeding No       Lab results were obtained and reviewed. Recent Results (from the past 12 hour(s))   HGB & HCT    Collection Time: 22  1:50 PM   Result Value Ref Range    HGB 9.4 (L) 11.5 - 16.0 g/dL    HCT 28.3 (L) 35.0 - 47.0 %     1446: Retacrit 20,000 units was administered subcutaneous in  Left arm. Band-aid applied to site without redness, swelling or pain. Ms. Frank Harvey tolerated well, and had no complaints. Patient armband removed and shredded. Ms. Frank Harvey was discharged from [place] [address] in stable condition at 1450. She is due to return on May 6 but is having a pacemaker put in that day, she will tentatively return on May 13 at 1330 for her next appointment. Her daughter will call if the appointment needs to be changed.      [facility]  2022  3:14 PM

## 2022-04-26 PROBLEM — N18.30 CHRONIC RENAL DISEASE, STAGE III (HCC): Status: ACTIVE | Noted: 2022-01-01

## 2022-04-26 NOTE — PROGRESS NOTES
Subjective:     CC: right hip pain    Young Harley is a 80 y.o. female who presents today for a routine 3 month follow up right sided sciatica and other chronic medical problems. Right sided sciatica  She has chronic posterior right hip pain that radiates all the way down the right leg. Reports muscle cramping in the calf. She has chronic tingling in the toes r/t diabetic neuropathy. The pain is aggravated with standing and walking. Started over a month ago. Denies any injury. She has tried Tylenol without relief but riding her stationary bike does relieve the pain. Has not yet tried a heating pad. She cannot take NSAIDS due to CKD. At the last OV she was prescribed Tizanidine 2mg qHS prn but this was ineffective. Her ENT then started her on Pepcid and due to a drug drug interaction she had to stop it and was not able to increase the dose. She just started taking OTC turmeric and using OTC lidocaine patches which help some. Would like a script for the higher 5% strength patch. Bradycardia  Earlier this year she presented to cardiology with two episodes of syncope without prodrome. Monitor demonstrated junctional rhythm in the 30s while asleep and ECHO showed severe aortic stenosis. She is scheduled for pacer removal on 5/2/22. Her valve will be repaired/replaced at a later date. Pt seen by ENT Dr Jimmy Ayala on 3-24-22 for chronic R ear pain and globus sensation. He feels her problem is related to GERD and GOITER. He recommended adding pepcid 20mg bid to her omeprazole that she has been on for years. She followed up with him last week and reported the dysphagia is better. CKD stage 4 with anemia  She is followed by nephrologist Dr Eduardo Nunez. Hx of iron infusions for anemia. She now needs EPO and has been referred to the infusion center for administration. She is also on Sensipar for secondary hyperparathyroidism.     Lab Results   Component Value Date/Time    Creatinine 2.79 (H) 03/25/2022 02:55 PM Lab Results   Component Value Date/Time    HGB (POC) 6.6 (A) 11/17/2014 11:59 AM    HGB 9.4 (L) 04/22/2022 01:50 PM      Type 2 Diabetes with neuropathy, diet controlled   Lab Results   Component Value Date/Time    Hemoglobin A1c 6.0 (H) 01/27/2022 03:10 PM     Checks BS at home in the morning. Has her log with her and all readings in the 80'90's. She is not on any diabetes meds. She is on Gabapentin 100mg TID prn for neuropathy. Has painful tingling in feet at times and takes 100-200mg TID PRN.     Last eye exam: this was done in August.   Last foot exam: Sees podiatrist Dr. Hazel Rush  She is on Llovastatin.     Chronic right shoulder pain  She was referred to Dr. Gila Marte for right shoulder pain. MRI showed chronic degenerative changes and he is planning on trying injections rather than surgical intervention. She has had 2 injections that have helped relieve the pain.     GERD  Stable, no breakthrough heartburn or alarm signs. Taking omeprazole 40 mg daily.     Hyperparathyroidism  She has been followed by Endo Dr Maria De Jesus French. Last seen 4-2021. According to his last note, \"Her thyroid nodules mostly appear as pseudonodules, meaning that there is chronic heterogenous appearance giving the resemblance of thyroid nodules whereas most of them are not actually true nodules. She had a repeat ultrasound in 2-2022 that showed unchanged nodules.     Osteopenia  DEXA in 2-2022 showed worsening OP, may be related to CKD. She is on Prolia.   Last injection was in November 2021.      Healthcare maintenance  PNA vaccines up to date  Shingrix up to date  She rec'd both Moderna vaccines and was boosted in   Flu shot up to date      Patient Active Problem List   Diagnosis Code    Encounter for long-term (current) use of other medications Z79.899    Type 2 diabetes mellitus with stage 4 chronic kidney disease, without long-term current use of insulin ([facility] E11.22, N18.4    Pure hypercholesterolemia E78.00    Anemia of chronic disease D63.8    Bradycardia R00.1    Aortic stenosis, moderate I35.0    Hyperparathyroidism ([facility]) E21.3    BMI 33.0-33.9,adult Z68.33    Type 2 diabetes mellitus with diabetic neuropathy ([facility]) E11.40    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 ([facility]) I13.10, N18.4    Primary hyperparathyroidism ([facility]) E21.0    Gastroesophageal reflux disease without esophagitis K21.9    Peripheral vascular disease ([facility]) I73.9    Benign familial tremor G25.0    Bilateral ganglion cysts of wrists M67.431, M67.432    Localized osteoporosis without current pathological fracture M81.6    Multiple thyroid nodules E04.2    Anemia D64.9    S/P cardiac cath Z98.890       Past Medical History:   Diagnosis Date    Anemia 1/27/2022    Anemia of chronic disease 6/19/2018    Anxiety     Aortic stenosis, moderate     Back pain     CAD (coronary artery disease)     Chronic kidney disease     CKD (chronic kidney disease) stage 3, GFR 30-59 ml/min ([facility])     Clotting disorder ([facility])     Constipation     Diabetes [facility]     Diverticulosis 2007    DM (diabetes mellitus) ([facility]     AODM    Fibroid 1972    GERD (gastroesophageal reflux disease)     Goiter, non-toxic     Hernia, hiatal     Hyperlipemia     Hypertension     Hypokalemia     Menopause     Multiple thyroid nodules 1/25/2022    Murmur     Osteoarthritis 2006    bursitis R shoulder    Osteoarthritis of right knee     PVC (premature ventricular contraction)     H/O PVC's    Raynaud disease     Venous stasis     Wears hearing aid     both ears         Current Outpatient Medications:     famotidine (PEPCID) 20 mg tablet, Take 1 Tablet by mouth two (2) times a day. (Patient not taking: Reported on 3/29/2022), Disp: 180 Tablet, Rfl: 0    gabapentin (NEURONTIN) 100 mg capsule, TAKE 1 TO 2 CAPSULES BY MOUTH THREE TIMES DAILY.  MAX DAILY AMOUNT: 600 MG, Disp: 180 Capsule, Rfl: 3    omeprazole (PRILOSEC) 40 mg capsule, TAKE 1 CAPSULE BY MOUTH EVERY MORNING TO CONTROL STOMACH ACID, Disp: 90 Capsule, Rfl: 1    amLODIPine (NORVASC) 5 mg tablet, TAKE 1 TABLET BY MOUTH DAILY, Disp: 90 Tablet, Rfl: 1    lovastatin (MEVACOR) 40 mg tablet, TAKE 1 TABLET BY MOUTH NIGHTLY FOR CHOLESTEROL LOWERING, Disp: 90 Tablet, Rfl: 1    irbesartan (AVAPRO) 300 mg tablet, TAKE 1 TABLET BY MOUTH NIGHTLY, Disp: 90 Tablet, Rfl: 1    hydrALAZINE (APRESOLINE) 100 mg tablet, TAKE 1 TABLET BY MOUTH THREE TIMES DAILY, Disp: 270 Tablet, Rfl: 1    cinacalcet (SENSIPAR) 30 mg tablet, Take 30 mg by mouth daily. , Disp: , Rfl:     furosemide (LASIX) 40 mg tablet, TAKE 1 TABLET BY MOUTH DAILY, Disp: 90 Tablet, Rfl: 1    hydrocortisone valerate (WEST-JACLYN) 0.2 % ointment, Apply  to affected area two (2) times a day. use thin layer (Patient not taking: Reported on 3/29/2022), Disp: 15 g, Rfl: 0    fluticasone propionate (FLONASE) 50 mcg/actuation nasal spray, SPRAY 1 PUFF IN EACH NOSTRIL ONCE DAILY, Disp: 1 Bottle, Rfl: 5    ferrous sulfate (Iron) 325 mg (65 mg iron) tablet, Take  by mouth Daily (before breakfast). , Disp: , Rfl:     cetirizine (ZYRTEC) 10 mg tablet, Take 10 mg by mouth daily as needed. , Disp: , Rfl:     TRUE METRIX GLUCOSE TEST STRIP strip, , Disp: , Rfl:     aspirin delayed-release 81 mg tablet, Take 1 Tab by mouth daily. , Disp: 30 Tab, Rfl: 0    acetaminophen (TYLENOL EXTRA STRENGTH) 500 mg tablet, Take 1 Tab by mouth every six (6) hours as needed for Pain., Disp: 30 Tab, Rfl: 1    Allergies   Allergen Reactions    Metformin Other (comments)     Kidneys effected       Past Surgical History:   Procedure Laterality Date    HX BREAST BIOPSY Bilateral     x5    HX CATARACT REMOVAL  2004    bilateral    HX COLONOSCOPY  2002, 08/2007    HX DILATION AND CURETTAGE  1960's    HX HYSTERECTOMY  70's    BSO    HX OTHER SURGICAL  2001    sigmoidoscopy       Social History     Tobacco Use   Smoking Status Never Smoker   Smokeless Tobacco Never Used       Social History     Socioeconomic History    Marital status:    Tobacco Use    Smoking status: Never Smoker    Smokeless tobacco: Never Used   Vaping Use    Vaping Use: Never used   Substance and Sexual Activity    Alcohol use: No     Alcohol/week: 0.0 standard drinks    Drug use: No    Sexual activity: Never   Social History Narrative    Happily . No concern for abuse. Exercise: none    Diet: Careful. Wear Seatbelts               Family History   Problem Relation Age of Onset    Diabetes Brother     Hypertension Mother     Diabetes Brother        ROS:  Gen: denies fever, chills, or fatigue  Resp: denies dyspnea, cough, or wheezing  CV: denies chest pain, pressure, or palpitations  Extremeties: denies edema  Musculoskeletal: +chronic pain in right hip and right leg, + muscle cramps in right calf  Denies joint heat, redness, or swelling  Neuro: +chronic numbness/tingling in toes, denies extremity weakness or dizziness  Skin: denies rashes or new lesions   Psych: denies anxiety, depression, kenneth, or other changes in mood      Objective:     Visit Vitals  BP (!) 123/42 (BP 1 Location: Right arm)   Pulse 62   Temp 98.6 °F (37 °C)   Resp 16   Ht 5' 4\" (1.626 m)   Wt 144 lb (65.3 kg)   SpO2 99%   BMI 24.72 kg/m²       General: Alert and oriented. No acute distress. Well nourished  HEENT :  Eyes: Sclera white, conjunctiva clear. PERRLA. Extra ocular movements intact. Neck: Supple with FROM. Lungs: Breathing even and unlabored. All lobes clear to auscultation bilaterally   Heart :+chronic systolic murmur, Reg Rhythm  Extremities: Non-edematous  Back: No tenderness to palpation. Unable to assess ROM due to pain (sitting in W/C)  Musculoskeletal: R HIP: +TTP in anterior hip, there is no TTP over the lateral hip or trochanteric bursa.  Unable to assess ROM due to pain (sitting in W/C)  R Knee: No TTP, heat, or erythema, FROM  Neuro: Cranial nerves grossly normal.  Sensory: Sensation intact to RLE. Psych: Mood and thought content appropriate for situation. Dressed appropriately and with good hygiene. Skin: Warm, dry, and intact. No lesions or discoloration. Assessment/ Plan:     R sciatica  Script sent for Lidoderm 5% patches- use as prescribed  Cont Turmeric  She cannot take NSAIDS due to CKD  Tizanidine interacts with her Pepcid  F/U prn if symptoms worsen or do not improve. Bradycardia  Per cardiology- scheduled for pacer removal on 5-2-22    Aortic stenosis  Per cardiology- plans to have valve replaced soon    Type 2 Diabetes with neuropathy, diet controlled   Cont low carb, sugar free diet  May check BS twice a week  Keep eye exams and podiatry appts up to date  Cont Gabapentin TID prn for neuropathy     Hyperparathyroidism  Per nephrology     Osteopenia  Cont Prolia injections     Thyroid nodules  Stable     HLD  Cont lovastatin     GERD  Improved with Pepcid  Cont PPI  Avoid triggers        RTO 4 months      Verbal and written instructions (see AVS) provided.  Patient expresses understanding of diagnosis and treatment plan. Health Maintenance Due   Topic Date Due    Pneumococcal 65+ years (3 - PPSV23 or PCV20) 06/07/2021    Lipid Screen  12/16/2021    Foot Exam Q1  12/17/2021               Deloris Briggs NP

## 2022-04-26 NOTE — PROGRESS NOTES
Chief Complaint   Patient presents with    Hip Pain     right       1. \"Have you been to the ER, urgent care clinic since your last visit? Hospitalized since your last visit? \" No    2. \"Have you seen or consulted any other health care providers outside of the [address] [place] [address] since your last visit? \" No     3. For patients aged 39-70: Has the patient had a colonoscopy / FIT/ Cologuard? NA - based on age      If the patient is female:    4. For patients aged 41-77: Has the patient had a mammogram within the past 2 years? NA - based on age or sex      11. For patients aged 21-65: Has the patient had a pap smear? NA - based on age or sex      Identified pt with two pt identifiers(name and ). Reviewed record in preparation for visit and have obtained necessary documentation.     Symptom review: Covid Review    NO  Fever   NO  Shaking chills  NO  Cough  NO Headaches  NO  Body aches  NO  Coughing up blood  NO  Chest congestion  NO  Chest pain  NO  Shortness of breath  NO  Profound Loss of smell/taste  NO  Nausea/Vomiting   NO  Loose stool/Diarrhea  NO  any skin issues

## 2022-04-26 NOTE — TELEPHONE ENCOUNTER
Called & spoke to Apolinar Rodriguez, pt's son. Apolinar Rodriguez agreed to [facility] [address] with Dr Nicolas Schaeffer @ [address] on 5/13/2022. Apt on 4/27/2022 canceled.

## 2022-05-02 NOTE — TELEPHONE ENCOUNTER
Patient's son Omer Franklin states that the patient is having a pacemaker inserted on Friday by Dr. Bigg Reyes and would like to know if the appointment with Dr Nayla Haines which is scheduled for tomorrow is still needed. Requests a call back at [phone].     Thanks,  Caroline Bowen

## 2022-05-02 NOTE — TELEPHONE ENCOUNTER
Future Appointments   Date Time Provider Michelle Prasadi   5/13/2022 11:45 AM Aliya Parekh [facility]/JOE BS AMB   5/13/2022  1:30 PM [facility]   7/27/2022  2:00 PM Seven Saldana NP [facility]IN BS AMB   9/26/2022  1:40 PM Nina Linares MD [facility] BS AMB

## 2022-05-02 NOTE — TELEPHONE ENCOUNTER
Son Kev James on PHI needs to know if its absolutely necessary for Ms. Priscilla Carter to come and see dr. Lonnie Trejo. Left travis on voice mail to call office back at [phone].

## 2022-05-02 NOTE — TELEPHONE ENCOUNTER
Per Dr. Thania Wright patient can have appointment in 3-4 months if followed up with Dr. Alexandria Arnold today. Spoke with son , Marlene Domingoutant on [facility].  Son was informed and confirmed that patient went to appointment with Dana carvajal am.

## 2022-05-06 PROBLEM — I49.5 SSS (SICK SINUS SYNDROME) (HCC): Status: ACTIVE | Noted: 2022-01-01

## 2022-05-06 NOTE — Clinical Note
A Bovie was used. Blend setting: blend. Mode: bipolar. Coagulation Settin.  Cut Settin. Site (pad location): upper leg.

## 2022-05-06 NOTE — INTERVAL H&P NOTE
Update History & Physical    The Patient's History and Physical of April 20, 2022 was reviewed with the patient and I examined the patient. There was no change. The surgical site was confirmed by the patient and me. Plan:  The risk, benefits, expected outcome, and alternative to the recommended procedure have been discussed with the patient. Patient understands and wants to proceed with the procedure.     Electronically signed by Armen Johnson MD on 5/6/2022 at 8:43 AM

## 2022-05-06 NOTE — PROGRESS NOTES
TRANSFER - OUT REPORT:    Verbal report given to NAVID Roe on Young Cea being transferred to [facility] for routine progression of care       Report consisted of patients Situation, Background, Assessment and   Recommendations(SBAR). Information from the following report(s) Procedure Summary was reviewed with the receiving nurse. Opportunity for questions and clarification was provided.

## 2022-05-06 NOTE — DISCHARGE INSTRUCTIONS
11/12/2018        RE: Tye Bertrand  [facility]  [address]  [facility] [address]          [facility]    HPI:    Tye Bertrand is a 77 year old  (1940), who is being seen today for an episodic care visit at [facility] .  HPI information obtained from: facility chart records, facility staff and patient report. Today's concern is INR/Coumadin management for A. Fib     Bleeding Signs/Symptoms:  None  Thromboembolic Signs/Symptoms:  None    Medication Changes:  No  Dietary Changes:  No  Activity Changes: No  Bacterial/Viral Infection:  No    Missed Coumadin Doses:  None    On ASA: No      OBJECTIVE:    INR Today:4.03 was 3.29  Current Dose:  Coumadin 6 mg po every day    ASSESSMENT:  (I48.2) Chronic atrial fibrillation (H)  (primary encounter diagnosis)  (Z51.81,  Z79.01) Encounter for monitoring coumadin therapy    Therapeutic INR for goal of 2-3    PLAN:    New Dose:Hold today and tomorrow, 3 mg po Wed, Thursday     Next INR: 11/16/18        Electronically signed by:  SONA Banks CNP                    Sincerely,        SONA Banks CNP     [facility]  [address]  [phone]  [place]. [employer]     NEW PACEMAKER IMPLANT DISCHARGE INSTRUCTIONS    Patient ID:  Luis Angel Vu  [id number]  65 y.o.  10/5/1931    Admit Date: 5/6/2022    Discharge Date: 5/6/2022     Admitting Physician: Nirmala Oden MD     Discharge Physician: Nirmala Oden MD    Admission Diagnoses:   Sinus bradycardia [R00.1]  SSS (sick sinus syndrome) [facility]) [I49.5]    Discharge Diagnoses: Active Problems:    SSS (sick sinus syndrome) ([address] (5/6/2022)        Discharge Condition: Good    Cardiology Procedures this Admission:  Pacemaker insertion. Disposition: home    Reference discharge instructions provided by nursing for diet and activity. Follow-up with device clinic in three weeks. Call [phone] to make an appointment. Signed:  Nirmala Oden MD  5/6/2022  12:36 PM      DISCHARGE INSTRUCTIONS FOR PATIENTS WITH PACEMAKERS    1. Remember to call for an appointment for 3 weeks [phone] to check healing and implant programming. 2. Medic Alert Bracelets are available from your pharmacist to wear at all times if you choose to wear one. 3. Carry your ID card for pacemaker with you at all times. This card will be given to you in the hospital or mailed to you. 4. The pacemaker will bulge slightly under your skin. The bulge will decrease in size over the next few weeks. Please notify the doctor's office if you notice any of the following around your site:   A.  A bruise that does not go away. B.  Soreness or yellow, green, or brown drainage from the site. C. Any swelling from the site. D. If you have a fever of 100 degrees or higher that lasts for a few days. INCISION CARE       1.  Leave the dressing over your site until your follow up visit. 2.  You may not shower until after follow up visit. 3.  For comfort, wear loose fitting clothing.   1. 4.  Ice pack to affected shoulder for first 24 hours, wear your sling for 2 days. 2. 5.  Report any signs of infection, fever, pain, swelling, redness, oozing, or heat at site especially if these symptoms increase after the first 3 to 4 days. ACTIVITY PRECAUTIONS     1. Avoid rough contact with the implant site. 2. No driving for 14 days. 3. Avoid lifting your arm over your head, carrying anything on the affected side, or lifting over 10 pounds for 90 days. For the first 2 days only bend your arm at the elbow. 4. Any extreme activity such as golf, weight lifting or exercise biking should be restricted for 60 days. 5. Do not carry objects by holding them against your implant site. 6.  No shooting rifles or any type of gun with the affected shoulder permanently. SPECIAL PRECAUTIONS     1. You should avoid all strong magnetic fields, such as arc welding, large transformers, large motors. 2.  You may or may not (depending on your device) have an MRI which uses a strong magnet to take pictures. 3.  Treatments or surgery that requires diathermy or electrocautery should be discussed with your doctor before scheduled. 4. Avoid radio frequency transmitters, including radar. 5. Advise dentist or other medical personnel you see that you have a pacemaker. 6.  Cell phones and microwave oven use is okay. 7.  If you plan to move or take a trip to a new area, the doctor's office will give you a name of a doctor to contact for any problems. ANTIBIOTIC THERAPY    During the first 8 weeks after your pacemaker insertion, you may need antibiotics before any dental work or certain tests or operations. Let the dentist or doctor who is caring for you know that you have had an implanted device.

## 2022-05-06 NOTE — PROGRESS NOTES
Cardiac Cath Lab Recovery Arrival Note:      Becky Damian arrived to Cardiac Cath Lab, Recovery Area. Staff introduced to patient. Patient identifiers verified with NAME and DATE OF BIRTH. Procedure verified with patient. Consent forms reviewed and signed by patient or authorized representative and verified. Allergies verified. Patient and family oriented to department. Patient and family informed of procedure and plan of care. Questions answered with review. Patient prepped for procedure, per orders from physician, prior to arrival.    Patient on cardiac monitor, non-invasive blood pressure, SPO2 monitor. On room air. Patient is A&Ox 3. Patient reports no c/o. Patient in stretcher, in low position, with side rails up, call bell within reach, patient instructed to call if assistance as needed. Patient prep in: [address], [place]. Patient family has pager # none  Family in: [facility] waiting area.    Prep by: Venita Topete RN

## 2022-05-06 NOTE — PROGRESS NOTES
Primary Nurse Estella Oliva RN and Milo Nj RN performed a dual skin assessment on this patient No impairment noted  Cheo score is 23

## 2022-05-13 NOTE — PROGRESS NOTES
Dain Carlson is a 80 y.o. female here for follow up for anemia. patinet c/o sciatic pain in RLE, rates pain 9/10. She is taking tylenol, due to kidney issues. Patient is scheduled for Retacrit in [place] today, order needs to be signed. Key Oncology Meds     Patient is on no Oncologic meds. Patient was admitted to [facility] on 5/2/2022 for SSS. Per notes pacemaker was implanted.

## 2022-05-13 NOTE — PROGRESS NOTES
[facility] Progress Note    Date: May 13, 2022    Name: Octavio Lucas    MRN: [id number]         : 10/5/1931      Ms. Justin Mendez was assessed and education was provided. She was seen by Dr. Isidra Benz through virtual visit. She is in wheelchair today because it is difficult for her to use walker with right hand due to recent pacemaker placement. Site healing well and she reports that she feels better since getting pacemaker. Ms. Erika Mae vitals were reviewed and patient was observed for 5 minutes prior to treatment. Lab work drawn without difficulty left antecubital.  Visit Vitals  /62 (BP 1 Location: Left upper arm, BP Patient Position: Sitting)   Pulse 64   Temp 97.8 °F (36.6 °C)   Resp 16   Wt 72.2 kg (159 lb 3.2 oz)   SpO2 95%   Breastfeeding No   BMI 27.33 kg/m²       Lab results were obtained and reviewed. Recent Results (from the past 12 hour(s))   HGB & HCT    Collection Time: 22 12:20 PM   Result Value Ref Range    HGB 9.6 (L) 11.5 - 16.0 g/dL    HCT 29.1 (L) 35.0 - 47.0 %   IRON PROFILE    Collection Time: 22 12:20 PM   Result Value Ref Range    Iron 75 50 - 170 ug/dL    TIBC 198 (L) 250 - 450 ug/dL    Iron % saturation 38 20 - 50 %   FERRITIN    Collection Time: 22 12:20 PM   Result Value Ref Range    Ferritin 400 (H) 8 - 252 NG/ML       Retacrit 01062 units was administered subcutaneous in  Left upper arm and site intact. Ms. Justin Mendez tolerated well, and had no complaints. Patient armband removed and shredded. Ms. Justin Mendez was discharged from [place] [address] in stable condition at 1300. She is to return on May 27 at 1:30 for her next appointment.     Courtney Park RN  May 13, 2022  1:15 PM

## 2022-05-13 NOTE — PROGRESS NOTES
Reason for Visit:   Mark Vasquez is a 80 y.o. female who is seen for eval of anemia    Treatment History:   Dx: Anemia--CKD    Tx: EPO to maintain hgb>10    History of Present Illness:     Seen today virtually at [facility] for first time fu of ACD. Get Retacrit as needed. Tolerates fine  Wants to continue  Daughter on visit today. Pt does not converse much. No issues today/ seeing cardio regularly  No fevers/ chills/ chest pain/ SOB/ nausea/ vomiting/diarrhea/         Last note:  Referred by Nephrology for possible JANAY. Hx of CKD for years, progressive decline in hgb. Was told needed JANAY and would like to have done here due to difficulties with transportation. No symptoms. No bleeding or bruising.   Only complaint is of tremor which is familial.    Past Medical History:   Diagnosis Date    Anemia 1/27/2022    Anemia of chronic disease 6/19/2018    Anxiety     Aortic stenosis, moderate     Back pain     CAD (coronary artery disease)     Chronic kidney disease     CKD (chronic kidney disease) stage 3, GFR 30-59 ml/min ([facility])     Clotting disorder ([facility])     Constipation     Diabetes ([address]     Diverticulosis 2007    DM (diabetes mellitus) ([address]     AODM    Fibroid 1972    GERD (gastroesophageal reflux disease)     Goiter, non-toxic     Hernia, hiatal     Hyperlipemia     Hypertension     Hypokalemia     Menopause     Multiple thyroid nodules 1/25/2022    Murmur     Osteoarthritis 2006    bursitis R shoulder    Osteoarthritis of right knee     PVC (premature ventricular contraction)     H/O PVC's    Raynaud disease     SSS (sick sinus syndrome) ([address] 5/6/2022    Venous stasis     Wears hearing aid     both ears      Past Surgical History:   Procedure Laterality Date    HX BREAST BIOPSY Bilateral     x5    HX CATARACT REMOVAL  2004    bilateral    HX COLONOSCOPY  2002, 08/2007    HX DILATION AND CURETTAGE  1960's    HX HYSTERECTOMY  70's    BSO    HX OTHER SURGICAL  2001 sigmoidoscopy      Social History     Tobacco Use    Smoking status: Never Smoker    Smokeless tobacco: Never Used   Substance Use Topics    Alcohol use: No     Alcohol/week: 0.0 standard drinks      Family History   Problem Relation Age of Onset    Diabetes Brother     Hypertension Mother     Diabetes Brother      Current Outpatient Medications   Medication Sig    furosemide (LASIX) 40 mg tablet TAKE 1 TABLET BY MOUTH DAILY    lidocaine (LIDODERM) 5 % APPLY PATCH TO AFFECTED AREA FOR 12 HOURS DAILY AND REMOVE 12 HOURS A DAY    gabapentin (NEURONTIN) 100 mg capsule TAKE 1 TO 2 CAPSULES BY MOUTH THREE TIMES DAILY. MAX DAILY AMOUNT: 600 MG    omeprazole (PRILOSEC) 40 mg capsule TAKE 1 CAPSULE BY MOUTH EVERY MORNING TO CONTROL STOMACH ACID    amLODIPine (NORVASC) 5 mg tablet TAKE 1 TABLET BY MOUTH DAILY    lovastatin (MEVACOR) 40 mg tablet TAKE 1 TABLET BY MOUTH NIGHTLY FOR CHOLESTEROL LOWERING    irbesartan (AVAPRO) 300 mg tablet TAKE 1 TABLET BY MOUTH NIGHTLY    hydrALAZINE (APRESOLINE) 100 mg tablet TAKE 1 TABLET BY MOUTH THREE TIMES DAILY    cinacalcet (SENSIPAR) 30 mg tablet Take 30 mg by mouth daily.  fluticasone propionate (FLONASE) 50 mcg/actuation nasal spray SPRAY 1 PUFF IN EACH NOSTRIL ONCE DAILY    ferrous sulfate (Iron) 325 mg (65 mg iron) tablet Take  by mouth Daily (before breakfast).  cetirizine (ZYRTEC) 10 mg tablet Take 10 mg by mouth daily as needed.  TRUE METRIX GLUCOSE TEST STRIP strip     aspirin delayed-release 81 mg tablet Take 1 Tab by mouth daily.  acetaminophen (TYLENOL EXTRA STRENGTH) 500 mg tablet Take 1 Tab by mouth every six (6) hours as needed for Pain. No current facility-administered medications for this visit. Allergies   Allergen Reactions    Metformin Other (comments)     Kidneys effected        Review of Systems: A complete review of systems was obtained, negative except as described above.     Physical Exam:     There were no vitals taken for this visit. General: alert, cooperative, no distress   Mental  status: normal mood, behavior, speech, dress, motor activity, and thought processes, able to follow commands   HENT: NCAT   Neck: no visualized mass   Resp: no respiratory distress   Neuro: no gross deficits   Skin: no discoloration or lesions of concern on visible areas   Psychiatric: normal affect, consistent with stated mood, no evidence of hallucinations       Due to this being a TeleHealth evaluation (During YKOJD-82 public health emergency), many elements of the physical examination are unable to be assessed. Evaluation of the following organ systems was limited: Vitals/Constitutional/EENT/Resp/CV/GI//MS/Neuro/Skin/Heme-Lymph-Imm. Results:     Lab Results   Component Value Date/Time    WBC 3.4 (L) 04/29/2022 02:10 PM    HGB 10.3 (L) 04/29/2022 02:10 PM    HCT 31.3 (L) 04/29/2022 02:10 PM    PLATELET 756 74/57/4263 02:10 PM    .6 (H) 04/29/2022 02:10 PM    ABS. NEUTROPHILS 2.4 04/29/2022 02:10 PM    HGB (POC) 6.6 (A) 11/17/2014 11:59 AM    HCT (POC) 19.5 (A) 11/17/2014 11:59 AM     Lab Results   Component Value Date/Time    Sodium 132 (L) 04/29/2022 02:10 PM    Potassium 4.2 04/29/2022 02:10 PM    Chloride 96 (L) 04/29/2022 02:10 PM    CO2 28 04/29/2022 02:10 PM    Glucose 104 (H) 04/29/2022 02:10 PM    BUN 39 (H) 04/29/2022 02:10 PM    Creatinine 2.73 (H) 04/29/2022 02:10 PM    GFR est AA 20 (L) 04/29/2022 02:10 PM    GFR est non-AA 16 (L) 04/29/2022 02:10 PM    Calcium 9.1 04/29/2022 02:10 PM    Glucose (POC) 114 03/29/2022 08:04 AM     Lab Results   Component Value Date/Time    Bilirubin, total 0.4 04/29/2022 02:10 PM    ALT (SGPT) 8 (L) 04/29/2022 02:10 PM    Alk.  phosphatase 58 04/29/2022 02:10 PM    Protein, total 7.2 04/29/2022 02:10 PM    Albumin 3.4 (L) 04/29/2022 02:10 PM    Globulin 3.8 04/29/2022 02:10 PM           Assessment/PLAN:     1) Anemia chronic disease   CKD-- Stage IV and sees renal.     Seen today virtually for first time fu for ACD. Pt of Dr Sam De Oliveira  History and records reviewed with pt and family today. Pt tolerates Retacrit fine and wants to continue this. Has cardiac issues and had recent pacer placed. Sees cardio. Feels fine today. Will continue with same plan as Dr Sam De Oliveira. Monthly H/H. Fu with us in 3-6 months    2) cardiac issues/ pacer. Per cardio. Fu with [facility] [place] 3-6 months  Call if questions       The patient was evaluated through a synchronous (real-time) audio-video encounter. The patient (or guardian if applicable) is aware that this is a billable service, which includes applicable co-pays. This Virtual Visit was conducted with patient's (and/or legal guardian's) consent. The visit was conducted pursuant to the emergency declaration under the [address] [place] [address], [address] [place] [address] waiver authority and the [employer] and [employer] General Act. Patient identification was verified, and a caregiver was present when appropriate. The patient was located in a state where the provider was licensed to provide care.        Signed By: Paris Harris DO

## 2022-05-24 NOTE — TELEPHONE ENCOUNTER
Spoke with pt's son and he stated that they had too many appointments to keep up with and they really were not interested in the shoes. I told  to call us when they are ready for this appt for the shoe form

## 2022-05-25 NOTE — TELEPHONE ENCOUNTER
Rec'd a call from patient's cardiologist Dr Michael Alvarado. States patient needs aortic valve replacement (TAVR) surgery. However, she cannot use her right arm at the moment due to recent pacemaker surgery. Becasuse she cannot use her right arm, she cannot use her walker. So Dr Michael Alvarado would like to wait to do the surgery until she is able to use her walker and he would like her to do \"Pre-hab\" (PT) prior to surgery which will most likely be some time in July. He was calling to find out which PT company she went to in the past. According to the records it looks like she went to [facility] so he will refer her there and I told him they will reach out to the patient to schedule. I then called over to PT to verify.

## 2022-05-25 NOTE — PROGRESS NOTES
Patient: Wan Benítez   Age: 80 y.o. Patient Care Team:  Ayah Espinal NP as PCP - General (Nurse Practitioner)  Ayah Espinal NP as PCP - [facility]  Kenan Bruno MD (Nephrology)  Elizabeth Hodges MD as Physician (Cardiovascular Disease Physician)  Theresa Hernandez MD (Cardiothoracic Surgery)  Meera Drew MD (Interventional Cardiology Physician)    PCP: Ayah Espinal NP    Cardiologist: Rosmery Nieves    Diagnosis/Reason for Consultation: The encounter diagnosis was Nonrheumatic aortic valve stenosis. Problem List:   Patient Active Problem List   Diagnosis Code    Encounter for long-term (current) use of other medications Z79.899    Type 2 diabetes mellitus with stage 4 chronic kidney disease, without long-term current use of insulin ([facility] E11.22, N18.4    Pure hypercholesterolemia E78.00    Anemia of chronic disease D63.8    Bradycardia R00.1    Aortic stenosis, moderate I35.0    Hyperparathyroidism ([facility] [address] E21.3    BMI 33.0-33.9,adult Z68.33    Type 2 diabetes mellitus with diabetic neuropathy (HCC) E11.40    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 female with PMHx of AS, SSS s/p PPM placed 5/6/22, HLD, HTN, hyperparathyroid, CKD stage IV, DM type II, GERD, multiple thyroid nodules, anemia of chronic disease that is referred to the [address] [place] [address] by Dr. Rosmery Nieves for interventional evaluation of  AS.  She complains of dizziness when standing up. She denies SOB, CP, orthopnea. She does have LE edema. She walks with a walker dt arthritis. She had a PPM placed on 5/6 and is not allowed to use her walker until 6/6. She arrived today in a wheelchair and her son states that she uses a wheelchair at home. She stands with assistance. She states that she is weaker now because she has not been walking. She requires assistance with ADLs. She needs assistance with dressing. She does not do housework or cooking. She is mostly sedentary. She had a mechanical fall in July 2021. She denies smoking and alcohol. She lives with her  and son. Her son accompanied her to her appointment today. She has a family history of HTN. Kidneys -Dr. Kristy Cruz      SOB/BERMAN: no  Fatigue: yes - more recently   Palpitations: no  Chest pain: no   Chest tightness with activity: no  Lightheadedness: no  Syncope: no  Falls: no  Orthopnea: no  PND: no  LE edema: yes  Medication changes in past 3 months: no  Blood/Blackness/Tarriness of stools: no  Heart Failure Admission w/in past year: no  Heart Failure Admission w/in past 2 weeks: no    NYHA Classification: Class III   Class I (Mild): No limitation of physical activity. Ordinary physical activity does not cause undue fatigue, palpitation, or dyspnea. Class II (Mild): Slight limitation of physical activity. Comfortable at rest, but ordinary physical activity results in fatigue, palpitation, or dyspnea. Class III (Moderate): Marked limitation of physical activity. Comfortable at rest, but less than ordinary activity causes fatigue, palpitation, or dyspnea   Class IV (Severe): Unable to carry out any physical activity without discomfort. Symptoms  of cardiac insufficiency at rest.  If any physical activity is undertaken, discomfort is increased.     Angina Classification: Class 0   Class 0: No symptoms   Class 1: Angina with strenuous exercise   Class 2: Angina with moderate exercise   Class 3: Angina with mild exertion   Walking 1-2 level blocks at normal pace; Climbing 1 flight of stairs at normal pace  Class 4: Angina at any level of physical exertion       Past Medical History:   Diagnosis Date    Anemia 1/27/2022    Anemia of chronic disease 6/19/2018    Anxiety     Aortic stenosis, moderate     Back pain     CAD (coronary artery disease)     Chronic kidney disease     CKD (chronic kidney disease) stage 3, GFR 30-59 ml/min ([facility])     Clotting disorder (HCC)     Constipation     Diabetes ([facility] [address] [facility]     Diverticulosis 2007    DM (diabetes mellitus) ([facility] [address]     AODM    Fibroid 1972    GERD (gastroesophageal reflux disease)     Goiter, non-toxic     Hernia, hiatal     Hyperlipemia     Hypertension     Hypokalemia     Menopause     Multiple thyroid nodules 1/25/2022    Murmur     Osteoarthritis 2006    bursitis R shoulder    Osteoarthritis of right knee     PVC (premature ventricular contraction)     H/O PVC's    Raynaud disease     SSS (sick sinus syndrome) ([address] 5/6/2022    Venous stasis     Wears hearing aid     both ears     Endocarditis: no  Pulmonary HTN:  Yes mild Greater than 2/3 systemic: no  Immunocompromised/Steroids: no  Liver Dz/Cirrhosis: no  If yes, MELD score:  n/a  Last Dental Visit:  None recent   Any dental pain/concerns: no    Past Surgical History:   Procedure Laterality Date    HX BREAST BIOPSY Bilateral     x5    HX CATARACT REMOVAL  2004    bilateral    HX COLONOSCOPY  2002, 08/2007    HX DILATION AND CURETTAGE  1960's    HX HYSTERECTOMY  70's    BSO    HX OTHER SURGICAL  2001    sigmoidoscopy      Social History     Tobacco Use    Smoking status: Never Smoker    Smokeless tobacco: Never Used   Substance Use Topics    Alcohol use: No     Alcohol/week: 0.0 standard drinks      Family History   Problem Relation Age of Onset    Diabetes Brother     Hypertension Mother     Diabetes Brother      Prior to Admission medications Medication Sig Start Date End Date Taking? Authorizing Provider   furosemide (LASIX) 40 mg tablet TAKE 1 TABLET BY MOUTH DAILY 5/1/22  Yes Kim RAMIREZ NP   lidocaine (LIDODERM) 5 % APPLY PATCH TO AFFECTED AREA FOR 12 HOURS DAILY AND REMOVE 12 HOURS A DAY 4/27/22  Yes Kimberlee SANTA NP   gabapentin (NEURONTIN) 100 mg capsule TAKE 1 TO 2 CAPSULES BY MOUTH THREE TIMES DAILY. MAX DAILY AMOUNT: 600 MG  Patient taking differently: Take 200 mg by mouth three (3) times daily. 4/5/22  Yes Kimberlee SANTA NP   omeprazole (PRILOSEC) 40 mg capsule TAKE 1 CAPSULE BY MOUTH EVERY MORNING TO CONTROL STOMACH ACID 2/7/22  Yes Kimberlee SANTA NP   amLODIPine (NORVASC) 5 mg tablet TAKE 1 TABLET BY MOUTH DAILY 2/7/22  Yes Kimberlee SANTA NP   lovastatin (MEVACOR) 40 mg tablet TAKE 1 TABLET BY MOUTH NIGHTLY FOR CHOLESTEROL LOWERING 2/7/22  Yes Kimberlee SANTA NP   irbesartan (AVAPRO) 300 mg tablet TAKE 1 TABLET BY MOUTH NIGHTLY 2/3/22  Yes Kimberlee SANTA NP   hydrALAZINE (APRESOLINE) 100 mg tablet TAKE 1 TABLET BY MOUTH THREE TIMES DAILY 2/2/22  Yes Siobhan Kaba MD   cinacalcet (SENSIPAR) 30 mg tablet Take 30 mg by mouth daily. Yes Provider, Historical   fluticasone propionate (FLONASE) 50 mcg/actuation nasal spray SPRAY 1 PUFF IN EACH NOSTRIL ONCE DAILY 6/15/21  Yes Kimberlee SANTA NP   ferrous sulfate (Iron) 325 mg (65 mg iron) tablet Take  by mouth Daily (before breakfast). Yes Provider, Historical   cetirizine (ZYRTEC) 10 mg tablet Take 10 mg by mouth daily as needed. Yes Provider, Historical   TRUE METRIX GLUCOSE TEST STRIP strip  4/23/18  Yes Provider, Historical   aspirin delayed-release 81 mg tablet Take 1 Tab by mouth daily. 8/4/14  Yes Deanna Rizo NP   acetaminophen (TYLENOL EXTRA STRENGTH) 500 mg tablet Take 1 Tab by mouth every six (6) hours as needed for Pain.  4/2/14  Yes Gloria Caba MD       Allergies   Allergen Reactions    Metformin Other (comments)     Kidneys effected Current Medications:   Current Outpatient Medications   Medication Sig Dispense Refill    furosemide (LASIX) 40 mg tablet TAKE 1 TABLET BY MOUTH DAILY 90 Tablet 1    lidocaine (LIDODERM) 5 % APPLY PATCH TO AFFECTED AREA FOR 12 HOURS DAILY AND REMOVE 12 HOURS A DAY 90 Patch 1    gabapentin (NEURONTIN) 100 mg capsule TAKE 1 TO 2 CAPSULES BY MOUTH THREE TIMES DAILY. MAX DAILY AMOUNT: 600 MG (Patient taking differently: Take 200 mg by mouth three (3) times daily. ) 180 Capsule 3    omeprazole (PRILOSEC) 40 mg capsule TAKE 1 CAPSULE BY MOUTH EVERY MORNING TO CONTROL STOMACH ACID 90 Capsule 1    amLODIPine (NORVASC) 5 mg tablet TAKE 1 TABLET BY MOUTH DAILY 90 Tablet 1    lovastatin (MEVACOR) 40 mg tablet TAKE 1 TABLET BY MOUTH NIGHTLY FOR CHOLESTEROL LOWERING 90 Tablet 1    irbesartan (AVAPRO) 300 mg tablet TAKE 1 TABLET BY MOUTH NIGHTLY 90 Tablet 1    hydrALAZINE (APRESOLINE) 100 mg tablet TAKE 1 TABLET BY MOUTH THREE TIMES DAILY 270 Tablet 1    cinacalcet (SENSIPAR) 30 mg tablet Take 30 mg by mouth daily.  fluticasone propionate (FLONASE) 50 mcg/actuation nasal spray SPRAY 1 PUFF IN EACH NOSTRIL ONCE DAILY 1 Bottle 5    ferrous sulfate (Iron) 325 mg (65 mg iron) tablet Take  by mouth Daily (before breakfast).  cetirizine (ZYRTEC) 10 mg tablet Take 10 mg by mouth daily as needed.  TRUE METRIX GLUCOSE TEST STRIP strip       aspirin delayed-release 81 mg tablet Take 1 Tab by mouth daily. 30 Tab 0    acetaminophen (TYLENOL EXTRA STRENGTH) 500 mg tablet Take 1 Tab by mouth every six (6) hours as needed for Pain. 30 Tab 1       Vitals: Blood pressure (!) 130/56, pulse 65, temperature 98.3 °F (36.8 °C), temperature source Oral, resp. rate 18, height 5' 4\" (1.626 m), weight 147 lb (66.7 kg), SpO2 95 %. Allergies: is allergic to metformin.     Review of Systems: Pertinent Positives per HPI   [] Unable to obtain  ROS due to  []mental status change  []sedated   []intubated   [x]Total of 13 systems reviewed as follows:  Constitutional: Negative fever, negative chills  Eyes:   Negative for amauroses fugax  ENT:   Negative sore throat,oral abscess   Endocrine Negative for thyroid replacement Rx; goiter; DM  Respiratory:  Negative chronic cough,sputum production  Cards:   Negative for palpitations, varicosities, claudication, lower extremity edema  GI:   Negative for dysphagia, bleeding, nausea, vomiting, diarrhea, and abdominal pain  Genitourinary: Negative for frequency, dysuria, BPH   Integument:  Negative for rash and pruritus  Hematologic:  Negative for easy bruising; bleeding dyscrasia   Musculoskel: Negative for muscle weakness inhibiting ambulation  Neurological:  Negative for stroke, TIA, syncope, dizziness  Behavl/Psych: Negative for feelings of anxiety, depression     Cardiovascular Testing:     EKG:   Sinus  Rhythm   Voltage criteria for LVH  (R(aVF) exceeds 1.50 mV). -Old anteroseptal infarct.    -Nonspecific ST depression  -Seen with left ventricular hypertrophy (strain) or digitalis effect. ABNORMAL     TTE:    Left Ventricle Left ventricle size is normal. Normal wall thickness. Normal wall motion. Normal left ventricular systolic function with a visually estimated EF of 60 - 65%. Grade I diastolic dysfunction with normal LAP. Left Atrium Left atrium is moderately dilated. Interatrial Septum No interatrial shunt visualized on color Doppler. Right Ventricle Right ventricle size is normal. Normal wall thickness. Normal systolic function. Right Atrium Right atrium size is normal.   Aortic Valve Moderately calcified cusp. Trace transvalvular regurgitation. Moderate to severe stenosis of the aortic valve. AV mean gradient is 39 mmHg. AV peak gradient is 70 mmHg. AV area by continuity VTI is 0.9 cm2. Mitral Valve Valve structure is normal. Mildly calcified leaflet, at the posterior leaflet. Mild transvalvular regurgitation. No stenosis noted.    Tricuspid Valve Valve structure is normal. Mild transvalvular regurgitation. No stenosis noted. Pulmonic Valve Valve structure is normal. No transvalvular regurgitation. No stenosis noted. Pulmonary Artery Moderate pulmonary hypertension present. The estimated pulmonary artery systolic pressure is 49 mmHg. Aorta Normal sized aortic root and ascending aorta. IVC/Hepatic Veins IVC diameter is less than or equal to 21 mm and decreases greater than 50% during inspiration; therefore the estimated right atrial pressure is normal (~3 mmHg). IVC size is normal.   Pericardium No pericardial effusion.        Procedure Staff    Technologist/Clinician: Aura Maharaj  Supporting Staff: None  Performing Physician/Midlevel: None     Exam Completion Date/Time: 3/18/22  3:27 PM      Volumes and Function (Range)     ESV   LA Area-Length 99 mL         LA A4C 62 mL (22 - 52) Important          LA A2C 121 mL (22 - 52) Important            Left Ventricle Measurements    Dimensions   Fractional Shortening 2D 35 % (Range: 28 - 44)      LVIDd 5.1 cm (Range: 3.9 - 5.3)      LVIDs 3.3 cm      IVSd 1.1 cm (Range: 0.6 - 0.9) Important       LVPWd 1.1 cm (Range: 0.6 - 0.9) Important       LV RWT Ratio 0.43       LV Mass 2D 213.9 g (Range: 67 - 162) Important              Atrial Measurements    Left Atrium   LA Diameter 4.7 cm      LA/AO Root Ratio 1.57               Tricuspid Valve Measurements    Regurgitation   Est. RA Pressure 10 mmHg      PASP 49 mmHg      TR Max Velocity 3.41 m/s      RVSP 57 mmHg      TR Peak Gradient 47 mmHg             Aortic Valve Measurements    Stenosis   AV Mean Gradient 39 mmHg      AV Peak Gradient 70 mmHg      AV Peak Velocity 4.2 m/s      AV Velocity Ratio 0.31       AV .9 cm      LVOT:AV VTI Index 0.29       AV Mean Velocity 3 m/s      AV Area by VTI 0.9 cm2      AV Area by Peak Velocity 0.9 cm2       LVOT   LVOT Diameter 2 cm      LVOT Area 3.1 cm2      LVOT Mean Gradient 4 mmHg      LVOT Peak Gradient 6 mmHg      LVOT VTI 32.3 cm      LVOT Peak Velocity 1.3 m/s             Aorta Measurements    Dimensions   Measurement Value (Range)   Aortic Root 3 cm            Diastolic Filling/Shunts    Diastolic Filling   MV E Velocity 0.9 m/s      MV A Velocity 0.99 m/s      MV E/A 0.91       LV E' Septal Velocity 8 cm/s      E/E' Septal 11.25       MV E Wave Deceleration Time 226.9 ms       Shunt   LVOT .4 ml               Cardiac catheterization:   Diagnostic  Dominance: Left    Left Main   The vessel is angiographically normal.   Left Anterior Descending   The vessel is angiographically normal.   First Diagonal Branch   The vessel is angiographically normal.   Second Diagonal Branch   The vessel is angiographically normal.   Left Circumflex   The vessel is angiographically normal.   First Obtuse Marginal Branch   The vessel is angiographically normal.   Second Obtuse Marginal Branch   The vessel is angiographically normal.   Third Obtuse Marginal Branch   The vessel is angiographically normal.   Left Posterior Descending Artery   The vessel is angiographically normal.   First Left Posterolateral Branch   The vessel is angiographically normal.   Right Coronary Artery   The vessel is angiographically normal.     Intervention      No interventions have been documented. Right Heart    Right Heart Cath PA pressure = 42/14 mmHg. PA Sat = 80.8 %. Low PA Sat = 80.8 %. Mid RA mean = 6 mmHg. RV pressure = 38/6 mmHg. Wedge pressure = 14 mmHg. AO Sat = 98 %. Lance CO = 6.33 L/min. LANCE INDEX- 3.62  LANCE OUTPUT-6.33         Carotid Dopplers:   · There is mild stenosis in the right ICA (<50%). · There is mild stenosis in the left ICA (<50%). · The right vertebral is antegrade. · The left vertebral is antegrade.         PFTs: FEV1/Predicted:  None needed  Normal FEV1 > 1 Liter, Predicted = 100%, DLCO > 80%    Mild Lung Disease (60-70% Predicted)    Moderate Lung Disease (50-55% Predicted)    Severe Lung Disease (< 50% Predicted or PO2 < 60 or pCO2>50 on RA)    Gated C/A/P CTA:   FINDINGS:     THYROID: Enlarged and multinodular in appearance. MEDIASTINUM: No mass or lymphadenopathy. JIN: No mass or lymphadenopathy. THORACIC AORTA: No dissection or aneurysm. The annulus measures 2.4 cm and the  sinus measures 3.1 cm. The distance to the origin of the left main coronary  atery is 11.8 mm and to origin of the right coronary artery is 14.9 mm. PULMONARY ARTERIES: Main pulmonary artery is normal in caliber. TRACHEA/BRONCHI: Patent. ESOPHAGUS: No wall thickening or dilatation. HEART: Normal in size. PLEURA: No effusion or pneumothorax. LUNGS: No nodule, mass, or airspace disease.     LIVER: No mass or biliary dilatation. 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 SPLEEN: No mass. PANCREAS: No mass or ductal dilatation. ADRENALS: Unremarkable. KIDNEYS: Bilateral renal cysts, the largest of which measures 3.7 cm. No  follow-up required. No renal mass or hydronephrosis. STOMACH: Unremarkable. SMALL BOWEL: No dilatation or wall thickening. COLON: No dilatation or wall thickening. APPENDIX: Within normal limits. PERITONEUM: No ascites or pneumoperitoneum. RETROPERITONEUM: The abdominal aorta is atherosclerotic without evidence of  aneurysm. Origins of the celiac axis, SMA, single left renal artery, dual right  renal arteries, and REBEKAH are widely patent. The common iliac arteries measure 8  mm each and the common femoral arteries measure 6 mm each. REPRODUCTIVE ORGANS: The uterus is surgically absent. URINARY BLADDER: No mass or calculus. BONES: Degenerative changes are seen in the thoracic and lumbar spine. ADDITIONAL COMMENTS: N/A     IMPRESSION  No acute abnormality in the chest, abdomen, or pelvis. 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     Physical Exam:  General: Well nourished well groomed female appearing stated age accompanied by son  Neuro: A&OX3. HEMPHILL. PERRL. Arrived in wheelchair, did not walk. Head:Normocephalic. Atraumatic. Symmetrical  Neck: Trachea Midline  Resp: CTA B. No Adv BS/cough/sputum/tachypnea with seated conversation  CV: S1S2 RRR. SAM III/VI. No JVD/carotid bruits. Pink/warm/dry extremities. 1-2+ LE peripheral edema  GI:Benign ab. Soft. NT/ND. Active BS  : Voids  Integ: No obvious s/s of infection or breakdown  Musculo/Skeletal: FROM in all major joints. normal muscle tone    Clinic Evaluation:   KCCQ-12: scanned into EMR    5 meter gait: unable to complete, pt unable to walk at this time    [place] Survey:  hospitals Index ADL -  scanned into EMR     STS 4.2 Risk Score / Predicted 30 day mortality: - calculations scanned into EMR  Procedure: Isolated AVR  Risk of Mortality:  4.968%  Renal Failure:  21.081%  Permanent Stroke:  3.372%  Prolonged Ventilation:  18.709%  DSW Infection:  0.085%  Reoperation:  4.623%  Morbidity or Mortality:  36.990%  Short Length of Stay:  8.811%  Long Length of Stay:  20.567%      Assessment/Plan:  SSS s/p PPM placed 5/6/22, HLD, HTN, hyperparathyroid, CKD stage IV, DM type II, GERD, multiple thyroid nodules, anemia of chronic disease    1. Severe AS: KENAN 0.9 cm2 mean 39 mmHg peak 70 mmHg. She sizes to a 23 S3 ultra via right transfemoral approach. Awaiting [employer] sizing as well. On lasix. 2. SSS s/p PPM: PPM placed 5/6/22 by Dr. Rajni Cazares.   3. HTN: on norvasc, irbesartan, hydralazine  4. HLD: on statin  5. Hyperparathyroid: On sensipar  6. CKD stage IV: Sees Dr. Myra Rubio  7. DM type II: diet controlled  8. GERD: on omeprazole  9. Multiple thyroid nodules: Has fu with ENT  10. Anemia of chronic disease: On PO iron  11. Deconditioning: Start HHPT. Will reeval in 1 month and then schedule surgery.      The patient was evaluated by Dr. Raghav Brown who discussed conventional aortic valve replacement and TAVR, as well as the risks and benefits of both versus continuing medical therapy with the patient and her son. All questions were answered. Dr. Bruno Suarez felt that TAVR is a better option for this patient, given age, frailty, and co-morbidities. With all the options available, the patient has agreed to proceed with TAVR for the treatment of her aortic stenosis and will be scheduled for TBD. Addendum: Pt seen and examined. She would benefits from a TF TAVR and is a suitable candidate. However she needs to ambulate now that she has been unable to use her arm and her walker after her PPM.  She needs to have HHPT come to her house and work with her, then we will see her back at the end of the month. If she is ambulating well we will schedule her for TAVR. I discussed with her PCP and her referring physician, Dr. John Boyd, who agreed witth the above. The patient and her family were also present and understood the risks, benefits and agreed to proceed with TAVR once she is ambulatory.

## 2022-05-25 NOTE — PROGRESS NOTES
Chief Complaint   Patient presents with    Aortic Stenosis    Referral / Consult     1. Have you been to the ER, urgent care clinic since your last visit? Hospitalized since your last visit? No    2. Have you seen or consulted any other health care providers outside of the [address] [place] [address] since your last visit? Include any pap smears or colon screening. No      HAIDER & KCCQ-12 Questionnaires completed and scanned    5 meter walk not completed. Patient unable to ambulate to complete 5 meter walk at this time.     TVT Registry Documentation    AV Annulus Assessment Method : CTA  AV Annulus Diameter    Min: 20.8mm      Max: 24.1mm  AV Annulus Area:  376.6mm2  AV Annulus Perimeter:   69.6mm

## 2022-06-01 NOTE — TELEPHONE ENCOUNTER
Pt's son called and wanted to know when [employer] Bustamante is going to order Physical Therapy at home.

## 2022-06-02 NOTE — TELEPHONE ENCOUNTER
Because the PT pertains to her upcoming cardiac surgery, the cardiologist would have to order this but he did say that he was waiting for her to be able to use her arm since she had the pacemaker placed a little while ago so I would recommend he call the cardiology office to inquire.

## 2022-06-03 NOTE — PROGRESS NOTES
[facility] Progress Note    Date: Joyce 3, 2022    Name: Love Davis    MRN: [id number]         : 10/5/1931      Ms. Elena Carballo was assessed and education was provided. In wheelchair assisted to chair. Denied any new concerns. Labs drawn    Ms. Coopers vitals were reviewed and patient was observed for 5 minutes prior to treatment. Visit Vitals  /62 (BP 1 Location: Left upper arm, BP Patient Position: Sitting)   Pulse 65   Temp 98.4 °F (36.9 °C)   Resp 18   Wt 67.1 kg (148 lb)   SpO2 99%   Breastfeeding No   BMI 25.40 kg/m²       Lab results were obtained and reviewed. Recent Results (from the past 12 hour(s))   HGB & HCT    Collection Time: 22  1:30 PM   Result Value Ref Range    HGB 9.7 (L) 11.5 - 16.0 g/dL    HCT 29.9 (L) 35.0 - 47.0 %   IRON PROFILE    Collection Time: 22  1:30 PM   Result Value Ref Range    Iron 100 50 - 170 ug/dL    TIBC 195 (L) 250 - 450 ug/dL    Iron % saturation 51 (H) 20 - 50 %   FERRITIN    Collection Time: 22  1:30 PM   Result Value Ref Range    Ferritin 446 (H) 8 - 252 NG/ML       Retacrit 20,000 units was administered subcutaneous in her left arm. No redness, pain or swelling at the site. Band aid applied. Ms. Elena Carballo tolerated well, and had no complaints. Patient armband removed and shredded. Ms. Elena Carballo was discharged from [place] [address] in stable condition at 14:30. She is to return on  at 13:30 for her next appointment.  Plan sent to Dr. Khris Lake, NAVID  Joyce 3, 2022  3:10 PM

## 2022-06-17 NOTE — PROGRESS NOTES
[facility] OPI Progress Note    Date: 2022    Name: Jose Ramon Huber    MRN: [id number]         : 10/5/1931      Ms. Wood Carroll was assessed and education was provided. Pt denies any new concerns. Ms. Lisbeth Santiago vitals were reviewed and patient was observed for 5 minutes prior to treatment. Visit Vitals  /63 (BP 1 Location: Left arm, BP Patient Position: Sitting)   Pulse 68   Temp 97.9 °F (36.6 °C)   Resp 18   Wt 67.5 kg (148 lb 12.8 oz)   SpO2 99%   BMI 25.54 kg/m²       Lab results were obtained and reviewed. Recent Results (from the past 12 hour(s))   HGB & HCT    Collection Time: 22  1:37 PM   Result Value Ref Range    HGB 8.9 (L) 11.5 - 16.0 g/dL    HCT 27.0 (L) 35.0 - 47.0 %       Retacrit 20,000 units was administered subcutaneous in  Left arm. Band-aid applied to site without redness, swelling or pain. Ms. Wood Carroll tolerated well, and had no complaints. Patient armband removed and shredded. Ms. Wood Carroll was discharged from [place] [address] in stable condition at 1400. She is to return on   at 1330 for her next appointment.     J Carlos Granado RN  2022  2:20 PM

## 2022-06-19 NOTE — ED PROVIDER NOTES
EMERGENCY DEPARTMENT HISTORY AND PHYSICAL EXAM          Date: 6/19/2022  Patient Name: Kirk Scanlon    History of Presenting Illness     Chief Complaint   Patient presents with    Altered mental status       History Provided By: Patient, son, EMS    HPI: Kirk Scanlon is a 80 y.o. female, pmhx listed below, who presents to the ED c/o episode of decreased responsiveness. According to son, the patient went to the bathroom and felt a headache and blurred vision. Came back to the breakfast table and put her head down, was not answering questions. Never lost postural tone. Son reports eyes were open but it seemed like \"no one was home\". No falls or head injury. No history of similar episodes. No chest pain or shortness of breath preceding event. Son reports the patient did not have any slurred speech but she was not answering questions at all. EMS arrived patient was slowly improving. As per EMS, the patient's symptoms had completely resolved by arrival to our emergency department. Patient reports she ate a normal dinner last night, was feeling well today except for brief headache, does not remember episode as described by son, now feels well. PCP: Muna Wilks NP    There are no other complaints, changes, or physical findings at this time.          Past History       Past Medical History:  Past Medical History:   Diagnosis Date    Anemia 1/27/2022    Anemia of chronic disease 6/19/2018    Anxiety     Aortic stenosis, moderate     Back pain     CAD (coronary artery disease)     Chronic kidney disease     CKD (chronic kidney disease) stage 3, GFR 30-59 ml/min (HCC)     [facility] disorder ([facility])     Constipation     Diabetes [facility] [address]     Diverticulosis 2007    DM (diabetes mellitus) ([facility] [address]     AODM    Fibroid 1972    GERD (gastroesophageal reflux disease)     Goiter, non-toxic     Hernia, hiatal     Hyperlipemia     Hypertension     Hypokalemia     Menopause     Multiple thyroid nodules 1/25/2022    Murmur     Osteoarthritis 2006    bursitis R shoulder    Osteoarthritis of right knee     PVC (premature ventricular contraction)     H/O PVC's    Raynaud disease     SSS (sick sinus syndrome) ([facility] [address] 5/6/2022    Venous stasis     Wears hearing aid     both ears       Past Surgical History:  Past Surgical History:   Procedure Laterality Date    HX BREAST BIOPSY Bilateral     x5    HX CATARACT REMOVAL  2004    bilateral    HX COLONOSCOPY  2002, 08/2007    HX DILATION AND CURETTAGE  1960's    HX HYSTERECTOMY  70's    BSO    HX OTHER SURGICAL  2001    sigmoidoscopy       Family History:  Family History   Problem Relation Age of Onset    Diabetes Brother     Hypertension Mother     Diabetes Brother        Social History:  Social History     Tobacco Use    Smoking status: Never Smoker    Smokeless tobacco: Never Used   Vaping Use    Vaping Use: Never used   Substance Use Topics    Alcohol use: No     Alcohol/week: 0.0 standard drinks    Drug use: No       Current Outpatient Medications   Medication Sig Dispense Refill    furosemide (LASIX) 40 mg tablet TAKE 1 TABLET BY MOUTH DAILY 90 Tablet 1    lidocaine (LIDODERM) 5 % APPLY PATCH TO AFFECTED AREA FOR 12 HOURS DAILY AND REMOVE 12 HOURS A DAY 90 Patch 1    gabapentin (NEURONTIN) 100 mg capsule TAKE 1 TO 2 CAPSULES BY MOUTH THREE TIMES DAILY. MAX DAILY AMOUNT: 600 MG (Patient taking differently: Take 200 mg by mouth three (3) times daily. ) 180 Capsule 3    omeprazole (PRILOSEC) 40 mg capsule TAKE 1 CAPSULE BY MOUTH EVERY MORNING TO CONTROL STOMACH ACID 90 Capsule 1    amLODIPine (NORVASC) 5 mg tablet TAKE 1 TABLET BY MOUTH DAILY 90 Tablet 1    lovastatin (MEVACOR) 40 mg tablet TAKE 1 TABLET BY MOUTH NIGHTLY FOR CHOLESTEROL LOWERING 90 Tablet 1    irbesartan (AVAPRO) 300 mg tablet TAKE 1 TABLET BY MOUTH NIGHTLY 90 Tablet 1    hydrALAZINE (APRESOLINE) 100 mg tablet TAKE 1 TABLET BY MOUTH THREE TIMES DAILY 270 Tablet 1    cinacalcet (SENSIPAR) 30 mg tablet Take 30 mg by mouth daily.  fluticasone propionate (FLONASE) 50 mcg/actuation nasal spray SPRAY 1 PUFF IN EACH NOSTRIL ONCE DAILY 1 Bottle 5    ferrous sulfate (Iron) 325 mg (65 mg iron) tablet Take  by mouth Daily (before breakfast).  cetirizine (ZYRTEC) 10 mg tablet Take 10 mg by mouth daily as needed.  TRUE METRIX GLUCOSE TEST STRIP strip       aspirin delayed-release 81 mg tablet Take 1 Tab by mouth daily. 30 Tab 0    acetaminophen (TYLENOL EXTRA STRENGTH) 500 mg tablet Take 1 Tab by mouth every six (6) hours as needed for Pain. 30 Tab 1       Allergies: Allergies   Allergen Reactions    Metformin Other (comments)     Kidneys effected         Review of Systems   Review of Systems   Constitutional: Negative for chills and fever. HENT: Negative for congestion. Eyes: Negative for pain. Respiratory: Negative for shortness of breath. Cardiovascular: Negative for chest pain. Gastrointestinal: Negative for abdominal pain. Genitourinary: Negative for flank pain. Musculoskeletal: Negative for back pain. Neurological: Negative for headaches. Psychiatric/Behavioral: Negative for agitation. Physical Exam     Vital Signs-Reviewed the patient's vital signs. Patient Vitals for the past 12 hrs:   Temp Pulse Resp BP SpO2   06/19/22 0937 98.8 °F (37.1 °C) 63 18 (!) 133/42 100 %   06/19/22 0901  60 14 (!) 137/45 100 %   06/19/22 0825  73 15 (!) 138/53    06/19/22 0819 98.8 °F (37.1 °C) 80 18         Physical Exam  Constitutional:       Appearance: Normal appearance. HENT:      Head: Normocephalic and atraumatic. Mouth/Throat:      Mouth: Mucous membranes are moist.   Eyes:      Pupils: Pupils are equal, round, and reactive to light. Cardiovascular:      Rate and Rhythm: Normal rate and regular rhythm. Pulmonary:      Effort: Pulmonary effort is normal.      Breath sounds: Normal breath sounds. Abdominal:      Tenderness:  There is no abdominal tenderness. Musculoskeletal:         General: No swelling. Skin:     General: Skin is warm and dry. Neurological:      Mental Status: She is alert and oriented to person, place, and time. Cranial Nerves: No cranial nerve deficit, dysarthria or facial asymmetry. Sensory: No sensory deficit. Motor: No weakness. Psychiatric:         Mood and Affect: Mood normal.         Behavior: Behavior normal.         Diagnostic Study Results     Labs -     Recent Results (from the past 12 hour(s))   GLUCOSE, POC    Collection Time: 06/19/22  8:19 AM   Result Value Ref Range    Glucose (POC) 109 65 - 117 mg/dL    Performed by [facility]    CBC WITH AUTOMATED DIFF    Collection Time: 06/19/22  8:21 AM   Result Value Ref Range    WBC 3.7 3.6 - 11.0 K/uL    RBC 2.67 (L) 3.80 - 5.20 M/uL    HGB 8.8 (L) 11.5 - 16.0 g/dL    HCT 26.2 (L) 35.0 - 47.0 %    MCV 98.1 80.0 - 99.0 FL    MCH 33.0 26.0 - 34.0 PG    MCHC 33.6 30.0 - 36.5 g/dL    RDW 13.1 11.5 - 14.5 %    PLATELET 273 (L) 820 - 400 K/uL    MPV 10.4 8.9 - 12.9 FL    NRBC 0.0 0  WBC    ABSOLUTE NRBC 0.00 0.00 - 0.01 K/uL    NEUTROPHILS 54 32 - 75 %    LYMPHOCYTES 33 12 - 49 %    MONOCYTES 11 5 - 13 %    EOSINOPHILS 1 0 - 7 %    BASOPHILS 0 0 - 1 %    IMMATURE GRANULOCYTES 1 (H) 0.0 - 0.5 %    ABS. NEUTROPHILS 2.0 1.8 - 8.0 K/UL    ABS. LYMPHOCYTES 1.2 0.8 - 3.5 K/UL    ABS. MONOCYTES 0.4 0.0 - 1.0 K/UL    ABS. EOSINOPHILS 0.0 0.0 - 0.4 K/UL    ABS. BASOPHILS 0.0 0.0 - 0.1 K/UL    ABS. IMM.  GRANS. 0.0 0.00 - 0.04 K/UL    DF AUTOMATED     METABOLIC PANEL, COMPREHENSIVE    Collection Time: 06/19/22  8:21 AM   Result Value Ref Range    Sodium 132 (L) 136 - 145 mmol/L    Potassium 3.7 3.5 - 5.1 mmol/L    Chloride 96 (L) 97 - 108 mmol/L    CO2 26 21 - 32 mmol/L    Anion gap 10 5 - 15 mmol/L    Glucose 105 (H) 65 - 100 mg/dL    BUN 48 (H) 6 - 20 MG/DL    Creatinine 3.29 (H) 0.55 - 1.02 MG/DL    BUN/Creatinine ratio 15 12 - 20      GFR est AA 16 (L) >60 ml/min/1.73m2    GFR est non-AA 13 (L) >60 ml/min/1.73m2    Calcium 9.1 8.5 - 10.1 MG/DL    Bilirubin, total 0.3 0.2 - 1.0 MG/DL    ALT (SGPT) 10 (L) 12 - 78 U/L    AST (SGOT) 20 15 - 37 U/L    Alk. phosphatase 52 45 - 117 U/L    Protein, total 6.8 6.4 - 8.2 g/dL    Albumin 3.2 (L) 3.5 - 5.0 g/dL    Globulin 3.6 2.0 - 4.0 g/dL    A-G Ratio 0.9 (L) 1.1 - 2.2     PROTHROMBIN TIME + INR    Collection Time: 06/19/22  8:21 AM   Result Value Ref Range    INR 1.0 0.9 - 1.1      Prothrombin time 10.4 9.0 - 11.1 sec       Radiologic Studies -   CT HEAD WO CONT   Final Result      1. No evidence of acute intracranial abnormality. 2. Moderate periventricular and subcortical white matter hypodensities   consistent with chronic small vessel ischemic disease. CT Results  (Last 48 hours)               06/19/22 0836  CT HEAD WO CONT Final result    Impression:      1. No evidence of acute intracranial abnormality. 2. Moderate periventricular and subcortical white matter hypodensities   consistent with chronic small vessel ischemic disease. Narrative:  EXAM: CT HEAD WO CONT       INDICATION: Code Stroke       COMPARISON: CT head January 19, 2022. CONTRAST: None. TECHNIQUE: Unenhanced CT of the head was performed using 5 mm images. Brain and   bone windows were generated. Coronal and sagittal reformats. CT dose reduction   was achieved through use of a standardized protocol tailored for this   examination and automatic exposure control for dose modulation. FINDINGS:   The ventricles and sulci are normal in size, shape and configuration. . There is   moderate periventricular and subcortical white matter hypodensities consistent   with chronic small vessel ischemic disease. There is no intracranial hemorrhage,   extra-axial collection, or mass effect. The basilar cisterns are open. No CT   evidence of acute infarct. The bone windows demonstrate no abnormalities.  The visualized portions of the   paranasal sinuses and mastoid air cells are clear. CXR Results  (Last 48 hours)    None            EKG interpretation: (Preliminary)  Rhythm: Atrial paced, rate 71, narrow QRS  This EKG was interpreted by ED Provider Monet Crow MD        Medical Decision Making   I am the first provider for this patient. I reviewed the vital signs, available nursing notes, past medical history, past surgical history, family history and social history. Records Reviewed: Nursing Notes and Old Medical Records    Provider Notes (Medical Decision Making):   MDM: 80-year-old female with episode of decreased responsiveness earlier today, now back to baseline mental status. Stat CT head reveals no acute abnormality. Will obtain blood work and observe in the emergency department. History provided not consistent with stroke. Headache concerning for acute intracranial hemorrhage although patient now has no neurodeficits and is not complaining of a headache. Also consider near syncope/arrhythmia, seizure, anemia, electrolyte abnormality. Initial assessment performed. The patients presenting problems have been discussed, and they are in agreement with the care plan formulated and outlined with them. I have encouraged them to ask questions as they arise throughout their visit. PROGRESS NOTE:    Patient is sitting up and well-appearing in bed. Having conversations with family and at her baseline according to son. I recommended admission with patient further work-up and monitoring which patient quickly declined. Chart review reveals she has a history of aortic stenosis and is supposed to have a TAVR procedure following physical therapy. Also reveals she has chronic kidney disease however today's creatinine has increased from her recent baseline.   Patient reports she feels well now and does not wish to stay in the hospital.  Son reports that he will be with her all day and will monitor her for similar future episodes. I discussed signs and symptoms of a stroke as well as syncope and other reasons to return to the emergency department with son, , and patient present. Plan for discharge home in the care of son. Discharge note:  Updated pt on all final results. Will follow up as instructed. All questions have been answered, pt voiced understanding and agreement with plan. Specific return precautions provided as well as instructions to return to the ED should sx worsen at any time. Vital signs stable for discharge. Diagnosis     Clinical Impression:   1. Transient alteration of awareness            Disposition:  Discharged    Discharge Medication List as of 6/19/2022  9:20 AM            Please note, this dictation was completed with BIXI, the computer voice recognition software. Quite often unanticipated grammatical, syntax, homophones, and other interpretive errors are inadvertently transcribed by the computer software. Please disregard these errors. Please excuse any errors that have escaped final proof reading.

## 2022-06-19 NOTE — DISCHARGE INSTRUCTIONS
Please call 911 immediately if Mrs. Dewayne Kaminski experiences slurred speech, weakness in one arm or leg, or if she becomes unresponsive. Please call your Cardiologist and Nephrologist (kidney doctor) tomorrow to schedule follow-up for this ER visit and to discuss your blood tests.

## 2022-06-19 NOTE — ED TRIAGE NOTES
EMS  called for AMS, pt was reportedly non verbal but alert with left side facial droop that resolved on EMS arrival. Arrived here awake, alert and oriented saying her vision got blurry and that is all I remember.  Skin warm , no slur or droop, strength equal.

## 2022-07-01 NOTE — PROGRESS NOTES
[facility] Progress Note    Date: 2022    Name: Sayda Kat    MRN: [id number]         : 10/5/1931      Ms. Holley Sandhoff was assessed . There has been no change in the patients condition    Ms. Pabon's vitals were reviewed and patient was observed for 5 minutes prior to treatment. Visit Vitals  BP (!) 129/59   Pulse 69   Temp 97.3 °F (36.3 °C)   Resp 17   Wt 67.8 kg (149 lb 7.6 oz)   Breastfeeding No   BMI 25.66 kg/m²       Lab results were obtained and reviewed. Recent Results (from the past 12 hour(s))   HGB & HCT    Collection Time: 22  1:55 PM   Result Value Ref Range    HGB 8.8 (L) 11.5 - 16.0 g/dL    HCT 26.5 (L) 35.0 - 47.0 %       Retacrit was administered subcutaneous in  L upper arm. Ms. Holley Sandhoff tolerated well, and had no complaints. Patient armband removed and shredded. Ms. Holley Sandhoff was discharged from [place] [address] in stable condition at 1440. She is to return on 7/15/22at 1330 for her next appointment.     Lucía Reyes RN  2022  3:18 PM

## 2022-07-08 NOTE — ED PROVIDER NOTES
EMERGENCY DEPARTMENT HISTORY AND PHYSICAL EXAM      Date: 7/8/2022  Patient Name: Maxwell Key    History of Presenting Illness     Chief Complaint   Patient presents with    Fever       History Provided By: Patient EMS    HPI: Maxwell Key, 80 y.o. female with PMHx significant for hypertension, presents to EMS to the ED with cc of Feliz, fever and chills. She reports some generalized weakness in her lower legs bilaterally. She has history of CKD. Denies any chest pain or shortness of breath. She denies any cough. She denies any abdominal pain, nausea or vomiting. She denies any urinary frequency or dysuria. PMHx: Significant for hypertension, GERD, CKD, DM  PSHx: Significant for hysterectomy, cataract surgery  Social Hx: Non-smoker. There are no other complaints, changes, or physical findings at this time. PCP: Ananya Riggins NP    No current facility-administered medications on file prior to encounter. Current Outpatient Medications on File Prior to Encounter   Medication Sig Dispense Refill    furosemide (LASIX) 40 mg tablet TAKE 1 TABLET BY MOUTH DAILY 90 Tablet 1    lidocaine (LIDODERM) 5 % APPLY PATCH TO AFFECTED AREA FOR 12 HOURS DAILY AND REMOVE 12 HOURS A DAY 90 Patch 1    gabapentin (NEURONTIN) 100 mg capsule TAKE 1 TO 2 CAPSULES BY MOUTH THREE TIMES DAILY. MAX DAILY AMOUNT: 600 MG (Patient taking differently: Take 200 mg by mouth three (3) times daily. ) 180 Capsule 3    omeprazole (PRILOSEC) 40 mg capsule TAKE 1 CAPSULE BY MOUTH EVERY MORNING TO CONTROL STOMACH ACID 90 Capsule 1    amLODIPine (NORVASC) 5 mg tablet TAKE 1 TABLET BY MOUTH DAILY 90 Tablet 1    lovastatin (MEVACOR) 40 mg tablet TAKE 1 TABLET BY MOUTH NIGHTLY FOR CHOLESTEROL LOWERING 90 Tablet 1    irbesartan (AVAPRO) 300 mg tablet TAKE 1 TABLET BY MOUTH NIGHTLY 90 Tablet 1    hydrALAZINE (APRESOLINE) 100 mg tablet TAKE 1 TABLET BY MOUTH THREE TIMES DAILY 270 Tablet 1    cinacalcet (SENSIPAR) 30 mg tablet Take 30 mg by mouth daily.  fluticasone propionate (FLONASE) 50 mcg/actuation nasal spray SPRAY 1 PUFF IN EACH NOSTRIL ONCE DAILY 1 Bottle 5    ferrous sulfate (Iron) 325 mg (65 mg iron) tablet Take  by mouth Daily (before breakfast).  cetirizine (ZYRTEC) 10 mg tablet Take 10 mg by mouth daily as needed.  TRUE METRIX GLUCOSE TEST STRIP strip       aspirin delayed-release 81 mg tablet Take 1 Tab by mouth daily. 30 Tab 0    acetaminophen (TYLENOL EXTRA STRENGTH) 500 mg tablet Take 1 Tab by mouth every six (6) hours as needed for Pain.  30 Tab 1       Past History     Past Medical History:  Past Medical History:   Diagnosis Date    Anemia 1/27/2022    Anemia of chronic disease 6/19/2018    Anxiety     Aortic stenosis, moderate     Back pain     CAD (coronary artery disease)     Chronic kidney disease     CKD (chronic kidney disease) stage 3, GFR 30-59 ml/min [facility]     Clotting disorder ([facility])     Constipation     Diabetes [facility]     Diverticulosis 2007    DM (diabetes mellitus) ([facility]     AODM    Fibroid 1972    GERD (gastroesophageal reflux disease)     Goiter, non-toxic     Hernia, hiatal     Hyperlipemia     Hypertension     Hypokalemia     Menopause     Multiple thyroid nodules 1/25/2022    Murmur     Osteoarthritis 2006    bursitis R shoulder    Osteoarthritis of right knee     PVC (premature ventricular contraction)     H/O PVC's    Raynaud disease     SSS (sick sinus syndrome) ([facility] 5/6/2022    Venous stasis     Wears hearing aid     both ears       Past Surgical History:  Past Surgical History:   Procedure Laterality Date    HX BREAST BIOPSY Bilateral     x5    HX CATARACT REMOVAL  2004    bilateral    HX COLONOSCOPY  2002, 08/2007    HX DILATION AND CURETTAGE  1960's    HX HYSTERECTOMY  70's    BSO    HX OTHER SURGICAL  2001    sigmoidoscopy       Family History:  Family History   Problem Relation Age of Onset    Diabetes Brother     Hypertension Mother     Diabetes Brother        Social History:  Social History     Tobacco Use    Smoking status: Never Smoker    Smokeless tobacco: Never Used   Vaping Use    Vaping Use: Never used   Substance Use Topics    Alcohol use: No     Alcohol/week: 0.0 standard drinks    Drug use: No       Allergies: Allergies   Allergen Reactions    Metformin Other (comments)     Kidneys effected         Review of Systems   Review of Systems   Constitutional: Positive for chills, fatigue and fever. Negative for activity change. HENT: Negative for congestion and sore throat. Eyes: Negative for pain and redness. Respiratory: Negative for cough, chest tightness and shortness of breath. Cardiovascular: Negative for chest pain and palpitations. Gastrointestinal: Negative for abdominal pain, diarrhea, nausea and vomiting. Genitourinary: Negative for dysuria, frequency and urgency. Musculoskeletal: Negative for back pain and neck pain. Skin: Negative for rash. Neurological: Positive for weakness. Negative for syncope, light-headedness and headaches. Psychiatric/Behavioral: Negative for confusion. All other systems reviewed and are negative. Physical Exam   Physical Exam  Vitals and nursing note reviewed. Constitutional:       General: She is not in acute distress. Appearance: She is well-developed. She is not diaphoretic. Comments: Calm and pleasant elderly black female. HENT:      Head: Normocephalic. Nose: Nose normal.      Mouth/Throat:      Pharynx: No oropharyngeal exudate. Eyes:      General: No scleral icterus. Conjunctiva/sclera: Conjunctivae normal.      Pupils: Pupils are equal, round, and reactive to light. Neck:      Thyroid: No thyromegaly. Vascular: No JVD. Trachea: No tracheal deviation. Cardiovascular:      Rate and Rhythm: Normal rate and regular rhythm. Heart sounds: No murmur heard. No friction rub. No gallop.     Pulmonary:      Effort: Pulmonary effort is normal. No respiratory distress. Breath sounds: Normal breath sounds. No stridor. No wheezing or rales. Abdominal:      General: Bowel sounds are normal. There is no distension. Palpations: Abdomen is soft. Tenderness: There is no abdominal tenderness. There is no guarding or rebound. Musculoskeletal:         General: Normal range of motion. Cervical back: Normal range of motion and neck supple. Lymphadenopathy:      Cervical: No cervical adenopathy. Skin:     General: Skin is warm and dry. Findings: No erythema or rash. Neurological:      Mental Status: She is alert and oriented to person, place, and time. Cranial Nerves: No cranial nerve deficit. Motor: No abnormal muscle tone. Coordination: Coordination normal.   Psychiatric:         Behavior: Behavior normal.             Diagnostic Study Results     Labs -     Recent Results (from the past 12 hour(s))   COVID-19 WITH INFLUENZA A/B    Collection Time: 07/08/22  1:40 PM   Result Value Ref Range    SARS-CoV-2 by PCR Detected (A) NOTD      Influenza A by PCR Not detected NOTD      Influenza B by PCR Not detected NOTD     CBC WITH AUTOMATED DIFF    Collection Time: 07/08/22  1:54 PM   Result Value Ref Range    WBC 3.8 3.6 - 11.0 K/uL    RBC 2.85 (L) 3.80 - 5.20 M/uL    HGB 9.5 (L) 11.5 - 16.0 g/dL    HCT 29.2 (L) 35.0 - 47.0 %    .5 (H) 80.0 - 99.0 FL    MCH 33.3 26.0 - 34.0 PG    MCHC 32.5 30.0 - 36.5 g/dL    RDW 14.8 (H) 11.5 - 14.5 %    PLATELET 050 399 - 220 K/uL    MPV 8.9 8.9 - 12.9 FL    NRBC 0.0 0.0  WBC    ABSOLUTE NRBC 0.00 0.00 - 0.01 K/uL    NEUTROPHILS 65 32 - 75 %    LYMPHOCYTES 22 12 - 49 %    MONOCYTES 12 5 - 13 %    EOSINOPHILS 0 0 - 7 %    BASOPHILS 0 0 - 1 %    IMMATURE GRANULOCYTES 0 0 - 0.5 %    ABS. NEUTROPHILS 2.5 1.8 - 8.0 K/UL    ABS. LYMPHOCYTES 0.9 0.8 - 3.5 K/UL    ABS. MONOCYTES 0.5 0.0 - 1.0 K/UL    ABS. EOSINOPHILS 0.0 0.0 - 0.4 K/UL    ABS.  BASOPHILS 0.0 0.0 - 0.1 K/UL    ABS. IMM. GRANS. 0.0 0.00 - 0.04 K/UL    DF AUTOMATED     METABOLIC PANEL, COMPREHENSIVE    Collection Time: 07/08/22  1:54 PM   Result Value Ref Range    Sodium 133 (L) 136 - 145 mmol/L    Potassium 4.2 3.5 - 5.1 mmol/L    Chloride 99 97 - 108 mmol/L    CO2 27 21 - 32 mmol/L    Anion gap 7 5 - 15 mmol/L    Glucose 85 65 - 100 mg/dL    BUN 49 (H) 6 - 20 MG/DL    Creatinine 3.64 (H) 0.55 - 1.02 MG/DL    BUN/Creatinine ratio 13 12 - 20      GFR est AA 14 (L) >60 ml/min/1.73m2    GFR est non-AA 12 (L) >60 ml/min/1.73m2    Calcium 10.0 8.5 - 10.1 MG/DL    Bilirubin, total 0.5 0.2 - 1.0 MG/DL    ALT (SGPT) 11 (L) 12 - 78 U/L    AST (SGOT) 72 (H) 15 - 37 U/L    Alk. phosphatase 53 45 - 117 U/L    Protein, total 6.8 6.4 - 8.2 g/dL    Albumin 3.3 (L) 3.5 - 5.0 g/dL    Globulin 3.5 2.0 - 4.0 g/dL    A-G Ratio 0.9 (L) 1.1 - 2.2     LACTIC ACID    Collection Time: 07/08/22  1:54 PM   Result Value Ref Range    Lactic acid 0.8 0.4 - 2.0 MMOL/L   URINALYSIS W/ RFLX MICROSCOPIC    Collection Time: 07/08/22  2:45 PM   Result Value Ref Range    Color YELLOW/STRAW      Appearance CLEAR CLEAR      Specific gravity 1.010 1.003 - 1.030      pH (UA) 5.5 5.0 - 8.0      Protein Negative NEG mg/dL    Glucose Negative NEG mg/dL    Ketone Negative NEG mg/dL    Bilirubin Negative NEG      Blood Negative NEG      Urobilinogen 0.2 0.2 - 1.0 EU/dL    Nitrites Negative NEG      Leukocyte Esterase Negative NEG         Radiologic Studies -   No orders to display     CT Results  (Last 48 hours)    None        CXR Results  (Last 48 hours)    None            Medical Decision Making   I am the first provider for this patient. I reviewed the vital signs, available nursing notes, past medical history, past surgical history, family history and social history. Vital Signs-Reviewed the patient's vital signs.   Patient Vitals for the past 12 hrs:   Temp Pulse Resp BP SpO2   07/08/22 1545 -- 79 18 (!) 159/42 99 %   07/08/22 1433 -- 78 -- (!) 144/46 100 %   07/08/22 1400 -- 74 -- (!) 135/56 100 %   07/08/22 1330 -- 78 17 (!) 134/41 100 %   07/08/22 1312 99.6 °F (37.6 °C) 68 16 (!) 143/62 100 %       Pulse Oximetry Analysis - 99% on RA    Cardiac Monitor:   Rate: 79 bpm  Rhythm: Normal Sinus Rhythm            Records Reviewed: Nursing Notes and Old Medical Records    Provider Notes (Medical Decision Making):   DDx: UTI, dehydration, pneumonia, COVID-19 virus infection. ED Course:   Initial assessment performed. The patients presenting problems have been discussed, and they are in agreement with the care plan formulated and outlined with them. I have encouraged them to ask questions as they arise throughout their visit. PROGRESS NOTE    Pt reevaluated. Patient well-appearing. CBC unremarkable. CMP unremarkable. Baseline creatinine at 3.64. Flu negative. Patient COVID-19 positive. Good room air sats at 99%. Will discharge home. Reviewed results with daughter. Written by James Pugh MD     Progress note:    Pt noted to be feeling better , ready for discharge. Updated pt and/or family on all final lab and imaging findings. Will follow up as instructed. All questions have been answered, pt voiced understanding and agreement with plan. Specific return precautions provided as well as instructions to return to the ED should sx worsen at any time. Vital signs stable for discharge. I have also put together some discharge instructions for them that include: 1) educational information regarding their diagnosis, 2) how to care for their diagnosis at home, as well a 3) list of reasons why they would want to return to the ED prior to their follow-up appointment, should their condition change. Written by James Pugh MD        Critical Care Time:   0    Disposition:  Discharge    PLAN:  1. Current Discharge Medication List        2.    Follow-up Information     Follow up With Specialties Details Why Contact Info Megan Mendoza, NP Nurse Practitioner Schedule an appointment as soon as possible for a visit in 1 week  [address] [place] [address]  [phone]          Return to ED if worse     Diagnosis     Clinical Impression:   1. COVID-19 virus infection              Please note that this dictation was completed with [employer], the computer voice recognition software. Quite often unanticipated grammatical, syntax, homophones, and other interpretive errors are inadvertently transcribed by the computer software. Please disregard these errors. Please excuse any errors that have escaped final proofreading.

## 2022-07-08 NOTE — ED TRIAGE NOTES
Patient presents to the ED with a complaint of fevers, tremors, and the inability to move her bilat lower extremities. Patient does have a hx of stage 4 Kidney disease. Alert and oriented.

## 2022-07-12 NOTE — ED NOTES
Pt with daughter who verbalized understanding of visit as well as results. States no further questions.

## 2022-07-12 NOTE — PROGRESS NOTES
Patient contacted regarding COVID-19 risk, diagnosis. Discussed COVID-19 related testing which was available at this time. Test results were positive. Patient informed of results, if available? Already advised. LPN Care Coordinator contacted the family daughter Dakota Warren by telephone to perform post discharge assessment. Call within 2 business days of discharge: Yes Verified name and  with family as identifiers. Provided introduction to self, and explanation of the CTN/ACM role, and reason for call due to risk factors for infection and/or exposure to COVID-19. Symptoms reviewed with family who verbalized the following symptoms: fatigue      Due to no new or worsening symptoms encounter was not routed to provider for escalation. Discussed follow-up appointments. If no appointment was previously scheduled, appointment scheduling offered:  No.already scheduled  [facility] follow up appointment(s):   Future Appointments   Date Time Provider Michelle Bob   7/15/2022 10:00 AM Starlett Check, NP [facility] MAIN BS AMB   7/15/2022  1:30 PM [address] INF [facility] [address] 7565 QPD   2022  1:30 PM MD Robles Fragoso [address] BS [facility]   2022  2:00 PM Starlett Check, NP [facility] MAIN BS [facility]   2022  1:40 PM MD MARY Ma [facility]     [facility] follow up appointment(s): n/a    Interventions to address risk factors: Education of patient/family/caregiver/guardian to support self-management-VDH and covid numbers given     Advance Care Planning:   Does patient have an Advance Directive: not on file. Educated patient about risk for severe COVID-19 due to risk factors according to CDC guidelines. LPN CC reviewed discharge instructions, medical action plan and red flag symptoms with the patient who verbalized understanding. Discussed COVID vaccination status: no. Education provided on COVID-19 vaccination as appropriate. Discussed exposure protocols and quarantine with CDC Guidelines.  Family was given an opportunity to verbalize any questions and concerns and agrees to contact LPN CC or health care provider for questions related to their healthcare. Reviewed and educated family on any new and changed medications related to discharge diagnosis     Was patient discharged with a pulse oximeter? no    LPN CC provided contact information. Plan for follow-up call in 5-7 days based on severity of symptoms and risk factors.

## 2022-07-12 NOTE — DISCHARGE INSTRUCTIONS
Overall your vital signs and chest x-ray and laboratories today are all very reassuring. Try to stay well-hydrated, and take extra strength Tylenol (approximately 500 mg or 650 mg) every 4 hours to treat fevers and chills and shaking. I will let Dr. Italo London and Dr. Louise Welch know about your visit. It was a pleasure taking care of you at [facility] Emergency Department today. We know that when you come to [address] [place] [address], you are entrusting us with your health, comfort, and safety. Our physicians and nurses honor that trust, and we truly appreciate the opportunity to care for you and your loved ones. We also value your feedback. If you receive a survey about your Emergency Department experience today, please fill it out. We care about our patients' feedback, and we listen to what you have to say. Thank you!

## 2022-07-12 NOTE — ED PROVIDER NOTES
EMERGENCY DEPARTMENT HISTORY AND PHYSICAL EXAM           Date: 7/11/2022  Patient Name: Akil Whittaker  Patient Age and Sex: 80 y.o. female  MRN:  [id number]  [facility]:  [id number]    History of Presenting Illness     Chief Complaint   Patient presents with    Positive For Covid-19     Sx started thurs; dx on Friday. pt is sometimes confused. She spikes a temperature. She is not eating or drinking well. Lizeth Carl is her cardiologist.  she is pending an aortic valve surgery. She was in home PT, but since covid she has lost mobility. She has hx of stage IV CKD       History Provided By: Patient and Patient's Daughter    Ability to gather history was limited by:     HPI: Akil Whittaker, 80 y.o. female who is vaccinated for COVID-19, has a history of severe aortic stenosis, CAD, CKD, diabetes, brought to emergency department by her daughter because patient seemed delirious last night and was having shaking chills. She was seen a few days ago in a different emergency department and diagnosed with COVID-19. She does not have any significant respiratory symptoms. No cough or shortness of breath. She has become weaker and unable to walk over the last 24 hours in the setting of her COVID infection. Symptoms are moderate severity but do seem significantly improved at the time of my H&P according to the patient and her daughter. Location:    Quality:      Severity:    Duration:   Timing:      Context:    Modifying factors:   Associated symptoms:     Past History      The patient's medical, surgical, and social history on file were reviewed by me today.      The family history was reviewed by me today and was non-contributory, unless otherwise specified below:    Past Medical History:  Past Medical History:   Diagnosis Date    Anemia 1/27/2022    Anemia of chronic disease 6/19/2018    Anxiety     Aortic stenosis, moderate     Back pain     CAD (coronary artery disease)     Chronic kidney disease     CKD (chronic kidney disease) stage 3, GFR 30-59 ml/min ([facility])     Clotting disorder ([facility])     Constipation     Diabetes [facility]     Diverticulosis 2007    DM (diabetes mellitus) ([facility]     AODM    Fibroid 1972    GERD (gastroesophageal reflux disease)     Goiter, non-toxic     Hernia, hiatal     Hyperlipemia     Hypertension     Hypokalemia     Menopause     Multiple thyroid nodules 1/25/2022    Murmur     Osteoarthritis 2006    bursitis R shoulder    Osteoarthritis of right knee     PVC (premature ventricular contraction)     H/O PVC's    Raynaud disease     SSS (sick sinus syndrome) ([facility] [address] 5/6/2022    Venous stasis     Wears hearing aid     both ears       Past Surgical History:  Past Surgical History:   Procedure Laterality Date    HX BREAST BIOPSY Bilateral     x5    HX CATARACT REMOVAL  2004    bilateral    HX COLONOSCOPY  2002, 08/2007    HX DILATION AND CURETTAGE  1960's    HX HYSTERECTOMY  66's    BSO    HX OTHER SURGICAL  2001    sigmoidoscopy       Family History:  Family History   Problem Relation Age of Onset    Diabetes Brother     Hypertension Mother     Diabetes Brother        Social History:  Social History     Tobacco Use    Smoking status: Never Smoker    Smokeless tobacco: Never Used   Vaping Use    Vaping Use: Never used   Substance Use Topics    Alcohol use: No     Alcohol/week: 0.0 standard drinks    Drug use: No       Current Medications:  No current facility-administered medications on file prior to encounter. Current Outpatient Medications on File Prior to Encounter   Medication Sig Dispense Refill    furosemide (LASIX) 40 mg tablet TAKE 1 TABLET BY MOUTH DAILY 90 Tablet 1    lidocaine (LIDODERM) 5 % APPLY PATCH TO AFFECTED AREA FOR 12 HOURS DAILY AND REMOVE 12 HOURS A DAY 90 Patch 1    gabapentin (NEURONTIN) 100 mg capsule TAKE 1 TO 2 CAPSULES BY MOUTH THREE TIMES DAILY.  MAX DAILY AMOUNT: 600 MG (Patient taking differently: Take 200 mg by mouth three (3) times daily. ) 180 Capsule 3    omeprazole (PRILOSEC) 40 mg capsule TAKE 1 CAPSULE BY MOUTH EVERY MORNING TO CONTROL STOMACH ACID 90 Capsule 1    amLODIPine (NORVASC) 5 mg tablet TAKE 1 TABLET BY MOUTH DAILY 90 Tablet 1    lovastatin (MEVACOR) 40 mg tablet TAKE 1 TABLET BY MOUTH NIGHTLY FOR CHOLESTEROL LOWERING 90 Tablet 1    irbesartan (AVAPRO) 300 mg tablet TAKE 1 TABLET BY MOUTH NIGHTLY 90 Tablet 1    hydrALAZINE (APRESOLINE) 100 mg tablet TAKE 1 TABLET BY MOUTH THREE TIMES DAILY 270 Tablet 1    cinacalcet (SENSIPAR) 30 mg tablet Take 30 mg by mouth daily.  fluticasone propionate (FLONASE) 50 mcg/actuation nasal spray SPRAY 1 PUFF IN EACH NOSTRIL ONCE DAILY 1 Bottle 5    ferrous sulfate (Iron) 325 mg (65 mg iron) tablet Take  by mouth Daily (before breakfast).  cetirizine (ZYRTEC) 10 mg tablet Take 10 mg by mouth daily as needed.  TRUE METRIX GLUCOSE TEST STRIP strip       aspirin delayed-release 81 mg tablet Take 1 Tab by mouth daily. 30 Tab 0    acetaminophen (TYLENOL EXTRA STRENGTH) 500 mg tablet Take 1 Tab by mouth every six (6) hours as needed for Pain. 30 Tab 1       Allergies: Allergies   Allergen Reactions    Metformin Other (comments)     Kidneys effected     Review of Systems    A complete ROS was reviewed by me today and was negative, unless otherwise specified below:    Review of Systems   Constitutional: Positive for chills, fatigue and fever. Respiratory: Negative for cough and shortness of breath. Cardiovascular: Negative for chest pain. Gastrointestinal: Negative for abdominal pain. Neurological: Positive for weakness. Psychiatric/Behavioral: Positive for confusion. All other systems reviewed and are negative.       Physical Exam   Vital Signs  Patient Vitals for the past 8 hrs:   Temp Pulse Resp BP SpO2   07/11/22 1656 98.5 °F (36.9 °C) 60 16 (!) 140/70 97 %   07/11/22 1511 98.4 °F (36.9 °C) 63 16 (!) 148/51 100 %          Physical Exam  Vitals and nursing note reviewed. Constitutional:       General: She is not in acute distress. Appearance: Normal appearance. She is well-developed. She is not ill-appearing or toxic-appearing. Comments: Alert, oriented, well-appearing for her advanced age   HENT:      Head: Normocephalic and atraumatic. Mouth/Throat:      Mouth: Mucous membranes are moist.   Eyes:      General:         Right eye: No discharge. Left eye: No discharge. Conjunctiva/sclera: Conjunctivae normal.   Cardiovascular:      Rate and Rhythm: Normal rate and regular rhythm. Heart sounds: Normal heart sounds. No murmur heard. Pulmonary:      Effort: Pulmonary effort is normal. No respiratory distress. Breath sounds: Normal breath sounds. No wheezing. Abdominal:      General: There is no distension. Palpations: Abdomen is soft. Tenderness: There is no abdominal tenderness. Musculoskeletal:         General: No deformity. Normal range of motion. Cervical back: Normal range of motion and neck supple. Skin:     General: Skin is warm and dry. Findings: No rash. Neurological:      General: No focal deficit present. Mental Status: She is alert and oriented to person, place, and time.    Psychiatric:         Speech: Speech normal.         Behavior: Behavior normal.         Cognition and Memory: Cognition normal.         Diagnostic Study Results   Labs  Recent Results (from the past 24 hour(s))   URINALYSIS W/ REFLEX CULTURE    Collection Time: 07/11/22  3:41 PM    Specimen: Urine   Result Value Ref Range    Color YELLOW/STRAW      Appearance CLEAR CLEAR      Specific gravity 1.014      pH (UA) 5.5 5.0 - 8.0      Protein Negative NEG mg/dL    Glucose Negative NEG mg/dL    Ketone Negative NEG mg/dL    Bilirubin Negative NEG      Blood Negative NEG      Urobilinogen 0.2 0.2 - 1.0 EU/dL    Nitrites Negative NEG      Leukocyte Esterase TRACE (A) NEG      UA:UC IF INDICATED CULTURE NOT INDICATED BY UA RESULT      WBC 0-4 0 - 4 /hpf    RBC 0-5 0 - 5 /hpf    Epithelial cells MANY (A) FEW /lpf    Bacteria Negative NEG /hpf    Hyaline cast 2-5 0 - 2 /lpf   CBC WITH AUTOMATED DIFF    Collection Time: 07/11/22  3:48 PM   Result Value Ref Range    WBC 2.7 (L) 3.6 - 11.0 K/uL    RBC 3.24 (L) 3.80 - 5.20 M/uL    HGB 10.7 (L) 11.5 - 16.0 g/dL    HCT 33.8 (L) 35.0 - 47.0 %    .3 (H) 80.0 - 99.0 FL    MCH 33.0 26.0 - 34.0 PG    MCHC 31.7 30.0 - 36.5 g/dL    RDW 15.2 (H) 11.5 - 14.5 %    PLATELET 022 908 - 298 K/uL    MPV 9.2 8.9 - 12.9 FL    NRBC 0.0 0  WBC    ABSOLUTE NRBC 0.00 0.00 - 0.01 K/uL    NEUTROPHILS 47 32 - 75 %    LYMPHOCYTES 37 12 - 49 %    MONOCYTES 14 (H) 5 - 13 %    EOSINOPHILS 1 0 - 7 %    BASOPHILS 1 0 - 1 %    IMMATURE GRANULOCYTES 0 0.0 - 0.5 %    ABS. NEUTROPHILS 1.3 (L) 1.8 - 8.0 K/UL    ABS. LYMPHOCYTES 1.0 0.8 - 3.5 K/UL    ABS. MONOCYTES 0.4 0.0 - 1.0 K/UL    ABS. EOSINOPHILS 0.0 0.0 - 0.4 K/UL    ABS. BASOPHILS 0.0 0.0 - 0.1 K/UL    ABS. IMM. GRANS. 0.0 0.00 - 0.04 K/UL    DF AUTOMATED     METABOLIC PANEL, COMPREHENSIVE    Collection Time: 07/11/22  3:48 PM   Result Value Ref Range    Sodium 137 136 - 145 mmol/L    Potassium 4.2 3.5 - 5.1 mmol/L    Chloride 104 97 - 108 mmol/L    CO2 29 21 - 32 mmol/L    Anion gap 4 (L) 5 - 15 mmol/L    Glucose 94 65 - 100 mg/dL    BUN 43 (H) 6 - 20 MG/DL    Creatinine 2.97 (H) 0.55 - 1.02 MG/DL    BUN/Creatinine ratio 14 12 - 20      GFR est AA 18 (L) >60 ml/min/1.73m2    GFR est non-AA 15 (L) >60 ml/min/1.73m2    Calcium 10.4 (H) 8.5 - 10.1 MG/DL    Bilirubin, total 0.6 0.2 - 1.0 MG/DL    ALT (SGPT) 14 12 - 78 U/L    AST (SGOT) 43 (H) 15 - 37 U/L    Alk. phosphatase 50 45 - 117 U/L    Protein, total 7.5 6.4 - 8.2 g/dL    Albumin 3.5 3.5 - 5.0 g/dL    Globulin 4.0 2.0 - 4.0 g/dL    A-G Ratio 0.9 (L) 1.1 - 2.2         Radiologic Studies  XR CHEST PA LAT   Final Result   No acute cardiopulmonary findings.         CT Results  (Last 48 hours)    None CXR Results  (Last 48 hours)               07/11/22 1610  XR CHEST PA LAT Final result    Impression:  No acute cardiopulmonary findings. Narrative:  EXAM: XR CHEST PA LAT       INDICATION: sob       COMPARISON: Chest radiograph May 6, 2022. FINDINGS: PA and lateral radiographs of the chest demonstrate RIGHT pectoral   implanted pacemaker with lead tips projecting over the RIGHT atrium and the   RIGHT ventricle. Lungs are adequately expanded. The heart size is normal. No   focal lung consolidation is seen. No pleural effusion or pneumothorax is   identified. Billable Procedures   EKG reviewed by ED Physician in the absence of a cardiologist: Yes  EKG below was interpreted by Emilie Sahni MD    Procedures    Medical Decision Making     I reviewed the patient's most recent Emergency Dept notes and diagnostic tests in formulating my MDM on today's visit. Provider Notes (Medical Decision Making):   80-year-old female presenting with COVID-19 infection for the past few days, last night had rigors and confusion and delirium, currently improved. On my examination she has completely normal vital signs, lungs are completely clear. Well-appearing, alert and oriented despite her advanced age. No focal weakness symptoms. Laboratories are all reassuring. No significant acute findings, no signs of sepsis. Chest x-ray is clear, no infiltrate or signs of pneumonia. No steroids are indicated as she has no wheezing or significant coughing. Would not recommend Paxlovid. Stable for discharge home after hydration.         Emilie Sahni MD  8:40 PM  7/11/2022       Social History     Tobacco Use    Smoking status: Never Smoker    Smokeless tobacco: Never Used   Vaping Use    Vaping Use: Never used   Substance Use Topics    Alcohol use: No     Alcohol/week: 0.0 standard drinks    Drug use: No       Medications Administered during ED course:  Medications   acetaminophen (TYLENOL) tablet 975 mg (has no administration in time range)   sodium chloride 0.9 % bolus infusion 500 mL (500 mL IntraVENous New Bag 7/11/22 1943)          Prescriptions from today's ED visit:  Current Discharge Medication List         Diagnosis and Disposition     Disposition:  Discharged    Clinical Impression:   1. COVID-19    2. Rigors        Attestation:  I personally performed the services described in this documentation on this date 7/11/2022 for patient Maxwell Key. Siddhartha Guillaume MD        I was the first provider for this patient on this visit. To the best of my ability I reviewed relevant prior medical records, electrocardiograms, laboratories, and radiologic studies. The patient's presenting problems were discussed, and the patient was in agreement with the care plan formulated and outlined with them. Siddhartha Guillaume MD    Please note that this dictation was completed with Dragon voice recognition software. Quite often unanticipated grammatical, syntax, homophones, and other interpretive errors are inadvertently transcribed by the computer software. Please disregard these errors and excuse any errors that have escaped final proofreading.

## 2022-07-14 NOTE — PROGRESS NOTES
Consent:  She and/or her healthcare decision maker is aware that this patient-initiated Telehealth encounter is a billable service, with coverage as determined by her insurance carrier. She is aware that she may receive a bill and has provided verbal consent to proceed: Yes    I was in the office while conducting this encounter. Shirley Henderson is a 80 y.o. female who was seen by synchronous (real-time) audio-video technology on 7/15/2022. Pt was seen at home. I spoke to her daughter Margarito Howell and son Lida Lobo today. Subjective:   Positive For Covid-19 and Altered mental status (started last thursday)    Mrs. Reynaldo Man is a 81yo female who presents today via telephone visit to follow up for an ER visit. She was brought to the ER on 7-11-22 by her daughter. Per ER note \"patient seemed delirious and was having shaking chills. She was seen a few days ago in a different emergency department and diagnosed with COVID-19. She dis not have any significant respiratory symptoms. She had become weaker and unable to walk over the last 24 hours in the setting of her COVID infection. Laboratories were all reassuring. There were no significant acute findings, no signs of sepsis. Chest x-ray was clear. No steroids are indicated as she has no wheezing or significant coughing. Would not recommend Paxlovid. Stable for discharge home after hydration. \"    I spoke to her son Lida Lobo and her daughter Margarito Howell today. They are staying at her house caring for her. She is pretty much confined to the bed, too weak to ambulate, poor appetite, and only awake for maybe 4 hours a day and these are not 4 consecutive hours. They are trying to get her to take her medications but they are having to crush them up and put them in appelsauce. This is a drastic change in her activity level since prior to catching Covid. Her vitals are stable today:  BP: 106/50  HR: 52  Temp: 97.2  O2 sat: 95% on RA     They are interested in a referral to hospice. She was supposed to have upcoming TAVR surgery, however this has been post-poned. I received a call from her cardiologist Dr Bridger Gallegos yesterday, who stated they are supportive of a referral to hospice if this is what the family wishes. PMH, SH, Medications/Allergies: reviewed, on chart. Current Outpatient Medications   Medication Sig    furosemide (LASIX) 40 mg tablet TAKE 1 TABLET BY MOUTH DAILY    lidocaine (LIDODERM) 5 % APPLY PATCH TO AFFECTED AREA FOR 12 HOURS DAILY AND REMOVE 12 HOURS A DAY    gabapentin (NEURONTIN) 100 mg capsule TAKE 1 TO 2 CAPSULES BY MOUTH THREE TIMES DAILY. MAX DAILY AMOUNT: 600 MG (Patient taking differently: Take 200 mg by mouth three (3) times daily.)    omeprazole (PRILOSEC) 40 mg capsule TAKE 1 CAPSULE BY MOUTH EVERY MORNING TO CONTROL STOMACH ACID    amLODIPine (NORVASC) 5 mg tablet TAKE 1 TABLET BY MOUTH DAILY    lovastatin (MEVACOR) 40 mg tablet TAKE 1 TABLET BY MOUTH NIGHTLY FOR CHOLESTEROL LOWERING    irbesartan (AVAPRO) 300 mg tablet TAKE 1 TABLET BY MOUTH NIGHTLY    hydrALAZINE (APRESOLINE) 100 mg tablet TAKE 1 TABLET BY MOUTH THREE TIMES DAILY    cinacalcet (SENSIPAR) 30 mg tablet Take 30 mg by mouth daily.  fluticasone propionate (FLONASE) 50 mcg/actuation nasal spray SPRAY 1 PUFF IN EACH NOSTRIL ONCE DAILY    ferrous sulfate (Iron) 325 mg (65 mg iron) tablet Take  by mouth Daily (before breakfast).  cetirizine (ZYRTEC) 10 mg tablet Take 10 mg by mouth daily as needed.  TRUE METRIX GLUCOSE TEST STRIP strip     aspirin delayed-release 81 mg tablet Take 1 Tab by mouth daily.  acetaminophen (TYLENOL EXTRA STRENGTH) 500 mg tablet Take 1 Tab by mouth every six (6) hours as needed for Pain. No current facility-administered medications for this visit.       Allergies   Allergen Reactions    Metformin Other (comments)     Kidneys effected       ROS:  Gen: denies fever, +chills and fatigue  HEENT:denies H/A or vision changes  Resp: denies dyspnea, cough, or wheezing  CV: denies chest pain, pressure, or palpitations  Extremeties: +edema  Musculoskeletal: +chronic right hip pain  Neuro: +AMS (lethargic) denies numbness/tingling or dizziness  Skin: denies rashes or new lesions     VS review: Wt Readings from Last 3 Encounters:   07/11/22 173 lb (78.5 kg)   07/08/22 173 lb (78.5 kg)   07/01/22 149 lb 7.6 oz (67.8 kg)     BP Readings from Last 3 Encounters:   07/11/22 (!) 151/56   07/08/22 (!) 146/54   07/01/22 (!) 129/59       Objective:     General: alert, fatigued, no distress   Mental  status: mental status: alert, oriented to person, place, and time, normal mood, behavior, speech, dress, motor activity, and thought processes   Resp: resp: normal effort and no respiratory distress   Neuro: neuro: no gross deficits   Skin: skin: no discoloration or lesions of concern on visible areas       Assessment & Plan:     Covid 19  Pt has rapidly deteriorated in the last week since catching Covid. She is 80years old and needs a cardiac valve replacement. She also has stage 4 CKD and type 2 diabetes. Due to her comorbidities I am not confident that her condition will improve so I feel a hospice consult is appropriate. Referral order placed. Advised son to hold her Hydralazine and also the Amlodipine if SBP drops lower than 100. He can also contact the cardiologist for further direction on her medications if needed. F/U prn        Time-based coding, delete if not needed: I spent at least 25 minutes with this established patient, and >50% of the time was spent counseling and/or coordinating care regarding Covid infection and the need for hospice services. Annie Kim NP      Due to this being a TeleHealth evaluation, many elements of the physical examination are unable to be assessed. We discussed the expected course, resolution and complications of the diagnosis(es) in detail.   Medication risks, benefits, costs, interactions, and alternatives were discussed as indicated. I advised her to contact the office if her condition worsens, changes or fails to improve as anticipated. She expressed understanding with the diagnosis(es) and plan. Pursuant to the emergency declaration under the [facility] waiver authority and the [employer] Act, this Virtual  Visit was conducted, with patient's consent, to reduce the patient's risk of exposure to COVID-19 and provide continuity of care for an established patient. Services were provided through a video synchronous discussion virtually to substitute for in-person clinic visit.     CPT Codes 86983-30906 for Established Patients may apply to this Telehealth Visit

## 2022-07-14 NOTE — TELEPHONE ENCOUNTER
Called and spoke with patient's daughter. Pt was diagnosed with covid on 7/8. She had high fevers, lethargy, and was unable to eat or take medications for several days. Per her daughter she is slowly doing better but is basically bed bound. She is requiring family to pick her up out of bed to go to the bathroom and for ADLs. Daughter is asking about the possibility of TAVR vs. Hospice. She has a virtual appointment with PCP tomorrow. Updated Dr. Becca Estrella on patient status. CSS will call daughter to check on her in 1 week. I updated her PCP as well. CSS ok with hospice choice if family would like to pursue that route.

## 2022-07-15 NOTE — PROGRESS NOTES
Chief Complaint   Patient presents with    Positive For Covid-19    Altered mental status     started last thursday       1. \"Have you been to the ER, urgent care clinic since your last visit? Hospitalized since your last visit? \" Yes When: [facility]    2. \"Have you seen or consulted any other health care providers outside of the [address] [place] [address] since your last visit? \" No     3. For patients aged 39-70: Has the patient had a colonoscopy / FIT/ Cologuard? NA - based on age      If the patient is female:    4. For patients aged 41-77: Has the patient had a mammogram within the past 2 years? NA - based on age or sex      11. For patients aged 21-65: Has the patient had a pap smear? NA - based on age or sex      Identified pt with two pt identifiers(name and ). Reviewed record in preparation for visit and have obtained necessary documentation.     Symptom review:    NO  Fever   YES  Shaking chills  NO  Cough  NO Headaches  NO  Body aches  NO  Coughing up blood  NO  Chest congestion  NO  Chest pain  NO  Shortness of breath  NO  Profound Loss of smell/taste  NO  Nausea/Vomiting   NO  Loose stool/Diarrhea  NO  any skin issues

## 2022-07-18 NOTE — TELEPHONE ENCOUNTER
What services from home health are they wanting specifically? I think PT for strengthening is the only thing that would be covered.

## 2022-07-19 NOTE — PROGRESS NOTES
Patient resolved from Transition of Care episode on 7/19/22. ACM/CTN was unsuccessful at contacting this patient today. Patient/family was provided the following resources and education related to COVID-19 during the initial call:                         Signs, symptoms and red flags related to COVID-19            CDC exposure and quarantine guidelines            [facility] exposure contact - [phone]            Contact for their local Department of Health                 Patient has not had any additional ED or hospital visits. No further outreach scheduled with this CTN/ACM. Episode of Care resolved. Patient has this CTN/ACM contact information if future needs arise.

## 2022-07-19 NOTE — TELEPHONE ENCOUNTER
Spoke with daughter this am. Her brother is going to call to start Hospice today or tomorrow. They just cancelled because there has been many deaths last week in the patients [employer] and told them it was not a good time. Daughter will call back in the next couple of days to give update.

## 2022-07-20 NOTE — TELEPHONE ENCOUNTER
Spoke to [employer] [facility]) Pts kids are wanting [place] [facility]service for strengthening.

## 2022-07-21 NOTE — TELEPHONE ENCOUNTER
Please disregard the previous message. No nursing needed.  Patients son Peter Field) is ok waiting until PT can get out

## 2022-07-21 NOTE — TELEPHONE ENCOUNTER
Belgica Batista from [place] came by wanting to know if we could add nursing to this patients [facility] referral so they can go out and check on her.  Nursing can get out to the patient next week and PT cant get out until the week after

## 2022-07-21 NOTE — TELEPHONE ENCOUNTER
Called patient's daughter to check on patient. She reports that the patient is improving slowly. She is feeling better and able to stand. She is not on hospice and is resuming physical therapy. Will check back with family in a week.

## 2022-07-22 NOTE — PROGRESS NOTES
Problem: Airway Clearance - Ineffective  Goal: Achieve or maintain patent airway  Outcome: Resolved/Met     Problem: Gas Exchange - Impaired  Goal: Absence of hypoxia  Outcome: Resolved/Met  Goal: Promote optimal lung function  Outcome: Resolved/Met     Problem: Breathing Pattern - Ineffective  Goal: Ability to achieve and maintain a regular respiratory rate  Outcome: Resolved/Met     Problem:  Body Temperature -  Risk of, Imbalanced  Goal: Ability to maintain a body temperature within defined limits  Outcome: Resolved/Met  Goal: Will regain or maintain usual level of consciousness  Outcome: Resolved/Met  Goal: Complications related to the disease process, condition or treatment will be avoided or minimized  Outcome: Resolved/Met     Problem: Isolation Precautions - Risk of Spread of Infection  Goal: Prevent transmission of infectious organism to others  Outcome: Resolved/Met     Problem: Nutrition Deficits  Goal: Optimize nutrtional status  Outcome: Resolved/Met     Problem: Risk for Fluid Volume Deficit  Goal: Maintain normal heart rhythm  Outcome: Resolved/Met  Goal: Maintain absence of muscle cramping  Outcome: Resolved/Met  Goal: Maintain normal serum potassium, sodium, calcium, phosphorus, and pH  Outcome: Resolved/Met     Problem: Loneliness or Risk for Loneliness  Goal: Demonstrate positive use of time alone when socialization is not possible  Outcome: Resolved/Met     Problem: Fatigue  Goal: Verbalize increase energy and improved vitality  Outcome: Resolved/Met     Problem: Patient Education: Go to Patient Education Activity  Goal: Patient/Family Education  Outcome: Resolved/Met     Problem: Patient Education:  Go to Education Activity  Goal: Patient/Family Education  Outcome: Resolved/Met

## 2022-07-27 NOTE — PROGRESS NOTES
Marisa Mota is a 80 y.o. female evaluated via telephone on 7/27/2022. Consent:  She and/or health care decision maker is aware that that she may receive a bill for this telephone service, depending on her insurance coverage, and has provided verbal consent to proceed: Yes    The patient was at home during the visit. I, the provider, was at the office during this visit. I talked to her son and main caregiver, Evaristo Bennett. CC: aortic stenosis    HPI  Mrs. Poncho Garcia is a 79yo female who presents today via telephone visit for a routine 3 month follow up for aortic stenosis, CKD Stage 4, and other chronic medication problems. New issues:  She was brought to the ER on 7-11-22 by her daughter. Per ER note \"patient seemed delirious and was having shaking chills. She was seen a few days ago in a different emergency department and diagnosed with COVID-19. She dis not have any significant respiratory symptoms. She had become weaker and unable to walk over the last 24 hours in the setting of her COVID infection. Laboratories were all reassuring. There were no significant acute findings, no signs of sepsis. Chest x-ray was clear. No steroids are indicated as she has no wheezing or significant coughing. Would not recommend Paxlovid. Stable for discharge home after hydration. \"     A few days later she had a virtual follow up visit with me and I had spoken to her son Evaristo Bennett and her daughter Brittany Akins. The reported she was pretty much confined to the bed, too weak to ambulate, had a poor appetite, and was only awake for maybe 4 hours a day. They were trying to get her to take her medications but they were having to crush them up and put them in appelsauce. After a long discussion she was referred to hospice, with the understanding that they could cancel the referral at any time if Mrs. Poncho Garcia showed signs of improvement.  A few days later they were requesting a referral to home health for PT for strengthening eitan Vásquez states Mrs. Aleksandr Lara had perked up and they were able to take her to her appointment to get her shot last week. But then the next 2 days she had regressed and was back in bed for most of the day. He is now intersted in a referral for palliative care. She was supposed to have upcoming TAVR surgery for severe arotic stenosis, however this has been post-poned. She has stage 4 CKD and is followed by Dr Ayleen Narayanan. Lab Results   Component Value Date/Time    Creatinine 2.97 (H) 07/11/2022 03:48 PM          PLAN  Generalized weakness (recent Covid infection)  Referred to palliative care per son's request    Aortic stenosis  TAVR surgery has been post poned  Follow up with cardiology when patient is feeling better    CKD stage 4  Follow up with Dr Ayleen Narayanan as scheduled if patient is feeling better    F/U 3 months or sooner prn        Documentation:  I communicated with the patient and/or health care decision maker about the plan of care as noted above. I affirm this is a Patient Initiated Episode with an Established Patient who has not had a related appointment within my department in the past 7 days or scheduled within the next 24 hours.     Total Time: minutes: 5-10 minutes    Note: not billable if this call serves to triage the patient into an appointment for the relevant concern      Crystal Lopez NP

## 2022-07-29 NOTE — TELEPHONE ENCOUNTER
Spoke to pts daughter Luanne Rodriguez and stated to her they don't have palliative care at [facility] ([facility]) She stated that's fine and she would reach back out to us if she;s wanting to do hospice

## 2022-07-29 NOTE — TELEPHONE ENCOUNTER
Called son Martina Clark. He is helping to take care of his mother, his sister has moved her stuff from [place] and is not staying with her mother to help out. They feel her physical condition has deteriorated, she has days when she can move around some, others they are having trouble getting her out of bed. They are still trying to get her in for her retacrit injections. The are wondering since frequently she does not need the shot, if she needs to come as often? They do not have the finances to have in home care and do not want to look into assisted living.

## 2022-08-02 NOTE — TELEPHONE ENCOUNTER
Refill Request Received for the Following Medication     Requested Prescriptions     Pending Prescriptions Disp Refills    hydrALAZINE (APRESOLINE) 100 mg tablet [Pharmacy Med Name: HYDRALAZINE 100MG (HUNDRED MG) TABS] 270 Tablet 1     Sig: TAKE 1 TABLET BY MOUTH THREE TIMES DAILY       Last Prescribed:04-    Last Appointment With Me:  09-    Future Appointments:  Future Appointments   Date Time Provider Michelle Bob   8/5/2022  1:30 PM [facility]   8/19/2022  1:30 PM [facility] 5 [address] [facility]   9/2/2022  1:30 PM [facility] 3 [address] [facility]   9/26/2022  1:40 PM Rodney Linares MD [facility]

## 2022-08-05 NOTE — TELEPHONE ENCOUNTER
Spoke to Brandon Jeong from [facility] and they went out to evaluate this pt this morning.  Soon as they arrive the pts kids stated that the pt has not eaten anything for a week and is not drinking and now in need of hospice

## 2022-08-09 NOTE — TELEPHONE ENCOUNTER
Per note in chart, patient is going with hospice. Called son Lida Lobo to confirm, no answer and VM is full.

## 2022-08-19 ENCOUNTER — APPOINTMENT (OUTPATIENT)
Dept: INFUSION THERAPY | Age: 87
End: 2022-08-19

## 2022-09-02 ENCOUNTER — APPOINTMENT (OUTPATIENT)
Dept: INFUSION THERAPY | Age: 87
End: 2022-09-02

## 2023-08-24 NOTE — TELEPHONE ENCOUNTER
----- Message from Jeanna Charles MD sent at 8/23/2021 11:47 AM EDT -----  Regarding: Holter  Advise no concerns--normal rhythm with HR , only occasional skipped beats and one 5 beat run of PAT. If recurrent syncope would do longer monitor (Event up to 30 days).   Thanks [facility]
Spoke with the patient. Verified patient with two patient identifiers. Results given and questions answered. Patient verbalized understanding.
None

## (undated) DEVICE — KIT ACCS INTRO 4FR L10CM NDL 21GA L7CM GWIRE L40CM

## (undated) DEVICE — GUIDEWIRE VASC L260CM 0.035IN J TIP L3MM PTFE FIX COR NAMIC

## (undated) DEVICE — TUBING PRSS MON L6IN PVC M FEM CONN

## (undated) DEVICE — INTRO PEELWY HEMVLV 7F 13CM -- SHRT PRELUDE SNAP

## (undated) DEVICE — SUTURE V-LOC 180 SZ 2-0 L12IN ABSRB VLT GS-21 L37MM 1/2 CIR VLOCM0315

## (undated) DEVICE — 3M™ TEGADERM™ TRANSPARENT FILM DRESSING FRAME STYLE, 1626W, 4 IN X 4-3/4 IN (10 CM X 12 CM), 50/CT 4CT/CASE: Brand: 3M™ TEGADERM™

## (undated) DEVICE — GLIDESHEATH SLENDER ACCESS KIT: Brand: GLIDESHEATH SLENDER

## (undated) DEVICE — PROVE COVER: Brand: UNBRANDED

## (undated) DEVICE — SUT SLK 0 30IN SH BLK --

## (undated) DEVICE — KIT ANGIOGRAPHY CUST MRMC

## (undated) DEVICE — DEVICE COMPR REG 24 CM VASC BND

## (undated) DEVICE — INTRO SHTH HEMO 9FR 18G 13CM -- PRELUDE SNAP PEEL-AWAY

## (undated) DEVICE — PACEMAKER SETUP: Brand: MEDLINE INDUSTRIES, INC.

## (undated) DEVICE — RADIFOCUS OPTITORQUE ANGIOGRAPHIC CATHETER: Brand: OPTITORQUE

## (undated) DEVICE — 3M™ IOBAN™ 2 ANTIMICROBIAL INCISE DRAPE 6650EZ: Brand: IOBAN™ 2

## (undated) DEVICE — GLIDESHEATH SLENDER TIBIAL PEDAL KIT: Brand: GLIDESHEATH SLENDER TIBIAL PEDAL KIT

## (undated) DEVICE — SYRINGE ANGIO 10 CC BRL STD PRNT POLYCARB LT BLU MEDALLION

## (undated) DEVICE — DERMABOND SKIN ADH 0.7ML -- DERMABOND ADVANCED 12/BX

## (undated) DEVICE — SPLINT WR POS F/ARTERIAL ACC -- BX/10

## (undated) DEVICE — ABSORBABLE WOUND CLOSURE DEVICE: Brand: V-LOC 90

## (undated) DEVICE — HI-TORQUE VERSACORE FLOPPY GUIDE WIRE SYSTEM 145 CM: Brand: HI-TORQUE VERSACORE

## (undated) DEVICE — CATHETER ETER ANGIO L110CM OD5FR ID046IN L75CM 038IN 145DEG CARD

## (undated) DEVICE — MEDI-TRACE CADENCE ADULT, DEFIBRILLATION ELECTRODE -RTS  (10 PR/PK) - PHYSIO-CONTROL: Brand: MEDI-TRACE CADENCE

## (undated) DEVICE — HEART CATH-MRMC: Brand: MEDLINE INDUSTRIES, INC.

## (undated) DEVICE — SYR ART 700 CLEAR MARK 7 -- ARTERION

## (undated) DEVICE — TRAY,IRRIGATION,PISTON SYRINGE,60ML,STRL: Brand: MEDLINE